# Patient Record
Sex: MALE | Race: ASIAN
[De-identification: names, ages, dates, MRNs, and addresses within clinical notes are randomized per-mention and may not be internally consistent; named-entity substitution may affect disease eponyms.]

---

## 2017-01-16 ENCOUNTER — HOSPITAL ENCOUNTER (OUTPATIENT)
Dept: HOSPITAL 17 - CLAB | Age: 62
End: 2017-01-16
Attending: FAMILY MEDICINE
Payer: COMMERCIAL

## 2017-01-16 DIAGNOSIS — E78.1: ICD-10-CM

## 2017-01-16 DIAGNOSIS — R53.83: ICD-10-CM

## 2017-01-16 DIAGNOSIS — R94.5: ICD-10-CM

## 2017-01-16 DIAGNOSIS — M25.50: ICD-10-CM

## 2017-01-16 DIAGNOSIS — E78.00: ICD-10-CM

## 2017-01-16 DIAGNOSIS — E11.9: ICD-10-CM

## 2017-01-16 DIAGNOSIS — Z00.00: Primary | ICD-10-CM

## 2017-01-16 DIAGNOSIS — Z12.5: ICD-10-CM

## 2017-01-16 DIAGNOSIS — E03.9: ICD-10-CM

## 2017-01-16 DIAGNOSIS — D64.9: ICD-10-CM

## 2017-01-16 LAB
ALP SERPL-CCNC: 84 U/L (ref 45–117)
ALT SERPL-CCNC: 20 U/L (ref 12–78)
ANION GAP SERPL CALC-SCNC: 11 MEQ/L (ref 5–15)
AST SERPL-CCNC: 23 U/L (ref 15–37)
BASOPHILS # BLD AUTO: 0.1 TH/MM3 (ref 0–0.2)
BASOPHILS NFR BLD: 1.1 % (ref 0–2)
BILIRUB INDIRECT SERPL-MCNC: 0.4 MG/DL (ref 0–0.8)
BILIRUB SERPL-MCNC: 0.5 MG/DL (ref 0.2–1)
BUN SERPL-MCNC: 13 MG/DL (ref 7–18)
CHLORIDE SERPL-SCNC: 103 MEQ/L (ref 98–107)
EOSINOPHIL # BLD: 1.3 TH/MM3 (ref 0–0.4)
EOSINOPHIL NFR BLD: 22 % (ref 0–4)
ERYTHROCYTE [DISTWIDTH] IN BLOOD BY AUTOMATED COUNT: 14 % (ref 11.6–17.2)
GFR SERPLBLD BASED ON 1.73 SQ M-ARVRAT: 100 ML/MIN (ref 89–?)
HCO3 BLD-SCNC: 22.5 MEQ/L (ref 21–32)
HCT VFR BLD CALC: 42.4 % (ref 39–51)
HDLC SERPL-MCNC: 33.8 MG/DL (ref 40–60)
HEMO FLAGS: (no result)
HEMOGLOBIN A1A: 1 %
HEMOGLOBIN A1B: 1.6 %
HEMOGLOBIN AO: 85.2 %
HEMOGLOBIN LA1C: 2.1 %
HEMOGLOBIN P3: 4.1 %
LDLC SERPL-MCNC: 105 MG/DL (ref 0–99)
LYMPHOCYTES # BLD AUTO: 1.6 TH/MM3 (ref 1–4.8)
LYMPHOCYTES NFR BLD AUTO: 26 % (ref 9–44)
MCH RBC QN AUTO: 28.8 PG (ref 27–34)
MCHC RBC AUTO-ENTMCNC: 34 % (ref 32–36)
MCV RBC AUTO: 84.5 FL (ref 80–100)
MONOCYTES NFR BLD: 7.1 % (ref 0–8)
NEUTROPHILS # BLD AUTO: 2.6 TH/MM3 (ref 1.8–7.7)
NEUTROPHILS NFR BLD AUTO: 43.8 % (ref 16–70)
PLATELET # BLD: 180 TH/MM3 (ref 150–450)
POTASSIUM SERPL-SCNC: 3.6 MEQ/L (ref 3.5–5.1)
RBC # BLD AUTO: 5.01 MIL/MM3 (ref 4.5–5.9)
SODIUM SERPL-SCNC: 136 MEQ/L (ref 136–145)
VIT B12 SERPL-MCNC: 529 PG/ML (ref 193–986)
WBC # BLD AUTO: 6 TH/MM3 (ref 4–11)

## 2017-01-16 PROCEDURE — 83036 HEMOGLOBIN GLYCOSYLATED A1C: CPT

## 2017-01-16 PROCEDURE — 85025 COMPLETE CBC W/AUTO DIFF WBC: CPT

## 2017-01-16 PROCEDURE — 36415 COLL VENOUS BLD VENIPUNCTURE: CPT

## 2017-01-16 PROCEDURE — 80061 LIPID PANEL: CPT

## 2017-01-16 PROCEDURE — 80076 HEPATIC FUNCTION PANEL: CPT

## 2017-01-16 PROCEDURE — 80048 BASIC METABOLIC PNL TOTAL CA: CPT

## 2017-01-16 PROCEDURE — 82746 ASSAY OF FOLIC ACID SERUM: CPT

## 2017-01-16 PROCEDURE — 84443 ASSAY THYROID STIM HORMONE: CPT

## 2017-01-16 PROCEDURE — 82607 VITAMIN B-12: CPT

## 2017-01-16 PROCEDURE — 84153 ASSAY OF PSA TOTAL: CPT

## 2017-01-30 ENCOUNTER — HOSPITAL ENCOUNTER (INPATIENT)
Dept: HOSPITAL 17 - NEPE | Age: 62
LOS: 5 days | Discharge: HOME | DRG: 190 | End: 2017-02-04
Attending: SPECIALIST | Admitting: SPECIALIST
Payer: COMMERCIAL

## 2017-01-30 VITALS
HEART RATE: 102 BPM | TEMPERATURE: 98.2 F | RESPIRATION RATE: 20 BRPM | SYSTOLIC BLOOD PRESSURE: 134 MMHG | OXYGEN SATURATION: 87 % | DIASTOLIC BLOOD PRESSURE: 95 MMHG

## 2017-01-30 VITALS — OXYGEN SATURATION: 93 % | HEART RATE: 108 BPM

## 2017-01-30 VITALS
OXYGEN SATURATION: 94 % | HEART RATE: 106 BPM | RESPIRATION RATE: 18 BRPM | TEMPERATURE: 96.5 F | DIASTOLIC BLOOD PRESSURE: 63 MMHG | SYSTOLIC BLOOD PRESSURE: 111 MMHG

## 2017-01-30 VITALS — BODY MASS INDEX: 22.04 KG/M2 | WEIGHT: 132.28 LBS | HEIGHT: 65 IN

## 2017-01-30 VITALS — OXYGEN SATURATION: 93 %

## 2017-01-30 VITALS
SYSTOLIC BLOOD PRESSURE: 173 MMHG | TEMPERATURE: 97.5 F | DIASTOLIC BLOOD PRESSURE: 116 MMHG | HEART RATE: 110 BPM | OXYGEN SATURATION: 85 % | RESPIRATION RATE: 22 BRPM

## 2017-01-30 VITALS — HEART RATE: 118 BPM | OXYGEN SATURATION: 82 %

## 2017-01-30 VITALS
SYSTOLIC BLOOD PRESSURE: 127 MMHG | DIASTOLIC BLOOD PRESSURE: 76 MMHG | OXYGEN SATURATION: 91 % | HEART RATE: 108 BPM | RESPIRATION RATE: 20 BRPM

## 2017-01-30 VITALS — OXYGEN SATURATION: 97 % | RESPIRATION RATE: 20 BRPM

## 2017-01-30 VITALS — TEMPERATURE: 99.9 F

## 2017-01-30 VITALS — TEMPERATURE: 102.8 F

## 2017-01-30 DIAGNOSIS — N40.0: ICD-10-CM

## 2017-01-30 DIAGNOSIS — K51.90: ICD-10-CM

## 2017-01-30 DIAGNOSIS — B37.0: ICD-10-CM

## 2017-01-30 DIAGNOSIS — J18.9: ICD-10-CM

## 2017-01-30 DIAGNOSIS — R09.02: ICD-10-CM

## 2017-01-30 DIAGNOSIS — N39.0: ICD-10-CM

## 2017-01-30 DIAGNOSIS — F41.9: ICD-10-CM

## 2017-01-30 DIAGNOSIS — I48.91: ICD-10-CM

## 2017-01-30 DIAGNOSIS — J44.1: ICD-10-CM

## 2017-01-30 DIAGNOSIS — E87.1: ICD-10-CM

## 2017-01-30 DIAGNOSIS — Z87.891: ICD-10-CM

## 2017-01-30 DIAGNOSIS — I10: ICD-10-CM

## 2017-01-30 DIAGNOSIS — J44.0: Primary | ICD-10-CM

## 2017-01-30 LAB
ANION GAP SERPL CALC-SCNC: 5 MEQ/L (ref 5–15)
APTT BLD: 30.1 SEC (ref 24.3–30.1)
BASE EXCESS BLD CALC-SCNC: -2.5 MMOL/L (ref -2–2)
BASOPHILS # BLD AUTO: 0.1 TH/MM3 (ref 0–0.2)
BASOPHILS NFR BLD: 0.8 % (ref 0–2)
BENZODIAZEPINES PNL UR: 87 % (ref 90–100)
BLOOD GAS CARBOXYHEMOGLOBIN: 2.6 % (ref 0–4)
BLOOD GAS HCO3: 21 MMOL/L (ref 22–26)
BLOOD GAS OXYGEN CONTENT: 19.8 VOL % (ref 12–20)
BLOOD GAS PCO2: 29 MMHG (ref 38–42)
BUN SERPL-MCNC: 23 MG/DL (ref 7–18)
CHLORIDE SERPL-SCNC: 103 MEQ/L (ref 98–107)
CRITICAL VALUE: YES
DRAW SITE: (no result)
EOSINOPHIL # BLD: 1.2 TH/MM3 (ref 0–0.4)
EOSINOPHIL NFR BLD: 15 % (ref 0–4)
EOSINOPHIL NFR BLD: 15 % (ref 0–4)
ERYTHROCYTE [DISTWIDTH] IN BLOOD BY AUTOMATED COUNT: 14.6 % (ref 11.6–17.2)
GFR SERPLBLD BASED ON 1.73 SQ M-ARVRAT: 75 ML/MIN (ref 89–?)
HCO3 BLD-SCNC: 25.7 MEQ/L (ref 21–32)
HCT VFR BLD CALC: 46.2 % (ref 39–51)
HEMO FLAGS: (no result)
INR PPP: 1 RATIO
LITER FLOW: 3 L/M
LYMPHOCYTES # BLD AUTO: 1.6 TH/MM3 (ref 1–4.8)
LYMPHOCYTES NFR BLD AUTO: 19.7 % (ref 9–44)
MCH RBC QN AUTO: 29.5 PG (ref 27–34)
MCHC RBC AUTO-ENTMCNC: 34.3 % (ref 32–36)
MCV RBC AUTO: 86 FL (ref 80–100)
METHGB MFR BLDA: 2 % (ref 0–2)
MONOCYTES NFR BLD: 6.4 % (ref 0–8)
NEUTROPHILS # BLD AUTO: 4.8 TH/MM3 (ref 1.8–7.7)
NEUTROPHILS NFR BLD AUTO: 58.1 % (ref 16–70)
NEUTS BAND # BLD MANUAL: 5.8 TH/MM3 (ref 1.8–7.7)
NEUTS SEG NFR BLD MANUAL: 71 % (ref 16–70)
NUMBER OF ARTERIAL PUNCTURES: 1
OXYGEN DEVICE: (no result)
PLAT MORPH BLD: NORMAL
PLATELET # BLD: 182 TH/MM3 (ref 150–450)
PLATELET BLD QL SMEAR: NORMAL
PO2 BLD: 60 MMHG (ref 61–120)
POTASSIUM SERPL-SCNC: 3.8 MEQ/L (ref 3.5–5.1)
PROTHROMBIN TIME: 10.5 SEC (ref 9.8–11.6)
RBC # BLD AUTO: 5.38 MIL/MM3 (ref 4.5–5.9)
RBC MORPH BLD: NORMAL
SALICYLATES SERPL-MCNC: 16.3 G/DL (ref 12–16)
SCAN/DIFF: (no result)
SODIUM SERPL-SCNC: 134 MEQ/L (ref 136–145)
STAT: YES
TEMP CORR TO: 98.6
ULNAR PULSE: PRESENT
WBC # BLD AUTO: 8.2 TH/MM3 (ref 4–11)
WBC DIFF SAMPLE: 100

## 2017-01-30 PROCEDURE — 94060 EVALUATION OF WHEEZING: CPT

## 2017-01-30 PROCEDURE — 85730 THROMBOPLASTIN TIME PARTIAL: CPT

## 2017-01-30 PROCEDURE — 84484 ASSAY OF TROPONIN QUANT: CPT

## 2017-01-30 PROCEDURE — 85007 BL SMEAR W/DIFF WBC COUNT: CPT

## 2017-01-30 PROCEDURE — 84480 ASSAY TRIIODOTHYRONINE (T3): CPT

## 2017-01-30 PROCEDURE — 93005 ELECTROCARDIOGRAM TRACING: CPT

## 2017-01-30 PROCEDURE — 84443 ASSAY THYROID STIM HORMONE: CPT

## 2017-01-30 PROCEDURE — 87040 BLOOD CULTURE FOR BACTERIA: CPT

## 2017-01-30 PROCEDURE — 85610 PROTHROMBIN TIME: CPT

## 2017-01-30 PROCEDURE — 71275 CT ANGIOGRAPHY CHEST: CPT

## 2017-01-30 PROCEDURE — 94640 AIRWAY INHALATION TREATMENT: CPT

## 2017-01-30 PROCEDURE — 84439 ASSAY OF FREE THYROXINE: CPT

## 2017-01-30 PROCEDURE — 83735 ASSAY OF MAGNESIUM: CPT

## 2017-01-30 PROCEDURE — 93306 TTE W/DOPPLER COMPLETE: CPT

## 2017-01-30 PROCEDURE — 87449 NOS EACH ORGANISM AG IA: CPT

## 2017-01-30 PROCEDURE — 36600 WITHDRAWAL OF ARTERIAL BLOOD: CPT

## 2017-01-30 PROCEDURE — 83605 ASSAY OF LACTIC ACID: CPT

## 2017-01-30 PROCEDURE — 83880 ASSAY OF NATRIURETIC PEPTIDE: CPT

## 2017-01-30 PROCEDURE — 85025 COMPLETE CBC W/AUTO DIFF WBC: CPT

## 2017-01-30 PROCEDURE — 81001 URINALYSIS AUTO W/SCOPE: CPT

## 2017-01-30 PROCEDURE — 87633 RESP VIRUS 12-25 TARGETS: CPT

## 2017-01-30 PROCEDURE — 94664 DEMO&/EVAL PT USE INHALER: CPT

## 2017-01-30 PROCEDURE — 94620: CPT

## 2017-01-30 PROCEDURE — 71010: CPT

## 2017-01-30 PROCEDURE — 82805 BLOOD GASES W/O2 SATURATION: CPT

## 2017-01-30 PROCEDURE — 85652 RBC SED RATE AUTOMATED: CPT

## 2017-01-30 PROCEDURE — 85027 COMPLETE CBC AUTOMATED: CPT

## 2017-01-30 PROCEDURE — 80048 BASIC METABOLIC PNL TOTAL CA: CPT

## 2017-01-30 RX ADMIN — NYSTATIN SCH ML: 100000 SUSPENSION ORAL at 18:10

## 2017-01-30 RX ADMIN — METHYLPREDNISOLONE SODIUM SUCCINATE SCH MG: 40 INJECTION, POWDER, FOR SOLUTION INTRAMUSCULAR; INTRAVENOUS at 16:29

## 2017-01-30 RX ADMIN — GABAPENTIN SCH MG: 100 CAPSULE ORAL at 18:10

## 2017-01-30 RX ADMIN — IPRATROPIUM BROMIDE AND ALBUTEROL SULFATE SCH AMPULE: .5; 3 SOLUTION RESPIRATORY (INHALATION) at 20:34

## 2017-01-30 RX ADMIN — BUDESONIDE AND FORMOTEROL FUMARATE DIHYDRATE SCH PUFF: 160; 4.5 AEROSOL RESPIRATORY (INHALATION) at 21:58

## 2017-01-30 RX ADMIN — HEPARIN SODIUM SCH UNITS: 10000 INJECTION, SOLUTION INTRAVENOUS; SUBCUTANEOUS at 14:01

## 2017-01-30 RX ADMIN — ACETAMINOPHEN PRN MG: 500 TABLET ORAL at 18:10

## 2017-01-30 RX ADMIN — MAGNESIUM SULFATE HEPTAHYDRATE SCH MLS/HR: 500 INJECTION, SOLUTION INTRAMUSCULAR; INTRAVENOUS at 22:03

## 2017-01-30 RX ADMIN — MESALAMINE SCH MG: 800 TABLET, DELAYED RELEASE ORAL at 21:59

## 2017-01-30 RX ADMIN — NYSTATIN SCH ML: 100000 SUSPENSION ORAL at 21:58

## 2017-01-30 RX ADMIN — SODIUM CHLORIDE, PRESERVATIVE FREE SCH ML: 5 INJECTION INTRAVENOUS at 21:59

## 2017-01-30 RX ADMIN — MESALAMINE SCH MG: 800 TABLET, DELAYED RELEASE ORAL at 15:22

## 2017-01-30 RX ADMIN — GABAPENTIN SCH MG: 100 CAPSULE ORAL at 13:59

## 2017-01-30 RX ADMIN — CEFEPIME SCH MLS/HR: 2 INJECTION, POWDER, FOR SOLUTION INTRAVENOUS at 21:58

## 2017-01-30 NOTE — HHI.HP
HPI


Service


[facility]


Primary Care Physician


ASIF Farmer


Admission Diagnosis


Hypoxemia


Diagnoses:  


Chief Complaint:  


Shortness of breath (Wilma Harrison)





Travel History


International Travel<30 Days:  No


Contact w/Intl Traveler <30 Da:  No


Traveled to Known Affected Are:  No (Wilma Harrison)


History of Present Illness


This a pleasant 61-year-old male with notable past medical history of tobacco 

abuse with recent diagnosis of COPD, ulcer colitis, hypertension, BPH.  Patient 

presented to the emergency room with complaint of shortness of breath and 

hypoxia.  According to the patient approximately 2 weeks ago he had shortness 

of breath with wheezing and cough.  Went to see his primary care physician who 

gave him Rocephin IM as well as Solu-Medrol and prescribed Augmentin.  He 

completed 10 days of Augmentin and his symptoms did improve.  He also had PFTs 

and was told he had COPD and was referred to see Dr. Bazan.  Patient recently 

quit December 2016.  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 

he was at work he noted increased shortness of breath and checked his sats in 

the were 81%.  He gave himself oxygen at 2 L and finished his shift.  This 

morning, patient came to the emergency room for further evaluation.  States he 

has a dry cough, very little sputum, clear in color.  No fever, no chills.  

Denies any abdominal pain, no diarrhea, no nausea no vomiting.  He does use 

Symbicort as well as Combivent at home.  He's not on any oxygen.  Patient was 

evaluated in the emergency room, laboratory workup was completed, WBC was 8.2.  

Hemoglobin 15.9, hematocrit 46.2, platelets 182.  BMP was essentially 

unremarkable except for mild hyponatremia, sodium 134.  Troponin was negative.  

B natruretic peptide was negative.  Chest x-ray completed showed questionable 

minimal early interstitial infiltrate to the right lung base.  CTA was negative 

for PE.  ABGs were completed, showed PO2 of 60 on 3 L nasal cannula, sats 87% 

on 3 L.  Patient was started on empiric antibiotics.  Patient is evaluated in 

the emergency room, he is complaining of persistent cough.  Robitussin-AC has 

been ordered.  He is noted with thrush.  Patient is admitted for further 

evaluation and treatment (Wilma Harrison)





Review of Systems


Constitutional:  DENIES: Diaphoretic episodes, Fatigue, Fever, Weight gain, 

Weight loss, Chills, Dizziness, Change in appetite, Night Sweats


Endocrine:  DENIES: Heat/cold intolerance, Polydipsia, Polyuria, Polyphagia


Eyes:  DENIES: Blurred vision, Diplopia, Eye inflammation, Eye pain, Vision loss

, Photosensitivity, Double Vision


Ears, nose, mouth, throat:  DENIES: Tinnitus, Hearing loss, Vertigo, Nasal 

discharge, Oral lesions, Throat pain, Hoarseness, Ear Pain, Running Nose, 

Epistaxis, Sinus Pain, Toothache, Odynophagia


Respiratory:  COMPLAINS OF: Cough, Wheezing, Sputum production, Shortness of 

breath,  DENIES: Apneas, Snoring, Hemoptysis


Cardiovascular:  DENIES: Chest pain, Palpitations, Syncope, Dyspnea on Exertion

, PND, Lower Extremity Edema, Orthopnea, Claudication


Gastrointestinal:  DENIES: Abdominal pain, Black stools, Bloody stools, 

Constipation, Diarrhea, Nausea, Vomiting, Difficulty Swallowing, Anorexia


Genitourinary:  DENIES: Sexual dysfunction, Urinary frequency, Urinary 

incontinence, Urgency, Hematuria, Dysuria, Nocturia, Penile Discharge, 

Testicular Pain, Testicular Swelling


Musculoskeletal:  DENIES: Joint pain, Muscle aches, Stiffness, Joint Swelling, 

Back pain, Neck pain


Integumentary:  DENIES: Abnormal pigmentation, Nail changes, Pruritus, Rash


Hematologic/lymphatic:  DENIES: Bruising, Lymphadenopathy


Immunologic/allergic:  DENIES: Eczema, Urticaria


Neurologic:  DENIES: Abnormal gait, Headache, Localized weakness, Paresthesias, 

Seizures, Speech Problems, Tremor, Poor Balance


Psychiatric:  DENIES: Anxiety, Confusion, Mood changes, Depression, 

Hallucinations, Agitation, Suicidal Ideation, Homicidal Ideation, Delusions (

Wilma Harrison)





Past Family Social History


Past Medical History


Hypertension, diverticulitis, gastritis and ulcerative colitis


Previous admission for sepsis and UTI


Bladder polyp


Recently diagnosed with COPD


Past Surgical History


Benign cyst removal from the buttock 2000 and, keloid removal from left upper 

arm, cystoscopy 2 weeks ago


Colonoscopy


Reported Medications





Reported Meds & Active Scripts


Active


Reported


Symbicort Inh (Budesonide/Formoterol Fumarate) 160-4.5 Mcg/Act Aero 1 Puff INH 

Q12HR


Vitamin C (Ascorbic Acid) 500 Mg Tab 500 Mg PO DAILY


Vitamin B Complex (B-Complex Vitamins) 1 Tab 1 Tab PO DAILY


Duoneb (Ipratropium-Albuterol Neb) 0.5-2.5 Mg/3 Ml Neb 1 Vial NEB Q6HR PRN


Metoprolol Tartrate 25 Mg Tab 12.5 Mg PO DAILY PRN


Clonidine (Clonidine HCl) 0.1 Mg Tab 0.1 Mg PO BID PRN


Tylenol Extra Strength (Acetaminophen) 500 Mg Tab 500 Mg PO Q4-6H PRN


Neurontin (Gabapentin) 100 Mg Cap 100 Mg PO TID


Protonix (Pantoprazole Sodium) 40 Mg Tab 40 Mg PO DAILY PRN


Centrum Silver Adult 50+ (Multiple Vitamins W/ Minerals) 1 Tab Tab 1 Tab PO 

DAILY


Asacol HD (Mesalamine) 800 Mg Tab 1,600 Mg PO Q8HR


     Swallow whole.  Take on an empty stomach.


Bentyl (Dicyclomine HCl) 20 Mg Tab 20 Mg PO TID PRN (Wilma Harrison)


Allergies:  


Coded Allergies:  


     No Known Allergies (Verified , 1/30/17)


Active Ordered Medications





 Inpatient Medications


Acetaminophen (Tylenol) 500 mg 5 TIMES A DAY  PRN PO PAIN 1-7;  Start 1/30/17 

at 14:00


Albuterol/ Ipratropium (Duoneb Neb) 1 ampule Q6HR NEB  PRN NEB SHORTNESS OF 

BREATH;  Start 1/30/17 at 16:00


Ascorbic Acid (Vitamin C) 500 mg DAILY PO ;  Start 1/31/17 at 09:00


Azithromycin (Zithromax) 500 mg Q24H PO ;  Start 1/31/17 at 14:00


Budesonide/ Formoterol Fumarate (Symbicort 160-4.5 Inh) 1 puff Q12HR INH ;  

Start 1/30/17 at 21:00


Ceftriaxone Sodium/Sodium Chloride (Rocephin Inj/NS Inj) 100 ml @  200 mls/hr 

Q24H IV ;  Start 1/31/17 at 14:00


Clonidine (Catapres) 0.1 mg BID  PRN PO SYSTOLIC B/P > 170;  Start 1/30/17 at 13

:00


Dicyclomine HCl (Bentyl) 20 mg TID  PRN PO Bowel Management;  Start 1/30/17 at 

13:00


Gabapentin (Neurontin) 100 mg TID PO  Last administered on 1/30/17at 13:59;  

Start 1/30/17 at 13:00


Guaifenesin/ Codeine Phosphate (Robitussin Ac 200-20 Mg/10 ml Liq) 10 ml Q6H  

PRN PO cough;  Start 1/30/17 at 15:30


Heparin Sodium (Porcine) (Heparin Inj) 5,000 units Q12H SQ  Last administered 

on 1/30/17at 14:01;  Start 1/30/17 at 13:00


IV Flush (NS Flush) 2 ml BID FLUSH ;  Start 1/30/17 at 21:00


Mesalamine (Asacol Hd Dr) 1,600 mg Q8HR PO  Last administered on 1/30/17at 15:22

;  Start 1/30/17 at 14:00


Methylprednisolone Sodium Succinate 40 mg 40 mg Q8H IV PUSH ;  Start 1/30/17 at 

16:00


Metoprolol Tartrate (Lopressor) 12.5 mg DAILY  PRN PO INCREASE IN BLOOD PRESSURE

;  Start 1/30/17 at 14:00


Multivitamin Hematinic Therapeutic (Theragran Hematinic) 1 tab DAILY PO ;  

Start 1/31/17 at 09:00


Naloxone HCl (Narcan Inj) 0.4 mg UNSCH  PRN IV SEE LABEL COMMENTS;  Start 1/30/ 17 at 13:00


Nystatin (Mycostatin  Liq) 5 ml QID SWISH-SWAL ;  Start 1/30/17 at 18:00


Ondansetron HCl (Zofran Inj) 4 mg Q6H  PRN IVP NAUSEA OR VOMITING;  Start 1/30/ 17 at 13:00


Pantoprazole Sodium (Protonix) 40 mg DAILY  PRN PO REFLUX;  Start 1/30/17 at 14:

00


Vitamin B Complex/ Vitamin C (Allbee C) 1 tab DAILY PO ;  Start 1/31/17 at 09:00


Family History


Mother passed away secondary to colon cancer at age 65


Father passed away secondary to stroke at age 61


Social History


Patient currently works as an RN [employer]


Quit drinking December 2016


Quit smoking December 2016, smoked 1 pack of cigarettes every 3 days for 30+ 

years


No illegal drug use (Wilma Harrison)





Physical Exam


Vital Signs





 Vital Signs








  Date Time  Temp Pulse Resp B/P Pulse Ox O2 Delivery O2 Flow Rate FiO2


 


1/30/17 15:13  108 20 127/76 91 Nasal Cannula 2 


 


1/30/17 09:45     97 Nasal Cannula 3 


 


1/30/17 09:45   20  97 Nasal Cannula 3 


 


1/30/17 08:30  98 20  87 Nasal Cannula 2 


 


1/30/17 08:26 98.2 102 20 134/95 87   


 


1/30/17 08:16 97.5 110 22 173/116 85   








Physical Exam


GENERAL: This is a well-nourished, well-developed patient, in no apparent 

distress.


SKIN: No rashes, ecchymoses or lesions. Cool and dry.


HEAD: Atraumatic. Normocephalic. No temporal or scalp tenderness.


EYES: Pupils equal round and reactive. Extraocular motions intact. No scleral 

icterus. No injection or drainage. 


ENT: Nose without bleeding, purulent drainage or septal hematoma. Throat with 

mild erythema, thrush noted to oral pharynx, no tonsillar hypertrophy or 

exudate. Uvula midline. Airway patent.


NECK: Trachea midline. No JVD or lymphadenopathy. Supple, nontender, no 

meningeal signs.


CARDIOVASCULAR: Regular rate and rhythm without murmurs, gallops, or rubs. 


RESPIRATORY: Scattered rhonchi left lower base, faint expiratory wheezing, has 

a cough, nonproductive.


GASTROINTESTINAL: Abdomen soft, non-tender, nondistended. No hepato-splenomegaly

, or palpable masses. No guarding.


MUSCULOSKELETAL: Extremities without clubbing, cyanosis, or edema. No joint 

tenderness, effusion, or edema noted. No calf tenderness. Negative Homans sign 

bilaterally.


NEUROLOGICAL: Awake and alert. Cranial nerves II through XII intact.  Motor and 

sensory grossly within normal limits. Five out of 5 muscle strength in all 

muscle groups.  Normal speech.


Laboratory





Laboratory Tests








Test 1/30/17 1/30/17





 08:42 09:35


 


Blood Gas Puncture Site RT RADIAL  


 


Blood Gas Patient Temperature 98.6  


 


Blood Gas HCO3 21  


 


Blood Gas Base Excess -2.5  


 


Blood Gas Oxygen Saturation 87  


 


Arterial Blood pH 7.47  


 


Arterial Blood Partial 29  





Pressure CO2  


 


Arterial Blood Partial 60  





Pressure O2  


 


Arterial Blood Oxygen Content 19.8  


 


Arterial Blood 2.6  





Carboxyhemoglobin  


 


Arterial Blood Methemoglobin 2.0  


 


Blood Gas Hemoglobin 16.3  


 


Oxygen Delivery Device NASAL CANNULA  


 


Blood Gas Liter Flow 3  


 


White Blood Count  8.2 


 


Red Blood Count  5.38 


 


Hemoglobin  15.9 


 


Hematocrit  46.2 


 


Mean Corpuscular Volume  86.0 


 


Mean Corpuscular Hemoglobin  29.5 


 


Mean Corpuscular Hemoglobin  34.3 





Concent  


 


Red Cell Distribution Width  14.6 


 


Platelet Count  182 


 


Mean Platelet Volume  8.3 


 


Neutrophils (%) (Auto)  58.1 


 


Lymphocytes (%) (Auto)  19.7 


 


Monocytes (%) (Auto)  6.4 


 


Eosinophils (%) (Auto)  15.0 


 


Basophils (%) (Auto)  0.8 


 


Neutrophils # (Auto)  4.8 


 


Lymphocytes # (Auto)  1.6 


 


Monocytes # (Auto)  0.5 


 


Eosinophils # (Auto)  1.2 


 


Basophils # (Auto)  0.1 


 


CBC Comment  AUTO DIFF 


 


Differential Total Cells  100 





Counted  


 


Neutrophils % (Manual)  71 


 


Lymphocytes %  12 


 


Monocytes %  2 


 


Eosinophils %  15 


 


Neutrophils # (Manual)  5.8 


 


Differential Comment  FINAL DIFF





  MANUAL


 


Platelet Estimate  NORMAL 


 


Platelet Morphology Comment  NORMAL 


 


Red Cell Morphology Comment  NORMAL 


 


Prothrombin Time  10.5 


 


Prothromb Time International  1.0 





Ratio  


 


Activated Partial  30.1 





Thromboplast Time  


 


Sodium Level  134 


 


Potassium Level  3.8 


 


Chloride Level  103 


 


Carbon Dioxide Level  25.7 


 


Anion Gap  5 


 


Blood Urea Nitrogen  23 


 


Creatinine  1.01 


 


Estimat Glomerular Filtration  75 





Rate  


 


Random Glucose  80 


 


Calcium Level  9.0 


 


Troponin I  LESS THAN 0.02 


 


B-Type Natriuretic Peptide  LESS THAN 2 





 (Wilma Harrison)


Result Diagram:  


1/30/17 0935                                                                   

             1/30/17 0935





Imaging





Last Impressions








Chest X-Ray 1/30/17 0839 Signed





Impressions: 





 Service Date/Time:  Monday, January 30, 2017 08:40 - CONCLUSION:  Questionable 





 minimal early interstitial infiltrate right lung base     Ant Nino MD 


 


CT Angiography 1/30/17 0839 Signed





Impressions: 





 Service Date/Time:  Monday, January 30, 2017 10:42 - CONCLUSION:  Negative 





 examination.     Ant Nino MD 





 (Wilma Harrison)





Assessment and Plan


Problem List:  


(1) Hypoxemia


(2) COPD (chronic obstructive pulmonary disease)


(3) Hypertension


(4) Ulcerative colitis


(5) BPH (benign prostatic hyperplasia)


(6) Pneumonia


Assessment and Plan


Admit to Dr. Sutton





61-year-old male with recent diagnosis of COPD, quit smoking in December, 

presented to emergency room complaining of shortness of breath and cough for 

the last 2 weeks worsening last night while he was at work.   Was treated with 

Augmentin 10 days by PCP for possible URI infection.  He noted saturations on 

room air were 81%.  Patient presented to emergency room for further evaluation, 

chest x-ray with findings of possible early right lower lobe infiltrate, 

possible pneumonia,  COPD exacerbation with hypoxemia, failure of outpatient 

therapy


-Continue with oxygen at 2 L to keep sats greater than 90


Consul Dr. Murry for evaluation


-IV Solu-Medrol 40 mg IV daily


Robitussin AC 10 MLS every 6 when necessary


-DuoNeb's


Resume Symbicort


-Continue with empiric antibiotics, Rocephin 1 g IV daily, we will add 

Zithromax 500 mg by mouth


-Noted with thrush, nystatin has been ordered





History of ulcerative colitis, stable


Continue with Asacol





Hypertension, uncontrolled secondary to respiratory distress


Resume home medications








Heparin 5000 units subcutaneous twice a day for DVT prophylaxis


Protonix for GI prophylaxis


Bun of care has been discussed with the patient, attending and registered 

nurse.  Further management of the patient will be dependent on the hospital 

course





Patient requires inpatient admission for anticipated stay greater than 2 days 

for COPD exacerbation, failure of outpatient therapy, pneumonia, hypoxemia.  

Patient is at risk for complications that can result in respiratory failure, 

possibly death.  Patient requires admission for IV antibiotics, oxygen therapy, 

IV Solu-Medrol, DuoNeb's, evaluation by pulmonology.  Anticipated discharge 

back to home when stable





This patient was seen by myself and Dr. Sutton, this H&P is written his behalf


 (Wilma Harrison)


Assessment and Plan


seen and examined by myself,Dr Sutton , 


Discussed with emergency physician


discussed with nurse


Discussed with patient In room 1734


The patient is febrile, temperature 102.8


Sepsis


Dyspnea with clear chest x-ray


Bilateral wheezing with a cardiac murmur


Echocardiogram ordered


Blood cultures


Infectious disease consultation


Discussed with pulmonologist


Discussed with mid-level practitioner 


The exam, history, and the medical decision-making described in the above note 

were completed with the assistance of the mid-level provider. I reviewed the 

findings presented.  I attest that I had a face-to-face encounter with the 

patient on the same day, and personally performed and documented my assessment 

and findings in the medical record.


 (Mari Sutton MD)





Physician Certification


2 Midnight Certification Type:  Admission for Inpatient Services


Order for Inpatient Services


The services are ordered in accordance with Medicare regulations or non-

Medicare payer requirements, as applicable.  In the case of services not 

specified as inpatient-only, they are appropriately provided as inpatient 

services in accordance with the 2-midnight benchmark.


Estimated LOS (days):  2


2 days is the estimated time the patient will need to remain in the hospital, 

assuming treatment plan goals are met and no additional complications.


Post-Hospital Plan:  Home Health (Wilma Harrison)





Problem Qualifiers





(1) COPD (chronic obstructive pulmonary disease):  


Qualified Code:  J44.1 - Chronic obstructive pulmonary disease with acute 

exacerbation


(2) Hypertension:  


Qualified Code:  I10 - Essential hypertension


(3) Ulcerative colitis:  


Qualified Code:  K51.90 - Ulcerative colitis without complications, unspecified 

location


(4) BPH (benign prostatic hyperplasia):  





(5) Pneumonia:  


Qualified Code:  J18.1 - Pneumonia of right lower lobe due to infectious 

organism





Wilma Harrison Jan 30, 2017 15:16


Mari Sutton MD Jan 30, 2017 22:36

## 2017-01-30 NOTE — RADRPT
EXAM DATE/TIME:  01/30/2017 10:42 

 

[facility] COMPARISON:     

CHEST SINGLE AP, January 30, 2017, 8:40.

 

 

INDICATIONS :     

Shortness of breath and hypoxia for two days.

                      

 

IV CONTRAST:     

75 cc Omnipaque 350 (iohexol) IV 

 

 

RADIATION DOSE:     

23.27 CTDIvol (mGy) 

 

 

MEDICAL HISTORY :     

Cardiovascular disease. Hypertension. Ulcerative colitis.

 

SURGICAL HISTORY :      

None. 

 

ENCOUNTER:      

Initial

 

ACUITY:      

1 day

 

PAIN SCALE:      

0/10

 

LOCATION:       

Bilateral chest 

 

TECHNIQUE:     

Volumetric scanning of the chest was performed using a pulmonary embolism protocol MIP images were re
constructed.  Using automated exposure control and adjustment of the mA and/or kV according to patien
t size, radiation dose was kept as low as reasonably achievable to obtain optimal diagnostic quality 
images. 

 

FINDINGS:     

 

PULMONARY ARTERIES:

No filling defects are seen in the pulmonary arteries through the segmental level.

 

LUNGS:     

There is no consolidation or pneumothorax .  No concerning pulmonary nodule is visualized.

 

PLEURAE:     

There is no pleural thickening or pleural effusion.

 

MEDIASTINUM:     

There is good visualization of the great vessels of the middle mediastinum.  No evidence of mediastin
al or hilar adenopathy/mass.

 

MUSCULOSKELETAL:     

Within normal limits for patient age.

 

MISCELLANEOUS:     

The visualized upper abdominal organs demonstrate no acute abnormality.

 

CONCLUSION:     

Negative examination.

 

 

 

 Ant Nino MD on January 30, 2017 at 10:56           

Board Certified Radiologist.

 This report was verified electronically.

## 2017-01-30 NOTE — PD
HPI


Chief Complaint:  Respiratory Symptoms


Time Seen by Provider:  08:32


Travel History


International Travel<30 days:  No


Contact w/Intl Traveler<30days:  No


Traveled to known affect area:  No





History of Present Illness


HPI


62yo M with PMH of HTN, ulcerative colitis and early emphysema presents to the 

ED with sob and hypoxia.  Pt has been more short of breath for 2 days and was 

saturating at 81% on RA last night. 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 

over night when he check that his O2 sat was low so gave himself oxygen at 

work.  Denies chest pain but there is tightness with cough.  +Dry cough.  

Denies any history of PE, DVT, recent travel, hemoptysis, fever, n/v, abdominal 

pain, focal weakness or numbness.  Pt recently had finished antibiotic given by 

his PMD for cough.





PFSH


Past Medical History


Heart Rhythm Problems:  No


Cancer:  No


Cardiac Catheterization:  No


Cardiovascular Problems:  Yes (HTN)


High Cholesterol:  No


Congestive Heart Failure:  No


Diabetes:  No


Diminished Hearing:  No


Diverticulitis:  Yes


Endocrine:  No


Gastrointestinal Disorders:  Yes (ulcerative colitis)


GERD:  Yes


Genitourinary:  Yes (uti this week)


Hepatitis:  No


Hiatal Hernia:  No


Hypertension:  Yes


Immune Disorder:  No


Kidney Stones:  No


Medical other:  Yes (RECENT HOSPITALIZATION SEPTICEMIA, ANTIBIOTICS)


Musculoskeletal:  No


Neurologic:  No


Psychiatric:  No


Reproductive:  No


Respiratory:  No


Myocardial Infarction:  No


Renal Failure:  No


Thyroid Disease:  No


Ulcer:  No





Past Surgical History


Abdominal Surgery:  No


AICD:  No


Cardiac Surgery:  No


Coronary Artery Bypass Graft:  No


Ear Surgery:  No


Endocrine Surgery:  No


Eye Surgery:  No


Genitourinary Surgery:  No


Gynecologic Surgery:  No


Joint Replacement:  No


Oral Surgery:  No


Pacemaker:  No


Thoracic Surgery:  No


Other Surgery:  Yes (BENIGN CYST REMOVED FROM BUTTOCK 2000, KELOID REMOVED LEFT 

SHOULDER/ARM)





Social History


Alcohol Use:  Yes (SOCIAL)


Tobacco Use:  No


Substance Use:  No





Allergies-Medications


(Allergen,Severity, Reaction):  


Coded Allergies:  


     No Known Allergies (Verified , 1/30/17)


Reported Meds & Prescriptions





Reported Meds & Active Scripts


Active


Reported


Symbicort Inh (Budesonide/Formoterol Fumarate) 160-4.5 Mcg/Act Aero 1 Puff INH 

Q12HR


Vitamin C (Ascorbic Acid) 500 Mg Tab 500 Mg PO DAILY


Vitamin B Complex (B-Complex Vitamins) 1 Tab 1 Tab PO DAILY


Duoneb (Ipratropium-Albuterol Neb) 0.5-2.5 Mg/3 Ml Neb 1 Vial NEB Q6HR PRN


Metoprolol Tartrate 25 Mg Tab 12.5 Mg PO DAILY PRN


Clonidine (Clonidine HCl) 0.1 Mg Tab 0.1 Mg PO BID PRN


Tylenol Extra Strength (Acetaminophen) 500 Mg Tab 500 Mg PO Q4-6H PRN


Neurontin (Gabapentin) 100 Mg Cap 100 Mg PO TID


Protonix (Pantoprazole Sodium) 40 Mg Tab 40 Mg PO DAILY PRN


Centrum Silver Adult 50+ (Multiple Vitamins W/ Minerals) 1 Tab Tab 1 Tab PO 

DAILY


Asacol HD (Mesalamine) 800 Mg Tab 1,600 Mg PO Q8HR


     Swallow whole.  Take on an empty stomach.


Bentyl (Dicyclomine HCl) 20 Mg Tab 20 Mg PO TID PRN








Review of Systems


Except as stated in HPI:  all other systems reviewed are Neg





Physical Exam


Narrative


GENERAL: 62yo M not in distress.


SKIN: Warm and dry.


HEAD: Atraumatic. Normocephalic. 


CARDIOVASCULAR: Regular rate and rhythm.  No murmur appreciated.


RESPIRATORY: No accessory muscle use. Clear to auscultation. Breath sounds 

equal bilaterally. Saturating at 93% on 3L NC.


GASTROINTESTINAL: Abdomen soft, non-tender, nondistended. No rebound tenderness 

or guarding.


MUSCULOSKELETAL: No obvious deformities. No clubbing.  No cyanosis.  No edema. 


NEUROLOGICAL: Awake and alert. No obvious cranial nerve deficits.  Motor 

grossly within normal limits. Normal speech.


PSYCHIATRIC: Appropriate mood and affect; insight and judgment normal.





Data


Data


Last Documented VS





Vital Signs








  Date Time  Temp Pulse Resp B/P Pulse Ox O2 Delivery O2 Flow Rate FiO2


 


1/30/17 09:45     97 Nasal Cannula 3 


 


1/30/17 09:45   20     


 


1/30/17 08:30  98      


 


1/30/17 08:26 98.2   134/95    








Orders





 Complete Blood Count With Diff (1/30/17 08:39)


Basic Metabolic Panel (Bmp) (1/30/17 08:39)


B-Type Natriuretic Peptide (1/30/17 08:39)


Act Partial Throm Time (Ptt) (1/30/17 08:39)


Prothrombin Time / Inr (Pt) (1/30/17 08:39)


Troponin I (1/30/17 08:39)


Arterial Blood Gas (Abg) (1/30/17 08:39)


Iv Access Insert/Monitor (1/30/17 08:39)


Electrocardiogram (1/30/17 08:39)


Ecg Monitoring (1/30/17 08:39)


Oximetry (1/30/17 08:39)


Oxygen Administration (1/30/17 08:39)


Chest, Single Ap (1/30/17 08:39)


Ct Pulmonary Angiogram (1/30/17 08:39)


Sodium Chloride 0.9% Flush (Ns Flush) (1/30/17 08:45)


Iohexol 350 Inj (Omnipaque 350 Inj) (1/30/17 10:43)


Ceftriaxone Inj (Rocephin Inj) (1/30/17 12:45)


Azithromycin (Zithromax) (1/30/17 12:45)


Place In Observation (1/30/17 )


Vital Signs (Adult) Q4H (1/30/17 12:48)


Activity Bed Rest With Brp (1/30/17 12:48)


Cardiac Monitor / Telemetry .CONTINUOUS (1/30/17 12:48)


Diet Regular Basic (1/30/17 Lunch)


Sodium Chloride 0.9% Flush (Ns Flush) (1/30/17 13:00)


Sodium Chloride 0.9% Flush (Ns Flush) (1/30/17 21:00)


Ondansetron Inj (Zofran Inj) (1/30/17 13:00)


Basic Metabolic Panel (Bmp) (1/31/17 06:00)


Complete Blood Count With Diff (1/31/17 06:00)


Heparin Inj (Heparin Inj) (1/30/17 13:00)


Naloxone Inj (Narcan Inj) (1/30/17 13:00)


Westergren Sedimentation Rate (1/30/17 12:51)


Acetaminophen (Tylenol) (1/30/17 14:00)


Budeson-Formot 160-4.5 Mg Inh (Symbicort (1/30/17 21:00)


Clonidine (Catapres) (1/30/17 13:00)


Dicyclomine (Bentyl) (1/30/17 13:00)


Gabapentin (Neurontin) (1/30/17 13:00)


Mesalamine Hd Dr (Asacol Hd Dr) (1/30/17 14:00)


Metoprolol Tartrate (Lopressor) (1/30/17 14:00)


Multivitamin Hematinic Therap (Theragran (1/31/17 09:00)


Pantoprazole (Protonix) (1/30/17 14:00)


Vitamin B Complex-Vit C (Allbee C) (1/31/17 09:00)


Ascorbic Acid (Vitamin C) (1/31/17 09:00)


Albuterol-Ipratropium Neb (Duoneb Neb) (1/30/17 16:00)


Admit Order (Ed Use Only) (1/30/17 13:05)





Labs





 Laboratory Tests








Test 1/30/17 1/30/17





 08:42 09:35


 


Blood Gas Puncture Site RT RADIAL  


 


Blood Gas Patient Temperature 98.6  


 


Blood Gas HCO3 21 mmol/L 


 


Blood Gas Base Excess -2.5 mmol/L 


 


Blood Gas Oxygen Saturation 87 % 


 


Arterial Blood pH 7.47  


 


Arterial Blood Partial 29 mmHg 





Pressure CO2  


 


Arterial Blood Partial 60 mmHG 





Pressure O2  


 


Arterial Blood Oxygen Content 19.8 Vol % 


 


Arterial Blood 2.6 % 





Carboxyhemoglobin  


 


Arterial Blood Methemoglobin 2.0 % 


 


Blood Gas Hemoglobin 16.3 G/DL 


 


Oxygen Delivery Device NASAL CANNULA  


 


Blood Gas Liter Flow 3 L/M 


 


White Blood Count  8.2 TH/MM3


 


Red Blood Count  5.38 MIL/MM3


 


Hemoglobin  15.9 GM/DL


 


Hematocrit  46.2 %


 


Mean Corpuscular Volume  86.0 FL


 


Mean Corpuscular Hemoglobin  29.5 PG


 


Mean Corpuscular Hemoglobin  34.3 %





Concent  


 


Red Cell Distribution Width  14.6 %


 


Platelet Count  182 TH/MM3


 


Mean Platelet Volume  8.3 FL


 


Neutrophils (%) (Auto)  58.1 %


 


Lymphocytes (%) (Auto)  19.7 %


 


Monocytes (%) (Auto)  6.4 %


 


Eosinophils (%) (Auto)  15.0 %


 


Basophils (%) (Auto)  0.8 %


 


Neutrophils # (Auto)  4.8 TH/MM3


 


Lymphocytes # (Auto)  1.6 TH/MM3


 


Monocytes # (Auto)  0.5 TH/MM3


 


Eosinophils # (Auto)  1.2 TH/MM3


 


Basophils # (Auto)  0.1 TH/MM3


 


CBC Comment  AUTO DIFF 


 


Differential Total Cells  100 





Counted  


 


Neutrophils % (Manual)  71 %


 


Lymphocytes %  12 %


 


Monocytes %  2 %


 


Eosinophils %  15 %


 


Neutrophils # (Manual)  5.8 TH/MM3


 


Differential Comment  FINAL DIFF





  MANUAL


 


Platelet Estimate  NORMAL 


 


Platelet Morphology Comment  NORMAL 


 


Red Cell Morphology Comment  NORMAL 


 


Prothrombin Time  10.5 SEC


 


Prothromb Time International  1.0 RATIO





Ratio  


 


Activated Partial  30.1 SEC





Thromboplast Time  


 


Sodium Level  134 MEQ/L


 


Potassium Level  3.8 MEQ/L


 


Chloride Level  103 MEQ/L


 


Carbon Dioxide Level  25.7 MEQ/L


 


Anion Gap  5 MEQ/L


 


Blood Urea Nitrogen  23 MG/DL


 


Creatinine  1.01 MG/DL


 


Estimat Glomerular Filtration  75 ML/MIN





Rate  


 


Random Glucose  80 MG/DL


 


Calcium Level  9.0 MG/DL


 


Troponin I  LESS THAN 0.02





  NG/ML


 


B-Type Natriuretic Peptide  LESS THAN 2





  PG/ML











MDM


Medical Decision Making


Medical Screen Exam Complete:  Yes


Emergency Medical Condition:  Yes


Interpretation(s)


EKG: NSR 90bpm.  Normal axis.  Early repolarization.  No ST segment elevation 

or depression.


Differential Diagnosis


Pulmonary embolism vs. Pneumonia vs. COPD exacerbation


Narrative Course


62yo M with sob and hypoxemia.  Pt with 3L NC and only saturating at 91%.  

Although, pt is speaking in complete sentences and has clear breath sounds on 

auscultation.  ABG showed pO2 of 59.6 on 3L NC.  Saturation was 87% on 3L NC.  

Increased nasal cannula to 5 L.  Pt is not in acute distress.  Labs reviewed, 

no leukocytosis.  BNP negative.  Troponin negative.  BUN mildly elevated.  CXR 

showed questionable minimal early interstitial infiltrate right lung base.  CTA 

negative for PE.  Pt is a former cig smoker and may have emphysema.  Will cover 

with ceftriaxone and azithromycin for possible early pneumonia.  Discussed with 

[facility] hospitalist and admitted to Dr. Sutton.





Diagnosis





 Primary Impression:  


 Hypoxemia





Admitting Information


Admitting Physician Requests:  Admit








Susan Ambrosio DO Jan 30, 2017 08:44

## 2017-01-30 NOTE — RADRPT
EXAM DATE/TIME:  01/30/2017 08:40 

 

[facility] COMPARISON:     

CHEST SINGLE AP, December 04, 2015, 16:47.  CHEST ONE VIEW  **EMPLOYMED ONLY**, April 01, 2016, 7:59.


 

                     

INDICATIONS :     

Short of breath for two days, quit smoking one month ago

                     

 

MEDICAL HISTORY :     

Hypertension.          

 

SURGICAL HISTORY :     

None.   

 

ENCOUNTER:     

Initial                                        

 

ACUITY:     

2 days      

 

PAIN SCORE:     

0/10

 

LOCATION:     

Bilateral chest 

 

FINDINGS:     

There is some mild minimal asymmetric prominence of right basilar interstitium well to the left uncha
nged relative to prior studies reason to question as to a minimal or early interstitial infiltrate in
 the right lung base     

 

 

CONCLUSION:     

Questionable minimal early interstitial infiltrate right lung base

 

 

 

 Ant Nino MD on January 30, 2017 at 9:00           

Board Certified Radiologist.

 This report was verified electronically.

## 2017-01-30 NOTE — EKG
Date Performed: 01/30/2017       Time Performed: 09:33:28

 

PTAGE:      61 years

 

EKG:      Sinus rhythm 

 

 ST ELEVATION, CONSIDER EARLY REPOLARIZATION BORDERLINE ECG

 

PREVIOUS TRACING       : 09/02/2015 11.36 No significant change from previous tracing noted.

 

DOCTOR:   Flo Browne  Interpretating Date/Time  01/30/2017 13:49:02

## 2017-01-31 VITALS
OXYGEN SATURATION: 94 % | RESPIRATION RATE: 19 BRPM | DIASTOLIC BLOOD PRESSURE: 78 MMHG | TEMPERATURE: 96.7 F | HEART RATE: 100 BPM | SYSTOLIC BLOOD PRESSURE: 146 MMHG

## 2017-01-31 VITALS
SYSTOLIC BLOOD PRESSURE: 134 MMHG | OXYGEN SATURATION: 95 % | HEART RATE: 101 BPM | DIASTOLIC BLOOD PRESSURE: 72 MMHG | RESPIRATION RATE: 19 BRPM | TEMPERATURE: 95.3 F

## 2017-01-31 VITALS
DIASTOLIC BLOOD PRESSURE: 69 MMHG | HEART RATE: 84 BPM | TEMPERATURE: 97.3 F | OXYGEN SATURATION: 93 % | SYSTOLIC BLOOD PRESSURE: 131 MMHG | RESPIRATION RATE: 20 BRPM

## 2017-01-31 VITALS
TEMPERATURE: 96.8 F | RESPIRATION RATE: 22 BRPM | OXYGEN SATURATION: 98 % | SYSTOLIC BLOOD PRESSURE: 142 MMHG | HEART RATE: 103 BPM | DIASTOLIC BLOOD PRESSURE: 82 MMHG

## 2017-01-31 VITALS
OXYGEN SATURATION: 94 % | RESPIRATION RATE: 18 BRPM | TEMPERATURE: 96.2 F | DIASTOLIC BLOOD PRESSURE: 74 MMHG | SYSTOLIC BLOOD PRESSURE: 121 MMHG | HEART RATE: 97 BPM

## 2017-01-31 VITALS
DIASTOLIC BLOOD PRESSURE: 66 MMHG | OXYGEN SATURATION: 95 % | HEART RATE: 70 BPM | SYSTOLIC BLOOD PRESSURE: 111 MMHG | RESPIRATION RATE: 18 BRPM | TEMPERATURE: 96.3 F

## 2017-01-31 VITALS — OXYGEN SATURATION: 92 %

## 2017-01-31 VITALS
SYSTOLIC BLOOD PRESSURE: 131 MMHG | RESPIRATION RATE: 18 BRPM | DIASTOLIC BLOOD PRESSURE: 72 MMHG | OXYGEN SATURATION: 93 % | TEMPERATURE: 96 F | HEART RATE: 81 BPM

## 2017-01-31 VITALS — OXYGEN SATURATION: 93 %

## 2017-01-31 VITALS — HEART RATE: 93 BPM

## 2017-01-31 LAB
ANION GAP SERPL CALC-SCNC: 7 MEQ/L (ref 5–15)
BASOPHILS # BLD AUTO: 0 TH/MM3 (ref 0–0.2)
BASOPHILS NFR BLD: 0.5 % (ref 0–2)
BUN SERPL-MCNC: 24 MG/DL (ref 7–18)
CHLORIDE SERPL-SCNC: 108 MEQ/L (ref 98–107)
COLOR UR: YELLOW
COMMENT (UR): (no result)
CULTURE IF INDICATED: (no result)
EOSINOPHIL # BLD: 0 TH/MM3 (ref 0–0.4)
EOSINOPHIL NFR BLD: 0.1 % (ref 0–4)
ERYTHROCYTE [DISTWIDTH] IN BLOOD BY AUTOMATED COUNT: 14.4 % (ref 11.6–17.2)
GFR SERPLBLD BASED ON 1.73 SQ M-ARVRAT: 86 ML/MIN (ref 89–?)
GLUCOSE UR STRIP-MCNC: (no result) MG/DL
HCO3 BLD-SCNC: 23.3 MEQ/L (ref 21–32)
HCT VFR BLD CALC: 42.7 % (ref 39–51)
HEMO FLAGS: (no result)
HGB UR QL STRIP: (no result)
HYALINE CASTS #/AREA URNS LPF: 7 /LPF
KETONES UR STRIP-MCNC: (no result) MG/DL
LYMPHOCYTES # BLD AUTO: 1.2 TH/MM3 (ref 1–4.8)
LYMPHOCYTES NFR BLD AUTO: 28.4 % (ref 9–44)
MCH RBC QN AUTO: 29.8 PG (ref 27–34)
MCHC RBC AUTO-ENTMCNC: 34.6 % (ref 32–36)
MCV RBC AUTO: 86 FL (ref 80–100)
MONOCYTES NFR BLD: 2.8 % (ref 0–8)
MUCOUS THREADS #/AREA URNS LPF: (no result) /LPF
NEUTROPHILS # BLD AUTO: 2.9 TH/MM3 (ref 1.8–7.7)
NEUTROPHILS NFR BLD AUTO: 68.2 % (ref 16–70)
NITRITE UR QL STRIP: (no result)
PLATELET # BLD: 163 TH/MM3 (ref 150–450)
POTASSIUM SERPL-SCNC: 4.3 MEQ/L (ref 3.5–5.1)
RBC # BLD AUTO: 4.96 MIL/MM3 (ref 4.5–5.9)
RENAL EPI CELLS #/AREA URNS HPF: <1 /HPF
SODIUM SERPL-SCNC: 138 MEQ/L (ref 136–145)
SP GR UR STRIP: 1.02 (ref 1–1.03)
TRANS CELLS #/AREA URNS HPF: <1 /HPF
WBC # BLD AUTO: 4.2 TH/MM3 (ref 4–11)

## 2017-01-31 RX ADMIN — MAGNESIUM SULFATE HEPTAHYDRATE SCH MLS/HR: 500 INJECTION, SOLUTION INTRAMUSCULAR; INTRAVENOUS at 09:20

## 2017-01-31 RX ADMIN — GABAPENTIN SCH MG: 100 CAPSULE ORAL at 18:00

## 2017-01-31 RX ADMIN — GABAPENTIN SCH MG: 100 CAPSULE ORAL at 07:34

## 2017-01-31 RX ADMIN — IPRATROPIUM BROMIDE AND ALBUTEROL SULFATE SCH AMPULE: .5; 3 SOLUTION RESPIRATORY (INHALATION) at 15:30

## 2017-01-31 RX ADMIN — NYSTATIN SCH ML: 100000 SUSPENSION ORAL at 20:35

## 2017-01-31 RX ADMIN — METHYLPREDNISOLONE SODIUM SUCCINATE SCH MG: 40 INJECTION, POWDER, FOR SOLUTION INTRAMUSCULAR; INTRAVENOUS at 23:09

## 2017-01-31 RX ADMIN — Medication SCH TAB: at 07:34

## 2017-01-31 RX ADMIN — HEPARIN SODIUM SCH UNITS: 10000 INJECTION, SOLUTION INTRAVENOUS; SUBCUTANEOUS at 00:50

## 2017-01-31 RX ADMIN — GUAIFENESIN AND CODEINE PHOSPHATE PRN ML: 10; 100 LIQUID ORAL at 00:50

## 2017-01-31 RX ADMIN — GUAIFENESIN AND CODEINE PHOSPHATE PRN ML: 10; 100 LIQUID ORAL at 07:33

## 2017-01-31 RX ADMIN — CEFEPIME SCH MLS/HR: 2 INJECTION, POWDER, FOR SOLUTION INTRAVENOUS at 10:33

## 2017-01-31 RX ADMIN — METHYLPREDNISOLONE SODIUM SUCCINATE SCH MG: 40 INJECTION, POWDER, FOR SOLUTION INTRAMUSCULAR; INTRAVENOUS at 00:50

## 2017-01-31 RX ADMIN — METHYLPREDNISOLONE SODIUM SUCCINATE SCH MG: 40 INJECTION, POWDER, FOR SOLUTION INTRAMUSCULAR; INTRAVENOUS at 07:35

## 2017-01-31 RX ADMIN — BUDESONIDE AND FORMOTEROL FUMARATE DIHYDRATE SCH PUFF: 160; 4.5 AEROSOL RESPIRATORY (INHALATION) at 20:35

## 2017-01-31 RX ADMIN — IPRATROPIUM BROMIDE AND ALBUTEROL SULFATE SCH AMPULE: .5; 3 SOLUTION RESPIRATORY (INHALATION) at 11:53

## 2017-01-31 RX ADMIN — SODIUM CHLORIDE, PRESERVATIVE FREE SCH ML: 5 INJECTION INTRAVENOUS at 07:35

## 2017-01-31 RX ADMIN — IPRATROPIUM BROMIDE AND ALBUTEROL SULFATE SCH AMPULE: .5; 3 SOLUTION RESPIRATORY (INHALATION) at 08:28

## 2017-01-31 RX ADMIN — OXYCODONE HYDROCHLORIDE AND ACETAMINOPHEN SCH MG: 500 TABLET ORAL at 07:34

## 2017-01-31 RX ADMIN — CEFEPIME SCH MLS/HR: 2 INJECTION, POWDER, FOR SOLUTION INTRAVENOUS at 23:09

## 2017-01-31 RX ADMIN — NYSTATIN SCH ML: 100000 SUSPENSION ORAL at 07:33

## 2017-01-31 RX ADMIN — IPRATROPIUM BROMIDE AND ALBUTEROL SULFATE SCH AMPULE: .5; 3 SOLUTION RESPIRATORY (INHALATION) at 19:53

## 2017-01-31 RX ADMIN — GABAPENTIN SCH MG: 100 CAPSULE ORAL at 12:05

## 2017-01-31 RX ADMIN — METHYLPREDNISOLONE SODIUM SUCCINATE SCH MG: 40 INJECTION, POWDER, FOR SOLUTION INTRAMUSCULAR; INTRAVENOUS at 16:06

## 2017-01-31 RX ADMIN — HEPARIN SODIUM SCH UNITS: 10000 INJECTION, SOLUTION INTRAVENOUS; SUBCUTANEOUS at 12:05

## 2017-01-31 RX ADMIN — NYSTATIN SCH ML: 100000 SUSPENSION ORAL at 18:00

## 2017-01-31 RX ADMIN — BUDESONIDE AND FORMOTEROL FUMARATE DIHYDRATE SCH PUFF: 160; 4.5 AEROSOL RESPIRATORY (INHALATION) at 07:36

## 2017-01-31 RX ADMIN — MESALAMINE SCH MG: 800 TABLET, DELAYED RELEASE ORAL at 05:34

## 2017-01-31 RX ADMIN — VITAMIN C SCH TAB: TAB at 07:33

## 2017-01-31 RX ADMIN — HEPARIN SODIUM SCH UNITS: 10000 INJECTION, SOLUTION INTRAVENOUS; SUBCUTANEOUS at 23:10

## 2017-01-31 RX ADMIN — NYSTATIN SCH ML: 100000 SUSPENSION ORAL at 12:04

## 2017-01-31 RX ADMIN — MAGNESIUM SULFATE HEPTAHYDRATE SCH MLS/HR: 500 INJECTION, SOLUTION INTRAMUSCULAR; INTRAVENOUS at 22:40

## 2017-01-31 RX ADMIN — GUAIFENESIN AND CODEINE PHOSPHATE PRN ML: 10; 100 LIQUID ORAL at 23:09

## 2017-01-31 RX ADMIN — SODIUM CHLORIDE, PRESERVATIVE FREE SCH ML: 5 INJECTION INTRAVENOUS at 20:35

## 2017-01-31 RX ADMIN — MESALAMINE SCH MG: 800 TABLET, DELAYED RELEASE ORAL at 12:05

## 2017-01-31 RX ADMIN — MESALAMINE SCH MG: 800 TABLET, DELAYED RELEASE ORAL at 23:09

## 2017-01-31 NOTE — HHI.PR
Subjective


Remarks


He is better. Less cough  and wheezing. Sputum is clearing. No fever.





Objective





 Vital Signs








  Date Time  Temp Pulse Resp B/P Pulse Ox O2 Delivery O2 Flow Rate FiO2


 


1/31/17 16:00 96.2 97 18 121/74 94   


 


1/31/17 15:30     93 Nasal Cannula 5.00 


 


1/31/17 12:00 96.7 100 19 146/78 94   


 


1/31/17 08:33     92 Nasal Cannula 5.00 


 


1/31/17 08:00 95.3 101 19 134/72 95   


 


1/31/17 04:00 96.3 70 18 111/66 95   


 


1/31/17 00:00 96.0 81 18 131/72 93   


 


1/30/17 20:00  110      


 


1/30/17 20:00 96.5 106 18 111/63 94   








 I/O








 1/30/17 1/30/17 1/30/17 1/31/17 1/31/17 1/31/17





 07:00 15:00 23:00 07:00 15:00 23:00


 


Intake Total   320 ml 740 ml 2381 ml 


 


Balance   320 ml 740 ml 2381 ml 


 


      


 


Intake Oral   320 ml 240 ml 1400 ml 


 


IV Total   0 ml 500 ml 981 ml 


 


# Voids   1 2 3 


 


# Bowel Movements   0 1 0 








Result Diagram:  


1/31/17 0512                                                                   

             1/31/17 0512





Objective Remarks


This is an averagely built middle-aged man in no distress.  He is slightly


diaphoretic.


HEENT:  Head normocephalic.  Pupils reactive and equal.  Tongue moist.  Throat


is clear.  Nasal mucosae edematous.


NECK:  Supple.  No lymphadenopathy.  No bruits or thyroid enlargement.


CHEST:  Equal movements with percussion note, resonant throughout.  Wheezes are


scattered bilaterally.  There are a few crackles heard at the right base.


HEART:  The heart sounds are irregular, S1 and S2.  No murmur.  No S3.


ABDOMEN:  Soft and benign.  No masses, organomegaly or tenderness.  Bowel


sounds are active.


EXTREMITIES:  No edema.  No clubbing or cyanosis.  No calf tenderness or


swelling.  Reflexes are 1+ with no gross motor deficits.


NEUROLOGIC:  Cranial nerves grossly intact.


SKIN:  No lesions.





Assessment and Plan


Assessment and Plan





 


 


 


 


IMPRESSION


1.   COPD and chronic bronchitis with acute exacerbation.


2.   Probable early pneumonia right lower lobe.


3.   UTI


4.   History of hypertension.








Plan :








1. Continue  antibiotics.





2. Nebs qid , duoneb.





3. Solumedrol  40 mg q6h.





4. Rpt Chest X ray.





5. Will  cont  symbicort inhaler  2 puffs bid.





6. PFT when  stable.





7. O2 at 5 L  and wean to  keep sat >92








ARIADNA Murry MD Jan 31, 2017 19:23

## 2017-01-31 NOTE — MB
cc:

FIFIJESSRINA

****

 

 

DATE OF CONSULTATION

2017

 

REASON FOR CONSULTATION

Pneumonia and COPD.

 

HISTORY OF PRESENT ILLNESS

This is a 61-year-old [place] male with a past history of  chronic bronchitis

and ulcerative colitis, as well as hypertension.  He was seen in the emergency

room for a two-week history of cough, wheezing, shortness of breath.  The

patient initially was treated by his family physician with Rocephin,

Solu-Medrol and Augmentin.  The patient also was on prednisone orally, but he

did improve over a period of one week, but then started to have more shortness

of breath and yesterday at work he quite weak and dyspneic and his O2 sats had

dropped into the 80s.  He then was on oxygen and subsequently came in to the ER

for evaluation.  A chest x-ray and a CT scan were done which showed no active

pulmonary infiltrates.  The patient, however, had to be brought in due to low

sats and a temperature of over 100 degrees.  The patient did have some chills

and he denied any hemoptysis, leg or calf muscle pains.  On oxygen, the pO2 was

60.  Presently, he is on Rocephin one gram and Zithromax 100 mg daily.  He does

have oral thrush and he has been using the Symbicort inhaler regularly.

 

PAST HISTORY

Past history has included a:

1.  Hypertension

2.  History of ulcerative colitis.

3.  Previous history of prostatic hypertrophy and bladder polyps.

4.  Previous sepsis with a UTI.

5.  Removal of benign cyst from his buttock and a keloid from the

    left arm.

6.  He did have a cystoscopy done just two weeks ago.

7.  His most recent chest x-ray apparently showed evidence of

    COPD.

 

HABITS

The patient smoked three to five cigarettes a day and has done so for over 25

years.  No significant alcohol intake.  Works as a nurse.

 

 

MEDICATIONS

1.   Symbicort 160/4.5 two puffs b.i.d.

2.   Nebulized DuoNeb t.i.d.

3.   Clonidine 0.1 mg b.i.d.

4.   Metoprolol 25 mg daily

5.   Neurontin 100 mg t.i.d.

6.   Protonix 40 mg daily

7.   Asacol 1600 mg every 8 hours

 

ALLERGIES

No drug allergies.

 

FAMILY HISTORY

Significant for colon cancer in his mother.  Father  of a stroke.

 

REVIEW OF SYSTEMS

The patient has had some weight loss.  He has sinus drainage, postnasal drip,

cough and wheezing.  He also had some epigastric distress with reflux.  He has

urinary frequency and bladder pain. He has joint pains.  Denies leg swelling.

No calf muscle pains.  He has no depression or anxiety.  Denies skin lesions.

 

PHYSICAL EXAMINATION

This is an averagely built middle-aged man in no distress.  He is slightly

diaphoretic.

VITAL SIGNS:  Blood pressure 125/70, pulse 110, respirations 22, temperature is

98.2.

HEENT:  Head normocephalic.  Pupils reactive and equal.  Tongue moist.  Throat

is injected.  Nasal mucosae edematous.

NECK:  Supple.  No lymphadenopathy.  No bruits or thyroid enlargement.

CHEST:  Equal movements with percussion note, resonant throughout.  Wheezes are

scattered bilaterally.  There are a few crackles heard at the right base.

HEART:  The heart sounds are irregular, S1 and S2.  No murmur.  No S3.

ABDOMEN:  Soft and benign.  No masses, organomegaly or tenderness.  Bowel

sounds are active.

EXTREMITIES:  No edema.  No clubbing or cyanosis.  No calf tenderness or

swelling.  Reflexes are 1+ with no gross motor deficits.

NEUROLOGIC:  Cranial nerves grossly intact.

SKIN:  No lesions.

 

 

 

 

IMPRESSION

1.   COPD and chronic bronchitis with acute exacerbation.

2.   Probable early pneumonia right lower lobe.

3.   UTI

4.   History of hypertension.

 

PLAN

The patient has been placed on cefepime two grams every 12 hours and continue

with Zithromax 500 mg IV daily and discontinue Rocephin.  Sputum will be sent

for Gram stain and culture, urine sent for Legionella and pneumococcal

antigens.  Blood cultures and sputum cultures are pending.  A 2-D

echocardiogram will be done.  Pulmonary function studies to be done at the

bedside and the patient will be continued on Symbicort 160 x 4.5 two puffs

twice a day.  A repeat chest x-ray to be ordered in the a.m.

 

Thank you Dr. Sutton for this consultation.

 

 

 

 

 

                              _________________________________

                              MD CHRIS Ingram/TAMIKA

D:  2017/11:28 PM

T:  2017/9:23 AM

Visit #:  [id number]

Job #:  [id number]

## 2017-01-31 NOTE — HHI.PR
Vitals/Results


Intake & Output











 1/30/17 1/30/17 1/31/17





 14:59 22:59 06:59


 


Intake Total  320 ml 740 ml


 


Balance  320 ml 740 ml


 


   


 


Intake Oral  320 ml 240 ml


 


IV Total  0 ml 500 ml


 


# Voids  1 2


 


# Bowel Movements  0 1








Vital Signs





 Vital Signs








  Date Time  Temp Pulse Resp B/P Pulse Ox O2 Delivery O2 Flow Rate FiO2


 


1/31/17 20:00 96.8 103 22 142/82 98   


 


1/31/17 16:00 96.2 97 18 121/74 94   


 


1/31/17 15:30     93 Nasal Cannula 5.00 


 


1/31/17 12:00 96.7 100 19 146/78 94   


 


1/31/17 08:33     92 Nasal Cannula 5.00 


 


1/31/17 08:00 95.3 101 19 134/72 95   


 


1/31/17 04:00 96.3 70 18 111/66 95   


 


1/31/17 00:00 96.0 81 18 131/72 93   








CBC/BMP:  


1/31/17 0512                                                                   

             1/31/17 0512





Lab Results





Laboratory Tests








Test 1/31/17 1/31/17





 05:12 05:30


 


White Blood Count 4.2 TH/MM3 


 


Red Blood Count 4.96 MIL/MM3 


 


Hemoglobin 14.8 GM/DL 


 


Hematocrit 42.7 % 


 


Mean Corpuscular Volume 86.0 FL 


 


Mean Corpuscular Hemoglobin 29.8 PG 


 


Mean Corpuscular Hemoglobin 34.6 % 





Concent  


 


Red Cell Distribution Width 14.4 % 


 


Platelet Count 163 TH/MM3 


 


Mean Platelet Volume 8.1 FL 


 


Neutrophils (%) (Auto) 68.2 % 


 


Lymphocytes (%) (Auto) 28.4 % 


 


Monocytes (%) (Auto) 2.8 % 


 


Eosinophils (%) (Auto) 0.1 % 


 


Basophils (%) (Auto) 0.5 % 


 


Neutrophils # (Auto) 2.9 TH/MM3 


 


Lymphocytes # (Auto) 1.2 TH/MM3 


 


Monocytes # (Auto) 0.1 TH/MM3 


 


Eosinophils # (Auto) 0.0 TH/MM3 


 


Basophils # (Auto) 0.0 TH/MM3 


 


CBC Comment DIFF FINAL  


 


Differential Comment   


 


Sodium Level 138 MEQ/L 


 


Potassium Level 4.3 MEQ/L 


 


Chloride Level 108 MEQ/L 


 


Carbon Dioxide Level 23.3 MEQ/L 


 


Anion Gap 7 MEQ/L 


 


Blood Urea Nitrogen 24 MG/DL 


 


Creatinine 0.90 MG/DL 


 


Estimat Glomerular Filtration 86 ML/MIN 





Rate  


 


Random Glucose 158 MG/DL 


 


Calcium Level 8.9 MG/DL 


 


Urine Color  YELLOW 


 


Urine Turbidity  CLEAR 


 


Urine pH  5.5 


 


Urine Specific Gravity  1.022 


 


Urine Protein  TRACE mg/dL


 


Urine Glucose (UA)  NEG mg/dL


 


Urine Ketones  NEG mg/dL


 


Urine Occult Blood  NEG 


 


Urine Nitrite  NEG 


 


Urine Bilirubin  NEG 


 


Urine Urobilinogen  LESS THAN 2.0





  MG/DL


 


Urine Leukocyte Esterase  NEG 


 


Urine RBC  1 /hpf


 


Urine WBC  3 /hpf


 


Urine Transitional Epithelial  <1 /hpf





Cells  


 


Urine Renal Epithelial Cells  <1 /hpf


 


Urine Amorphous Sediment  RARE 


 


Urine Hyaline Casts  7 /lpf


 


Urine Mucus  FEW /lpf


 


Microscopic Urinalysis Comment  CULT NOT





  INDICATED








Microbiology





 Microbiology


1/31/17 Legionella Antigen - Final, Complete


          PRESUMPTIVE NEGATIVE FOR LEGIONELLA P...


1/31/17 Streptococcus pneumoniae Antigen (M - Final, Complete


          PRESUMPTIVE NEGATIVE FOR STREPTOCOCCU...





Physical Exam


General


General Appearance:  Well Developed, Comfortable





Eyes


Eye Exam:  Pupils Reactive





Ears & Nose


Ears & Nose Exam:  Nasal Mucosa Pink





Neck


Neck Exam:  Trachea Midline





Pulmonary


Resp Exam:  Breath Sounds Equal, No Distress


Resp Remarks


Occasional bilateral wheeze





Cardiology


CV Exam:  Normal Sinus Rhythm, Good Perfusion





Gastrointestinal/Abdomen


GI Exam:  Non-Tender, Bowel Sounds Present





Musculoskeletal


MS Exam:  Normal Gait, Normal Tone





Integumentary


Skin Exam:  Warm, Dry





Neurologic


Neuro Exam:  Awake, Oriented





Psychiatric


Psych Exam:  Appropriate Responses





VTE Prophylaxis


VTE Prophylaxis Meds:  Heparin, Coumadin





Assessment/Plan


Assessment/Plan








Assessment





Pneumonia


COPD exacerbation


Fever.


Recently quit smoking.





Management.





Intravenous antibiotics.


Assessment


Azithromycin.


DuoNeb.


Supplemental oxygen.


Pulmonary and infectious disease following


Discussed with patient.


Discussed with nurse.








Mari Sutton MD Jan 31, 2017 22:40

## 2017-01-31 NOTE — PD.ID.CON
History of Present Illness


Service


ID


Consult Requested By


Wilma Gomez


Reason for Consult


Evaluation and Mment of Pneumonia and COPD exacerbation.


Primary Care Physician


ASIF Farmer


Diagnoses:  


History of Present Illness


This a pleasant 61-year-old male with notable past medical history of tobacco 

abuse with recent diagnosis of COPD, ulcer colitis, hypertension, BPH.  Patient 

presented to the emergency room with complaint of shortness of breath and 

hypoxia.  According to the patient approximately 2 weeks ago he had shortness 

of breath with wheezing and cough.  Went to see his primary care physician who 

gave him Rocephin IM as well as Solu-Medrol and prescribed Augmentin.  He 

completed 10 days of Augmentin and his symptoms did improve.  He also had PFTs 

and was told he had COPD and was referred to see Dr. Bazan.  Patient recently 

quit December 2016.  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 

he was at work he noted increased shortness of breath and checked his sats in 

the were 81%.  He gave himself oxygen at 2 L and finished his shift.  This 

morning, patient came to the emergency room for further evaluation.  States he 

has a dry cough, very little sputum, clear in color.  No fever, no chills.  

Denies any abdominal pain, no diarrhea, no nausea no vomiting.  He does use 

Symbicort as well as Combivent at home.  He's not on any oxygen.  Patient was 

evaluated in the emergency room, laboratory workup was completed, WBC was 8.2.  

Hemoglobin 15.9, hematocrit 46.2, platelets 182.  BMP was essentially 

unremarkable except for mild hyponatremia, sodium 134.  Troponin was negative.  

B natruretic peptide was negative.  Chest x-ray completed showed questionable 

minimal early interstitial infiltrate to the right lung base.  CTA was negative 

for PE.  ABGs were completed, showed PO2 of 60 on 3 L nasal cannula, sats 87% 

on 3 L.  Patient was started on empiric antibiotics.  Patient is evaluated in 

the emergency room, he is complaining of persistent cough.  Robitussin-AC has 

been ordered.  He is noted with thrush.  Patient is admitted for further 

evaluation and treatment





Past Family Social History


Allergies:  


Coded Allergies:  


     No Known Allergies (Verified , 1/30/17)


Past Medical History


Hypertension, 


diverticulitis, 


gastritis 


ulcerative colitis


Previous admission for sepsis and UTI


Bladder polyp


Recently diagnosed with COPD


Past Surgical History


Benign cyst removal from the buttock 2000 and, 


keloid removal from left upper arm, 


cystoscopy 2 weeks ago


Colonoscopy


Reported Medications


Reported Meds & Active Scripts


Active


Reported


Symbicort Inh (Budesonide/Formoterol Fumarate) 160-4.5 Mcg/Act Aero 1 Puff INH 

Q12HR


Vitamin C (Ascorbic Acid) 500 Mg Tab 500 Mg PO DAILY


Vitamin B Complex (B-Complex Vitamins) 1 Tab 1 Tab PO DAILY


Duoneb (Ipratropium-Albuterol Neb) 0.5-2.5 Mg/3 Ml Neb 1 Vial NEB Q6HR PRN


Metoprolol Tartrate 25 Mg Tab 12.5 Mg PO DAILY PRN


Clonidine (Clonidine HCl) 0.1 Mg Tab 0.1 Mg PO BID PRN


Tylenol Extra Strength (Acetaminophen) 500 Mg Tab 500 Mg PO Q4-6H PRN


Neurontin (Gabapentin) 100 Mg Cap 100 Mg PO TID


Protonix (Pantoprazole Sodium) 40 Mg Tab 40 Mg PO DAILY PRN


Centrum Silver Adult 50+ (Multiple Vitamins W/ Minerals) 1 Tab Tab 1 Tab PO 

DAILY


Asacol HD (Mesalamine) 800 Mg Tab 1,600 Mg PO Q8HR


     Swallow whole.  Take on an empty stomach.


Bentyl (Dicyclomine HCl) 20 Mg Tab 20 Mg PO TID PRN


Active Ordered Medications





 Current Medications








 Medications


  (Trade)  Dose


 Ordered  Sig/Gissel


 Route  Start Time


 Stop Time Status Last Admin


 


  (NS Flush)  2 ml  UNSCH  PRN


 FLUSH  1/30/17 13:00


     


 


 


  (NS Flush)  2 ml  BID


 FLUSH  1/30/17 21:00


    1/31/17 20:35


 


 


  (Zofran Inj)  4 mg  Q6H  PRN


 IVP  1/30/17 13:00


     


 


 


  (Heparin Inj)  5,000 units  Q12H


 SQ  1/30/17 13:00


    1/31/17 12:05


 


 


  (Narcan Inj)  0.4 mg  UNSCH  PRN


 IV  1/30/17 13:00


     


 


 


  (Vitamin C)  500 mg  DAILY


 PO  1/31/17 09:00


    1/31/17 07:34


 


 


  (Symbicort


 160-4.5 Inh)  1 puff  Q12HR


 INH  1/30/17 21:00


    1/31/17 20:35


 


 


  (Catapres)  0.1 mg  BID  PRN


 PO  1/30/17 13:00


     


 


 


  (Bentyl)  20 mg  TID  PRN


 PO  1/30/17 13:00


     


 


 


  (Neurontin)  100 mg  TID


 PO  1/30/17 13:00


    1/31/17 18:00


 


 


  (Asacol Hd Dr)  1,600 mg  Q8HR


 PO  1/30/17 14:00


    1/31/17 12:05


 


 


  (Lopressor)  12.5 mg  DAILY  PRN


 PO  1/30/17 14:00


     


 


 


  (Theragran


 Hematinic)  1 tab  DAILY


 PO  1/31/17 09:00


    1/31/17 07:34


 


 


  (Protonix)  40 mg  DAILY  PRN


 PO  1/30/17 14:00


     


 


 


  (Allbee C)  1 tab  DAILY


 PO  1/31/17 09:00


    1/31/17 07:33


 


 


  (Mycostatin  Liq)  5 ml  QID


 SWISH-SWAL  1/30/17 18:00


    1/31/17 20:35


 


 


  (Robitussin Ac


 200-20 Mg/10 ml


 Liq)  10 ml  Q6H  PRN


 PO  1/30/17 15:30


    1/31/17 07:33


 


 


 Methylprednisolone


 Sodium Succinate


 40 mg  40 mg  Q8H


 IV PUSH  1/30/17 16:00


    1/31/17 16:06


 


 


 Cefepime HCl 2000


 mg/Sodium Chloride  100 ml @ 


 200 mls/hr  Q12H


 IV  1/30/17 22:00


    1/31/17 10:33


 


 


 Azithromycin 500


 mg/Sodium Chloride  250 ml @ 


 250 mls/hr  Q24H


 IV  1/31/17 14:00


    1/31/17 12:58


 


 


  (D5W-1/2 NS 1000


 ml Inj)  1,000 ml @ 


 75 mls/hr  [id number]


 IV  1/30/17 20:00


    1/30/17 22:03


 








Family History


Mother passed away secondary to colon cancer at age 65


Father passed away secondary to stroke at age 61


Social History


Patient currently works as an RN [employer]


Quit drinking December 2016


Quit smoking December 2016, smoked 1 pack of cigarettes every 3 days for 30+ 

years


No illegal drug use





Physical Exam


Vital Signs





 Vital Signs








  Date Time  Temp Pulse Resp B/P Pulse Ox O2 Delivery O2 Flow Rate FiO2


 


1/31/17 16:00 96.2 97 18 121/74 94   


 


1/31/17 15:30     93 Nasal Cannula 5.00 


 


1/31/17 12:00 96.7 100 19 146/78 94   


 


1/31/17 08:33     92 Nasal Cannula 5.00 


 


1/31/17 08:00 95.3 101 19 134/72 95   


 


1/31/17 04:00 96.3 70 18 111/66 95   


 


1/31/17 00:00 96.0 81 18 131/72 93   


 


1/30/17 20:00  110      


 


1/30/17 20:00 96.5 106 18 111/63 94   


 


1/30/17 18:56 99.9       


 


1/30/17 18:06 102.8       








Physical Exam


GENERAL: Thin built, poorly nourished patient, in no apparent distress.


SKIN: No rashes.


HEAD: Atraumatic. Normocephalic. No temporal or scalp tenderness.


EYES: Pupils equal round and reactive. Extraocular motions intact. No scleral 

icterus. No injection or drainage. 


ENT: Nose without bleeding, purulent drainage or septal hematoma. 


Throat without erythema, tonsillar hypertrophy or exudate. Uvula midline. 

Airway patent.


NECK: Trachea midline. Supple, nontender, no meningeal signs.


CARDIOVASCULAR: HS audible.


RESPIRATORY: Clear to auscultation. Breath sounds equal bilaterally. No wheezes

, rales, or rhonchi.  


GASTROINTESTINAL: Abdomen soft, non-tender, nondistended. 


MUSCULOSKELETAL: Extremities without clubbing, cyanosis, or edema. 


No joint tenderness, effusion, or edema noted. No calf tenderness. Negative 

Homans sign bilaterally.


NEUROLOGICAL: Awake and alert. Grossly non focal


Psych: cooperative


IV line sites with no e/o infection.


Laboratory





Laboratory Tests








Test 1/30/17 1/31/17 1/31/17





 19:23 05:12 05:30


 


Erythrocyte Sedimentation Rate 7   


 


Lactic Acid Level 0.9   


 


White Blood Count  4.2  


 


Red Blood Count  4.96  


 


Hemoglobin  14.8  


 


Hematocrit  42.7  


 


Mean Corpuscular Volume  86.0  


 


Mean Corpuscular Hemoglobin  29.8  


 


Mean Corpuscular Hemoglobin  34.6  





Concent   


 


Red Cell Distribution Width  14.4  


 


Platelet Count  163  


 


Mean Platelet Volume  8.1  


 


Neutrophils (%) (Auto)  68.2  


 


Lymphocytes (%) (Auto)  28.4  


 


Monocytes (%) (Auto)  2.8  


 


Eosinophils (%) (Auto)  0.1  


 


Basophils (%) (Auto)  0.5  


 


Neutrophils # (Auto)  2.9  


 


Lymphocytes # (Auto)  1.2  


 


Monocytes # (Auto)  0.1  


 


Eosinophils # (Auto)  0.0  


 


Basophils # (Auto)  0.0  


 


CBC Comment  DIFF FINAL  


 


Differential Comment    


 


Sodium Level  138  


 


Potassium Level  4.3  


 


Chloride Level  108  


 


Carbon Dioxide Level  23.3  


 


Anion Gap  7  


 


Blood Urea Nitrogen  24  


 


Creatinine  0.90  


 


Estimat Glomerular Filtration  86  





Rate   


 


Random Glucose  158  


 


Calcium Level  8.9  


 


Urine Color   YELLOW 


 


Urine Turbidity   CLEAR 


 


Urine pH   5.5 


 


Urine Specific Gravity   1.022 


 


Urine Protein   TRACE 


 


Urine Glucose (UA)   NEG 


 


Urine Ketones   NEG 


 


Urine Occult Blood   NEG 


 


Urine Nitrite   NEG 


 


Urine Bilirubin   NEG 


 


Urine Urobilinogen   LESS THAN 2.0 


 


Urine Leukocyte Esterase   NEG 


 


Urine RBC   1 


 


Urine WBC   3 


 


Urine Transitional Epithelial   <1 





Cells   


 


Urine Renal Epithelial Cells   <1 


 


Urine Amorphous Sediment   RARE 


 


Urine Hyaline Casts   7 


 


Urine Mucus   FEW 


 


Microscopic Urinalysis Comment   CULT NOT





   INDICATED














 Date/Time Procedure Status





Source Growth 


 


 1/31/17 05:30 Legionella Antigen - Final Complete





Urine Clean Catch PRESUMPTIVE NEGATIVE FOR LEGIONELLA P... 





 1/31/17 05:30 Streptococcus pneumoniae Antigen (M - Final Complete





Urine Clean Catch PRESUMPTIVE NEGATIVE FOR STREPTOCOCCU... 


 


 1/30/17 19:25 Aerobic Blood Culture - Preliminary Resulted





Blood Peripheral NO GROWTH IN 1 DAY 





 1/30/17 19:25 Anaerobic Blood Culture - Preliminary Resulted





Blood Peripheral NO GROWTH IN 1 DAY 








Result Diagram:  


1/31/17 0512                                                                   

             1/31/17 0512





Imaging





Last Impressions








Chest X-Ray 1/31/17 0000 Signed





Impressions: 





 Service Date/Time:  Tuesday, January 31, 2017 19:27 - CONCLUSION:  Hazy 

density 





 seen throughout the lungs which may represent some mild interstitial 

prominence 





 especially on the right side.     Nahun Azul MD 


 


CT Angiography 1/30/17 0839 Signed





Impressions: 





 Service Date/Time:  Monday, January 30, 2017 10:42 - CONCLUSION:  Negative 





 examination.     Ant Nino MD 











Assessment and Plan


Assessment and Plan


Community acquired pneumonia vs atypical pneumonia vs viral pneumonitis.


Patient reports he finished his augmentin and next day had recurrence of 

symptoms.


Acute COPD exacerbation


Ulcerative Colitis on Asacol





Recs


Continue Cefepime IV 


Continue Azithro change to oral to reduce fluid overload.


Will check viral resp panel.


Follow cultures


Follow clinically.


d/w patient and Blank Pelletier MD Jan 31, 2017 17:03

## 2017-01-31 NOTE — RADRPT
EXAM DATE/TIME:  01/31/2017 19:27 

 

[facility] COMPARISON:     CT PULMONARY ANGIOGRAM, January 30, 2017, 10:42.  CHEST SINGLE AP, January 30
, 2017, 8:40.

 

                     

INDICATIONS:     Shortness of breath.

                     

MEDICAL HISTORY:     Hypertension.          

SURGICAL HISTORY:     None.   

ENCOUNTER:     Subsequent                                        

ACUITY:     3 days      

PAIN SCORE:     0/10

LOCATION:     Bilateral chest 

 

FINDINGS:     

 

 

Heart size normal.  There is some minimal indistinctiveness of the pulmonary markings.  This appears 
more prominent on the right.  A focal consolidation is not clearly seen.  The costophrenic angles are
 clear.  CONCLUSION:     

Hazy density seen throughout the lungs which may represent some mild interstitial prominence especial
ly on the right side.  

 

 Nahun Azul MD on January 31, 2017 at 19:58                

Board Certified Radiologist.

 This report was verified electronically.

## 2017-02-01 VITALS
SYSTOLIC BLOOD PRESSURE: 134 MMHG | DIASTOLIC BLOOD PRESSURE: 70 MMHG | TEMPERATURE: 96.3 F | RESPIRATION RATE: 17 BRPM | OXYGEN SATURATION: 94 % | HEART RATE: 103 BPM

## 2017-02-01 VITALS
HEART RATE: 115 BPM | SYSTOLIC BLOOD PRESSURE: 166 MMHG | TEMPERATURE: 98.1 F | RESPIRATION RATE: 22 BRPM | DIASTOLIC BLOOD PRESSURE: 93 MMHG | OXYGEN SATURATION: 95 %

## 2017-02-01 VITALS
OXYGEN SATURATION: 95 % | RESPIRATION RATE: 18 BRPM | SYSTOLIC BLOOD PRESSURE: 106 MMHG | TEMPERATURE: 96.6 F | DIASTOLIC BLOOD PRESSURE: 59 MMHG | HEART RATE: 87 BPM

## 2017-02-01 VITALS
DIASTOLIC BLOOD PRESSURE: 76 MMHG | HEART RATE: 77 BPM | RESPIRATION RATE: 17 BRPM | TEMPERATURE: 96.2 F | SYSTOLIC BLOOD PRESSURE: 123 MMHG | OXYGEN SATURATION: 93 %

## 2017-02-01 VITALS
RESPIRATION RATE: 19 BRPM | OXYGEN SATURATION: 94 % | HEART RATE: 101 BPM | SYSTOLIC BLOOD PRESSURE: 117 MMHG | DIASTOLIC BLOOD PRESSURE: 81 MMHG | TEMPERATURE: 96.3 F

## 2017-02-01 VITALS — OXYGEN SATURATION: 96 %

## 2017-02-01 LAB
BOR. HOLMESII: NOT DETECTED
BOR. PARA/BRONCH: NOT DETECTED
BOR. PERTUSSIS: NOT DETECTED
FLUBV AG SPEC QL IA: NOT DETECTED
RESP SYNCYTIAL VIRUS A: NOT DETECTED
RESP SYNCYTIAL VIRUS B: NOT DETECTED

## 2017-02-01 RX ADMIN — NYSTATIN SCH ML: 100000 SUSPENSION ORAL at 19:42

## 2017-02-01 RX ADMIN — GABAPENTIN SCH MG: 100 CAPSULE ORAL at 07:39

## 2017-02-01 RX ADMIN — IPRATROPIUM BROMIDE AND ALBUTEROL SULFATE SCH AMPULE: .5; 3 SOLUTION RESPIRATORY (INHALATION) at 09:09

## 2017-02-01 RX ADMIN — CEFEPIME SCH MLS/HR: 2 INJECTION, POWDER, FOR SOLUTION INTRAVENOUS at 07:38

## 2017-02-01 RX ADMIN — GUAIFENESIN AND CODEINE PHOSPHATE PRN ML: 10; 100 LIQUID ORAL at 07:38

## 2017-02-01 RX ADMIN — HEPARIN SODIUM SCH UNITS: 10000 INJECTION, SOLUTION INTRAVENOUS; SUBCUTANEOUS at 13:00

## 2017-02-01 RX ADMIN — METHYLPREDNISOLONE SODIUM SUCCINATE SCH MG: 40 INJECTION, POWDER, FOR SOLUTION INTRAMUSCULAR; INTRAVENOUS at 16:43

## 2017-02-01 RX ADMIN — MESALAMINE SCH MG: 800 TABLET, DELAYED RELEASE ORAL at 13:00

## 2017-02-01 RX ADMIN — GABAPENTIN SCH MG: 100 CAPSULE ORAL at 16:42

## 2017-02-01 RX ADMIN — METHYLPREDNISOLONE SODIUM SUCCINATE SCH MG: 40 INJECTION, POWDER, FOR SOLUTION INTRAMUSCULAR; INTRAVENOUS at 07:39

## 2017-02-01 RX ADMIN — NYSTATIN SCH ML: 100000 SUSPENSION ORAL at 16:42

## 2017-02-01 RX ADMIN — NYSTATIN SCH ML: 100000 SUSPENSION ORAL at 13:00

## 2017-02-01 RX ADMIN — IPRATROPIUM BROMIDE AND ALBUTEROL SULFATE SCH AMPULE: .5; 3 SOLUTION RESPIRATORY (INHALATION) at 11:26

## 2017-02-01 RX ADMIN — SODIUM CHLORIDE, PRESERVATIVE FREE SCH ML: 5 INJECTION INTRAVENOUS at 19:42

## 2017-02-01 RX ADMIN — SODIUM CHLORIDE, PRESERVATIVE FREE SCH ML: 5 INJECTION INTRAVENOUS at 07:39

## 2017-02-01 RX ADMIN — MESALAMINE SCH MG: 800 TABLET, DELAYED RELEASE ORAL at 22:48

## 2017-02-01 RX ADMIN — IPRATROPIUM BROMIDE AND ALBUTEROL SULFATE SCH AMPULE: .5; 3 SOLUTION RESPIRATORY (INHALATION) at 19:38

## 2017-02-01 RX ADMIN — HEPARIN SODIUM SCH UNITS: 10000 INJECTION, SOLUTION INTRAVENOUS; SUBCUTANEOUS at 22:48

## 2017-02-01 RX ADMIN — GABAPENTIN SCH MG: 100 CAPSULE ORAL at 13:00

## 2017-02-01 RX ADMIN — OXYCODONE HYDROCHLORIDE AND ACETAMINOPHEN SCH MG: 500 TABLET ORAL at 07:38

## 2017-02-01 RX ADMIN — AZITHROMYCIN SCH MG: 250 TABLET, FILM COATED ORAL at 07:39

## 2017-02-01 RX ADMIN — MAGNESIUM SULFATE HEPTAHYDRATE SCH MLS/HR: 500 INJECTION, SOLUTION INTRAMUSCULAR; INTRAVENOUS at 11:14

## 2017-02-01 RX ADMIN — CEFEPIME SCH MLS/HR: 2 INJECTION, POWDER, FOR SOLUTION INTRAVENOUS at 22:47

## 2017-02-01 RX ADMIN — MESALAMINE SCH MG: 800 TABLET, DELAYED RELEASE ORAL at 06:14

## 2017-02-01 RX ADMIN — GUAIFENESIN AND CODEINE PHOSPHATE PRN ML: 10; 100 LIQUID ORAL at 22:48

## 2017-02-01 RX ADMIN — IPRATROPIUM BROMIDE AND ALBUTEROL SULFATE SCH AMPULE: .5; 3 SOLUTION RESPIRATORY (INHALATION) at 15:26

## 2017-02-01 RX ADMIN — Medication SCH TAB: at 07:39

## 2017-02-01 RX ADMIN — NYSTATIN SCH ML: 100000 SUSPENSION ORAL at 07:38

## 2017-02-01 RX ADMIN — VITAMIN C SCH TAB: TAB at 07:38

## 2017-02-01 RX ADMIN — BUDESONIDE AND FORMOTEROL FUMARATE DIHYDRATE SCH PUFF: 160; 4.5 AEROSOL RESPIRATORY (INHALATION) at 07:47

## 2017-02-01 RX ADMIN — METHYLPREDNISOLONE SODIUM SUCCINATE SCH MG: 40 INJECTION, POWDER, FOR SOLUTION INTRAMUSCULAR; INTRAVENOUS at 22:48

## 2017-02-01 RX ADMIN — BUDESONIDE AND FORMOTEROL FUMARATE DIHYDRATE SCH PUFF: 160; 4.5 AEROSOL RESPIRATORY (INHALATION) at 19:43

## 2017-02-01 NOTE — HHI.PR
Subjective


Interval History


Alert, oriented, complaining of recurrent cough, wheezing, anxiety, no pain, 

some palpitation





Review of Systems


Constitutional


Constitutional Remarks


10 systems reviewed and otherwise negative





Vitals/Results


Intake & Output











 1/31/17 1/31/17 2/1/17





 15:00 23:00 07:00


 


Intake Total 2381 ml 960 ml 100 ml


 


Balance 2381 ml 960 ml 100 ml


 


   


 


Intake Oral 1400 ml 960 ml 


 


IV Total 981 ml  100 ml


 


# Voids 3 2 


 


# Bowel Movements 0 0 








Vital Signs





 Vital Signs








  Date Time  Temp Pulse Resp B/P Pulse Ox O2 Delivery O2 Flow Rate FiO2


 


2/1/17 12:00 96.3 101 19 117/81 94   


 


2/1/17 09:12     96 Nasal Cannula 3.00 


 


2/1/17 08:00 96.2 77 17 123/76 93   


 


2/1/17 04:00 96.6 87 18 106/59 95   


 


1/31/17 23:49 97.3 84 20 131/69 93   


 


1/31/17 20:08  93      


 


1/31/17 20:00 96.8 103 22 142/82 98   








CBC/BMP:  


1/31/17 0512                                                                   

             1/31/17 0512





Lab Results





Laboratory Tests








Test 2/1/17





 06:15


 


Adenovirus (PCR) NOT DETECTED 


 


Bordetella holmesii (PCR) NOT DETECTED 


 


Bordetella pertussis DNA (PCR) NOT DETECTED 


 


Bordetella parapertussis DNA NOT DETECTED 





(PCR) 


 


Human Metapneumovirus (PCR) NOT DETECTED 


 


Influenza Type A (RT-PCR) NOT DETECTED 


 


Influenza Type A (H1) (PCR) NOT DETECTED 


 


Influenza Type A (H3) (PCR) NOT DETECTED 


 


Parainfluenza Type 1 (PCR) NOT DETECTED 


 


Parainfluenza Type 2 (PCR) NOT DETECTED 


 


Parainfluenza Type 3 (PCR) NOT DETECTED 


 


Parainfluenza Type 4 (PCR) NOT DETECTED 


 


Resp Syncytial Virus Type A NOT DETECTED 





(PCR) 


 


Resp Syncytial Virus Type B NOT DETECTED 





(PCR) 


 


Rhinovirus (PCR) NOT DETECTED 











Physical Exam


General


General Appearance:  Well Developed, Comfortable





Eyes


Eye Exam:  Pupils Reactive





Ears & Nose


Ears & Nose Exam:  Nasal Mucosa Pink





Neck


Neck Exam:  Trachea Midline





Pulmonary


Resp Exam:  Breath Sounds Equal, No Distress


Resp Remarks


Occasional bilateral wheeze





Cardiology


CV Exam:  Normal Sinus Rhythm, Good Perfusion, Tachycardia





Gastrointestinal/Abdomen


GI Exam:  Non-Tender, Bowel Sounds Present





Musculoskeletal


MS Exam:  Normal Gait, Normal Tone





Integumentary


Skin Exam:  Warm, Dry





Neurologic


Neuro Exam:  Awake, Oriented





Psychiatric


Psych Exam:  Appropriate Responses





VTE Prophylaxis


VTE Prophylaxis Meds:  Heparin, Coumadin





Assessment/Plan


Assessment/Plan








Assessment





Pneumonia


COPD exacerbation


Fever.


Recently quit smoking.





Management.





Telemetry


Intravenous antibiotics.


Follow cultures


Antibiotics per infectious disease


DuoNeb.


Supplemental oxygen.


Pulmonary and infectious disease following


Discussed with patient.


Discussed with nurse.








Mari Sutton MD Feb 1, 2017 17:07

## 2017-02-01 NOTE — HHI.PR
Subjective


Remarks


Improved. Less cough  and wheezing. Sputum is clear. No fever.





Objective





 Vital Signs








  Date Time  Temp Pulse Resp B/P Pulse Ox O2 Delivery O2 Flow Rate FiO2


 


2/1/17 16:00 96.3 103 17 134/70 94   


 


2/1/17 12:00 96.3 101 19 117/81 94   


 


2/1/17 09:12     96 Nasal Cannula 3.00 


 


2/1/17 08:00 96.2 77 17 123/76 93   


 


2/1/17 04:00 96.6 87 18 106/59 95   


 


1/31/17 23:49 97.3 84 20 131/69 93   


 


1/31/17 20:08  93      


 


1/31/17 20:00 96.8 103 22 142/82 98   








 I/O








 1/31/17 1/31/17 1/31/17 2/1/17 2/1/17 2/1/17





 07:00 15:00 23:00 07:00 15:00 23:00


 


Intake Total 740 ml 2381 ml 960 ml 100 ml 340 ml 


 


Balance 740 ml 2381 ml 960 ml 100 ml 340 ml 


 


      


 


Intake Oral 240 ml 1400 ml 960 ml  240 ml 


 


IV Total 500 ml 981 ml  100 ml 100 ml 


 


# Voids 2 3 2  2 


 


# Bowel Movements 1 0 0  1 








Result Diagram:  


1/31/17 0512 1/31/17 0512





Objective Remarks


This is an averagely built middle-aged man in no distress. 


HEENT:  Head normocephalic.  Pupils reactive and equal.  Tongue moist.  Throat


is clear. 


NECK:  Supple.  No lymphadenopathy.  No bruits or thyroid enlargement.


CHEST:  Equal movements with percussion note, resonant throughout.  Wheezes are


scattered bilaterally.  


HEART:  The heart sounds are irregular, S1 and S2.  No murmur.  No S3.


ABDOMEN:  Soft and benign.  No masses, organomegaly or tenderness.  Bowel


sounds are active.


EXTREMITIES:  No edema.  No clubbing or cyanosis.  No calf tenderness or


swelling.  Reflexes are 1+ with no gross motor deficits.


NEUROLOGIC:  Cranial nerves grossly intact.


SKIN:  No lesions.





Assessment and Plan


Assessment and Plan





 


 


 


 


IMPRESSION


1.   COPD and chronic bronchitis with acute exacerbation.


2.   Probable early pneumonia right lower lobe.


3.   UTI


4.   History of hypertension.








Plan :








1. Continue  antibiotic.





2. Nebs qid , duoneb.





3. Solumedrol  40 mg q8h.





4. Labs in am





5. Will  cont  symbicort inhaler  2 puffs bid.





6. PFT when  stable.





7. O2 at 5 L  and wean to  keep sat >92. 





8. Arrange Home O2 at  3 L.








ARIADNA Murry MD Feb 1, 2017 18:47

## 2017-02-02 VITALS
TEMPERATURE: 97.2 F | HEART RATE: 113 BPM | OXYGEN SATURATION: 94 % | DIASTOLIC BLOOD PRESSURE: 63 MMHG | SYSTOLIC BLOOD PRESSURE: 111 MMHG | RESPIRATION RATE: 17 BRPM

## 2017-02-02 VITALS
SYSTOLIC BLOOD PRESSURE: 114 MMHG | HEART RATE: 89 BPM | RESPIRATION RATE: 18 BRPM | TEMPERATURE: 96 F | OXYGEN SATURATION: 95 %

## 2017-02-02 VITALS
TEMPERATURE: 96.5 F | DIASTOLIC BLOOD PRESSURE: 83 MMHG | RESPIRATION RATE: 20 BRPM | HEART RATE: 118 BPM | OXYGEN SATURATION: 96 % | SYSTOLIC BLOOD PRESSURE: 144 MMHG

## 2017-02-02 VITALS
HEART RATE: 82 BPM | TEMPERATURE: 97 F | OXYGEN SATURATION: 93 % | DIASTOLIC BLOOD PRESSURE: 61 MMHG | SYSTOLIC BLOOD PRESSURE: 102 MMHG | RESPIRATION RATE: 17 BRPM

## 2017-02-02 VITALS
RESPIRATION RATE: 20 BRPM | HEART RATE: 109 BPM | DIASTOLIC BLOOD PRESSURE: 85 MMHG | SYSTOLIC BLOOD PRESSURE: 129 MMHG | OXYGEN SATURATION: 91 % | TEMPERATURE: 96 F

## 2017-02-02 VITALS
OXYGEN SATURATION: 92 % | TEMPERATURE: 96.8 F | HEART RATE: 106 BPM | DIASTOLIC BLOOD PRESSURE: 81 MMHG | SYSTOLIC BLOOD PRESSURE: 128 MMHG | RESPIRATION RATE: 19 BRPM

## 2017-02-02 VITALS — OXYGEN SATURATION: 95 %

## 2017-02-02 VITALS — HEART RATE: 109 BPM

## 2017-02-02 VITALS — OXYGEN SATURATION: 96 %

## 2017-02-02 LAB
ANION GAP SERPL CALC-SCNC: 11 MEQ/L (ref 5–15)
BASOPHILS # BLD AUTO: 0 TH/MM3 (ref 0–0.2)
BASOPHILS NFR BLD: 0.1 % (ref 0–2)
BUN SERPL-MCNC: 23 MG/DL (ref 7–18)
CHLORIDE SERPL-SCNC: 107 MEQ/L (ref 98–107)
EOSINOPHIL # BLD: 0 TH/MM3 (ref 0–0.4)
EOSINOPHIL NFR BLD: 0 % (ref 0–4)
ERYTHROCYTE [DISTWIDTH] IN BLOOD BY AUTOMATED COUNT: 14.2 % (ref 11.6–17.2)
GFR SERPLBLD BASED ON 1.73 SQ M-ARVRAT: 91 ML/MIN (ref 89–?)
HCO3 BLD-SCNC: 21.5 MEQ/L (ref 21–32)
HCT VFR BLD CALC: 38.9 % (ref 39–51)
HEMO FLAGS: (no result)
LYMPHOCYTES # BLD AUTO: 0.5 TH/MM3 (ref 1–4.8)
LYMPHOCYTES NFR BLD AUTO: 5.3 % (ref 9–44)
MAGNESIUM SERPL-MCNC: 2.2 MG/DL (ref 1.5–2.5)
MCH RBC QN AUTO: 30.5 PG (ref 27–34)
MCHC RBC AUTO-ENTMCNC: 35.4 % (ref 32–36)
MCV RBC AUTO: 86 FL (ref 80–100)
MONOCYTES NFR BLD: 3.1 % (ref 0–8)
NEUTROPHILS # BLD AUTO: 8.9 TH/MM3 (ref 1.8–7.7)
NEUTROPHILS NFR BLD AUTO: 91.5 % (ref 16–70)
PLATELET # BLD: 179 TH/MM3 (ref 150–450)
POTASSIUM SERPL-SCNC: 4.4 MEQ/L (ref 3.5–5.1)
RBC # BLD AUTO: 4.52 MIL/MM3 (ref 4.5–5.9)
SODIUM SERPL-SCNC: 139 MEQ/L (ref 136–145)
WBC # BLD AUTO: 9.7 TH/MM3 (ref 4–11)

## 2017-02-02 RX ADMIN — NYSTATIN SCH ML: 100000 SUSPENSION ORAL at 21:55

## 2017-02-02 RX ADMIN — VITAMIN C SCH TAB: TAB at 08:11

## 2017-02-02 RX ADMIN — MAGNESIUM SULFATE HEPTAHYDRATE SCH MLS/HR: 500 INJECTION, SOLUTION INTRAMUSCULAR; INTRAVENOUS at 01:20

## 2017-02-02 RX ADMIN — HEPARIN SODIUM SCH UNITS: 10000 INJECTION, SOLUTION INTRAVENOUS; SUBCUTANEOUS at 12:41

## 2017-02-02 RX ADMIN — NYSTATIN SCH ML: 100000 SUSPENSION ORAL at 16:24

## 2017-02-02 RX ADMIN — SODIUM CHLORIDE, PRESERVATIVE FREE SCH ML: 5 INJECTION INTRAVENOUS at 21:00

## 2017-02-02 RX ADMIN — IPRATROPIUM BROMIDE AND ALBUTEROL SULFATE SCH AMPULE: .5; 3 SOLUTION RESPIRATORY (INHALATION) at 07:16

## 2017-02-02 RX ADMIN — AZITHROMYCIN SCH MG: 250 TABLET, FILM COATED ORAL at 08:11

## 2017-02-02 RX ADMIN — GABAPENTIN SCH MG: 100 CAPSULE ORAL at 12:41

## 2017-02-02 RX ADMIN — MESALAMINE SCH MG: 800 TABLET, DELAYED RELEASE ORAL at 12:41

## 2017-02-02 RX ADMIN — SODIUM CHLORIDE, PRESERVATIVE FREE SCH ML: 5 INJECTION INTRAVENOUS at 08:12

## 2017-02-02 RX ADMIN — BUDESONIDE AND FORMOTEROL FUMARATE DIHYDRATE SCH PUFF: 160; 4.5 AEROSOL RESPIRATORY (INHALATION) at 21:55

## 2017-02-02 RX ADMIN — GABAPENTIN SCH MG: 100 CAPSULE ORAL at 08:11

## 2017-02-02 RX ADMIN — DILTIAZEM HYDROCHLORIDE SCH MG: 30 TABLET, FILM COATED ORAL at 16:24

## 2017-02-02 RX ADMIN — IPRATROPIUM BROMIDE AND ALBUTEROL SULFATE SCH AMPULE: .5; 3 SOLUTION RESPIRATORY (INHALATION) at 11:06

## 2017-02-02 RX ADMIN — CEFEPIME SCH MLS/HR: 2 INJECTION, POWDER, FOR SOLUTION INTRAVENOUS at 21:55

## 2017-02-02 RX ADMIN — GUAIFENESIN AND CODEINE PHOSPHATE PRN ML: 10; 100 LIQUID ORAL at 08:15

## 2017-02-02 RX ADMIN — CEFEPIME SCH MLS/HR: 2 INJECTION, POWDER, FOR SOLUTION INTRAVENOUS at 08:12

## 2017-02-02 RX ADMIN — METHYLPREDNISOLONE SODIUM SUCCINATE SCH MG: 40 INJECTION, POWDER, FOR SOLUTION INTRAMUSCULAR; INTRAVENOUS at 08:11

## 2017-02-02 RX ADMIN — BUDESONIDE AND FORMOTEROL FUMARATE DIHYDRATE SCH PUFF: 160; 4.5 AEROSOL RESPIRATORY (INHALATION) at 08:13

## 2017-02-02 RX ADMIN — Medication SCH TAB: at 08:10

## 2017-02-02 RX ADMIN — DILTIAZEM HYDROCHLORIDE SCH MG: 30 TABLET, FILM COATED ORAL at 21:55

## 2017-02-02 RX ADMIN — OXYCODONE HYDROCHLORIDE AND ACETAMINOPHEN SCH MG: 500 TABLET ORAL at 08:11

## 2017-02-02 RX ADMIN — NYSTATIN SCH ML: 100000 SUSPENSION ORAL at 12:41

## 2017-02-02 RX ADMIN — MAGNESIUM SULFATE HEPTAHYDRATE SCH MLS/HR: 500 INJECTION, SOLUTION INTRAMUSCULAR; INTRAVENOUS at 12:43

## 2017-02-02 RX ADMIN — GABAPENTIN SCH MG: 100 CAPSULE ORAL at 16:24

## 2017-02-02 RX ADMIN — MESALAMINE SCH MG: 800 TABLET, DELAYED RELEASE ORAL at 21:55

## 2017-02-02 RX ADMIN — MESALAMINE SCH MG: 800 TABLET, DELAYED RELEASE ORAL at 06:23

## 2017-02-02 RX ADMIN — NYSTATIN SCH ML: 100000 SUSPENSION ORAL at 08:11

## 2017-02-02 NOTE — MB
cc:

TRINY HOLLAND

****

 

 

DATE OF CONSULTATION:  02/02/2017.

 

HISTORY OF PRESENT ILLNESS:

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

of COPD/chronic bronchitis. He was admitted with shortness of breath and

diagnosed with acute exacerbation and COPD and pneumonia. He was started on

antibiotics. He still complains of shortness of breath. He has not had any

chest pain or peripheral edema. He developed atrial fibrillation with rapid

ventricular response this morning. He stays in atrial fibrillation. He has a

previous history of hypertension, chronic bronchitis, ulcerative colitis,

benign prostate hypertrophy, bladder polyps, urinary tract infection and

sepsis, skin cyst and keloid surgeries, cystoscopies.

 

MEDICATIONS:

1. Asacol.

2. Protonix.

3. Neurontin.

4. Metoprolol 25 milligrams a day.

5. Clonidine.

6. DuoNeb.

7. Symbicort.

 

ALLERGIES:

NONE.

 

SOCIAL HISTORY:

The patient quit smoking in December.  He was also drinking in December. He is

a nurse at [employer].

 

ALLERGIES:

NONE.

 

FAMILY HISTORY:

Family history is positive for stroke in his father and coronary artery disease

in his sister.

 

REVIEW OF SYSTEMS:

His review of systems is otherwise negative.

 

PHYSICAL EXAMINATION:

VITAL SIGNS: Blood pressure is 102/61, pulse 82 and irregular.

HEAD, EYES, EARS, NOSE, THROAT:  Negative.

NECK: 2+ carotid upstrokes, no bruits.

LUNGS: With bilateral rhonchi and wheezes.

HEART: Irregularly irregular with no murmurs, rubs or gallops.

ABDOMEN: Abdomen soft. No bruits.

EXTREMITIES: Without edema. 2+ distal pulses.

NEUROLOGIC: Grossly nonfocal.

 

EKG was reviewed and showed normal sinus rhythm with normal axis and intervals.

 

 

Follow up EKG today shows atrial fibrillation with increased ventricular

response, no acute changes.

 

LABS:

Hemoglobin is 1.38, potassium 4.4, creatinine 0.9.

 

Troponin negative x2.

 

BNP less than 2.

 

DIAGNOSIS:

1. New onset of atrial fibrillation with rapid ventricular response.

2. COPD/chronic bronchitis exacerbation.

3. Pneumonia.

4. Urinary tract infection.

 

DISPOSITION:

1. Mr. Pickard will be monitored on telemetry.

2. We will initiate diltiazem p.o. for rate control.

3. We will start therapy with aspirin.

4. If he stays in atrial fibrillation, we will proceed with cardioversion

tomorrow.

5. We will continue antibiotics, steroids, oxygen as per Dr. Murry.

6. I will follow him for cardiology during his hospitalization.

 

 

 

                              _________________________________

                              MD ARAMIS Moctezuma/DAPHNIE

D:  2/2/2017/1:46 PM

T:  2/2/2017/4:00 PM

Visit #:  [id number]

Job #:  [id number]

BENJA

## 2017-02-02 NOTE — HHI.FF
Face to Face Verification


Diagnosis:  


(1) Tobacco abuse


(2) COPD (chronic obstructive pulmonary disease)


(3) Hypoxemia


Home Health Nursing


Order:     Medical education


      Oxygen administration education


      Nursing assessment with vital signs








I have seen patient Zo Pickard on 2/2/17. My clinical findings 

support the need for the requested home health care services because:


Patient has SOB








I certify that my clinical findings support that this patient is homebound 

because:


Hx COPD- exertion dyspnea/weakness








Wilma Harrison Feb 2, 2017 14:58

## 2017-02-02 NOTE — HHI.PR
Subjective


Subjective Remarks


less sob, and wheezing


minimal cough


feels improved


overnight, has palpitations, tele showed afib RVR


seen by Dr. Davalos at this time, poss. due to meds, copd


afebrile 


tele reviewed, afib RVR 





Review of Systems


Constitutional


Constitutional Remarks


12 point ROS completed, negative except as noted above





Vitals/Results


Intake & Output











 2/1/17 2/1/17 2/2/17





 15:00 23:00 07:00


 


Intake Total 340 ml 960 ml 340 ml


 


Balance 340 ml 960 ml 340 ml


 


   


 


Intake Oral 240 ml 960 ml 240 ml


 


IV Total 100 ml  100 ml


 


# Voids 2  2


 


# Bowel Movements 1  1








Vital Signs





 Vital Signs








  Date Time  Temp Pulse Resp B/P Pulse Ox O2 Delivery O2 Flow Rate FiO2


 


2/2/17 12:00 97.0 82 17 102/61 93   


 


2/2/17 08:00 96.8 106 19 128/81 92   


 


2/2/17 07:16     95 Nasal Cannula 2.00 


 


2/2/17 04:00 96.5 118 20 144/83 96   


 


2/2/17 00:00 96.0 89 18 114/ 95   


 


2/1/17 20:00  115      


 


2/1/17 20:00 98.1 101 22 166/93 95   


 


2/1/17 19:38     96 Nasal Cannula 2.00 


 


2/1/17 16:00 96.3 103 17 134/70 94   








CBC/BMP:  


2/2/17 0431                                                                    

            2/2/17 0431





Lab Results





Laboratory Tests








Test 2/2/17





 04:31


 


White Blood Count 9.7 TH/MM3


 


Red Blood Count 4.52 MIL/MM3


 


Hemoglobin 13.8 GM/DL


 


Hematocrit 38.9 %


 


Mean Corpuscular Volume 86.0 FL


 


Mean Corpuscular Hemoglobin 30.5 PG


 


Mean Corpuscular Hemoglobin 35.4 %





Concent 


 


Red Cell Distribution Width 14.2 %


 


Platelet Count 179 TH/MM3


 


Mean Platelet Volume 8.3 FL


 


Neutrophils (%) (Auto) 91.5 %


 


Lymphocytes (%) (Auto) 5.3 %


 


Monocytes (%) (Auto) 3.1 %


 


Eosinophils (%) (Auto) 0.0 %


 


Basophils (%) (Auto) 0.1 %


 


Neutrophils # (Auto) 8.9 TH/MM3


 


Lymphocytes # (Auto) 0.5 TH/MM3


 


Monocytes # (Auto) 0.3 TH/MM3


 


Eosinophils # (Auto) 0.0 TH/MM3


 


Basophils # (Auto) 0.0 TH/MM3


 


CBC Comment DIFF FINAL 


 


Differential Comment  


 


Sodium Level 139 MEQ/L


 


Potassium Level 4.4 MEQ/L


 


Chloride Level 107 MEQ/L


 


Carbon Dioxide Level 21.5 MEQ/L


 


Anion Gap 11 MEQ/L


 


Blood Urea Nitrogen 23 MG/DL


 


Creatinine 0.86 MG/DL


 


Estimat Glomerular Filtration 91 ML/MIN





Rate 


 


Random Glucose 178 MG/DL


 


Calcium Level 9.1 MG/DL


 


Magnesium Level 2.2 MG/DL


 


Troponin I LESS THAN 0.02





 NG/ML











Physical Exam


General


General Appearance:  Well Developed, Comfortable, Anxious





Eyes


Eye Exam:  Pupils Equal, Pupils Reactive





Ears & Nose


Ears & Nose Exam:  Nasal Mucosa Pink





Neck


Neck Exam:  Trachea Midline





Pulmonary


Resp Exam:  No Distress


Resp Remarks


mild exp. wheeze, improved air movement





Cardiology


CV Exam:  Good Perfusion, Arrhythmia, Tachycardia





Gastrointestinal/Abdomen


GI Exam:  Non-Tender, Bowel Sounds Present





Musculoskeletal


MS Exam:  Normal Gait, Normal Tone





Integumentary


Skin Exam:  Warm, Dry





Neurologic


Neuro Exam:  Alert, Awake, Oriented, Speech Clear, Moving All Extremities, No 

Focal Deficits





Psychiatric


Psych Exam:  Appropriate Responses





VTE Prophylaxis


VTE Prophylaxis Meds:  Heparin





PUD Prophylasis


PUD Prophylaxis:  Protonix





Assessment/Plan


Problem List:  


(1) COPD (chronic obstructive pulmonary disease)


(2) Ulcerative colitis


(3) Hypoxemia


(4) Hypertension


(5) Pneumonia


(6) BPH (benign prostatic hyperplasia)


(7) Atrial fibrillation


Assessment/Plan


61-year-old male with recent diagnosis of COPD, quit smoking in December, 

presented to emergency room complaining of shortness of breath and cough for 

the last 2 weeks worsening last night while he was at work.   Was treated with 

Augmentin 10 days by PCP for possible URI infection.  He noted saturations on 

room air were 81%.  Patient presented to emergency room for further evaluation, 

chest x-ray with findings of possible early right lower lobe infiltrate, 

possible pneumonia,  COPD exacerbation with hypoxemia, failure of outpatient 

therapy


-supplemental oxygen as needed to keep sats > 92


appreciate pulmonary input, has adjusted steroids and duonebs due to afib with 

RVR


Robitussin AC 10 MLS every 6 when necessary


continue Symbicort


-Continue with empiric antibiotics, Rocephin 1 g IV daily, Zithromax 500 mg by 

mouth


-Nystatin


-ID input appreciated, viral panel has been ordered, negative so far





History of ulcerative colitis, stable


Continue with Asacol





Hypertension, uncontrolled secondary to respiratory distress


-continue home medications





Afib with RVR overnight


-continue cardizem


-appreciate cardiology input, d/w Dr. Davalos-if he remains in afib, poss. 

cardioversion tomorrow. Will keep NPO after MN


-echo done, ef okay per Dr. Davalos





Heparin 5000 units subcutaneous twice a day for DVT prophylaxis


Protonix for GI prophylaxis


continue to monitor


walk test ordered, may need oxygen for home


CM for dc planning











D/W RN


D/W Dr. Sutton


D/W pt


D/W Dr. Davalos


This patient was seen by myself and Dr. Sutton, this note is written his behalf





Problem Qualifiers





(1) COPD (chronic obstructive pulmonary disease):  


Qualified Code:  J44.1 - Chronic obstructive pulmonary disease with acute 

exacerbation


(2) Ulcerative colitis:  


Qualified Code:  K51.90 - Ulcerative colitis without complications, unspecified 

location


(3) Hypertension:  


Qualified Code:  I10 - Essential hypertension


(4) Pneumonia:  


Qualified Code:  J18.1 - Pneumonia of right lower lobe due to infectious 

organism


(5) BPH (benign prostatic hyperplasia):  





(6) Atrial fibrillation:  


Qualified Code:  I48.91 - Atrial fibrillation, unspecified type





Wilma Harrison Feb 2, 2017 14:58

## 2017-02-02 NOTE — HHI.PR
Subjective


Remarks


Was in A Fib RVR last nite . Now HR 94. Less cough  and wheezing. Sputum is 

clear. No fever.or  chest pain.





Objective





 Vital Signs








  Date Time  Temp Pulse Resp B/P Pulse Ox O2 Delivery O2 Flow Rate FiO2


 


2/2/17 12:00 97.0 82 17 102/61 93   


 


2/2/17 08:00 96.8 106 19 128/81 92   


 


2/2/17 07:16     95 Nasal Cannula 2.00 


 


2/2/17 04:00 96.5 118 20 144/83 96   


 


2/2/17 00:00 96.0 89 18 114/ 95   


 


2/1/17 20:00  115      


 


2/1/17 20:00 98.1 101 22 166/93 95   


 


2/1/17 19:38     96 Nasal Cannula 2.00 


 


2/1/17 16:00 96.3 103 17 134/70 94   








 I/O








 2/1/17 2/1/17 2/1/17 2/2/17 2/2/17 2/2/17





 07:00 15:00 23:00 07:00 15:00 23:00


 


Intake Total 100 ml 340 ml 960 ml 340 ml  


 


Balance 100 ml 340 ml 960 ml 340 ml  


 


      


 


Intake Oral  240 ml 960 ml 240 ml  


 


IV Total 100 ml 100 ml  100 ml  


 


# Voids  2  2  


 


# Bowel Movements  1  1  








Result Diagram:  


2/2/17 0431 2/2/17 0431





Objective Remarks


This is an averagely built middle-aged man in no distress. 


HEENT:  Head normocephalic.  Pupils reactive and equal.  Tongue moist.  Throat


is clear. 


NECK:  Supple.  No lymphadenopathy.  No bruits or thyroid enlargement.


CHEST:  Equal movements with percussion note, resonant throughout. Occ Wheezes 

are


scattered bilaterally.  


HEART:  The heart sounds are irregular, S1 and S2.  No murmur.  No S3.


ABDOMEN:  Soft and benign.  No masses, organomegaly or tenderness.  Bowel


sounds are active.


EXTREMITIES:  No edema.  No clubbing or cyanosis.  No calf tenderness or


swelling.  Reflexes are 1+ with no gross motor deficits.


NEUROLOGIC:  Cranial nerves grossly intact.


SKIN:  No lesions.





Assessment and Plan


Assessment and Plan





 


 


 


 


IMPRESSION


1.   COPD and chronic bronchitis with acute exacerbation.


2.   Probable early pneumonia right lower lobe.


3.   UTI


4.   History of hypertension.


5.   Atrial Fibrillation








Plan :








1. Continue  antibiotic.





2. Nebs qid , duoneb.





3. D/C Solumedrol  





4. Add Low dose prednisone





5. Will  cont  symbicort inhaler  1 puff bid.





6. PFT when  stable.





7. O2 at 3 L  and wean to  keep sat >92. 





8. Arrange Home O2 at  2 L.





9. Cardiology  to see.








ARIADNA Murry MD Feb 2, 2017 12:47

## 2017-02-03 VITALS
OXYGEN SATURATION: 92 % | TEMPERATURE: 96.2 F | SYSTOLIC BLOOD PRESSURE: 124 MMHG | RESPIRATION RATE: 20 BRPM | HEART RATE: 98 BPM | DIASTOLIC BLOOD PRESSURE: 84 MMHG

## 2017-02-03 VITALS
HEART RATE: 107 BPM | SYSTOLIC BLOOD PRESSURE: 128 MMHG | DIASTOLIC BLOOD PRESSURE: 89 MMHG | OXYGEN SATURATION: 95 % | RESPIRATION RATE: 18 BRPM | TEMPERATURE: 98.1 F

## 2017-02-03 VITALS
SYSTOLIC BLOOD PRESSURE: 98 MMHG | OXYGEN SATURATION: 93 % | HEART RATE: 109 BPM | DIASTOLIC BLOOD PRESSURE: 63 MMHG | RESPIRATION RATE: 21 BRPM | TEMPERATURE: 96.4 F

## 2017-02-03 VITALS
DIASTOLIC BLOOD PRESSURE: 86 MMHG | SYSTOLIC BLOOD PRESSURE: 128 MMHG | RESPIRATION RATE: 18 BRPM | HEART RATE: 101 BPM | TEMPERATURE: 98.6 F | OXYGEN SATURATION: 95 %

## 2017-02-03 VITALS
TEMPERATURE: 101.1 F | SYSTOLIC BLOOD PRESSURE: 123 MMHG | DIASTOLIC BLOOD PRESSURE: 72 MMHG | RESPIRATION RATE: 18 BRPM | OXYGEN SATURATION: 94 % | HEART RATE: 117 BPM

## 2017-02-03 VITALS
OXYGEN SATURATION: 98 % | DIASTOLIC BLOOD PRESSURE: 61 MMHG | TEMPERATURE: 97.5 F | SYSTOLIC BLOOD PRESSURE: 109 MMHG | RESPIRATION RATE: 21 BRPM | HEART RATE: 101 BPM

## 2017-02-03 VITALS — OXYGEN SATURATION: 92 %

## 2017-02-03 VITALS — TEMPERATURE: 100.9 F

## 2017-02-03 RX ADMIN — AZITHROMYCIN SCH MG: 250 TABLET, FILM COATED ORAL at 13:41

## 2017-02-03 RX ADMIN — IPRATROPIUM BROMIDE AND ALBUTEROL SULFATE SCH AMPULE: .5; 3 SOLUTION RESPIRATORY (INHALATION) at 21:11

## 2017-02-03 RX ADMIN — GABAPENTIN SCH MG: 100 CAPSULE ORAL at 11:10

## 2017-02-03 RX ADMIN — ACETAMINOPHEN PRN MG: 500 TABLET ORAL at 15:35

## 2017-02-03 RX ADMIN — BUDESONIDE AND FORMOTEROL FUMARATE DIHYDRATE SCH PUFF: 160; 4.5 AEROSOL RESPIRATORY (INHALATION) at 21:00

## 2017-02-03 RX ADMIN — NYSTATIN SCH ML: 100000 SUSPENSION ORAL at 16:27

## 2017-02-03 RX ADMIN — CEFEPIME SCH MLS/HR: 2 INJECTION, POWDER, FOR SOLUTION INTRAVENOUS at 08:07

## 2017-02-03 RX ADMIN — GUAIFENESIN AND CODEINE PHOSPHATE PRN ML: 10; 100 LIQUID ORAL at 21:28

## 2017-02-03 RX ADMIN — SODIUM CHLORIDE, PRESERVATIVE FREE SCH ML: 5 INJECTION INTRAVENOUS at 21:31

## 2017-02-03 RX ADMIN — HEPARIN SODIUM SCH UNITS: 10000 INJECTION, SOLUTION INTRAVENOUS; SUBCUTANEOUS at 01:19

## 2017-02-03 RX ADMIN — NYSTATIN SCH ML: 100000 SUSPENSION ORAL at 21:28

## 2017-02-03 RX ADMIN — NYSTATIN SCH ML: 100000 SUSPENSION ORAL at 09:00

## 2017-02-03 RX ADMIN — DILTIAZEM HYDROCHLORIDE SCH MG: 60 TABLET, FILM COATED ORAL at 21:28

## 2017-02-03 RX ADMIN — OXYCODONE HYDROCHLORIDE AND ACETAMINOPHEN SCH MG: 500 TABLET ORAL at 13:41

## 2017-02-03 RX ADMIN — GABAPENTIN SCH MG: 100 CAPSULE ORAL at 13:00

## 2017-02-03 RX ADMIN — DILTIAZEM HYDROCHLORIDE SCH MG: 60 TABLET, FILM COATED ORAL at 16:28

## 2017-02-03 RX ADMIN — GABAPENTIN SCH MG: 100 CAPSULE ORAL at 16:27

## 2017-02-03 RX ADMIN — DILTIAZEM HYDROCHLORIDE SCH MG: 30 TABLET, FILM COATED ORAL at 08:07

## 2017-02-03 RX ADMIN — DILTIAZEM HYDROCHLORIDE SCH MG: 30 TABLET, FILM COATED ORAL at 13:47

## 2017-02-03 RX ADMIN — MESALAMINE SCH MG: 800 TABLET, DELAYED RELEASE ORAL at 13:46

## 2017-02-03 RX ADMIN — NYSTATIN SCH ML: 100000 SUSPENSION ORAL at 13:41

## 2017-02-03 RX ADMIN — MESALAMINE SCH MG: 800 TABLET, DELAYED RELEASE ORAL at 21:28

## 2017-02-03 RX ADMIN — DILTIAZEM HYDROCHLORIDE SCH MG: 30 TABLET, FILM COATED ORAL at 11:10

## 2017-02-03 RX ADMIN — CEFEPIME SCH MLS/HR: 2 INJECTION, POWDER, FOR SOLUTION INTRAVENOUS at 21:29

## 2017-02-03 RX ADMIN — GUAIFENESIN AND CODEINE PHOSPHATE PRN ML: 10; 100 LIQUID ORAL at 09:07

## 2017-02-03 RX ADMIN — NYSTATIN SCH ML: 100000 SUSPENSION ORAL at 11:10

## 2017-02-03 RX ADMIN — SODIUM CHLORIDE, PRESERVATIVE FREE SCH ML: 5 INJECTION INTRAVENOUS at 08:07

## 2017-02-03 RX ADMIN — MESALAMINE SCH MG: 800 TABLET, DELAYED RELEASE ORAL at 06:00

## 2017-02-03 RX ADMIN — Medication SCH TAB: at 13:40

## 2017-02-03 RX ADMIN — HEPARIN SODIUM SCH UNITS: 10000 INJECTION, SOLUTION INTRAVENOUS; SUBCUTANEOUS at 11:10

## 2017-02-03 RX ADMIN — MESALAMINE SCH MG: 800 TABLET, DELAYED RELEASE ORAL at 06:27

## 2017-02-03 RX ADMIN — GABAPENTIN SCH MG: 100 CAPSULE ORAL at 09:00

## 2017-02-03 RX ADMIN — MAGNESIUM SULFATE HEPTAHYDRATE SCH MLS/HR: 500 INJECTION, SOLUTION INTRAMUSCULAR; INTRAVENOUS at 04:00

## 2017-02-03 RX ADMIN — BUDESONIDE AND FORMOTEROL FUMARATE DIHYDRATE SCH PUFF: 160; 4.5 AEROSOL RESPIRATORY (INHALATION) at 08:08

## 2017-02-03 RX ADMIN — GUAIFENESIN AND CODEINE PHOSPHATE PRN ML: 10; 100 LIQUID ORAL at 15:37

## 2017-02-03 RX ADMIN — VITAMIN C SCH TAB: TAB at 13:40

## 2017-02-03 NOTE — HHI.IDPN
Subjective


Subjective


Remarks


Delayed entry for patient seen on 2/2/2017





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


He has h/o COPD, ulcerative colitis





Overnight events reviewed.


reports he was in Afib with RVR new onset.


No rash


No diarrhea


Sitting in chair working on computer.


Antibiotics


Cefepime IV


Azithro IV


Lines


Line sites with no e/o infection.


Past Medical History


reviewed


Allergies:  


Coded Allergies:  


     No Known Allergies (Verified , 1/30/17)





Objective


.





 Vital Signs








  Date Time  Temp Pulse Resp B/P Pulse Ox O2 Delivery O2 Flow Rate FiO2


 


2/3/17 12:00 98.6 101 18 128/86 95   


 


2/3/17 08:00 98.1 107 18 128/89 95   


 


2/3/17 04:00 97.5 101 21 109/61 98   


 


2/3/17 00:00 96.4 109 21 98/63 93   


 


2/2/17 21:51  109      


 


2/2/17 20:00 96.0 109 20 129/85 91   


 


2/2/17 19:59     96 Nasal Cannula 3.00 


 


2/2/17 18:43       3.00 


 


2/2/17 16:00 97.2 113 17 111/63 94   














 2/2/17 2/2/17 2/3/17





 15:00 23:00 07:00


 


Intake Total 480 ml 360 ml 225 ml


 


Balance 480 ml 360 ml 225 ml


 


   


 


Intake Oral 380 ml 360 ml 120 ml


 


IV Total 100 ml 0 ml 105 ml


 


# Voids 4 2 2


 


# Bowel Movements 1 1 0








.





Laboratory Tests








Test 2/2/17





 04:31


 


White Blood Count 9.7 TH/MM3


 


Red Blood Count 4.52 MIL/MM3


 


Hemoglobin 13.8 GM/DL


 


Hematocrit 38.9 %


 


Mean Corpuscular Volume 86.0 FL


 


Mean Corpuscular Hemoglobin 30.5 PG


 


Mean Corpuscular Hemoglobin 35.4 %





Concent 


 


Red Cell Distribution Width 14.2 %


 


Platelet Count 179 TH/MM3


 


Mean Platelet Volume 8.3 FL


 


Neutrophils (%) (Auto) 91.5 %


 


Lymphocytes (%) (Auto) 5.3 %


 


Monocytes (%) (Auto) 3.1 %


 


Eosinophils (%) (Auto) 0.0 %


 


Basophils (%) (Auto) 0.1 %


 


Neutrophils # (Auto) 8.9 TH/MM3


 


Lymphocytes # (Auto) 0.5 TH/MM3


 


Monocytes # (Auto) 0.3 TH/MM3


 


Eosinophils # (Auto) 0.0 TH/MM3


 


Basophils # (Auto) 0.0 TH/MM3


 


CBC Comment DIFF FINAL 


 


Differential Comment  








Laboratory Tests








Test 2/2/17





 04:31


 


Sodium Level 139 MEQ/L


 


Potassium Level 4.4 MEQ/L


 


Chloride Level 107 MEQ/L


 


Carbon Dioxide Level 21.5 MEQ/L


 


Anion Gap 11 MEQ/L


 


Blood Urea Nitrogen 23 MG/DL


 


Creatinine 0.86 MG/DL


 


Estimat Glomerular Filtration 91 ML/MIN





Rate 


 


Random Glucose 178 MG/DL


 


Calcium Level 9.1 MG/DL


 


Magnesium Level 2.2 MG/DL


 


Troponin I LESS THAN 0.02





 NG/ML


 


Thyroid Stimulating Hormone 0.352 uIU/ML





3rd Gen 








Imaging


Last Impressions








Chest X-Ray 1/31/17 0000 Signed





Impressions: 





 Service Date/Time:  Tuesday, January 31, 2017 19:27 - CONCLUSION:  Hazy 

density 





 seen throughout the lungs which may represent some mild interstitial 

prominence 





 especially on the right side.     Nahun Azul MD 


 


CT Angiography 1/30/17 0839 Signed





Impressions: 





 Service Date/Time:  Monday, January 30, 2017 10:42 - CONCLUSION:  Negative 





 examination.     Ant Nino MD 








Physical Exam


GENERAL: Thin built, poorly nourished patient, in no apparent distress.


SKIN: No rashes.


HEAD: Atraumatic. Normocephalic. No temporal or scalp tenderness.


EYES: Pupils equal round and reactive. Extraocular motions intact. No scleral 

icterus. No injection or drainage. 


ENT: Nose without bleeding, purulent drainage or septal hematoma. 


Throat without erythema, tonsillar hypertrophy or exudate. Uvula midline. 

Airway patent.


NECK: Trachea midline. Supple, nontender, no meningeal signs.


CARDIOVASCULAR: HS audible.


RESPIRATORY: Clear to auscultation. Breath sounds equal bilaterally. No wheezes

, rales, or rhonchi.  


GASTROINTESTINAL: Abdomen soft, non-tender, nondistended. 


MUSCULOSKELETAL: Extremities without clubbing, cyanosis, or edema. 


No joint tenderness, effusion, or edema noted. No calf tenderness. Negative 

Homans sign bilaterally.


NEUROLOGICAL: Awake and alert. Grossly non focal


Psych: cooperative


IV line sites with no e/o infection.





Assessment & Plan


Remarks


Community acquired pneumonia vs atypical pneumonia vs viral pneumonitis.


Patient reports he finished his augmentin and next day had recurrence of 

symptoms.


Acute COPD exacerbation


Ulcerative Colitis on Asacol





Recs


Continue Cefepime IV 


Continue Azithro oral


d/w primary team: continue above while in hospital/


Upon discharge Azithro for 3 more days.


Will sign off please call back if any change in clinical condition or questions.








Blank Whiting MD Feb 3, 2017 12:55

## 2017-02-03 NOTE — EKG
Date Performed: 02/02/2017       Time Performed: 04:19:18

 

PTAGE:      61 years

 

EKG:      Atrial fibrillation with rapid ventricular response with PVC(s) Inferior ST-T changes are n
onspecific Abnormal ECG

 

PREVIOUS TRACING       : 01/30/2017 09.33 Compared to the previous tracing,  Afib is new

 

DOCTOR:   Bowen Irby  Interpretating Date/Time  02/03/2017 06:50:55

## 2017-02-03 NOTE — HHI.PR
Subjective


Subjective Remarks


went for cardioversion


cancelled, converted to SR before procedure


pt. now back to floor


SOB with activity, somewhat anxious, wants to go home


will need oxygen


no fever


HR up 120s, ST, no afib


Dr. Lucero to evaluate as well


will need to stay overnight to monitor





Review of Systems


Constitutional


Constitutional Remarks


12 point ROS completed, negative except as noted above





Vitals/Results


Intake & Output











 2/2/17 2/2/17 2/3/17





 15:00 23:00 07:00


 


Intake Total 480 ml 360 ml 225 ml


 


Balance 480 ml 360 ml 225 ml


 


   


 


Intake Oral 380 ml 360 ml 120 ml


 


IV Total 100 ml 0 ml 105 ml


 


# Voids 4 2 2


 


# Bowel Movements 1 1 0








Vital Signs





 Vital Signs








  Date Time  Temp Pulse Resp B/P Pulse Ox O2 Delivery O2 Flow Rate FiO2


 


2/3/17 12:00 98.6 101 18 128/86 95   


 


2/3/17 08:00 98.1 107 18 128/89 95   


 


2/3/17 04:00 97.5 101 21 109/61 98   


 


2/3/17 00:00 96.4 109 21 98/63 93   


 


2/2/17 21:51  109      


 


2/2/17 20:00 96.0 109 20 129/85 91   


 


2/2/17 19:59     96 Nasal Cannula 3.00 


 


2/2/17 18:43       3.00 


 


2/2/17 16:00 97.2 113 17 111/63 94   








CBC/BMP:  


2/2/17 0431                                                                    

            2/2/17 0431








Physical Exam


General


General Appearance:  Well Developed, Comfortable, Anxious





Eyes


Eye Exam:  Pupils Equal, Pupils Reactive





Ears & Nose


Ears & Nose Exam:  Nasal Mucosa Pink





Neck


Neck Exam:  Trachea Midline





Pulmonary


Resp Exam:  No Distress


Resp Remarks


mild exp. wheeze, improved air movement





Cardiology


CV Exam:  Good Perfusion, Arrhythmia, Tachycardia





Gastrointestinal/Abdomen


GI Exam:  Non-Tender, Bowel Sounds Present





Musculoskeletal


MS Exam:  Normal Gait, Normal Tone





Integumentary


Skin Exam:  Warm, Dry





Neurologic


Neuro Exam:  Alert, Awake, Oriented, Speech Clear, Moving All Extremities, No 

Focal Deficits





Psychiatric


Psych Exam:  Appropriate Responses





VTE Prophylaxis


VTE Prophylaxis Meds:  Heparin





PUD Prophylasis


PUD Prophylaxis:  Protonix





Assessment/Plan


Problem List:  


(1) COPD (chronic obstructive pulmonary disease)


(2) Ulcerative colitis


(3) Hypoxemia


(4) Hypertension


(5) Pneumonia


(6) BPH (benign prostatic hyperplasia)


(7) Atrial fibrillation


Assessment/Plan


61-year-old male with recent diagnosis of COPD, quit smoking in December, 

presented to emergency room complaining of shortness of breath and cough for 

the last 2 weeks worsening last night while he was at work.   Was treated with 

Augmentin 10 days by PCP for possible URI infection.  He noted saturations on 

room air were 81%.  Patient presented to emergency room for further evaluation, 

chest x-ray with findings of possible early right lower lobe infiltrate, 

possible pneumonia,  COPD exacerbation with hypoxemia, failure of outpatient 

therapy


-supplemental oxygen as needed to keep sats > 92


appreciate pulmonary input, has adjusted steroids and duonebs due to afib with 

RVR


Robitussin AC 10 MLS every 6 when necessary


continue Symbicort


-Continue with empiric antibiotics, Rocephin 1 g IV daily, Zithromax 500 mg by 

mouth


-Nystatin


-ID input appreciated, viral panel has been ordered, negative so far





History of ulcerative colitis, stable


Continue with Asacol





Hypertension, uncontrolled secondary to respiratory distress


-continue home medications





Afib with RVR overnight


-Cardioversion planned today but cancelled, pt. converted to SR. 


-continue cardizem, inc to 60 mg PO QID, HR 120s 


-appreciate cardiology 


-echo done, ef okay per Dr. Davalos





Low TSH


-will check T3, free T4





Heparin 5000 units subcutaneous twice a day for DVT prophylaxis


Protonix for GI prophylaxis


needs oxygen for home, ordered


CM for dc planning


will need to be off work at least 2 weeks, until SOB improved and no hypoxia, d/

w at length


Hopefully dc tomorrow 








D/W RN


D/W Dr. Sutton


D/W pt


This patient was seen by myself and Dr. Sutton, this note is written his behalf





Problem Qualifiers





(1) COPD (chronic obstructive pulmonary disease):  


Qualified Code:  J44.1 - Chronic obstructive pulmonary disease with acute 

exacerbation


(2) Ulcerative colitis:  


Qualified Code:  K51.90 - Ulcerative colitis without complications, unspecified 

location


(3) Hypertension:  


Qualified Code:  I10 - Essential hypertension


(4) Pneumonia:  


Qualified Code:  J18.1 - Pneumonia of right lower lobe due to infectious 

organism


(5) BPH (benign prostatic hyperplasia):  





(6) Atrial fibrillation:  


Qualified Code:  I48.91 - Atrial fibrillation, unspecified type





Wilma Harrison Feb 3, 2017 13:39

## 2017-02-03 NOTE — HHI.PR
Subjective


Subjective Remarks


less sob, and wheezing


minimal cough


feels improved


overnight, has palpitations, tele showed afib RVR


seen by Dr. Davalos at this time, poss. due to meds, copd


afebrile 


tele reviewed, afib RVR 





Review of Systems


Constitutional


Constitutional Remarks


12 point ROS completed, negative except as noted above





Vitals/Results


Intake & Output











 2/2/17 2/2/17 2/3/17





 15:00 23:00 07:00


 


Intake Total 480 ml 360 ml 225 ml


 


Balance 480 ml 360 ml 225 ml


 


   


 


Intake Oral 380 ml 360 ml 120 ml


 


IV Total 100 ml 0 ml 105 ml


 


# Voids 4 2 2


 


# Bowel Movements 1 1 0








Vital Signs





 Vital Signs








  Date Time  Temp Pulse Resp B/P Pulse Ox O2 Delivery O2 Flow Rate FiO2


 


2/3/17 12:00 98.6 101 18 128/86 95   


 


2/3/17 08:00 98.1 107 18 128/89 95   


 


2/3/17 04:00 97.5 101 21 109/61 98   


 


2/3/17 00:00 96.4 109 21 98/63 93   


 


2/2/17 21:51  109      


 


2/2/17 20:00 96.0 109 20 129/85 91   


 


2/2/17 19:59     96 Nasal Cannula 3.00 


 


2/2/17 18:43       3.00 


 


2/2/17 16:00 97.2 113 17 111/63 94   








CBC/BMP:  


2/2/17 0431                                                                    

            2/2/17 0431








Physical Exam


General


General Appearance:  Well Developed, Comfortable, Anxious





Eyes


Eye Exam:  Pupils Equal, Pupils Reactive





Ears & Nose


Ears & Nose Exam:  Nasal Mucosa Pink





Neck


Neck Exam:  Trachea Midline





Pulmonary


Resp Exam:  No Distress


Resp Remarks


mild exp. wheeze, improved air movement





Cardiology


CV Exam:  Good Perfusion, Arrhythmia, Tachycardia





Gastrointestinal/Abdomen


GI Exam:  Non-Tender, Bowel Sounds Present





Musculoskeletal


MS Exam:  Normal Gait, Normal Tone





Integumentary


Skin Exam:  Warm, Dry





Neurologic


Neuro Exam:  Alert, Awake, Oriented, Speech Clear, Moving All Extremities, No 

Focal Deficits





Psychiatric


Psych Exam:  Appropriate Responses





VTE Prophylaxis


VTE Prophylaxis Meds:  Heparin





PUD Prophylasis


PUD Prophylaxis:  Protonix





Assessment/Plan


Problem List:  


(1) COPD (chronic obstructive pulmonary disease)


(2) Ulcerative colitis


(3) Hypoxemia


(4) Hypertension


(5) Pneumonia


(6) BPH (benign prostatic hyperplasia)


(7) Atrial fibrillation


Assessment/Plan


61-year-old male with recent diagnosis of COPD, quit smoking in December, 

presented to emergency room complaining of shortness of breath and cough for 

the last 2 weeks worsening last night while he was at work.   Was treated with 

Augmentin 10 days by PCP for possible URI infection.  He noted saturations on 

room air were 81%.  Patient presented to emergency room for further evaluation, 

chest x-ray with findings of possible early right lower lobe infiltrate, 

possible pneumonia,  COPD exacerbation with hypoxemia, failure of outpatient 

therapy


-supplemental oxygen as needed to keep sats > 92


appreciate pulmonary input, has adjusted steroids and duonebs due to afib with 

RVR


Robitussin AC 10 MLS every 6 when necessary


continue Symbicort


-Continue with empiric antibiotics, Rocephin 1 g IV daily, Zithromax 500 mg by 

mouth


-Nystatin


-ID input appreciated, viral panel has been ordered, negative so far





History of ulcerative colitis, stable


Continue with Asacol





Hypertension, uncontrolled secondary to respiratory distress


-continue home medications





Afib with RVR overnight


-continue cardizem


-appreciate cardiology input, d/w Dr. Davalos-if he remains in afib, poss. 

cardioversion tomorrow. Will keep NPO after MN


-echo done, ef okay per Dr. Davalos





Heparin 5000 units subcutaneous twice a day for DVT prophylaxis


Protonix for GI prophylaxis


continue to monitor


walk test ordered, may need oxygen for home


CM for dc planning











D/W RN


D/W Dr. Sutton


D/W pt


D/W Dr. Davalos


This patient was seen by myself and Dr. Sutton, this note is written his behalf





Problem Qualifiers





(1) COPD (chronic obstructive pulmonary disease):  


Qualified Code:  J44.1 - Chronic obstructive pulmonary disease with acute 

exacerbation


(2) Ulcerative colitis:  


Qualified Code:  K51.90 - Ulcerative colitis without complications, unspecified 

location


(3) Hypertension:  


Qualified Code:  I10 - Essential hypertension


(4) Pneumonia:  


Qualified Code:  J18.1 - Pneumonia of right lower lobe due to infectious 

organism


(5) BPH (benign prostatic hyperplasia):  





(6) Atrial fibrillation:  


Qualified Code:  I48.91 - Atrial fibrillation, unspecified type





Wilma Harrison Feb 3, 2017 13:36

## 2017-02-04 VITALS
RESPIRATION RATE: 18 BRPM | OXYGEN SATURATION: 95 % | DIASTOLIC BLOOD PRESSURE: 78 MMHG | HEART RATE: 102 BPM | SYSTOLIC BLOOD PRESSURE: 127 MMHG | TEMPERATURE: 98.4 F

## 2017-02-04 VITALS
OXYGEN SATURATION: 92 % | RESPIRATION RATE: 21 BRPM | TEMPERATURE: 98.1 F | SYSTOLIC BLOOD PRESSURE: 108 MMHG | HEART RATE: 106 BPM | DIASTOLIC BLOOD PRESSURE: 78 MMHG

## 2017-02-04 VITALS
DIASTOLIC BLOOD PRESSURE: 66 MMHG | TEMPERATURE: 98.9 F | RESPIRATION RATE: 21 BRPM | OXYGEN SATURATION: 92 % | HEART RATE: 85 BPM | SYSTOLIC BLOOD PRESSURE: 116 MMHG

## 2017-02-04 VITALS
DIASTOLIC BLOOD PRESSURE: 76 MMHG | TEMPERATURE: 98.6 F | OXYGEN SATURATION: 96 % | HEART RATE: 98 BPM | RESPIRATION RATE: 18 BRPM | SYSTOLIC BLOOD PRESSURE: 125 MMHG

## 2017-02-04 RX ADMIN — NYSTATIN SCH ML: 100000 SUSPENSION ORAL at 08:57

## 2017-02-04 RX ADMIN — NYSTATIN SCH ML: 100000 SUSPENSION ORAL at 12:49

## 2017-02-04 RX ADMIN — HEPARIN SODIUM SCH UNITS: 10000 INJECTION, SOLUTION INTRAVENOUS; SUBCUTANEOUS at 12:51

## 2017-02-04 RX ADMIN — CEFEPIME SCH MLS/HR: 2 INJECTION, POWDER, FOR SOLUTION INTRAVENOUS at 08:59

## 2017-02-04 RX ADMIN — GABAPENTIN SCH MG: 100 CAPSULE ORAL at 12:49

## 2017-02-04 RX ADMIN — GUAIFENESIN AND CODEINE PHOSPHATE PRN ML: 10; 100 LIQUID ORAL at 10:20

## 2017-02-04 RX ADMIN — HEPARIN SODIUM SCH UNITS: 10000 INJECTION, SOLUTION INTRAVENOUS; SUBCUTANEOUS at 01:13

## 2017-02-04 RX ADMIN — DILTIAZEM HYDROCHLORIDE SCH MG: 60 TABLET, FILM COATED ORAL at 08:57

## 2017-02-04 RX ADMIN — SODIUM CHLORIDE, PRESERVATIVE FREE SCH ML: 5 INJECTION INTRAVENOUS at 09:06

## 2017-02-04 RX ADMIN — MESALAMINE SCH MG: 800 TABLET, DELAYED RELEASE ORAL at 08:57

## 2017-02-04 RX ADMIN — BUDESONIDE AND FORMOTEROL FUMARATE DIHYDRATE SCH PUFF: 160; 4.5 AEROSOL RESPIRATORY (INHALATION) at 09:00

## 2017-02-04 RX ADMIN — IPRATROPIUM BROMIDE AND ALBUTEROL SULFATE SCH AMPULE: .5; 3 SOLUTION RESPIRATORY (INHALATION) at 10:06

## 2017-02-04 RX ADMIN — MESALAMINE SCH MG: 800 TABLET, DELAYED RELEASE ORAL at 12:49

## 2017-02-04 RX ADMIN — DILTIAZEM HYDROCHLORIDE SCH MG: 60 TABLET, FILM COATED ORAL at 12:49

## 2017-02-04 RX ADMIN — GABAPENTIN SCH MG: 100 CAPSULE ORAL at 08:57

## 2017-02-04 RX ADMIN — DILTIAZEM HYDROCHLORIDE SCH MG: 60 TABLET, FILM COATED ORAL at 16:21

## 2017-02-04 RX ADMIN — AZITHROMYCIN SCH MG: 250 TABLET, FILM COATED ORAL at 08:57

## 2017-02-04 RX ADMIN — VITAMIN C SCH TAB: TAB at 08:57

## 2017-02-04 RX ADMIN — Medication SCH TAB: at 08:57

## 2017-02-04 RX ADMIN — OXYCODONE HYDROCHLORIDE AND ACETAMINOPHEN SCH MG: 500 TABLET ORAL at 08:57

## 2017-02-04 NOTE — HHI.DS
Discharge Summary


Admission Date


Jan 30, 2017 at 13:07


Discharge Date:  Feb 4, 2017


Admitting Diagnosis


Hypoxemia





(1) Hypoxemia


Diagnosis:  Principal





(2) COPD (chronic obstructive pulmonary disease)


Diagnosis:  Secondary





(3) Hypertension


Diagnosis:  Principal





(4) Ulcerative colitis


Diagnosis:  Secondary





(5) BPH (benign prostatic hyperplasia)


Diagnosis:  Secondary





(6) Pneumonia


Diagnosis:  Principal





(7) Atrial fibrillation


Diagnosis:  Principal





(8) Tobacco abuse


Diagnosis:  Secondary





Procedures


Set up for cardioversion, pt. converted on his own before completion


Brief History


This was a pleasant 61-year-old male with notable past medical history of 

tobacco abuse with recent diagnosis of COPD, ulcer colitis, hypertension, BPH.  

Patient presented to the emergency room with complaint of shortness of breath 

and hypoxia.  According to the patient approximately 2 weeks ago before 

admission,  he had shortness of breath with wheezing and cough.  He went to see 

his primary care physician who gave him Rocephin IM as well as Solu-Medrol and 

prescribed Augmentin.  He completed 10 days of Augmentin and his symptoms did 

improve.  He also had PFTs and was told he had COPD and was referred to see Dr. Bazan.  Patient recently quit December 2016.  Patient is a staff nurse at 

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 

shortness of breath and checked his sats in the were 81%.  He gave himself 

oxygen at 2 L and finished his shift.  Day of admission, patient came to the 

emergency room for further evaluation.  Stated he has a dry cough, very little 

sputum, clear in color.  No fever, no chills.  Denied any abdominal pain, no 

diarrhea, no nausea no vomiting.  He did use Symbicort as well as Combivent at 

home.  He's not on any oxygen.


CBC/BMP:  


2/2/17 0431                                                                    

            2/2/17 0431





Significant Findings





Laboratory Tests








Test 2/2/17





 04:31


 


Hematocrit 38.9 %





 (39.0-51.0)


 


Neutrophils (%) (Auto) 91.5 %





 (16.0-70.0)


 


Lymphocytes (%) (Auto) 5.3 %





 (9.0-44.0)


 


Neutrophils # (Auto) 8.9 TH/MM3





 (1.8-7.7)


 


Lymphocytes # (Auto) 0.5 TH/MM3





 (1.0-4.8)


 


Blood Urea Nitrogen 23 MG/DL (7-18)


 


Random Glucose 178 MG/DL





 ()


 


Troponin I LESS THAN 0.02





 NG/ML





 (0.02-0.05)


 


Thyroid Stimulating Hormone 0.352 uIU/ML





3rd Gen (0.358-3.740)








Imaging





Last Impressions








Chest X-Ray 1/31/17 0000 Signed





Impressions: 





 Service Date/Time:  Tuesday, January 31, 2017 19:27 - CONCLUSION:  Hazy 

density 





 seen throughout the lungs which may represent some mild interstitial 

prominence 





 especially on the right side.     Nahun Azul MD 


 


CT Angiography 1/30/17 0839 Signed





Impressions: 





 Service Date/Time:  Monday, January 30, 2017 10:42 - CONCLUSION:  Negative 





 examination.     Ant Nino MD 








PE at Discharge


Physical Exam


General


General Appearance:  Well Developed, Comfortable, Anxious


Appearance Remarks


slim





Eyes


Eye Exam:  Pupils Equal, Pupils Reactive





Ears & Nose


Ears & Nose Exam:  Nasal Mucosa Pink





Neck


Neck Exam:  Trachea Midline





Pulmonary


Resp Exam:  Clear Bilaterally, No Distress


Resp Remarks


cough





Cardiology


CV Exam:  Normal Sinus Rhythm, Good Perfusion, Tachycardia





Gastrointestinal/Abdomen


GI Exam:  Non-Tender, Bowel Sounds Present





Musculoskeletal


MS Exam:  Normal Gait, Normal Tone, Good Strength





Integumentary


Skin Exam:  Warm, Dry





Extremeties


Extremities Exam:  No Edema





Neurologic


Neuro Exam:  Alert, Awake, Oriented, Speech Clear, Moving All Extremities, No 

Focal Deficits





Psychiatric


Psych Exam:  Appropriate Responses





VTE Prophylaxis


VTE Prophylaxis Meds:  Heparin





PUD Prophylasis


PUD Prophylaxis:  Protonix


Hospital Course


  Patient was evaluated in the emergency room, laboratory workup was completed, 

WBC was 8.2.  Hemoglobin 15.9, hematocrit 46.2, platelets 182.  BMP was 

essentially unremarkable except for mild hyponatremia, sodium 134.  Troponin 

was negative.  B natruretic peptide was negative.  Chest x-ray completed showed 

questionable minimal early interstitial infiltrate to the right lung base.  CTA 

was negative for PE.  ABGs were completed, showed PO2 of 60 on 3 L nasal cannula

, sats 87% on 3 L.  Patient was started on empiric antibiotics.  Patient is 

evaluated in the emergency room, he is complaining of persistent cough.  

Robitussin-AC has been ordered.  He is noted with thrush.  Patient is admitted 

for further evaluation and treatment.  Patient failed outpatient treatment plan.





This is the Problem List used on his treatment plan.


(1) COPD (chronic obstructive pulmonary disease)


(2) Ulcerative colitis


(3) Hypoxemia


(4) Hypertension


(5) Pneumonia


(6) BPH (benign prostatic hyperplasia)


(7) Atrial fibrillation





-supplemental oxygen as needed to keep sats > 92, was used throughout his 

hospital stay.  Patient was evaluated for home O2.


appreciate pulmonary input, has adjusted steroids and duonebs due to afib with 

RVR, dysrhythmia,  which the patient had initially.


Robitussin AC 10 MLS every 6 when necessary


 Symbicort, home med was continued


-Continue with empiric antibiotics, Rocephin 1 g IV daily, Zithromax 500 mg by 

mouth throughout hospital stay


-Nystatin was ordered swish and swallow and use throughout hospital stay


-ID consult ,input appreciated for treatment plan, viral panel has been ordered

, negative so far.  Monitored throughout hospital stay





History of ulcerative colitis, stable


Continue with Asacol, home med.





Hypertension, uncontrolled secondary to respiratory distress


-continue home medications, monitored throughout hospital stay





Afib with RVR overnight


-Cardioversion planned today but cancelled, pt. converted to SR on his own with 

coughing, 2/3, 


 cardizem, continued for home  increased 60 mg PO QID  due to tachycardia. HR 

110 on  Telemetry today with ambulation, but patient tolerated without 

shortness of breath or chest pain


-appreciate cardiology input, during his hospital stay.  Will's follow-up as an 

outpatient appointment.


-echo done, ef okay per Dr. Davalos





Low TSH


-will check T3, free T4, will need monitoring as outpatient when patient 

discharges with his PCP





Heparin 5000 units subcutaneous twice a day for DVT prophylaxis during hospital 

stay


Protonix for GI prophylaxis during hospital stay


needs oxygen for home, ordered for home use


CM assisted with dc planning and O2 sat up





Evaluated per Dr. Sutton, and NANO Vivas .  Stable for discharge.  

Discharge education given which includes work release.


will need to be off work at least 2 weeks, until SOB improved and no hypoxia, d/

w at length


Stable on discharge.


Pt Condition on Discharge:  Stable


Discharge Disposition:  Discharge Home


Discharge Instructions


DIET: Follow Instructions for:  As Tolerated, No Restrictions


Activities you can perform:  Weight Bearing as Leoncio


Follow up Referrals:  


Cardiology


PCP Follow-up





New Medications:  


Diltiazem CD 24 HR (Cardizem CD 24 HR) 240 Mg Caper


240 MG PO DAILY tachycardia #30 Ref 3 CAP


Oxygen tank (Oxygen tank) 1 Ea Tank


2 LITER VERO.CANULA CONTINUOUS Oxygen Concentrator Portable Gaseous 2 L/min via 

Nasal Cannula Continuous For 99 months HYPOXEMIA PREVENTION #1 CYLINDER


Azithromycin (Zithromax) 250 Mg Tab


500 MG PO DAILY Infection #3 Ref 0 TAB


 


Continued Medications:  


Acetaminophen (Tylenol Extra Strength) 500 Mg Tab


500 MG PO Q4-6H PRN PAIN Ref 0 TAB


Ascorbic Acid (Vitamin C) 500 Mg Tab


500 MG PO DAILY Nutritional Supplement Ref 0 TAB


B-Complex Vitamins (Vitamin B Complex) 1 Tab


1 TAB PO DAILY


Budesonide-Formoterol Inh (Symbicort Inh) 160-4.5 Mcg/Act Aero


1 PUFF INH Q12HR #1 Ref 0 INHALER


Clonidine (Clonidine) 0.1 Mg Tab


0.1 MG PO BID PRN SYSTOLIC B/P > 170 #60 Ref 0 TAB


Dicyclomine (Bentyl) 20 Mg Tab


20 MG PO TID PRN Bowel Management Ref 0 TAB


Gabapentin (Neurontin) 100 Mg Cap


100 MG PO TID #90 Ref 0 CAP


Ipratropium-Albuterol Neb (Duoneb) 0.5-2.5 Mg/3 Ml Neb


1 VIAL NEB Q6HR PRN SHORTNESS OF BREATH #120 Ref 0 NEBULE


Mesalamine DR (Asacol HD) 800 Mg Tab


1600 MG PO Q8HR Swallow whole.  Take on an empty stomach. Ulcerative colitis 

Ref 0 TAB


Metoprolol Tartrate (Metoprolol Tartrate) 25 Mg Tab


12.5 MG PO DAILY PRN INCREASE IN BLOOD PRESSURE #60 Ref 0 TAB


Multiple Vitamins W/ Minerals (Centrum Silver Adult 50+) 1 Tab Tab


1 TAB PO DAILY


Pantoprazole (Protonix) 40 Mg Tab


40 MG PO DAILY PRN REFLUX #30 Ref 0 TAB





Additional Information





 Active Medications


Diltiazem HCl (Cardizem) 60 mg QID PO Last administered on 2/4/17at 16:21; 

Admin Dose 60 MG;  Start 2/3/17 at 18:00;  Stop 2/4/17 at 16:41;  Status Coral Zamora Feb 4, 2017 17:30

## 2017-02-04 NOTE — HHI.PR
Subjective


Hospital Day:  5


Subjective Remarks


afib rhythm converted with cough, SR now


SOB exertional, mild, cough


no fever


some tachycardia with ambulation,. 110


No chest pain


appetite good.





Review of Systems


Constitutional


Constitutional Remarks


10 point ROS done.  Tachycardia 110, with activity, cough, O2.   Other systems 

negative





Pulmonary


Respiratory:  Coughing


Pulmonary Remarks


wearing O2





GI/Abdomen


GI/Abdominal Exam:  Vomiting





Vitals/Results


Intake & Output











 2/3/17 2/3/17 2/4/17





 15:00 23:00 07:00


 


Intake Total 100 ml 120 ml 240 ml


 


Balance 100 ml 120 ml 240 ml


 


   


 


Intake Oral 0 ml 120 ml 240 ml


 


IV Total 100 ml 0 ml 


 


# Voids 2 2 3


 


# Bowel Movements 0 0 1








Vital Signs





 Vital Signs








  Date Time  Temp Pulse Resp B/P Pulse Ox O2 Delivery O2 Flow Rate FiO2


 


2/4/17 12:00 98.6 98 18 125/76 96   


 


2/4/17 10:11      Nasal Cannula 3.00 


 


2/4/17 08:21      Nasal Cannula 3.00 


 


2/4/17 08:00 98.4 102 18 127/78 95   


 


2/4/17 04:00 98.1 106 21 108/78 92   


 


2/4/17 01:00 98.9 85 21 116/66 92   


 


2/3/17 21:16     92 Nasal Cannula 2.00 


 


2/3/17 20:00 96.2 98 20 124/84 92   


 


2/3/17 16:26 100.9       


 


2/3/17 16:00 101.1 117 18 123/72 94   








CBC/BMP:  


2/2/17 0431                                                                    

            2/2/17 0431





Lab Results





Laboratory Tests








Test 2/4/17





 03:28


 


Free Thyroxine 1.43 NG/DL


 


Total Triiodothyronine 121 NG/DL








Imaging Remarks





Last Impressions








Chest X-Ray 1/31/17 0000 Signed





Impressions: 





 Service Date/Time:  Tuesday, January 31, 2017 19:27 - CONCLUSION:  Hazy 

density 





 seen throughout the lungs which may represent some mild interstitial 

prominence 





 especially on the right side.     Nahun Azul MD 


 


CT Angiography 1/30/17 0839 Signed





Impressions: 





 Service Date/Time:  Monday, January 30, 2017 10:42 - CONCLUSION:  Negative 





 examination.     Ant Nino MD 








Current Medications





 Active Medications


Diltiazem HCl (Cardizem) 60 mg QID PO Last administered on 2/4/17at 08:57; 

Admin Dose 60 MG;  Start 2/3/17 at 18:00





Physical Exam


General


General Appearance:  Well Developed, Comfortable, Anxious


Appearance Remarks


slim





Eyes


Eye Exam:  Pupils Equal, Pupils Reactive





Ears & Nose


Ears & Nose Exam:  Nasal Mucosa Pink





Neck


Neck Exam:  Trachea Midline





Pulmonary


Resp Exam:  Clear Bilaterally, No Distress


Resp Remarks


cough





Cardiology


CV Exam:  Normal Sinus Rhythm, Good Perfusion, Tachycardia





Gastrointestinal/Abdomen


GI Exam:  Non-Tender, Bowel Sounds Present





Musculoskeletal


MS Exam:  Normal Gait, Normal Tone, Good Strength





Integumentary


Skin Exam:  Warm, Dry





Extremeties


Extremities Exam:  No Edema





Neurologic


Neuro Exam:  Alert, Awake, Oriented, Speech Clear, Moving All Extremities, No 

Focal Deficits





Psychiatric


Psych Exam:  Appropriate Responses





VTE Prophylaxis


VTE Prophylaxis Meds:  Heparin





PUD Prophylasis


PUD Prophylaxis:  Protonix





Assessment/Plan


Problem List:  


(1) COPD (chronic obstructive pulmonary disease)


(2) Ulcerative colitis


(3) Hypoxemia


(4) Hypertension


(5) Pneumonia


(6) BPH (benign prostatic hyperplasia)


(7) Atrial fibrillation


Assessment/Plan


61-year-old male with recent diagnosis of COPD, quit smoking in December, 

presented to emergency room complaining of shortness of breath and cough for 

the last 2 weeks worsening last night while he was at work.   Was treated with 

Augmentin 10 days by PCP for possible URI infection.  He noted saturations on 

room air were 81%.  Patient presented to emergency room for further evaluation, 

chest x-ray with findings of possible early right lower lobe infiltrate, 

possible pneumonia,  COPD exacerbation with hypoxemia, failure of outpatient 

therapy


-supplemental oxygen as needed to keep sats > 92


appreciate pulmonary input, has adjusted steroids and duonebs due to afib with 

RVR


Robitussin AC 10 MLS every 6 when necessary


continue Symbicort


-Continue with empiric antibiotics, Rocephin 1 g IV daily, Zithromax 500 mg by 

mouth


-Nystatin


-ID input appreciated, viral panel has been ordered, negative so far





History of ulcerative colitis, stable


Continue with Asacol





Hypertension, uncontrolled secondary to respiratory distress


-continue home medications





Afib with RVR overnight


-Cardioversion planned today but cancelled, pt. converted to SR on his own, 2/3


-continue cardizem, inc to 60 mg PO QID, HR 120s Telemetry


-appreciate cardiology 


-echo done, ef okay per Dr. Davalos





Low TSH


-will check T3, free T4, will need monitoring as outpatient if patient 

discharges





Heparin 5000 units subcutaneous twice a day for DVT prophylaxis


Protonix for GI prophylaxis


needs oxygen for home, ordered


CM for dc planning


will need to be off work at least 2 weeks, until SOB improved and no hypoxia, d/

w at length


Hopefully dc today , Dr. Sutton will see.








D/W RN


D/W Dr. Sutton


D/W pt


This patient was seen by myself and Dr. Sutton, this note is written his behalf





Problem Qualifiers





(1) COPD (chronic obstructive pulmonary disease):  


Qualified Code:  J44.1 - Chronic obstructive pulmonary disease with acute 

exacerbation


(2) Ulcerative colitis:  


Qualified Code:  K51.90 - Ulcerative colitis without complications, unspecified 

location


(3) Hypertension:  


Qualified Code:  I10 - Essential hypertension


(4) Pneumonia:  


Qualified Code:  J18.1 - Pneumonia of right lower lobe due to infectious 

organism


(5) BPH (benign prostatic hyperplasia):  





(6) Atrial fibrillation:  


Qualified Code:  I48.91 - Atrial fibrillation, unspecified type





Coral Li Feb 4, 2017 12:18

## 2017-02-05 NOTE — EKG
Date Performed: 02/03/2017       Time Performed: 12:32:26

 

PTAGE:      61 years

 

EKG:      Sinus rhythm 

 

. When compared to previous tracing, previously noted atrial fibrillation is no longer present, and p
reviously noted ST elevation Is some what reduced. Normal ECG

 

PREVIOUS TRACING       :    02/02/2017    04.19.18

 

DOCTOR:   Noemi Ballard  Interpretating Date/Time  02/05/2017 14:18:41

## 2017-02-11 ENCOUNTER — HOSPITAL ENCOUNTER (INPATIENT)
Dept: HOSPITAL 17 - NEPE | Age: 62
LOS: 30 days | Discharge: HOME HEALTH | DRG: 975 | End: 2017-03-13
Attending: SPECIALIST | Admitting: SPECIALIST
Payer: COMMERCIAL

## 2017-02-11 VITALS
SYSTOLIC BLOOD PRESSURE: 89 MMHG | RESPIRATION RATE: 22 BRPM | TEMPERATURE: 99 F | HEART RATE: 90 BPM | OXYGEN SATURATION: 94 % | DIASTOLIC BLOOD PRESSURE: 53 MMHG

## 2017-02-11 VITALS
SYSTOLIC BLOOD PRESSURE: 101 MMHG | HEART RATE: 112 BPM | DIASTOLIC BLOOD PRESSURE: 50 MMHG | RESPIRATION RATE: 26 BRPM | TEMPERATURE: 98.8 F | OXYGEN SATURATION: 96 %

## 2017-02-11 VITALS
SYSTOLIC BLOOD PRESSURE: 156 MMHG | DIASTOLIC BLOOD PRESSURE: 74 MMHG | RESPIRATION RATE: 26 BRPM | OXYGEN SATURATION: 87 % | HEART RATE: 128 BPM | TEMPERATURE: 102.9 F

## 2017-02-11 VITALS
OXYGEN SATURATION: 96 % | DIASTOLIC BLOOD PRESSURE: 50 MMHG | RESPIRATION RATE: 24 BRPM | SYSTOLIC BLOOD PRESSURE: 90 MMHG | HEART RATE: 87 BPM

## 2017-02-11 VITALS — DIASTOLIC BLOOD PRESSURE: 53 MMHG | HEART RATE: 99 BPM | SYSTOLIC BLOOD PRESSURE: 92 MMHG | RESPIRATION RATE: 24 BRPM

## 2017-02-11 VITALS — BODY MASS INDEX: 20.47 KG/M2 | HEIGHT: 64 IN | WEIGHT: 119.93 LBS

## 2017-02-11 VITALS — DIASTOLIC BLOOD PRESSURE: 52 MMHG | SYSTOLIC BLOOD PRESSURE: 94 MMHG

## 2017-02-11 DIAGNOSIS — I10: ICD-10-CM

## 2017-02-11 DIAGNOSIS — E87.6: ICD-10-CM

## 2017-02-11 DIAGNOSIS — K51.90: ICD-10-CM

## 2017-02-11 DIAGNOSIS — B37.0: ICD-10-CM

## 2017-02-11 DIAGNOSIS — J44.1: ICD-10-CM

## 2017-02-11 DIAGNOSIS — Z99.81: ICD-10-CM

## 2017-02-11 DIAGNOSIS — B59: ICD-10-CM

## 2017-02-11 DIAGNOSIS — R00.0: ICD-10-CM

## 2017-02-11 DIAGNOSIS — A41.9: ICD-10-CM

## 2017-02-11 DIAGNOSIS — B20: Primary | ICD-10-CM

## 2017-02-11 DIAGNOSIS — I48.0: ICD-10-CM

## 2017-02-11 DIAGNOSIS — R09.02: ICD-10-CM

## 2017-02-11 DIAGNOSIS — B00.2: ICD-10-CM

## 2017-02-11 DIAGNOSIS — Z87.01: ICD-10-CM

## 2017-02-11 DIAGNOSIS — Z87.891: ICD-10-CM

## 2017-02-11 DIAGNOSIS — N40.0: ICD-10-CM

## 2017-02-11 DIAGNOSIS — K21.9: ICD-10-CM

## 2017-02-11 DIAGNOSIS — E86.0: ICD-10-CM

## 2017-02-11 DIAGNOSIS — F41.9: ICD-10-CM

## 2017-02-11 DIAGNOSIS — Z87.440: ICD-10-CM

## 2017-02-11 DIAGNOSIS — E87.1: ICD-10-CM

## 2017-02-11 DIAGNOSIS — R60.0: ICD-10-CM

## 2017-02-11 LAB
ALP SERPL-CCNC: 71 U/L (ref 45–117)
ALT SERPL-CCNC: 28 U/L (ref 12–78)
ANION GAP SERPL CALC-SCNC: 10 MEQ/L (ref 5–15)
APTT BLD: 31 SEC (ref 24.3–30.1)
AST SERPL-CCNC: 35 U/L (ref 15–37)
BASE EXCESS BLD CALC-SCNC: -2.9 MMOL/L (ref -2–2)
BASOPHILS # BLD AUTO: 0.1 TH/MM3 (ref 0–0.2)
BASOPHILS NFR BLD: 1 % (ref 0–2)
BENZODIAZEPINES PNL UR: 91 % (ref 90–100)
BILIRUB SERPL-MCNC: 0.5 MG/DL (ref 0.2–1)
BLOOD GAS CARBOXYHEMOGLOBIN: 2.6 % (ref 0–4)
BLOOD GAS HCO3: 20 MMOL/L (ref 22–26)
BLOOD GAS OXYGEN CONTENT: 21.6 VOL % (ref 12–20)
BLOOD GAS PCO2: 26 MMHG (ref 38–42)
BUN SERPL-MCNC: 22 MG/DL (ref 7–18)
CHLORIDE SERPL-SCNC: 98 MEQ/L (ref 98–107)
COLOR UR: YELLOW
COMMENT (UR): (no result)
CRITICAL VALUE: NO
CULTURE IF INDICATED: (no result)
DRAW SITE: (no result)
EOSINOPHIL # BLD: 0.4 TH/MM3 (ref 0–0.4)
EOSINOPHIL NFR BLD: 3.9 % (ref 0–4)
ERYTHROCYTE [DISTWIDTH] IN BLOOD BY AUTOMATED COUNT: 14.1 % (ref 11.6–17.2)
GFR SERPLBLD BASED ON 1.73 SQ M-ARVRAT: 70 ML/MIN (ref 89–?)
GLUCOSE UR STRIP-MCNC: (no result) MG/DL
HCO3 BLD-SCNC: 23 MEQ/L (ref 21–32)
HCT VFR BLD CALC: 45.1 % (ref 39–51)
HEMO FLAGS: (no result)
HGB UR QL STRIP: (no result)
KETONES UR STRIP-MCNC: (no result) MG/DL
LITER FLOW: 6 L/M
LYMPHOCYTES # BLD AUTO: 1 TH/MM3 (ref 1–4.8)
LYMPHOCYTES NFR BLD AUTO: 10.4 % (ref 9–44)
MCH RBC QN AUTO: 29.5 PG (ref 27–34)
MCHC RBC AUTO-ENTMCNC: 34.6 % (ref 32–36)
MCV RBC AUTO: 85.2 FL (ref 80–100)
METHGB MFR BLDA: 1.9 % (ref 0–2)
MONOCYTES NFR BLD: 5.1 % (ref 0–8)
MUCOUS THREADS #/AREA URNS LPF: (no result) /LPF
NEUTROPHILS # BLD AUTO: 7.5 TH/MM3 (ref 1.8–7.7)
NEUTROPHILS NFR BLD AUTO: 79.6 % (ref 16–70)
NITRITE UR QL STRIP: (no result)
NUMBER OF ARTERIAL PUNCTURES: 1
OXYGEN DEVICE: (no result)
PLATELET # BLD: 265 TH/MM3 (ref 150–450)
PO2 BLD: 79 MMHG (ref 61–120)
POTASSIUM SERPL-SCNC: 4.1 MEQ/L (ref 3.5–5.1)
RBC # BLD AUTO: 5.29 MIL/MM3 (ref 4.5–5.9)
SALICYLATES SERPL-MCNC: 16.8 G/DL (ref 12–16)
SODIUM SERPL-SCNC: 131 MEQ/L (ref 136–145)
SP GR UR STRIP: 1.01 (ref 1–1.03)
SQUAMOUS #/AREA URNS HPF: <1 /HPF (ref 0–5)
STAT: NO
TEMP CORR TO: 98.6
ULNAR PULSE: PRESENT
WBC # BLD AUTO: 9.4 TH/MM3 (ref 4–11)

## 2017-02-11 PROCEDURE — 87804 INFLUENZA ASSAY W/OPTIC: CPT

## 2017-02-11 PROCEDURE — 36600 WITHDRAWAL OF ARTERIAL BLOOD: CPT

## 2017-02-11 PROCEDURE — 96367 TX/PROPH/DG ADDL SEQ IV INF: CPT

## 2017-02-11 PROCEDURE — 85027 COMPLETE CBC AUTOMATED: CPT

## 2017-02-11 PROCEDURE — 87536 HIV-1 QUANT&REVRSE TRNSCRPJ: CPT

## 2017-02-11 PROCEDURE — 87102 FUNGUS ISOLATION CULTURE: CPT

## 2017-02-11 PROCEDURE — 87081 CULTURE SCREEN ONLY: CPT

## 2017-02-11 PROCEDURE — 87206 SMEAR FLUORESCENT/ACID STAI: CPT

## 2017-02-11 PROCEDURE — 80053 COMPREHEN METABOLIC PANEL: CPT

## 2017-02-11 PROCEDURE — 86703 HIV-1/HIV-2 1 RESULT ANTBDY: CPT

## 2017-02-11 PROCEDURE — 81001 URINALYSIS AUTO W/SCOPE: CPT

## 2017-02-11 PROCEDURE — 83605 ASSAY OF LACTIC ACID: CPT

## 2017-02-11 PROCEDURE — 82565 ASSAY OF CREATININE: CPT

## 2017-02-11 PROCEDURE — 83935 ASSAY OF URINE OSMOLALITY: CPT

## 2017-02-11 PROCEDURE — 85652 RBC SED RATE AUTOMATED: CPT

## 2017-02-11 PROCEDURE — 87255 GENET VIRUS ISOLATE HSV: CPT

## 2017-02-11 PROCEDURE — 71020: CPT

## 2017-02-11 PROCEDURE — 87493 C DIFF AMPLIFIED PROBE: CPT

## 2017-02-11 PROCEDURE — 86360 T CELL ABSOLUTE COUNT/RATIO: CPT

## 2017-02-11 PROCEDURE — 80202 ASSAY OF VANCOMYCIN: CPT

## 2017-02-11 PROCEDURE — 94640 AIRWAY INHALATION TREATMENT: CPT

## 2017-02-11 PROCEDURE — 94664 DEMO&/EVAL PT USE INHALER: CPT

## 2017-02-11 PROCEDURE — 85730 THROMBOPLASTIN TIME PARTIAL: CPT

## 2017-02-11 PROCEDURE — 86702 HIV-2 ANTIBODY: CPT

## 2017-02-11 PROCEDURE — 87116 MYCOBACTERIA CULTURE: CPT

## 2017-02-11 PROCEDURE — 71010: CPT

## 2017-02-11 PROCEDURE — 86355 B CELLS TOTAL COUNT: CPT

## 2017-02-11 PROCEDURE — 86701 HIV-1ANTIBODY: CPT

## 2017-02-11 PROCEDURE — 87040 BLOOD CULTURE FOR BACTERIA: CPT

## 2017-02-11 PROCEDURE — 82805 BLOOD GASES W/O2 SATURATION: CPT

## 2017-02-11 PROCEDURE — 87205 SMEAR GRAM STAIN: CPT

## 2017-02-11 PROCEDURE — 87071 CULTURE AEROBIC QUANT OTHER: CPT

## 2017-02-11 PROCEDURE — 84132 ASSAY OF SERUM POTASSIUM: CPT

## 2017-02-11 PROCEDURE — 86403 PARTICLE AGGLUT ANTBDY SCRN: CPT

## 2017-02-11 PROCEDURE — 87449 NOS EACH ORGANISM AG IA: CPT

## 2017-02-11 PROCEDURE — 80048 BASIC METABOLIC PNL TOTAL CA: CPT

## 2017-02-11 PROCEDURE — 93005 ELECTROCARDIOGRAM TRACING: CPT

## 2017-02-11 PROCEDURE — 94150 VITAL CAPACITY TEST: CPT

## 2017-02-11 PROCEDURE — 84484 ASSAY OF TROPONIN QUANT: CPT

## 2017-02-11 PROCEDURE — 86140 C-REACTIVE PROTEIN: CPT

## 2017-02-11 PROCEDURE — 87015 SPECIMEN INFECT AGNT CONCNTJ: CPT

## 2017-02-11 PROCEDURE — 87070 CULTURE OTHR SPECIMN AEROBIC: CPT

## 2017-02-11 PROCEDURE — 85610 PROTHROMBIN TIME: CPT

## 2017-02-11 PROCEDURE — 86357 NK CELLS TOTAL COUNT: CPT

## 2017-02-11 PROCEDURE — 85007 BL SMEAR W/DIFF WBC COUNT: CPT

## 2017-02-11 PROCEDURE — 85025 COMPLETE CBC W/AUTO DIFF WBC: CPT

## 2017-02-11 PROCEDURE — 87901 NFCT AGT GNTYP ALYS HIV1 REV: CPT

## 2017-02-11 PROCEDURE — 76937 US GUIDE VASCULAR ACCESS: CPT

## 2017-02-11 PROCEDURE — 96365 THER/PROPH/DIAG IV INF INIT: CPT

## 2017-02-11 PROCEDURE — 80074 ACUTE HEPATITIS PANEL: CPT

## 2017-02-11 PROCEDURE — 86359 T CELLS TOTAL COUNT: CPT

## 2017-02-11 PROCEDURE — 71275 CT ANGIOGRAPHY CHEST: CPT

## 2017-02-11 PROCEDURE — 83930 ASSAY OF BLOOD OSMOLALITY: CPT

## 2017-02-11 PROCEDURE — 83735 ASSAY OF MAGNESIUM: CPT

## 2017-02-11 PROCEDURE — 96375 TX/PRO/DX INJ NEW DRUG ADDON: CPT

## 2017-02-11 RX ADMIN — FAMOTIDINE SCH MG: 20 TABLET, FILM COATED ORAL at 21:04

## 2017-02-11 RX ADMIN — HEPARIN SODIUM SCH UNITS: 10000 INJECTION, SOLUTION INTRAVENOUS; SUBCUTANEOUS at 21:05

## 2017-02-11 RX ADMIN — MESALAMINE SCH MG: 800 TABLET, DELAYED RELEASE ORAL at 21:46

## 2017-02-11 RX ADMIN — IPRATROPIUM BROMIDE AND ALBUTEROL SULFATE SCH AMPULE: .5; 3 SOLUTION RESPIRATORY (INHALATION) at 21:28

## 2017-02-11 RX ADMIN — IPRATROPIUM BROMIDE AND ALBUTEROL SULFATE SCH AMPULE: .5; 3 SOLUTION RESPIRATORY (INHALATION) at 17:01

## 2017-02-11 RX ADMIN — PHENYTOIN SODIUM SCH MLS/HR: 50 INJECTION INTRAMUSCULAR; INTRAVENOUS at 19:53

## 2017-02-11 RX ADMIN — SODIUM CHLORIDE, PRESERVATIVE FREE SCH ML: 5 INJECTION INTRAVENOUS at 20:56

## 2017-02-11 RX ADMIN — IPRATROPIUM BROMIDE AND ALBUTEROL SULFATE SCH AMPULE: .5; 3 SOLUTION RESPIRATORY (INHALATION) at 16:56

## 2017-02-11 NOTE — PD
HPI


Chief Complaint:  Respiratory Symptoms


Time Seen by Provider:  16:40


Travel History


International Travel<30 days:  No


Contact w/Intl Traveler<30days:  No


Traveled to known affect area:  No





History of Present Illness


HPI


61-year-old male came to the emergency room brought by his friend with history 

of respiratory distress and hypoxia.  His friend is giving most of the history 

who is also a nurse in the emergency room.  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  Patient was admitted for shortness of 

breath and was diagnosed with pneumonia not too long ago.  He was discharged 

about a week ago.  Discharge home on oxygen and he has been requiring 4 L of 

oxygen and currently all of the day.  Yesterday he was seen by a pulmonologist 

Dr. Dallas in his office.  Today he had gone to see his primary care since he 

was short of breath.  He was on his oxygen and his oxygen saturation was in the 

80s.  Primary care wanted him to come emergently by EMS but patient insisted on 

coming by car.  No history of chest pain.  Upon arrival his temperature was 

102.5.  Patient did have tachycardia as well.  He was extremely short of breath 

and could barely speak one word per breath.  He appeared cyanotic  with oxygen 

saturation of 85% on his oxygen.  However he was awake and alert.  As per his 

friend patient was diagnosed with new onset atrial fibrillation during his last 

admission.  However today he did not seem to be in A. fib on the monitor.





[facility]


Past Medical History


*** Narrative Medical


List of his past medical history as reviewed from the nursing note.


Atrial Fibrillation:  Yes


Heart Rhythm Problems:  No


Cancer:  No


Cardiac Catheterization:  No


Cardiovascular Problems:  Yes (HTN)


High Cholesterol:  No


Congestive Heart Failure:  No


COPD:  Yes


Diabetes:  No


Diminished Hearing:  No


Diverticulitis:  Yes


Endocrine:  No


Gastrointestinal Disorders:  Yes (ulcerative colitis)


GERD:  Yes


Genitourinary:  Yes (uti this week)


Hepatitis:  No


Hiatal Hernia:  No


Hypertension:  Yes


Immune Disorder:  No


Kidney Stones:  No


Medical other:  Yes (RECENT HOSPITALIZATION SEPTICEMIA, ANTIBIOTICS)


Musculoskeletal:  No


Neurologic:  No


Psychiatric:  No


Reproductive:  No


Respiratory:  No


Myocardial Infarction:  No


Pneumonia:  Yes


Renal Failure:  No


Thyroid Disease:  No


Ulcer:  No





Past Surgical History


Abdominal Surgery:  Yes (COLONOSCOPY)


AICD:  No


Cardiac Surgery:  No


Coronary Artery Bypass Graft:  No


Ear Surgery:  No


Endocrine Surgery:  No


Eye Surgery:  No


Genitourinary Surgery:  No


Gynecologic Surgery:  No


Joint Replacement:  No


Oral Surgery:  No


Pacemaker:  No


Thoracic Surgery:  No


Other Surgery:  Yes (BENIGN CYST REMOVED FROM BUTTOCK 2000, KELOID REMOVED LEFT 

SHOULDER/ARM)





Social History


Alcohol Use:  Yes (SOCIAL)


Tobacco Use:  No (5 CIGARETTES DAILY)


Substance Use:  No





Allergies-Medications


(Allergen,Severity, Reaction):  


Coded Allergies:  


     No Known Allergies (Verified , 1/30/17)


Comments


No known drug allergies.


Reported Meds & Prescriptions





Reported Meds & Active Scripts


Active


Cardizem CD 24 HR (Diltiazem CD 24 HR) 240 Mg Caper 240 Mg PO DAILY


Oxygen tank (Oxygen) 1 Ea Tank 2 Liter VERO.CANULA CONTINUOUS


     Oxygen Concentrator Portable Gaseous


     2 L/min via Nasal Cannula Continuous


     For 99 months


Reported


Augmentin (Amoxicillin-Clavulanate) 500-125 mg Tab Unknown Dose PO BID


Symbicort Inh (Budesonide/Formoterol Fumarate) 160-4.5 Mcg/Act Aero 1 Puff INH 

Q12HR


Vitamin C (Ascorbic Acid) 500 Mg Tab 500 Mg PO DAILY


Vitamin B Complex (B-Complex Vitamins) 1 Tab 1 Tab PO DAILY


Duoneb (Ipratropium-Albuterol Neb) 0.5-2.5 Mg/3 Ml Neb 1 Vial NEB Q6HR PRN


Metoprolol Tartrate 25 Mg Tab 12.5 Mg PO DAILY PRN


Clonidine (Clonidine HCl) 0.1 Mg Tab 0.1 Mg PO BID PRN


Neurontin (Gabapentin) 100 Mg Cap 100 Mg PO TID


Protonix (Pantoprazole Sodium) 40 Mg Tab 40 Mg PO DAILY PRN


Centrum Silver Adult 50+ (Multiple Vitamins W/ Minerals) 1 Tab Tab 1 Tab PO 

DAILY


Asacol HD (Mesalamine) 800 Mg Tab 1,600 Mg PO Q8HR


     Swallow whole.  Take on an empty stomach.


Bentyl (Dicyclomine HCl) 20 Mg Tab 20 Mg PO TID PRN





Narrative Medication


List of his home medications reviewed from the nursing note.





Review of Systems


Except as stated in HPI:  all other systems reviewed are Neg





Physical Exam


Narrative


GENERAL: Awake, alert, moderate to severe respiratory distress


SKIN: Warm and dry.  Central cyanosis


HEAD: Atraumatic. Normocephalic. 


EYES: Pupils equal and round. No scleral icterus. No injection or drainage. 


ENT: No nasal bleeding or discharge.  Mucous membranes pink and moist.


NECK: Trachea midline. No JVD. 


CARDIOVASCULAR: Regular rate and rhythm.  Tachycardia.  No murmur appreciated.


RESPIRATORY: Use of accessory muscle.  Fine crackles bilaterally, tachypnea


GASTROINTESTINAL: Abdomen soft, non-tender, nondistended. Hepatic and splenic 

margins not palpable. 


MUSCULOSKELETAL: No obvious deformities. No clubbing.  No cyanosis.  No edema. 


NEUROLOGICAL: Awake and alert. No obvious cranial nerve deficits.  Motor 

grossly within normal limits. Normal speech.


PSYCHIATRIC: Appropriate mood and affect; insight and judgment normal.





Data


Data


Last Documented VS





Vital Signs








  Date Time  Temp Pulse Resp B/P Pulse Ox O2 Delivery O2 Flow Rate FiO2


 


2/11/17 18:00 98.8 112 26 101/50 96 Nasal Cannula 4 








Orders





 Electrocardiogram (2/11/17 16:40)


Complete Blood Count With Diff (2/11/17 16:40)


Comprehensive Metabolic Panel (2/11/17 16:40)


Act Partial Throm Time (Ptt) (2/11/17 16:40)


Lactic Acid Sepsis Protocol (2/11/17 16:40)


Troponin I (2/11/17 16:40)


Urinalysis - C+S If Indicated (2/11/17 16:40)


Influenzae A/B Antigen (2/11/17 16:40)


Blood Culture (2/11/17 16:40)


Chest, Single Ap (2/11/17 16:40)


Blood Glucose (2/11/17 16:40)


Ecg Monitoring (2/11/17 16:40)


Iv Access Insert/Monitor (2/11/17 16:40)


Oximetry (2/11/17 16:40)


Oxygen Administration (2/11/17 16:40)


Acetaminophen (Tylenol) (2/11/17 16:45)


Piperacil-Tazo 4.5 Gm Premix (Zosyn 4.5 (2/11/17 16:40)


Azithromycin Inj (Zithromax Inj) (2/11/17 16:40)


Arterial Blood Gas (Abg) (2/11/17 16:40)


Methylprednisolone So Succ Inj (Solumedr (2/11/17 16:45)


Albuterol-Ipratropium Neb (Duoneb Neb) (2/11/17 16:45)


Sodium Chlor 0.9% 1000 Ml Inj (Ns 1000 M (2/11/17 16:45)


Vancomycin Inj (Vancomycin Inj) (2/11/17 17:15)


Ct Pulmonary Angiogram (2/11/17 )


Iohexol 350 Inj (Omnipaque 350 Inj) (2/11/17 18:44)


Admit Order (Ed Use Only) (2/11/17 19:00)





Labs





 Laboratory Tests








Test 2/11/17 2/11/17 2/11/17





 15:08 17:07 18:31


 


Blood Gas Puncture Site RT RADIAL   


 


Blood Gas Patient Temperature 98.6   


 


Blood Gas HCO3 20 mmol/L  


 


Blood Gas Base Excess -2.9 mmol/L  


 


Blood Gas Oxygen Saturation 91 %  


 


Arterial Blood pH 7.50   


 


Arterial Blood Partial 26 mmHg  





Pressure CO2   


 


Arterial Blood Partial 79 mmHG  





Pressure O2   


 


Arterial Blood Oxygen Content 21.6 Vol %  


 


Arterial Blood 2.6 %  





Carboxyhemoglobin   


 


Arterial Blood Methemoglobin 1.9 %  


 


Blood Gas Hemoglobin 16.8 G/DL  


 


Oxygen Delivery Device NASAL CANNULA   


 


Blood Gas Liter Flow 6 L/M  


 


White Blood Count  9.4 TH/MM3 


 


Red Blood Count  5.29 MIL/MM3 


 


Hemoglobin  15.6 GM/DL 


 


Hematocrit  45.1 % 


 


Mean Corpuscular Volume  85.2 FL 


 


Mean Corpuscular Hemoglobin  29.5 PG 


 


Mean Corpuscular Hemoglobin  34.6 % 





Concent   


 


Red Cell Distribution Width  14.1 % 


 


Platelet Count  265 TH/MM3 


 


Mean Platelet Volume  8.2 FL 


 


Neutrophils (%) (Auto)  79.6 % 


 


Lymphocytes (%) (Auto)  10.4 % 


 


Monocytes (%) (Auto)  5.1 % 


 


Eosinophils (%) (Auto)  3.9 % 


 


Basophils (%) (Auto)  1.0 % 


 


Neutrophils # (Auto)  7.5 TH/MM3 


 


Lymphocytes # (Auto)  1.0 TH/MM3 


 


Monocytes # (Auto)  0.5 TH/MM3 


 


Eosinophils # (Auto)  0.4 TH/MM3 


 


Basophils # (Auto)  0.1 TH/MM3 


 


CBC Comment  DIFF FINAL  


 


Differential Comment    


 


Activated Partial  31.0 SEC 





Thromboplast Time   


 


Sodium Level  131 MEQ/L 


 


Potassium Level  4.1 MEQ/L 


 


Chloride Level  98 MEQ/L 


 


Carbon Dioxide Level  23.0 MEQ/L 


 


Anion Gap  10 MEQ/L 


 


Blood Urea Nitrogen  22 MG/DL 


 


Creatinine  1.08 MG/DL 


 


Estimat Glomerular Filtration  70 ML/MIN 





Rate   


 


Random Glucose  93 MG/DL 


 


Lactic Acid Level  0.9 mmol/L 


 


Calcium Level  8.8 MG/DL 


 


Total Bilirubin  0.5 MG/DL 


 


Aspartate Amino Transf  35 U/L 





(AST/SGOT)   


 


Alanine Aminotransferase  28 U/L 





(ALT/SGPT)   


 


Alkaline Phosphatase  71 U/L 


 


Troponin I  LESS THAN 0.02 





  NG/ML 


 


Total Protein  7.8 GM/DL 


 


Albumin  2.5 GM/DL 


 


Urine Color   YELLOW 


 


Urine Turbidity   CLEAR 


 


Urine pH   5.5 


 


Urine Specific Gravity   1.015 


 


Urine Protein   TRACE mg/dL


 


Urine Glucose (UA)   NEG mg/dL


 


Urine Ketones   TRACE mg/dL


 


Urine Occult Blood   NEG 


 


Urine Nitrite   NEG 


 


Urine Bilirubin   NEG 


 


Urine Urobilinogen   LESS THAN 2.0





   MG/DL


 


Urine Leukocyte Esterase   NEG 


 


Urine RBC   2 /hpf


 


Urine WBC   2 /hpf


 


Urine Squamous Epithelial   <1 /hpf





Cells   


 


Urine Mucus   FEW /lpf


 


Microscopic Urinalysis Comment   CATH-CULT NOT





   IND











MDM


Medical Decision Making


Medical Screen Exam Complete:  Yes


Emergency Medical Condition:  Yes


Medical Record Reviewed:  Yes


Interpretation(s)


Twelve-lead EKG was reviewed by me.  Normal sinus rhythm, normal axis, poor R-

wave progression, tachycardia, nonspecific ST-T wave changes.  Heart rate of 

119 bpm.


Differential Diagnosis


Pneumonia, PE, atypical pneumonitis, sepsis, autoimmune pneumonitis


Narrative Course


6:36 PM blood test results are back and they appear to be within normal limit.  

His sodium is low.  Chest x-ray shows bilateral groundglass opacity.  Blood gas 

showed hypoxia in spite of 4 L of oxygen via nasal cannula.  Patient was given 

Tylenol for his fever.  I have also given him IV Zosyn, vancomycin and 

Zithromax for hospital-acquired pneumonia.  Had ordered a CT pulmonary 

angiogram and patient is currently over at the CT.  Awaiting for the test to be 

done and resulted.  Patient will require admission.  He was given a liter of IV 

fluid bolus as well.  I reassessed him and he said he felt a little better now.

  He is saturating 98% on 6 L of oxygen.  Patient is homosexual which increases 

his risk for HIV.  I have ordered an HIV screen test for him.


Critical Care Narrative


Aggregate critical care time was 45 minutes. Time to perform other separately 

billable procedures was not  


included in the critical care time. My time did not include minutes spent 

treating any other patients simultaneously or on  


activities that did not directly contribute to the patient's treatment.  





The services I provided to this patient were to treat and/or prevent clinically 

significant deterioration that could result  


in: Sepsis, hypoxia, pneumonia, respiratory distress





I provided critical care services requiring my management, as noted below:


Chart data review, documentation time, medication orders and management, vital 

sign assessments/reviewing monitor data,  


ordering and reviewing lab tests, ordering and interpreting/reviewing x-rays 

and diagnostic studies, care of the patient  


and discussion of the patient with the admitting physicians.





Procedures


EKG Prior to Arrival:  No





Sepsis Criteria


SIRS Criteria (2 or more):  Temp > 100.9 or < 96.8, Heart rate over 90, RR  > 

20 or PaCO2 < 32


Sepsis Criteria (SIRS+source):  Infect source susp/known





Diagnosis





 Primary Impression:  


 Hospital-acquired pneumonia


 Additional Impressions:  


 Respiratory distress


 Hypoxia


 Hyponatremia


 SIRS (systemic inflammatory response syndrome)





Admitting Information


Admitting Physician Requests:  Janina Mrai MD Feb 11, 2017 16:53

## 2017-02-11 NOTE — RADRPT
EXAM DATE/TIME:  02/11/2017 16:55 

 

[facility] COMPARISON:     

CHEST SINGLE AP, January 31, 2017, 19:27.

 

                     

INDICATIONS :     

Shortness of breath for the past three weeks. Got worst today.

                     

 

MEDICAL HISTORY :     

Hypertension.          

 

SURGICAL HISTORY :     

None.   

 

ENCOUNTER:     

Initial                                        

 

ACUITY:     

3 weeks      

 

PAIN SCORE:     

0/10

 

LOCATION:     

Bilateral chest 

 

FINDINGS:     

There is bilateral perihilar airspace disease. Findings are most characteristic of a bronchopneumonia
. Heart size is within normal limits. Mildly tortuous aorta thickness and confusion.

 

CONCLUSION:     

Bilateral airspace disease which has developed since January 31. Differential diagnosis includes bron
chopneumonia and possibly hypersensitivity type reaction. 

 

 

 Bravo Robison MD on February 11, 2017 at 17:21           

Board Certified Radiologist.

 This report was verified electronically.

## 2017-02-11 NOTE — RADRPT
EXAM DATE/TIME:  02/11/2017 18:29 

 

[facility] COMPARISON:     

CT PULMONARY ANGIOGRAM, January 30, 2017, 10:42.

 

 

INDICATIONS :     

Shortness of breath.

                     

 

IV CONTRAST:     

75 cc Omnipaque 350 (iohexol) IV 

 

 

RADIATION DOSE:     

12.46 CTDIvol (mGy) 

 

 

MEDICAL HISTORY :     

Hypertension. Chronic obstructive pulmonary disease. Gastroesophageal reflux disease.

 

SURGICAL HISTORY :       

Prostate resection.

 

ENCOUNTER:      

Initial

 

ACUITY:      

1 day

 

PAIN SCALE:      

4/10

 

LOCATION:       

Bilateral chest 

 

TECHNIQUE:     

Volumetric scanning of the chest was performed using a pulmonary embolism protocol MIP images were re
constructed.  Using automated exposure control and adjustment of the mA and/or kV according to patien
t size, radiation dose was kept as low as reasonably achievable to obtain optimal diagnostic quality 
images. 

 

FINDINGS:     

No filling defects identified within the pulmonary arteries to suggest pulmonary embolic disease. Ove
r the last 12 days there is development of bilateral groundglass opacity and interstitial lung diseas
e on a background of mild emphysema. Some lung opacities demonstrate small cystic changes or cavities
.

 

There is no pleural or pericardial effusion. Coronary calcifications noted.

 

CONCLUSION:     

1. Negative for pulmonary embolus.

2. Interval development of bilateral lung disease on a background of emphysema. Findings are not enti
rely typical of bronchopneumonia. Differential diagnosis includes some form of smoking related diseas
e with respiratory bronchiolitis or possibly Langerhans' cell histiocytosis.

 

 

 

 Bravo Robison MD on February 11, 2017 at 18:46           

Board Certified Radiologist.

 This report was verified electronically.

## 2017-02-12 VITALS
TEMPERATURE: 97.5 F | DIASTOLIC BLOOD PRESSURE: 65 MMHG | HEART RATE: 74 BPM | RESPIRATION RATE: 18 BRPM | SYSTOLIC BLOOD PRESSURE: 114 MMHG | OXYGEN SATURATION: 95 %

## 2017-02-12 VITALS
SYSTOLIC BLOOD PRESSURE: 115 MMHG | OXYGEN SATURATION: 95 % | DIASTOLIC BLOOD PRESSURE: 58 MMHG | RESPIRATION RATE: 20 BRPM | TEMPERATURE: 97.4 F | HEART RATE: 82 BPM

## 2017-02-12 VITALS
RESPIRATION RATE: 22 BRPM | TEMPERATURE: 99.3 F | HEART RATE: 76 BPM | OXYGEN SATURATION: 95 % | DIASTOLIC BLOOD PRESSURE: 54 MMHG | SYSTOLIC BLOOD PRESSURE: 91 MMHG

## 2017-02-12 VITALS — HEART RATE: 81 BPM

## 2017-02-12 VITALS
TEMPERATURE: 97.6 F | DIASTOLIC BLOOD PRESSURE: 70 MMHG | OXYGEN SATURATION: 94 % | HEART RATE: 85 BPM | RESPIRATION RATE: 20 BRPM | SYSTOLIC BLOOD PRESSURE: 119 MMHG

## 2017-02-12 VITALS — HEART RATE: 78 BPM

## 2017-02-12 VITALS
OXYGEN SATURATION: 95 % | DIASTOLIC BLOOD PRESSURE: 72 MMHG | RESPIRATION RATE: 22 BRPM | TEMPERATURE: 98.6 F | HEART RATE: 95 BPM | SYSTOLIC BLOOD PRESSURE: 130 MMHG

## 2017-02-12 VITALS — HEART RATE: 90 BPM

## 2017-02-12 VITALS
DIASTOLIC BLOOD PRESSURE: 67 MMHG | OXYGEN SATURATION: 94 % | HEART RATE: 87 BPM | TEMPERATURE: 98.8 F | RESPIRATION RATE: 22 BRPM | SYSTOLIC BLOOD PRESSURE: 126 MMHG

## 2017-02-12 VITALS — OXYGEN SATURATION: 93 %

## 2017-02-12 VITALS — HEART RATE: 83 BPM

## 2017-02-12 VITALS — HEART RATE: 86 BPM

## 2017-02-12 VITALS — OXYGEN SATURATION: 97 %

## 2017-02-12 VITALS — HEART RATE: 89 BPM

## 2017-02-12 VITALS — HEART RATE: 73 BPM

## 2017-02-12 VITALS — HEART RATE: 76 BPM

## 2017-02-12 VITALS — HEART RATE: 82 BPM

## 2017-02-12 VITALS — HEART RATE: 80 BPM

## 2017-02-12 VITALS — HEART RATE: 84 BPM

## 2017-02-12 VITALS — HEART RATE: 85 BPM

## 2017-02-12 LAB
ANION GAP SERPL CALC-SCNC: 12 MEQ/L (ref 5–15)
BASOPHILS # BLD AUTO: 0 TH/MM3 (ref 0–0.2)
BASOPHILS NFR BLD: 0.3 % (ref 0–2)
BUN SERPL-MCNC: 17 MG/DL (ref 7–18)
CHLORIDE SERPL-SCNC: 113 MEQ/L (ref 98–107)
EOSINOPHIL # BLD: 0 TH/MM3 (ref 0–0.4)
EOSINOPHIL NFR BLD: 0.2 % (ref 0–4)
ERYTHROCYTE [DISTWIDTH] IN BLOOD BY AUTOMATED COUNT: 14 % (ref 11.6–17.2)
GFR SERPLBLD BASED ON 1.73 SQ M-ARVRAT: 111 ML/MIN (ref 89–?)
HCO3 BLD-SCNC: 18.5 MEQ/L (ref 21–32)
HCT VFR BLD CALC: 39.4 % (ref 39–51)
HEMO FLAGS: (no result)
LYMPHOCYTES # BLD AUTO: 0.6 TH/MM3 (ref 1–4.8)
LYMPHOCYTES NFR BLD AUTO: 12.7 % (ref 9–44)
MCH RBC QN AUTO: 29.1 PG (ref 27–34)
MCHC RBC AUTO-ENTMCNC: 34 % (ref 32–36)
MCV RBC AUTO: 85.7 FL (ref 80–100)
MONOCYTES NFR BLD: 2.6 % (ref 0–8)
NEUTROPHILS # BLD AUTO: 4.1 TH/MM3 (ref 1.8–7.7)
NEUTROPHILS NFR BLD AUTO: 84.2 % (ref 16–70)
PLATELET # BLD: 226 TH/MM3 (ref 150–450)
POTASSIUM SERPL-SCNC: 3.4 MEQ/L (ref 3.5–5.1)
RBC # BLD AUTO: 4.6 MIL/MM3 (ref 4.5–5.9)
SODIUM SERPL-SCNC: 143 MEQ/L (ref 136–145)
WBC # BLD AUTO: 4.9 TH/MM3 (ref 4–11)

## 2017-02-12 RX ADMIN — GABAPENTIN SCH MG: 100 CAPSULE ORAL at 17:17

## 2017-02-12 RX ADMIN — GABAPENTIN SCH MG: 100 CAPSULE ORAL at 09:57

## 2017-02-12 RX ADMIN — HEPARIN SODIUM SCH UNITS: 10000 INJECTION, SOLUTION INTRAVENOUS; SUBCUTANEOUS at 09:45

## 2017-02-12 RX ADMIN — PHENYTOIN SODIUM SCH MLS/HR: 50 INJECTION INTRAMUSCULAR; INTRAVENOUS at 15:01

## 2017-02-12 RX ADMIN — MESALAMINE SCH MG: 800 TABLET, DELAYED RELEASE ORAL at 05:09

## 2017-02-12 RX ADMIN — TAZOBACTAM SODIUM AND PIPERACILLIN SODIUM SCH MLS/HR: 500; 4 INJECTION, SOLUTION INTRAVENOUS at 01:02

## 2017-02-12 RX ADMIN — MESALAMINE SCH MG: 800 TABLET, DELAYED RELEASE ORAL at 19:41

## 2017-02-12 RX ADMIN — PHENYTOIN SODIUM SCH MLS/HR: 50 INJECTION INTRAMUSCULAR; INTRAVENOUS at 05:01

## 2017-02-12 RX ADMIN — GUAIFENESIN AND CODEINE PHOSPHATE PRN ML: 10; 100 LIQUID ORAL at 19:49

## 2017-02-12 RX ADMIN — TAZOBACTAM SODIUM AND PIPERACILLIN SODIUM SCH MLS/HR: 500; 4 INJECTION, SOLUTION INTRAVENOUS at 13:14

## 2017-02-12 RX ADMIN — TAZOBACTAM SODIUM AND PIPERACILLIN SODIUM SCH MLS/HR: 500; 4 INJECTION, SOLUTION INTRAVENOUS at 21:45

## 2017-02-12 RX ADMIN — TAZOBACTAM SODIUM AND PIPERACILLIN SODIUM SCH MLS/HR: 500; 4 INJECTION, SOLUTION INTRAVENOUS at 17:18

## 2017-02-12 RX ADMIN — TAZOBACTAM SODIUM AND PIPERACILLIN SODIUM SCH MLS/HR: 500; 4 INJECTION, SOLUTION INTRAVENOUS at 05:09

## 2017-02-12 RX ADMIN — SODIUM CHLORIDE, PRESERVATIVE FREE SCH ML: 5 INJECTION INTRAVENOUS at 19:37

## 2017-02-12 RX ADMIN — Medication SCH TAB: at 11:00

## 2017-02-12 RX ADMIN — FAMOTIDINE SCH MG: 20 TABLET, FILM COATED ORAL at 09:56

## 2017-02-12 RX ADMIN — GUAIFENESIN AND CODEINE PHOSPHATE PRN ML: 10; 100 LIQUID ORAL at 12:40

## 2017-02-12 RX ADMIN — Medication SCH CAP: at 09:56

## 2017-02-12 RX ADMIN — NYSTATIN SCH ML: 100000 SUSPENSION ORAL at 01:02

## 2017-02-12 RX ADMIN — FAMOTIDINE SCH MG: 20 TABLET, FILM COATED ORAL at 19:38

## 2017-02-12 RX ADMIN — FLUCONAZOLE SCH MG: 100 TABLET ORAL at 11:00

## 2017-02-12 RX ADMIN — SODIUM CHLORIDE SCH MLS/HR: 900 INJECTION INTRAVENOUS at 11:58

## 2017-02-12 RX ADMIN — NYSTATIN SCH ML: 100000 SUSPENSION ORAL at 19:38

## 2017-02-12 RX ADMIN — IPRATROPIUM BROMIDE AND ALBUTEROL SULFATE SCH AMPULE: .5; 3 SOLUTION RESPIRATORY (INHALATION) at 21:28

## 2017-02-12 RX ADMIN — HEPARIN SODIUM SCH UNITS: 10000 INJECTION, SOLUTION INTRAVENOUS; SUBCUTANEOUS at 19:38

## 2017-02-12 RX ADMIN — DILTIAZEM HYDROCHLORIDE SCH MG: 240 CAPSULE, EXTENDED RELEASE ORAL at 09:56

## 2017-02-12 RX ADMIN — NYSTATIN SCH ML: 100000 SUSPENSION ORAL at 09:55

## 2017-02-12 RX ADMIN — NYSTATIN SCH ML: 100000 SUSPENSION ORAL at 17:17

## 2017-02-12 RX ADMIN — IPRATROPIUM BROMIDE AND ALBUTEROL SULFATE SCH AMPULE: .5; 3 SOLUTION RESPIRATORY (INHALATION) at 16:01

## 2017-02-12 RX ADMIN — MESALAMINE SCH MG: 800 TABLET, DELAYED RELEASE ORAL at 13:16

## 2017-02-12 RX ADMIN — SODIUM CHLORIDE SCH MLS/HR: 900 INJECTION INTRAVENOUS at 21:46

## 2017-02-12 RX ADMIN — METHYLPREDNISOLONE SODIUM SUCCINATE SCH MG: 40 INJECTION, POWDER, FOR SOLUTION INTRAMUSCULAR; INTRAVENOUS at 23:03

## 2017-02-12 RX ADMIN — BUDESONIDE AND FORMOTEROL FUMARATE DIHYDRATE SCH PUFF: 160; 4.5 AEROSOL RESPIRATORY (INHALATION) at 09:00

## 2017-02-12 RX ADMIN — GABAPENTIN SCH MG: 100 CAPSULE ORAL at 13:14

## 2017-02-12 RX ADMIN — METHYLPREDNISOLONE SODIUM SUCCINATE SCH MG: 40 INJECTION, POWDER, FOR SOLUTION INTRAMUSCULAR; INTRAVENOUS at 17:17

## 2017-02-12 RX ADMIN — SODIUM CHLORIDE, PRESERVATIVE FREE SCH ML: 5 INJECTION INTRAVENOUS at 09:56

## 2017-02-12 RX ADMIN — OXYCODONE HYDROCHLORIDE AND ACETAMINOPHEN SCH MG: 500 TABLET ORAL at 09:56

## 2017-02-12 RX ADMIN — NYSTATIN SCH ML: 100000 SUSPENSION ORAL at 13:14

## 2017-02-12 RX ADMIN — IPRATROPIUM BROMIDE AND ALBUTEROL SULFATE SCH AMPULE: .5; 3 SOLUTION RESPIRATORY (INHALATION) at 09:51

## 2017-02-12 NOTE — MB
cc:

RINA MURRY R. STEVEN

****

 

 

DATE OF CONSULTATION:  2017.

 

HISTORY OF PRESENT ILLNESS:

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

been followed by Dr. Murry for COPD and chronic bronchitis.  He smoked until

December.  He was admitted here in January for about a week and treated for an

acute exacerbation and during that time developed atrial fibrillation which

apparently converted spontaneously back to regular rhythm.

 

He was discharged home but called his primary physician yesterday because he

was having increased shortness of breath.  He was referred directly to the

emergency room because his sats were in the low 80s. It all came on rather

suddenly. He had cough and purulent sputum.  No blood.  His saturations were

low.  He had a temperature of 102 at home.

 

PAST MEDICAL HISTORY:

He has a prior history of:

1. Hypertension.

2. Ulcerative colitis.

3. Benign prostate hypertrophy.

4. Previous urinary tract infections.

 

ALLERGIES:

None.

 

FAMILY HISTORY:

Positive for colon cancer.  Father  of a stroke.

 

MEDICATIONS AT HOME:

Reviewed and recorded in the electronic medical record.

 

CURRENT MEDICATIONS:

Also reviewed in the electronic medical record.

 

ALLERGIES:

None.

 

 

 

 

 

REVIEW OF SYSTEMS:

He denies any smoking or alcohol use. He has had no chest pain.  He was

lightheaded when he had the fever.  No hemoptysis.  No diarrhea.  No nausea or

vomiting.  No edema in his legs.

 

PHYSICAL EXAMINATION:

VITAL SIGNS: Temperature 97 degrees, pulse 80, respirations 18 nonlabored,

blood pressure is 115/58 and sat is 95% currently on 5 liters.

HEAD, EYES, EARS, NOSE, THROAT: Sclerae anicteric.  Mucous membranes are moist.

Thrush on his tongue

NECK: His neck veins are flat.

CHEST: No wheezing. Some very fine rales.  No congestion.

HEART: Regular rhythm.  No harsh murmur.

ABDOMEN: Abdomen is soft.

EXTREMITIES: No peripheral edema, calf tenderness.  No cyanosis or clubbing.

 

LABORATORY DATA:

White cell count is 5900, hemoglobin is 13.

 

ABG on 6 liters: pO2 was 79, pH 7.5, pCO2 26.

 

BUN and creatinine are normal.

 

C-reactive protein and sedimentation rate are both quite high.

 

Troponin is less than 0.02.  BNP is also low.

 

ASSESSMENT:

Mr. Busch has known COPD; however, his CT scan on this presentation now

reveals rather diffuse infiltrates, probably infectious viral versus

hospital-acquired since he was just in the hospital.

 

He has been started on broad-spectrum antibiotics and corticosteroids.

 

I will increase the frequency of the corticosteroids and also continue him on

oxygen.

 

We will try to collect a sputum.

 

Also check for influenza, Legionella and pneumococcus and do a nasal wash for

MRSA as well.

 

He seems to be responding to initial therapy as his breathing has improved.

 

Further diagnostic and/or therapeutic range will depend on his ongoing clinical

course and response to therapy.

 

 

 

                              _________________________________

                              R. MD MARIYA Ramírez/DAPHNIE

D:  2017/3:33 PM

T:  2017/4:42 PM

Visit #:  [id number]

Job #:  [id number]

## 2017-02-12 NOTE — MH
cc:

FANG KWONG D.O.,JUAN

****

 

DATE OF ADMISSION:  2/11/2017

 

Primary care physician:

Dr. Kwong

 

CHIEF COMPLAINT

Fever. Shortness of breath.

 

HISTORY OF PRESENT ILLNESS

This is a pleasant 61-year-old [place] male who has a history of COPD, and

recent diagnosis of COPD exacerbation with what appeared to be early pneumonia

for which he was hospitalized from January 30, until February 4, 2017.  He was

released from the hospital.  Over the past 24 hours he has developed a fever.

He became more short of breath today.  He went to see his primary care

physician, Dr. Kwong who recommended the patient come to the hospital.  The

patient states at home he has a nebulizer and he has been using it several

times a day.  He also has oxygen.  He has been using between 4 to 6 liters a

day.  He is taking all his medications as prescribed.

 

He could feel that he had a fever.  He had minimal voice this morning.  His

voice is improving a little bit.  The patient was tachycardiac.  He was not

having chest pain or pressure.  He was seen in the emergency room and he was

started on antibiotics, nebulizer, steroids, Tylenol was given for his fever,

and he was admitted for further care.  The patient feels much better than when

he was first admitted.

 

His appetite is not very good because he has thrush and it hurts when he eats.

He has been using Nystatin and the thrush is improving.

 

MEDICATIONS ON ADMISSION:

Please see chart.

 

ALLERGIES:

NONE.

 

PAST MEDICAL HISTORY:

1. A-fib.

2. Hypertension.

3. COPD.

4. Ulcerative colitis.

5. GERD.

6. BPH.

7. Recent pneumonia.

 

PAST SURGICAL HISTORY:

Cyst and keloid removed.

 

SOCIAL HISTORY:

He is not .  Tobacco, quit in December of 2016.  Alcohol: Quite December 2016.  The patient works as a nurse at [employer].

 

FAMILY HISTORY:

Noncontributory.

 

REVIEW OF SYSTEMS:

The patient states that his thrush is getting better but it still hurts when he

tries to swallow.  He has been using Klonopin at night to help him sleep.  When

he is not sick he usually uses it about once a month.  With being sick, he is

using it once every night now.

 

Patient had no side effects from the medications.  He has not had diarrhea.  He

had echocardiogram during his last hospitalization which was essentially

normal.  He was seen by the Infectious Disease, Dr. Whiting.  The workup during

his last hospital stay was negative including multiple serology panel, as well

as urine, Legionella, strep pneumoniae, blood cultures all were negative as

well.  A 12 point review of systems was negative.

 

PHYSICAL EXAMINATION

VITAL SIGNS: T-max 102.9, maximum heart rate has been 128, last temperature was

98.8, heart rate 83, respiratory rate 24, blood pressure 94/52.

GENERAL: This is a 61 year-old [place] male, resting comfortable in bed.  He

is in no distress at this time.

HEENT:  Mucous membranes shows he has oral thrush.  He is hydrated.  There is

no jaundice.

NECK: Supple.

CARDIOVASCULAR: Regular rate and rhythm.

RESPIRATORY: Few crackles as well as a few scattered rhonchi.

GASTROINTESTINAL: Bowel sounds are present.

ABDOMEN: Not distended.

:  No CVA tenderness.

MUSCULOSKELETAL: No edema. Homans' sign negative.

NEUROLOGIC: Alert, oriented.  Speech is clear and fluent.  Moving all

extremities freely.

 

INVESTIGATIONS:

Sodium is 131, potassium 4.1, BUN 22, creatinine 1.08, Troponin-I is negative.

Albumin 2.5.  Blood gas was 0.9.

 

Urinalysis showed trace ketones.  Mucous few.  Cultures not indicated.

 

ABG showed a bicarb of 20, O2 sat 91, pH is 7.5, PCO2 26, PO2 of 79.  CBC was

normal.

 

EKG on admission showed sinus tachycardia.

 

CT angiography showed no PE, interval development of bilateral lung disease on

a background of emphysema, not entirely typical for bronchopneumonia.

Differential diagnosis includes some form of smoking related disease of

respiratory bronchiolitis or possible Langerhans cells, histiocytosis.

 

IMPRESSION:

1. Sepsis on admission.

2. COPD exacerbation.

3. Hyponatremia.

4. Hospital acquired pneumonia.

5. History of A-fib, currently in sinus rhythm.

6. History of ulcerative colitis.

 

DISCUSSION:

The patient is admitted to Dr. Obrien's service.  The patient meets inpatient

criteria due to the severity of his respiratory distress as well as the

diagnosis of sepsis on admission.  He is at high-risk of having a poor outcome

related to multisystem organ failure, respiratory failure, etc.

 

During his hospital stay he will be monitored on telemetry.  He will be placed

on IV fluids.  He is receiving triple antibiotic coverage for what appears to

be hospital acquired pneumonia, including vanco, Zosyn and Zithromax.  IV

consult will be obtained.  He was been started on nebulizer, oxygen and

steroids. Pulmonary consultation will be obtained.  Will follow up labs in the

morning.  Will monitor cultures. DVT prophylaxis will be provided as well as GI

prophylaxis.  His Klonopin will be continued to help him rest.

 

Further recommendations will be made as his case progressed.

 

Anticipated length of stay is 3 to 4 days.  Anticipated discharge is to home.

 

 

Dictated by:  Jose Flores PA-C

 

 

 

                               __________________________________

                           Juan Obrien MD JP/VERO

D:  2/11/2017/11:49 PM

T:  2/12/2017/5:10 AM

Visit #:  [id number]

Job #:  [id number]



Pt seen and examined as above on day of admission ( face to face time spent 
with pt )

chart was reviewd 

labs and rad data reviewed

meds reviewed

notes reviewed

dw ER physician

alexy pt 

alexy rn
EBNJA

## 2017-02-12 NOTE — HHI.PR
Subjective


Remarks


Patient is to have some shortness of breath less than yesterday


Still coughing dry type less than yesterday


Being weak and tired


Has some thrush in his mouth


No other complaint


Review of systems a 12 point system otherwise unremarkable





Objective


Objective Results


-





 Vital Signs








  Date Time  Temp Pulse Resp B/P Pulse Ox O2 Delivery O2 Flow Rate FiO2


 


2/12/17 09:52     97 Nasal Cannula 5.00 


 


2/12/17 08:00  73      


 


2/12/17 07:15 97.5 74 18 114/65 95   


 


2/12/17 07:15     96 Nasal Cannula 5.00 


 


2/12/17 07:15  67      


 


2/12/17 06:00  84      


 


2/12/17 05:00  82      


 


2/12/17 04:00  76      


 


2/12/17 03:56      Nasal Cannula 4.00 


 


2/12/17 03:56 99.3 76 22 91/54 95   


 


2/12/17 03:00  81      


 


2/12/17 02:00  82      


 


2/12/17 01:00  90      


 


2/12/17 00:00  90      


 


2/11/17 23:30  90      


 


2/11/17 23:30 99.0 90 22 89/53 94   


 


2/11/17 23:30      Nasal Cannula 4.00 


 


2/11/17 22:32  83 24 94/52 92  4 


 


2/11/17 21:49  87 24 90/50 96 Nasal Cannula 4 


 


2/11/17 19:36     77 Room Air  


 


2/11/17 19:12  99 24 92/53  Nasal Cannula 4 


 


2/11/17 18:00 98.8 112 26 101/50 96 Nasal Cannula 4 


 


2/11/17 16:44     91 Nasal Cannula 4 


 


2/11/17 16:37 102.9 128 26 156/74 87   








 I/O








 2/11/17 2/11/17 2/11/17 2/12/17 2/12/17 2/12/17





 07:00 15:00 23:00 07:00 15:00 23:00


 


Intake Total    1240 ml  


 


Output Total   300 ml 800 ml  


 


Balance   -300 ml 440 ml  


 


      


 


Intake Oral    240 ml  


 


IV Total    1000 ml  


 


Output Urine Total   300 ml 800 ml  


 


# Voids   1   


 


# Bowel Movements    0  








Result Diagram:  


2/12/17 0548                                                                   

             2/12/17 0548





Other Results





Laboratory Tests








Test 2/11/17 2/11/17 2/11/17 2/12/17





 15:08 17:07 18:31 05:48


 


Blood Gas Puncture Site RT RADIAL    


 


Blood Gas Patient Temperature 98.6    


 


Blood Gas HCO3 20    


 


Blood Gas Base Excess -2.9    


 


Blood Gas Oxygen Saturation 91    


 


Arterial Blood pH 7.50    


 


Arterial Blood Partial 26    





Pressure CO2    


 


Arterial Blood Partial 79    





Pressure O2    


 


Arterial Blood Oxygen Content 21.6    


 


Arterial Blood 2.6    





Carboxyhemoglobin    


 


Arterial Blood Methemoglobin 1.9    


 


Blood Gas Hemoglobin 16.8    


 


Oxygen Delivery Device NASAL CANNULA    


 


Blood Gas Liter Flow 6    


 


White Blood Count  9.4   4.9 


 


Red Blood Count  5.29   4.60 


 


Hemoglobin  15.6   13.4 


 


Hematocrit  45.1   39.4 


 


Mean Corpuscular Volume  85.2   85.7 


 


Mean Corpuscular Hemoglobin  29.5   29.1 


 


Mean Corpuscular Hemoglobin  34.6   34.0 





Concent    


 


Red Cell Distribution Width  14.1   14.0 


 


Platelet Count  265   226 


 


Mean Platelet Volume  8.2   8.0 


 


Neutrophils (%) (Auto)  79.6   84.2 


 


Lymphocytes (%) (Auto)  10.4   12.7 


 


Monocytes (%) (Auto)  5.1   2.6 


 


Eosinophils (%) (Auto)  3.9   0.2 


 


Basophils (%) (Auto)  1.0   0.3 


 


Neutrophils # (Auto)  7.5   4.1 


 


Lymphocytes # (Auto)  1.0   0.6 


 


Monocytes # (Auto)  0.5   0.1 


 


Eosinophils # (Auto)  0.4   0.0 


 


Basophils # (Auto)  0.1   0.0 


 


CBC Comment  DIFF FINAL   DIFF FINAL 


 


Differential Comment      


 


Activated Partial  31.0   





Thromboplast Time    


 


Sodium Level  131   143 


 


Potassium Level  4.1   3.4 


 


Chloride Level  98   113 


 


Carbon Dioxide Level  23.0   18.5 


 


Anion Gap  10   12 


 


Blood Urea Nitrogen  22   17 


 


Creatinine  1.08   0.72 


 


Estimat Glomerular Filtration  70   111 





Rate    


 


Random Glucose  93   148 


 


Lactic Acid Level  0.9   


 


Calcium Level  8.8   8.0 


 


Total Bilirubin  0.5   


 


Aspartate Amino Transf  35   





(AST/SGOT)    


 


Alanine Aminotransferase  28   





(ALT/SGPT)    


 


Alkaline Phosphatase  71   


 


Troponin I  LESS THAN 0.02   


 


Total Protein  7.8   


 


Albumin  2.5   


 


Urine Color   YELLOW  


 


Urine Turbidity   CLEAR  


 


Urine pH   5.5  


 


Urine Specific Gravity   1.015  


 


Urine Protein   TRACE  


 


Urine Glucose (UA)   NEG  


 


Urine Ketones   TRACE  


 


Urine Occult Blood   NEG  


 


Urine Nitrite   NEG  


 


Urine Bilirubin   NEG  


 


Urine Urobilinogen   LESS THAN 2.0  


 


Urine Leukocyte Esterase   NEG  


 


Urine RBC   2  


 


Urine WBC   2  


 


Urine Squamous Epithelial   <1  





Cells    


 


Urine Mucus   FEW  


 


Microscopic Urinalysis Comment   CATH-CULT NOT 





   IND 














 Date/Time Procedure Status





Source Growth 


 


 2/12/17 05:48 Aerobic Blood Culture Received





Blood Peripheral Pending 





 2/12/17 05:48 Anaerobic Blood Culture Received





Blood Peripheral Pending 


 


 2/11/17 17:07 Influenza Types A,B Antigen (VIVIANA) - Final Complete





Nasal Aspirate NEGATIVE FOR FLU A AND B ANTIGEN.... 











Physical Exam


Physical Exam


VITAL SIGNS: Reviewed


GENERAL: This is a 61 year-old [place]ian male, resting comfortable in bed.  He


is in no distress at this time.


HEENT:  Mucous membranes shows he has oral thrush.  He is hydrated.  There is


no jaundice.


NECK: Supple.  No increased JVD negative thyromegaly


CARDIOVASCULAR: Regular rate and rhythm.


RESPIRATORY: Few crackles as well as a occasional basilar rhonchi.


GASTROINTESTINAL: Bowel sounds are present.  Soft nontender nondistended 

positive bowel sounds


:  No CVA tenderness.


MUSCULOSKELETAL: No edema. Homans' sign negative.


NEUROLOGIC: Alert, oriented.  Speech is clear and fluent.  Moving all


extremities freely.





A/P


Assessment and Plan


1. Sepsis on admission.


2. COPD exacerbation.


3. Hyponatremia.


4. Hospital acquired pneumonia.


5. History of A-fib, currently in sinus rhythm.


6. History of ulcerative colitis.


 


DISCUSSION:


Labs reviewed


Low potassium replace


Better sodium


monitored on telemetry.


Continue IV fluids.


Continue antibiotic


ID consult


Pulmonary consult


Continue nebulizer treatments


Continue oxygen to keep saturation above 90%


steroids


DVT prophylaxis will be provided as well as GI prophylaxis.


Continue home medication as indicated


Diflucan for thrush


Cough medication with codeine


Discussed with RN


Discussed with patient


Condition Tiffany Thomas MD Feb 12, 2017 10:24

## 2017-02-13 VITALS
OXYGEN SATURATION: 95 % | DIASTOLIC BLOOD PRESSURE: 75 MMHG | SYSTOLIC BLOOD PRESSURE: 136 MMHG | TEMPERATURE: 98.4 F | RESPIRATION RATE: 20 BRPM | HEART RATE: 77 BPM

## 2017-02-13 VITALS — HEART RATE: 81 BPM

## 2017-02-13 VITALS
HEART RATE: 6 BPM | DIASTOLIC BLOOD PRESSURE: 71 MMHG | RESPIRATION RATE: 18 BRPM | SYSTOLIC BLOOD PRESSURE: 121 MMHG | OXYGEN SATURATION: 95 % | TEMPERATURE: 98 F

## 2017-02-13 VITALS
TEMPERATURE: 97.6 F | RESPIRATION RATE: 20 BRPM | SYSTOLIC BLOOD PRESSURE: 142 MMHG | DIASTOLIC BLOOD PRESSURE: 81 MMHG | HEART RATE: 86 BPM | OXYGEN SATURATION: 80 %

## 2017-02-13 VITALS — HEART RATE: 75 BPM

## 2017-02-13 VITALS
RESPIRATION RATE: 22 BRPM | TEMPERATURE: 98.4 F | HEART RATE: 80 BPM | OXYGEN SATURATION: 94 % | DIASTOLIC BLOOD PRESSURE: 83 MMHG | SYSTOLIC BLOOD PRESSURE: 130 MMHG

## 2017-02-13 VITALS — HEART RATE: 90 BPM

## 2017-02-13 VITALS — HEART RATE: 86 BPM

## 2017-02-13 VITALS
HEART RATE: 82 BPM | OXYGEN SATURATION: 93 % | DIASTOLIC BLOOD PRESSURE: 75 MMHG | SYSTOLIC BLOOD PRESSURE: 138 MMHG | RESPIRATION RATE: 20 BRPM

## 2017-02-13 VITALS — HEART RATE: 80 BPM

## 2017-02-13 VITALS — HEART RATE: 85 BPM

## 2017-02-13 VITALS — OXYGEN SATURATION: 95 %

## 2017-02-13 VITALS — HEART RATE: 87 BPM

## 2017-02-13 VITALS — HEART RATE: 79 BPM

## 2017-02-13 VITALS — HEART RATE: 76 BPM

## 2017-02-13 VITALS — OXYGEN SATURATION: 98 %

## 2017-02-13 VITALS — HEART RATE: 69 BPM

## 2017-02-13 VITALS — HEART RATE: 74 BPM

## 2017-02-13 VITALS — HEART RATE: 83 BPM

## 2017-02-13 LAB
ANION GAP SERPL CALC-SCNC: 10 MEQ/L (ref 5–15)
BUN SERPL-MCNC: 11 MG/DL (ref 7–18)
CHLORIDE SERPL-SCNC: 116 MEQ/L (ref 98–107)
GFR SERPLBLD BASED ON 1.73 SQ M-ARVRAT: 132 ML/MIN (ref 89–?)
HCO3 BLD-SCNC: 20.1 MEQ/L (ref 21–32)
POTASSIUM SERPL-SCNC: 2.9 MEQ/L (ref 3.5–5.1)
SODIUM SERPL-SCNC: 146 MEQ/L (ref 136–145)

## 2017-02-13 RX ADMIN — TAZOBACTAM SODIUM AND PIPERACILLIN SODIUM SCH MLS/HR: 500; 4 INJECTION, SOLUTION INTRAVENOUS at 05:06

## 2017-02-13 RX ADMIN — DILTIAZEM HYDROCHLORIDE SCH MG: 240 CAPSULE, EXTENDED RELEASE ORAL at 09:11

## 2017-02-13 RX ADMIN — IPRATROPIUM BROMIDE AND ALBUTEROL SULFATE SCH AMPULE: .5; 3 SOLUTION RESPIRATORY (INHALATION) at 15:53

## 2017-02-13 RX ADMIN — TAZOBACTAM SODIUM AND PIPERACILLIN SODIUM SCH MLS/HR: 500; 4 INJECTION, SOLUTION INTRAVENOUS at 12:58

## 2017-02-13 RX ADMIN — FAMOTIDINE SCH MG: 20 TABLET, FILM COATED ORAL at 20:19

## 2017-02-13 RX ADMIN — GABAPENTIN SCH MG: 100 CAPSULE ORAL at 09:11

## 2017-02-13 RX ADMIN — METHYLPREDNISOLONE SODIUM SUCCINATE SCH MG: 40 INJECTION, POWDER, FOR SOLUTION INTRAMUSCULAR; INTRAVENOUS at 12:20

## 2017-02-13 RX ADMIN — IPRATROPIUM BROMIDE AND ALBUTEROL SULFATE SCH AMPULE: .5; 3 SOLUTION RESPIRATORY (INHALATION) at 04:23

## 2017-02-13 RX ADMIN — HEPARIN SODIUM SCH UNITS: 10000 INJECTION, SOLUTION INTRAVENOUS; SUBCUTANEOUS at 20:21

## 2017-02-13 RX ADMIN — MESALAMINE SCH MG: 800 TABLET, DELAYED RELEASE ORAL at 15:17

## 2017-02-13 RX ADMIN — NYSTATIN SCH ML: 100000 SUSPENSION ORAL at 17:10

## 2017-02-13 RX ADMIN — SODIUM CHLORIDE SCH MLS/HR: 234 INJECTION INTRAMUSCULAR; INTRAVENOUS; SUBCUTANEOUS at 10:31

## 2017-02-13 RX ADMIN — AZITHROMYCIN SCH MG: 250 TABLET, FILM COATED ORAL at 17:10

## 2017-02-13 RX ADMIN — SODIUM CHLORIDE SCH MLS/HR: 900 INJECTION INTRAVENOUS at 11:53

## 2017-02-13 RX ADMIN — METHYLPREDNISOLONE SODIUM SUCCINATE SCH MG: 40 INJECTION, POWDER, FOR SOLUTION INTRAMUSCULAR; INTRAVENOUS at 17:10

## 2017-02-13 RX ADMIN — PHENYTOIN SODIUM SCH MLS/HR: 50 INJECTION INTRAMUSCULAR; INTRAVENOUS at 00:20

## 2017-02-13 RX ADMIN — NYSTATIN SCH ML: 100000 SUSPENSION ORAL at 09:11

## 2017-02-13 RX ADMIN — SODIUM CHLORIDE, PRESERVATIVE FREE SCH ML: 5 INJECTION INTRAVENOUS at 17:10

## 2017-02-13 RX ADMIN — OXYCODONE HYDROCHLORIDE AND ACETAMINOPHEN SCH MG: 500 TABLET ORAL at 09:11

## 2017-02-13 RX ADMIN — Medication SCH CAP: at 09:11

## 2017-02-13 RX ADMIN — GABAPENTIN SCH MG: 100 CAPSULE ORAL at 17:10

## 2017-02-13 RX ADMIN — NYSTATIN SCH ML: 100000 SUSPENSION ORAL at 12:20

## 2017-02-13 RX ADMIN — TAZOBACTAM SODIUM AND PIPERACILLIN SODIUM SCH MLS/HR: 500; 4 INJECTION, SOLUTION INTRAVENOUS at 17:10

## 2017-02-13 RX ADMIN — GUAIFENESIN AND CODEINE PHOSPHATE PRN ML: 10; 100 LIQUID ORAL at 09:17

## 2017-02-13 RX ADMIN — Medication SCH TAB: at 20:20

## 2017-02-13 RX ADMIN — SODIUM CHLORIDE, PRESERVATIVE FREE SCH ML: 5 INJECTION INTRAVENOUS at 20:19

## 2017-02-13 RX ADMIN — GUAIFENESIN AND CODEINE PHOSPHATE PRN ML: 10; 100 LIQUID ORAL at 20:18

## 2017-02-13 RX ADMIN — NYSTATIN SCH ML: 100000 SUSPENSION ORAL at 20:20

## 2017-02-13 RX ADMIN — Medication SCH TAB: at 09:11

## 2017-02-13 RX ADMIN — TAZOBACTAM SODIUM AND PIPERACILLIN SODIUM SCH MLS/HR: 500; 4 INJECTION, SOLUTION INTRAVENOUS at 23:26

## 2017-02-13 RX ADMIN — FAMOTIDINE SCH MG: 20 TABLET, FILM COATED ORAL at 09:11

## 2017-02-13 RX ADMIN — METHYLPREDNISOLONE SODIUM SUCCINATE SCH MG: 40 INJECTION, POWDER, FOR SOLUTION INTRAMUSCULAR; INTRAVENOUS at 05:06

## 2017-02-13 RX ADMIN — VANCOMYCIN HYDROCHLORIDE SCH MLS/HR: 1 INJECTION, SOLUTION INTRAVENOUS at 21:29

## 2017-02-13 RX ADMIN — MESALAMINE SCH MG: 800 TABLET, DELAYED RELEASE ORAL at 05:07

## 2017-02-13 RX ADMIN — IPRATROPIUM BROMIDE AND ALBUTEROL SULFATE SCH AMPULE: .5; 3 SOLUTION RESPIRATORY (INHALATION) at 21:17

## 2017-02-13 RX ADMIN — METHYLPREDNISOLONE SODIUM SUCCINATE SCH MG: 40 INJECTION, POWDER, FOR SOLUTION INTRAMUSCULAR; INTRAVENOUS at 23:26

## 2017-02-13 RX ADMIN — MESALAMINE SCH MG: 800 TABLET, DELAYED RELEASE ORAL at 20:24

## 2017-02-13 RX ADMIN — FLUCONAZOLE SCH MG: 100 TABLET ORAL at 09:11

## 2017-02-13 RX ADMIN — IPRATROPIUM BROMIDE AND ALBUTEROL SULFATE SCH AMPULE: .5; 3 SOLUTION RESPIRATORY (INHALATION) at 10:04

## 2017-02-13 RX ADMIN — GABAPENTIN SCH MG: 100 CAPSULE ORAL at 12:21

## 2017-02-13 RX ADMIN — HEPARIN SODIUM SCH UNITS: 10000 INJECTION, SOLUTION INTRAVENOUS; SUBCUTANEOUS at 09:12

## 2017-02-13 NOTE — HHI.PR
Subjective


Remarks


Patient still  have some shortness of breath less than yesterday


Still coughing dry type less than yesterday


improving weakness


Has some thrush in his mouth can swallow with ease now


No other complaint


Wants to transfer to the seventh floor in new to our


Review of systems a 10 point system otherwise unremarkable





Objective


Objective Results


-





 Vital Signs








  Date Time  Temp Pulse Resp B/P Pulse Ox O2 Delivery O2 Flow Rate FiO2


 


2/13/17 09:38     98 Nasal Cannula 3.00 


 


2/13/17 08:00 98.0 80 22 130/83 94   


 


2/13/17 06:00  79      


 


2/13/17 05:00  75      


 


2/13/17 04:00  77      


 


2/13/17 04:00      Nasal Cannula 4.00 


 


2/13/17 04:00 98.4 77 20 136/75 95   


 


2/13/17 03:00  81      


 


2/13/17 02:00  85      


 


2/13/17 01:00  83      


 


2/13/17 00:00  87      


 


2/12/17 23:54 98.8 87 22 126/67 94   


 


2/12/17 23:54      Nasal Cannula 5.00 


 


2/12/17 23:00  89      


 


2/12/17 22:00  85      


 


2/12/17 21:29     93   


 


2/12/17 21:00  82      


 


2/12/17 20:00 98.6 95 22 130/72 95   


 


2/12/17 20:00  95      


 


2/12/17 20:00      Nasal Cannula 5.00 


 


2/12/17 18:00  81      


 


2/12/17 17:00  83      


 


2/12/17 16:00  89      


 


2/12/17 15:00 97.6 82 20 119/70 94   


 


2/12/17 15:00  85      


 


2/12/17 14:00  78      


 


2/12/17 13:00  80      


 


2/12/17 12:00  86      








 I/O








 2/12/17 2/12/17 2/12/17 2/13/17 2/13/17 2/13/17





 07:00 15:00 23:00 07:00 15:00 23:00


 


Intake Total 1240 ml  1602 ml 1340 ml  


 


Output Total 800 ml  1500 ml 800 ml  


 


Balance 440 ml  102 ml 540 ml  


 


      


 


Intake Oral 240 ml  1240 ml 240 ml  


 


IV Total 1000 ml  362 ml 1100 ml  


 


Output Urine Total 800 ml  1500 ml 800 ml  


 


# Bowel Movements 0  1 0  








Result Diagram:  


2/12/17 0548                                                                   

             2/13/17 0610





Other Results





Laboratory Tests








Test 2/13/17





 06:10


 


Sodium Level 146 


 


Potassium Level 2.9 


 


Chloride Level 116 


 


Carbon Dioxide Level 20.1 


 


Anion Gap 10 


 


Blood Urea Nitrogen 11 


 


Creatinine 0.62 


 


Estimat Glomerular Filtration 132 





Rate 


 


Random Glucose 173 


 


Calcium Level 8.0 














 Date/Time Procedure Status





Source Growth 


 


 2/13/17 06:00 Gram Stain - Final Resulted





Sputum Expectorated Sputum  





 2/13/17 06:00 Sputum Culture Resulted





Sputum Expectorated Sputum Pending 


 


 2/12/17 17:10 MRSA Surveillance Culture Received





Nasopharyngeal Pending 


 


 2/12/17 17:10 Legionella Antigen - Final Complete





Urine Random Urine PRESUMPTIVE NEGATIVE FOR LEGIONELLA P... 





 2/12/17 17:10 Streptococcus pneumoniae Antigen (M - Final Complete





Urine Random Urine PRESUMPTIVE NEGATIVE FOR STREPTOCOCCU... 


 


 2/12/17 05:48 Aerobic Blood Culture - Preliminary Resulted





Blood Peripheral NO GROWTH IN 1 DAY 





 2/12/17 05:48 Anaerobic Blood Culture - Preliminary Resulted





Blood Peripheral NO GROWTH IN 1 DAY 


 


 2/11/17 17:07 Influenza Types A,B Antigen (VIVIANA) - Final Complete





Nasal Aspirate NEGATIVE FOR FLU A AND B ANTIGEN.... 











Physical Exam


Physical Exam


VITAL SIGNS: Reviewed


GENERAL: This is a 61 year-old Palestinian male, resting comfortable in bed.  He


is in no distress at this time.


HEENT:  Mucous membranes shows he has less oral thrush.  He is hydrated.  There 

is


no jaundice.


NECK: Supple.  No increased JVD negative thyromegaly


CARDIOVASCULAR: Regular rate and rhythm.


RESPIRATORY: Few crackles as well as a occasional basilar rhonchi.


GASTROINTESTINAL: Bowel sounds are present.  Soft nontender nondistended 

positive bowel sounds


:  No CVA tenderness.


MUSCULOSKELETAL: No edema. Homans' sign negative.


NEUROLOGIC: Alert, oriented.  Speech is clear and fluent.  Moving all


extremities freely.


Psych: appropriate mood and affect





A/P


Assessment and Plan


1. Sepsis on admission.


2. COPD exacerbation.


3. Hyponatremia.


4. Hospital acquired pneumonia.


5. History of A-fib, currently in sinus rhythm.


6. History of ulcerative colitis.


 


DISCUSSION:


Labs reviewed


Low potassium replace


High sodium will change IV fluid to half normal saline


High CRP and ESR


monitored on telemetry.


Continue IV fluids.


Continue antibiotic


ID consult


Pulmonary consult appreciated


Continue nebulizer treatments


Continue oxygen to keep saturation above 90%


steroids


DVT prophylaxis will be provided as well as GI prophylaxis.


Continue home medication as indicated


Diflucan for thrush


Cough medication with codeine


Discussed with RN


Discussed with patient


Transferred to seventh floor as per patient request








Tiffany Obrien MD Feb 13, 2017 11:26

## 2017-02-13 NOTE — PD.[email or web address].CON
History of Present Illness


Service


ID


Consult Requested By


Mayra


Reason for Consult


pneumonia


Primary Care Physician


ASIF Farmer


Diagnoses:  


History of Present Illness


60 yo [place] male with h/o smoking, emphysema developped SOB, fever up to 

102.9


found to have apypical imphiltrates


Mainly non productive cough


fever resolved on sterroids, hypoxia improved but not resolved


W/u negative with multiple neg blood and sputum clx, neg flu AG/leg and 

pneumococcus





Review of Systems


Constitutional:  COMPLAINS OF: Fatigue, Fever, Weight loss, Chills


Ears, nose, mouth, throat:  COMPLAINS OF: Oral lesions, Throat pain


Respiratory:  COMPLAINS OF: Cough, Shortness of breath


Cardiovascular:  COMPLAINS OF: Dyspnea on Exertion


Except as stated in HPI:  all other systems reviewed are Neg





Past Family Social History


Allergies:  


Coded Allergies:  


     No Known Allergies (Verified , 1/30/17)


Past Medical History


recent PNA


Afib


Ulcerative colitis


COPD HTN


GERD


BPH


Past Surgical History


colonoscopies


Active Ordered Medications


Medications where reviewed in EMR


Antibiotics Include:  vanco


azithro


fluconazole


pip tazo


Family History


Non-Contributory.


Social History


No Tobacco x 2 mos


No ETOH x 2 mos


No Illicit Drugs.


works as RN charge, night shifts





Physical Exam


Vital Signs





 Vital Signs








  Date Time  Temp Pulse Resp B/P Pulse Ox O2 Delivery O2 Flow Rate FiO2


 


2/13/17 12:00  82 20 138/75 93   


 


2/13/17 09:38     98 Nasal Cannula 3.00 


 


2/13/17 08:00 98.0 80 22 130/83 94   


 


2/13/17 06:00  79      


 


2/13/17 05:00  75      


 


2/13/17 04:00  77      


 


2/13/17 04:00      Nasal Cannula 4.00 


 


2/13/17 04:00 98.4 77 20 136/75 95   


 


2/13/17 03:00  81      


 


2/13/17 02:00  85      


 


2/13/17 01:00  83      


 


2/13/17 00:00  87      


 


2/12/17 23:54 98.8 87 22 126/67 94   


 


2/12/17 23:54      Nasal Cannula 5.00 


 


2/12/17 23:00  89      


 


2/12/17 22:00  85      


 


2/12/17 21:29     93   


 


2/12/17 21:00  82      


 


2/12/17 20:00 98.6 95 22 130/72 95   


 


2/12/17 20:00  95      


 


2/12/17 20:00      Nasal Cannula 5.00 


 


2/12/17 18:00  81      


 


2/12/17 17:00  83      


 


2/12/17 16:00  89      


 


2/12/17 15:00 97.6 82 20 119/70 94   


 


2/12/17 15:00  85      








Physical Exam


CONSTITUTIONAL/GENERAL: This is thin patient, in no apparent distress.


TUBES/LINES/DRAINS:


SKIN: No jaundice, rashes, or lesions. Ecchymoses on upper extremities. No 

wounds seen anteriorly. Skin temperature appropriate. Not diaphoretic. 


HEAD: Atraumatic. Normocephalic.


EYES: Pupils equal and round and reactive. Extraocular motions intact. No 

scleral icterus. No injection or drainage. Fundi not examined.


ENT: Hearing grossly normal. Nose without bleeding or purulent drainage. 

Extensive oral thrush


NECK: Trachea midline. Supple, nontender. No palpable thyroid enlargement or 

nodularity. 


CARDIOVASCULAR: Regular rate and rhythm without murmurs, gallops, or rubs. No 

JVD. Peripheral pulses symmetric.


RESPIRATORY/CHEST: Symmetric, unlabored respirations. Clear to auscultation. 

Breath sounds equal bilaterally. No wheezes, rales, or rhonchi.  


GASTROINTESTINAL: Abdomen soft, non-tender, nondistended. No hepato-splenomegaly

, or palpable masses. No guarding. Bowel sounds present.


GENITOURINARY: Without palpable bladder distension.


MUSCULOSKELETAL: Extremities without clubbing, cyanosis, or edema. No joint 

tenderness or effusion noted. No calf tenderness. No mottling or clubbing.


LYMPHATICS: No palpable cervical or supraclavicular adenopathy.


NEUROLOGICAL: Awake and alert. Motor and sensory grossly within normal limits. 

Follows commands. Cognitively sharp. Moves all extremities.


PSYCHIATRIC: No obvious anxiety/depression. no apparent hallucinations or other 

psychotic thought process.


Laboratory





Laboratory Tests








Test 2/13/17 2/13/17





 06:10 11:28


 


Sodium Level 146  


 


Potassium Level 2.9  


 


Chloride Level 116  


 


Carbon Dioxide Level 20.1  


 


Anion Gap 10  


 


Blood Urea Nitrogen 11  


 


Creatinine 0.62  


 


Estimat Glomerular Filtration 132  





Rate  


 


Random Glucose 173  


 


Calcium Level 8.0  


 


Vancomycin Level Trough  8.7 














 Date/Time Procedure Status





Source Growth 


 


 2/13/17 06:00 Gram Stain - Final Resulted





Sputum Expectorated Sputum  





 2/13/17 06:00 Sputum Culture Resulted





Sputum Expectorated Sputum Pending 


 


 2/12/17 17:10 MRSA Surveillance Culture Received





Nasopharyngeal Pending 


 


 2/12/17 17:10 Legionella Antigen - Final Complete





Urine Random Urine PRESUMPTIVE NEGATIVE FOR LEGIONELLA P... 





 2/12/17 17:10 Streptococcus pneumoniae Antigen (M - Final Complete





Urine Random Urine PRESUMPTIVE NEGATIVE FOR STREPTOCOCCU... 


 


 2/12/17 05:48 Aerobic Blood Culture - Preliminary Resulted





Blood Peripheral NO GROWTH IN 1 DAY 





 2/12/17 05:48 Anaerobic Blood Culture - Preliminary Resulted





Blood Peripheral NO GROWTH IN 1 DAY 


 


 2/11/17 17:07 Influenza Types A,B Antigen (VIVIANA) - Final Complete





Nasal Aspirate NEGATIVE FOR FLU A AND B ANTIGEN.... 








Result Diagram:  


2/12/17 0548                                                                   

             2/13/17 0610





Imaging





Last Impressions








Chest X-Ray 2/11/17 1640 Signed





Impressions: 





 Service Date/Time:  Saturday, February 11, 2017 16:55 - CONCLUSION:  Bilateral 





 airspace disease which has developed since January 31. Differential diagnosis 





 includes bronchopneumonia and possibly hypersensitivity type reaction.     





 Bravo Robison MD 


 


CT Angiography 2/11/17 0000 Signed





Impressions: 





 Service Date/Time:  Saturday, February 11, 2017 18:29 - CONCLUSION:  1. 

Negative 





 for pulmonary embolus. 2. Interval development of bilateral lung disease on a 





 background of emphysema. Findings are not entirely typical of 

bronchopneumonia. 





 Differential diagnosis includes some form of smoking related disease with 





 respiratory bronchiolitis or possibly Langerhans' cell histiocytosis.     

Bravo Robison MD 











Assessment and Plan


Assessment and Plan


COPD


PNA vs non infectious pneumonitis with hypoxia


Oral candidiasis in the settings of sterriods use





   - cont broad spectrum abx


   - fluconazole


   -  chk PCP


Discussed Condition With


Deborah Luis MD Feb 13, 2017 14:55

## 2017-02-13 NOTE — MB
cc:

TRINY HOLLAND

****

 

 

DATE OF CONSULTATION

2/13/2017

 

REASON FOR CONSULTATION

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

COPD and chronic bronchitis. He was seen two weeks ago here for COPD

exacerbation and developed atrial fibrillation which converted to sinus rhythm

after approximately 24 hours spontaneously. He has not had any new episode of

palpitations. He developed increased shortness of breath two days ago and he

was sent to the emergency room where he was found to be hypoxemic. He had cough

productive of purulent sputum. He has not had any chest pain. He has not had

any palpitations suggestive of recurrent atrial fibrillation.

 

PAST MEDICAL HISTORY

Positive for:

1. Hypertension.

2. Ulcerative colitis.

3. Benign prostatic hypertrophy.

4. Frequent urinary tract infections.

 

MEDICATIONS

Include:

1. Vancomycin.

2. Azithromycin.

3. Solu-Medrol.

4. Vitamin C.

5. Diltiazem 240 milligrams daily.

6. Gabapentin.

7. Multivitamin.

8. Vitamin B.

9. Vitamin C.

10. Folic acid.

11. Nystatin swish and swallow.

13. Heparin.

 

ALLERGIES

NONE.

 

SOCIAL HISTORY

The patient has history of smoking, he used to smoke until December of last 
year. He

also quit drinking last December. He works as a nurse at [employer].

 

FAMILY HISTORY

Positive for a stroke in his father and coronary artery disease in his sister.

 

 

REVIEW OF SYSTEMS

Is otherwise negative.

 

PHYSICAL EXAMINATION

VITAL SIGNS: Blood pressure 121/71, pulse 69 and regular.

HEENT: Negative. 2+ carotid upstroke. No bruits.

LUNGS: With a few crackles.

HEART: Regular with no murmur, gallop or rub.

ABDOMEN: Soft. No bruits.

EXTREMITIES: Without edema. 2+ distal pulses.

NEUROLOGICAL: Grossly nonfocal.

 

EKG was reviewed and showed sinus tachycardia. Normal axis and otherwise normal

intervals. No acute changes.

 

LABORATORY DATA

Hemoglobin 13.4.

 

Potassium 3.1. Creatinine 0.6.

 

CRP 10.

 

AST normal. ALT normal.

 

Telemetry shows sinus rhythm and with a pause around 3 seconds. Was

asymptomatic.

 

DIAGNOSES

1. Pneumonia.

2. Chronic obstructive pulmonary disease exacerbation.

3. Paroxysmal atrial fibrillation.

4. Sinus pauses.

5. Ulcerative colitis.

 

DISPOSITION

Mr. Pickard will continue his current medical program including PO

Diltiazem. His telemetry showed a sinus pause which was asymptomatic. If he has

recurrent pauses while awake with symptoms, we will decrease his dose of

Diltiazem. He will be monitored on telemetry. I will follow him for cardiology

during his hospitalization. I recommend to continue the evaluation and

therapy with antibiotic as outlined by infectious disease and pulmonary

consultants.

 

 

 

 

                              _________________________________

                              MD ARAMIS Moctezuma/MERRILL

D:  2/13/2017/6:54 PM

T:  2/13/2017/9:16 PM

Visit #:  [id number]

Job #:  [id number]

[email or web address]

## 2017-02-14 VITALS
RESPIRATION RATE: 20 BRPM | OXYGEN SATURATION: 96 % | SYSTOLIC BLOOD PRESSURE: 146 MMHG | DIASTOLIC BLOOD PRESSURE: 80 MMHG | HEART RATE: 73 BPM | TEMPERATURE: 97.5 F

## 2017-02-14 VITALS
DIASTOLIC BLOOD PRESSURE: 83 MMHG | HEART RATE: 83 BPM | SYSTOLIC BLOOD PRESSURE: 137 MMHG | RESPIRATION RATE: 20 BRPM | OXYGEN SATURATION: 92 % | TEMPERATURE: 97.6 F

## 2017-02-14 VITALS
DIASTOLIC BLOOD PRESSURE: 92 MMHG | TEMPERATURE: 97.4 F | SYSTOLIC BLOOD PRESSURE: 151 MMHG | HEART RATE: 96 BPM | OXYGEN SATURATION: 94 % | RESPIRATION RATE: 18 BRPM

## 2017-02-14 VITALS — HEART RATE: 98 BPM

## 2017-02-14 VITALS — HEART RATE: 93 BPM

## 2017-02-14 VITALS
OXYGEN SATURATION: 92 % | HEART RATE: 96 BPM | TEMPERATURE: 97.4 F | SYSTOLIC BLOOD PRESSURE: 138 MMHG | DIASTOLIC BLOOD PRESSURE: 69 MMHG | RESPIRATION RATE: 20 BRPM

## 2017-02-14 VITALS — HEART RATE: 102 BPM

## 2017-02-14 VITALS — OXYGEN SATURATION: 98 %

## 2017-02-14 VITALS
SYSTOLIC BLOOD PRESSURE: 158 MMHG | HEART RATE: 96 BPM | DIASTOLIC BLOOD PRESSURE: 99 MMHG | OXYGEN SATURATION: 93 % | TEMPERATURE: 97.8 F | RESPIRATION RATE: 18 BRPM

## 2017-02-14 VITALS — HEART RATE: 96 BPM

## 2017-02-14 VITALS — HEART RATE: 74 BPM

## 2017-02-14 VITALS — HEART RATE: 76 BPM

## 2017-02-14 VITALS — HEART RATE: 84 BPM

## 2017-02-14 VITALS — HEART RATE: 100 BPM

## 2017-02-14 LAB
ANION GAP SERPL CALC-SCNC: 13 MEQ/L (ref 5–15)
BUN SERPL-MCNC: 15 MG/DL (ref 7–18)
CHLORIDE SERPL-SCNC: 109 MEQ/L (ref 98–107)
ERYTHROCYTE [DISTWIDTH] IN BLOOD BY AUTOMATED COUNT: 14.2 % (ref 11.6–17.2)
GFR SERPLBLD BASED ON 1.73 SQ M-ARVRAT: 68 ML/MIN (ref 89–?)
HCO3 BLD-SCNC: 21 MEQ/L (ref 21–32)
HCT VFR BLD CALC: 40.6 % (ref 39–51)
INR PPP: 0.9 RATIO
MAGNESIUM SERPL-MCNC: 2.3 MG/DL (ref 1.5–2.5)
MCH RBC QN AUTO: 28.8 PG (ref 27–34)
MCHC RBC AUTO-ENTMCNC: 33.2 % (ref 32–36)
MCV RBC AUTO: 86.8 FL (ref 80–100)
PLATELET # BLD: 275 TH/MM3 (ref 150–450)
POTASSIUM SERPL-SCNC: 3.1 MEQ/L (ref 3.5–5.1)
PROTHROMBIN TIME: 10 SEC (ref 9.8–11.6)
RBC # BLD AUTO: 4.68 MIL/MM3 (ref 4.5–5.9)
REVIEW FLAG: (no result)
SODIUM SERPL-SCNC: 143 MEQ/L (ref 136–145)
WBC # BLD AUTO: 11.2 TH/MM3 (ref 4–11)

## 2017-02-14 RX ADMIN — DILTIAZEM HYDROCHLORIDE SCH MG: 240 CAPSULE, EXTENDED RELEASE ORAL at 09:22

## 2017-02-14 RX ADMIN — TAZOBACTAM SODIUM AND PIPERACILLIN SODIUM SCH MLS/HR: 500; 4 INJECTION, SOLUTION INTRAVENOUS at 23:00

## 2017-02-14 RX ADMIN — NYSTATIN SCH ML: 100000 SUSPENSION ORAL at 09:22

## 2017-02-14 RX ADMIN — SODIUM CHLORIDE, PRESERVATIVE FREE SCH ML: 5 INJECTION INTRAVENOUS at 21:00

## 2017-02-14 RX ADMIN — HEPARIN SODIUM SCH UNITS: 10000 INJECTION, SOLUTION INTRAVENOUS; SUBCUTANEOUS at 21:44

## 2017-02-14 RX ADMIN — METHYLPREDNISOLONE SODIUM SUCCINATE SCH MG: 40 INJECTION, POWDER, FOR SOLUTION INTRAMUSCULAR; INTRAVENOUS at 17:36

## 2017-02-14 RX ADMIN — MESALAMINE SCH MG: 800 TABLET, DELAYED RELEASE ORAL at 13:27

## 2017-02-14 RX ADMIN — MESALAMINE SCH MG: 800 TABLET, DELAYED RELEASE ORAL at 21:44

## 2017-02-14 RX ADMIN — GUAIFENESIN AND CODEINE PHOSPHATE PRN ML: 10; 100 LIQUID ORAL at 17:40

## 2017-02-14 RX ADMIN — IPRATROPIUM BROMIDE AND ALBUTEROL SULFATE SCH AMPULE: .5; 3 SOLUTION RESPIRATORY (INHALATION) at 04:26

## 2017-02-14 RX ADMIN — AZITHROMYCIN SCH MG: 250 TABLET, FILM COATED ORAL at 17:36

## 2017-02-14 RX ADMIN — Medication SCH CAP: at 09:22

## 2017-02-14 RX ADMIN — NYSTATIN SCH ML: 100000 SUSPENSION ORAL at 17:36

## 2017-02-14 RX ADMIN — TAZOBACTAM SODIUM AND PIPERACILLIN SODIUM SCH MLS/HR: 500; 4 INJECTION, SOLUTION INTRAVENOUS at 17:35

## 2017-02-14 RX ADMIN — TAZOBACTAM SODIUM AND PIPERACILLIN SODIUM SCH MLS/HR: 500; 4 INJECTION, SOLUTION INTRAVENOUS at 04:05

## 2017-02-14 RX ADMIN — GABAPENTIN SCH MG: 100 CAPSULE ORAL at 13:27

## 2017-02-14 RX ADMIN — GABAPENTIN SCH MG: 100 CAPSULE ORAL at 09:22

## 2017-02-14 RX ADMIN — GABAPENTIN SCH MG: 100 CAPSULE ORAL at 17:36

## 2017-02-14 RX ADMIN — GUAIFENESIN AND CODEINE PHOSPHATE PRN ML: 10; 100 LIQUID ORAL at 21:44

## 2017-02-14 RX ADMIN — FAMOTIDINE SCH MG: 20 TABLET, FILM COATED ORAL at 21:44

## 2017-02-14 RX ADMIN — VANCOMYCIN HYDROCHLORIDE SCH MLS/HR: 1 INJECTION, SOLUTION INTRAVENOUS at 09:23

## 2017-02-14 RX ADMIN — IPRATROPIUM BROMIDE AND ALBUTEROL SULFATE SCH AMPULE: .5; 3 SOLUTION RESPIRATORY (INHALATION) at 15:14

## 2017-02-14 RX ADMIN — SODIUM CHLORIDE SCH MLS/HR: 234 INJECTION INTRAMUSCULAR; INTRAVENOUS; SUBCUTANEOUS at 04:05

## 2017-02-14 RX ADMIN — FAMOTIDINE SCH MG: 20 TABLET, FILM COATED ORAL at 09:22

## 2017-02-14 RX ADMIN — HEPARIN SODIUM SCH UNITS: 10000 INJECTION, SOLUTION INTRAVENOUS; SUBCUTANEOUS at 09:22

## 2017-02-14 RX ADMIN — IPRATROPIUM BROMIDE AND ALBUTEROL SULFATE SCH AMPULE: .5; 3 SOLUTION RESPIRATORY (INHALATION) at 08:31

## 2017-02-14 RX ADMIN — GUAIFENESIN AND CODEINE PHOSPHATE PRN ML: 10; 100 LIQUID ORAL at 09:33

## 2017-02-14 RX ADMIN — TAZOBACTAM SODIUM AND PIPERACILLIN SODIUM SCH MLS/HR: 500; 4 INJECTION, SOLUTION INTRAVENOUS at 11:56

## 2017-02-14 RX ADMIN — METHYLPREDNISOLONE SODIUM SUCCINATE SCH MG: 40 INJECTION, POWDER, FOR SOLUTION INTRAMUSCULAR; INTRAVENOUS at 11:56

## 2017-02-14 RX ADMIN — SODIUM CHLORIDE, PRESERVATIVE FREE SCH ML: 5 INJECTION INTRAVENOUS at 09:00

## 2017-02-14 RX ADMIN — MESALAMINE SCH MG: 800 TABLET, DELAYED RELEASE ORAL at 04:06

## 2017-02-14 RX ADMIN — NYSTATIN SCH ML: 100000 SUSPENSION ORAL at 21:44

## 2017-02-14 RX ADMIN — NYSTATIN SCH ML: 100000 SUSPENSION ORAL at 13:27

## 2017-02-14 RX ADMIN — OXYCODONE HYDROCHLORIDE AND ACETAMINOPHEN SCH MG: 500 TABLET ORAL at 09:22

## 2017-02-14 RX ADMIN — VANCOMYCIN HYDROCHLORIDE SCH MLS/HR: 1 INJECTION, SOLUTION INTRAVENOUS at 21:43

## 2017-02-14 RX ADMIN — METHYLPREDNISOLONE SODIUM SUCCINATE SCH MG: 40 INJECTION, POWDER, FOR SOLUTION INTRAMUSCULAR; INTRAVENOUS at 04:06

## 2017-02-14 RX ADMIN — IPRATROPIUM BROMIDE AND ALBUTEROL SULFATE SCH AMPULE: .5; 3 SOLUTION RESPIRATORY (INHALATION) at 20:03

## 2017-02-14 NOTE — HHI.PR
Subjective


Remarks


No chest pain 


shortness of breath exertional and at rest


Voice hoarse, O2 on nasal cannula


Anxiety mild


No headache





Objective


Objective Results


-





 Vital Signs








  Date Time  Temp Pulse Resp B/P Pulse Ox O2 Delivery O2 Flow Rate FiO2


 


2/14/17 10:46 97.4       


 


2/14/17 08:35     98 Nasal Cannula 2.00 


 


2/14/17 06:00  93      


 


2/14/17 05:00  98      


 


2/14/17 04:00  78      


 


2/14/17 04:00 97.6 83 20 137/83 92   


 


2/14/17 03:00  76      


 


2/14/17 02:00  76      


 


2/14/17 01:00  74      


 


2/14/17 00:00  73      


 


2/14/17 00:00 97.5 70 20 146/80 96   


 


2/13/17 23:00  80      


 


2/13/17 22:00  90      


 


2/13/17 21:19     95 Nasal Cannula 3.00 


 


2/13/17 21:00  76      


 


2/13/17 20:00      Nasal Cannula 4.00 





      Humidified  


 


2/13/17 20:00 97.6 80 20 142/81 95   


 


2/13/17 20:00  86      


 


2/13/17 17:44  69      


 


2/13/17 17:24 98.0 6 18 121/71 95   


 


2/13/17 17:00  90      


 


2/13/17 15:26      Nasal Cannula 4.00 








 I/O








 2/13/17 2/13/17 2/13/17 2/14/17 2/14/17 2/14/17





 07:00 15:00 23:00 07:00 15:00 23:00


 


Intake Total 1340 ml  650 ml 240 ml  


 


Output Total 800 ml  600 ml 1150 ml  


 


Balance 540 ml  50 ml -910 ml  


 


      


 


Intake Oral 240 ml   240 ml  


 


IV Total 1100 ml  650 ml   


 


Output Urine Total 800 ml  600 ml 1150 ml  


 


# Bowel Movements 0     








Result Diagram:  


2/14/17 0540                                                                   

             2/14/17 0540





Other Results





Last Impressions








Chest X-Ray 2/11/17 1640 Signed





Impressions: 





 Service Date/Time:  Saturday, February 11, 2017 16:55 - CONCLUSION:  Bilateral 





 airspace disease which has developed since January 31. Differential diagnosis 





 includes bronchopneumonia and possibly hypersensitivity type reaction.     





 Bravo Robison MD 


 


CT Angiography 2/11/17 0000 Signed





Impressions: 





 Service Date/Time:  Saturday, February 11, 2017 18:29 - CONCLUSION:  1. 

Negative 





 for pulmonary embolus. 2. Interval development of bilateral lung disease on a 





 background of emphysema. Findings are not entirely typical of 

bronchopneumonia. 





 Differential diagnosis includes some form of smoking related disease with 





 respiratory bronchiolitis or possibly Langerhans' cell histiocytosis.     

Bravo Robison MD 








Medications and IVs





 Active Medications


Azithromycin 500 mg 500 mg Q24H PO Last administered on 2/13/17at 17:10; Admin 

Dose 500 MG;  Start 2/13/17 at 18:00


Miscellaneous Information SPECIFIC LAB TO BE SHREYA... ONCE  ONCE XX;  Start 2/15/

17 at 09:45;  Stop 2/15/17 at 09:46


Potassium Chloride (KCl 40 Meq/30 ml Liq) 40 meq ONCE  ONCE PO;  Start 2/14/17 

at 14:15;  Stop 2/14/17 at 14:16


Potassium Chloride (KCl) 20 meq ONCE  ONCE PO;  Start 2/14/17 at 17:00;  Stop 2/ 14/17 at 17:01


Vancomycin HCl/ Sodium Chloride (Vancomycin Inj/  ml Inj) 250 ml @  250 

mls/hr Q12H IV Last administered on 2/14/17at 09:23; Admin Dose 250 MLS/HR;  

Start 2/13/17 at 22:00





ROS


General:  Fatigue, Weakness, Other (10 point ROS done.  Positives include 

generalized weakness and fatigue, shortness of breath at rest and with exertion

, sore throat, hoarse speech)


HEENT:  Sore Throat, Other (thick white yellow coating remains on tongue)


Pulmonary:  Cough, SOB, Other (hoarse)


Neuro/MS:  Other (anxiety mild over unknown condition/medical)





Physical Exam


Physical Exam


PHYSICAL EXAMINATION


GENERAL:  This is a well-developed, well-nourished   male 


who appears to be in no acute distress.  He  is alert and awake, sitting on 

side of bed and ambulating in room.  


HEAD:  Normocephalic without any lesion or mass noted.  Facial features


appear symmetric.


OROPHARYNGEAL:  Oropharynx with thick coating, white yellow, thrush.  Throat 

sore to swallow


NECK:  Supple.  No nuchal rigidity or lymphadenopathy.


Trachea midline without deviation.  


CARDIAC:  Regular rhythm, regular rate, S1 and S2 are heard.  Murmur none; no 

gallops or rubs.


LUNGS:  Clear to auscultation bilaterally.  No wheeze, rhonchi or rale.  No


use of accessory muscles on inspiration or expiration.


ABDOMEN:  Soft, nontender, no organomegaly or masses.  Bowel sounds are


heard in all four quadrants.  No rebound.  No guarding.


EXTREMITIES:  No edema.  Pulses equal bilateral.   cyanosis.


NEUROLOGICAL:  Patient mood and affect appropriate. No focal deficit, mild 

anxiety over current condition.


SKIN:Warm and moist, dry


Objective Remarks


My Throat is so sore.





A/P


Assessment and Plan


Assessment and Plan


1. Sepsis on admission.


2. COPD exacerbation.


3. Hyponatremia.


4. Hospital acquired pneumonia.


5. History of A-fib, currently in sinus rhythm.


6. History of ulcerative colitis.


7. Hypokalemia


 


DISCUSSION:


Labs reviewed, continues with leukocytosis white count 11.2 probable secondary 

to steroids, hypokalemia 3.1, recheck BMP in the morning


Low potassium replace with by mouth


High sodium will change IV fluid to half normal saline, with K+


High CRP and ESR


monitored on telemetry.


Continue IV fluids.


Continue antibiotic


ID consult, , states probable PNA vs non infectious pneumonitis with hypoxia, 

thrush


Pulmonary consult appreciated, possible bronch tomorrow stated per patient.,


Continue nebulizer treatments


Continue oxygen to keep saturation above 90%Also check for influenza, 

Legionella and pneumococcus and do a nasal wash for


MRSA as well.


 


steroids


DVT prophylaxis will be provided as well as GI prophylaxis.


Continue home medication as indicated


Diflucan for thrush


Cough medication with codeine


Supplemental potassium by mouth today, total 30 mEq.  We'll recheck level in 

a.m.


Discussed with RN


Discussed with patient


Discussed With:  Nurse, Family (patient), Other (Dr. Obrien, patient seen  on 

his behalf)








Coral Li Feb 14, 2017 14:21

## 2017-02-14 NOTE — PD.CARD.PN
Subjective


Subjective Remarks


Less SOB, no CP





Objective


Medications





 Current Medications








 Medications


  (Trade)  Dose


 Ordered  Sig/Gissel


 Route  Start Time


 Stop Time Status Last Admin


 


  (NS Flush)  2 ml  UNSCH  PRN


 IV FLUSH  2/11/17 19:15


     


 


 


 IV Flush 2 ml  2 ml  BID


 IV FLUSH  2/11/17 21:00


    2/13/17 17:10


 


 


 Piperacillin Sod/


 Tazobactam Sod  100 ml @ 


 200 mls/hr  Q6H


 IV  2/11/17 23:00


    2/14/17 11:56


 


 


  (Vancomycin


 Consult Pharmacy)  0 ml @ 0


 mls/hr  UNSCH


 OTHER  2/11/17 19:15


     


 


 


  (Zofran Inj)  4 mg  Q6H  PRN


 IV  2/11/17 19:15


     


 


 


  (Heparin Inj)  5,000 units  Q12H


 SQ  2/11/17 21:00


    2/14/17 09:22


 


 


  (Pepcid)  20 mg  BID


 PO  2/11/17 21:00


    2/14/17 09:22


 


 


  (Vitamin C)  500 mg  DAILY


 PO  2/12/17 09:00


    2/14/17 09:22


 


 


  (Symbicort


 160-4.5 Inh)  1 puff  Q12HR


 INH  2/11/17 21:00


   Hold 2/12/17 09:00


 


 


  (Bentyl)  20 mg  TID  PRN


 PO  2/11/17 19:15


     


 


 


  (Cardizem Cd)  240 mg  DAILY


 PO  2/12/17 09:00


    2/14/17 09:22


 


 


  (Neurontin)  100 mg  TID


 PO  2/12/17 09:00


    2/14/17 13:27


 


 


  (Asacol Hd Dr)  1,600 mg  Q8HR


 PO  2/11/17 22:00


    2/14/17 13:27


 


 


  (Theragran


 Hematinic)  1 tab  DAILY


 PO  2/12/17 09:00


    2/13/17 20:20


 


 


  (Nephrocaps)  1 cap  DAILY


 PO  2/12/17 09:00


    2/14/17 09:22


 


 


  (Tylenol)  650 mg  Q4H  PRN


 PO  2/11/17 23:30


     


 


 


  (Mycostatin  Liq)  5 ml  QID


 SWISH-SWAL  2/11/17 23:30


    2/14/17 13:27


 


 


  (Robitussin Ac


 200-20 Mg/10 ml


 Liq)  10 ml  Q4H  PRN


 PO  2/12/17 10:30


    2/14/17 09:33


 


 


  (SoluMEDROL INJ)  40 mg  Q6HR


 IV  2/12/17 18:00


    2/14/17 11:56


 


 


 Clonazepam 1 mg  1 mg  HS  PRN


 PO  2/12/17 21:30


    2/13/17 20:18


 


 


  (KCl Inj/1/2 NS


 1000 ml Inj)  1,005 ml @ 


 42 mls/hr  [id number]


 IV  2/13/17 10:00


    2/14/17 04:05


 


 


 Azithromycin 500


 mg  500 mg  Q24H


 PO  2/13/17 18:00


    2/13/17 17:10


 


 


  (Vancomycin Inj/


  ml Inj)  250 ml @ 


 250 mls/hr  Q12H


 IV  2/13/17 22:00


    2/14/17 09:23


 


 


 Miscellaneous


 Information  SPECIFIC


 LAB TO BE


 SHREYA...  ONCE  ONCE


 XX  2/15/17 09:45


 2/15/17 09:46   


 


 


  (KCl)  20 meq  ONCE  ONCE


 PO  2/14/17 17:00


 2/14/17 17:01   


 


 


  (Chloraseptic


 Spray)  2 spray  Q2H  PRN


 OROPHARYNG  2/14/17 14:15


     


 








Vital Signs / I&O





 Vital Signs








  Date Time  Temp Pulse Resp B/P Pulse Ox O2 Delivery O2 Flow Rate FiO2


 


2/14/17 10:46 97.4       


 


2/14/17 08:35     98 Nasal Cannula 2.00 


 


2/14/17 06:00  93      


 


2/14/17 05:00  98      


 


2/14/17 04:00  78      


 


2/14/17 04:00 97.6 83 20 137/83 92   


 


2/14/17 03:00  76      


 


2/14/17 02:00  76      


 


2/14/17 01:00  74      


 


2/14/17 00:00  73      


 


2/14/17 00:00 97.5 70 20 146/80 96   


 


2/13/17 23:00  80      


 


2/13/17 22:00  90      


 


2/13/17 21:19     95 Nasal Cannula 3.00 


 


2/13/17 21:00  76      


 


2/13/17 20:00      Nasal Cannula 4.00 





      Humidified  


 


2/13/17 20:00 97.6 80 20 142/81 95   


 


2/13/17 20:00  86      


 


2/13/17 17:44  69      


 


2/13/17 17:24 98.0 6 18 121/71 95   


 


2/13/17 17:00  90      


 


2/13/17 15:26      Nasal Cannula 4.00 








 I/O








 2/13/17 2/13/17 2/13/17 2/14/17 2/14/17 2/14/17





 07:00 15:00 23:00 07:00 15:00 23:00


 


Intake Total 1340 ml  650 ml 240 ml  


 


Output Total 800 ml  600 ml 1150 ml  


 


Balance 540 ml  50 ml -910 ml  


 


      


 


Intake Oral 240 ml   240 ml  


 


IV Total 1100 ml  650 ml   


 


Output Urine Total 800 ml  600 ml 1150 ml  


 


# Bowel Movements 0     








Physical Exam


GENERAL: 


SKIN: Warm and dry.


HEAD: Normocephalic.


EYES: No scleral icterus. No injection or drainage. 


NECK: Supple, trachea midline. No JVD or lymphadenopathy.


CARDIOVASCULAR: Regular rate and rhythm without murmurs, gallops, or rubs. 


RESPIRATORY: Breath sounds equal bilaterally. No accessory muscle use. Few 

rhonchi.


GASTROINTESTINAL: Abdomen soft, non-tender, nondistended. 


MUSCULOSKELETAL: No cyanosis, or edema. 


BACK: Nontender without obvious deformity. No CVA tenderness.





Laboratory





Laboratory Tests








Test 2/13/17 2/14/17





 15:40 05:40


 


Potassium Level 3.1 MEQ/L 3.1 MEQ/L


 


Magnesium Level 2.3 MG/DL 2.3 MG/DL


 


White Blood Count  11.2 TH/MM3


 


Red Blood Count  4.68 MIL/MM3


 


Hemoglobin  13.5 GM/DL


 


Hematocrit  40.6 %


 


Mean Corpuscular Volume  86.8 FL


 


Mean Corpuscular Hemoglobin  28.8 PG


 


Mean Corpuscular Hemoglobin  33.2 %





Concent  


 


Red Cell Distribution Width  14.2 %


 


Platelet Count  275 TH/MM3


 


Mean Platelet Volume  7.8 FL


 


Prothrombin Time  10.0 SEC


 


Prothromb Time International  0.9 RATIO





Ratio  


 


Sodium Level  143 MEQ/L


 


Chloride Level  109 MEQ/L


 


Carbon Dioxide Level  21.0 MEQ/L


 


Anion Gap  13 MEQ/L


 


Blood Urea Nitrogen  15 MG/DL


 


Creatinine  1.10 MG/DL


 


Estimat Glomerular Filtration  68 ML/MIN





Rate  


 


Random Glucose  172 MG/DL


 


Calcium Level  8.5 MG/DL








Imaging





Last Impressions








Chest X-Ray 2/11/17 1640 Signed





Impressions: 





 Service Date/Time:  Saturday, February 11, 2017 16:55 - CONCLUSION:  Bilateral 





 airspace disease which has developed since January 31. Differential diagnosis 





 includes bronchopneumonia and possibly hypersensitivity type reaction.     





 Bravo Robison MD 


 


CT Angiography 2/11/17 0000 Signed





Impressions: 





 Service Date/Time:  Saturday, February 11, 2017 18:29 - CONCLUSION:  1. 

Negative 





 for pulmonary embolus. 2. Interval development of bilateral lung disease on a 





 background of emphysema. Findings are not entirely typical of 

bronchopneumonia. 





 Differential diagnosis includes some form of smoking related disease with 





 respiratory bronchiolitis or possibly Langerhans' cell histiocytosis.     

Bravo Robison MD 











Assessment and Plan


Problem List:  


(1) Pneumonia


(2) COPD (chronic obstructive pulmonary disease)


(3) Atrial fibrillation


Assessment and Plan


No recurrent AF. No pauses. Continue monitoring on tele. Pulm eval in progress, 

bronchoscopy planned. Continue tx for pneumonia/COPD exac. Increase activity.





Problem Qualifiers





(1) Pneumonia:  





(2) COPD (chronic obstructive pulmonary disease):  


Qualified Code:  J44.1 - Chronic obstructive pulmonary disease with acute 

exacerbation


(3) Atrial fibrillation:  


Qualified Code:  I48.0 - Paroxysmal atrial fibrillation





Romi Davalos MD Feb 14, 2017 15:22

## 2017-02-15 VITALS
DIASTOLIC BLOOD PRESSURE: 97 MMHG | SYSTOLIC BLOOD PRESSURE: 154 MMHG | RESPIRATION RATE: 16 BRPM | OXYGEN SATURATION: 96 % | TEMPERATURE: 98.1 F | HEART RATE: 80 BPM

## 2017-02-15 VITALS — HEART RATE: 88 BPM

## 2017-02-15 VITALS
RESPIRATION RATE: 18 BRPM | HEART RATE: 90 BPM | SYSTOLIC BLOOD PRESSURE: 155 MMHG | OXYGEN SATURATION: 92 % | DIASTOLIC BLOOD PRESSURE: 78 MMHG

## 2017-02-15 VITALS
DIASTOLIC BLOOD PRESSURE: 86 MMHG | SYSTOLIC BLOOD PRESSURE: 140 MMHG | TEMPERATURE: 97.6 F | OXYGEN SATURATION: 93 % | RESPIRATION RATE: 16 BRPM | HEART RATE: 82 BPM

## 2017-02-15 VITALS
DIASTOLIC BLOOD PRESSURE: 95 MMHG | OXYGEN SATURATION: 9 % | SYSTOLIC BLOOD PRESSURE: 154 MMHG | TEMPERATURE: 98.2 F | HEART RATE: 98 BPM | RESPIRATION RATE: 20 BRPM

## 2017-02-15 VITALS — HEART RATE: 92 BPM

## 2017-02-15 VITALS — OXYGEN SATURATION: 94 %

## 2017-02-15 VITALS
SYSTOLIC BLOOD PRESSURE: 145 MMHG | OXYGEN SATURATION: 93 % | DIASTOLIC BLOOD PRESSURE: 87 MMHG | RESPIRATION RATE: 18 BRPM | HEART RATE: 85 BPM

## 2017-02-15 VITALS — HEART RATE: 85 BPM

## 2017-02-15 VITALS — OXYGEN SATURATION: 97 %

## 2017-02-15 VITALS — HEART RATE: 78 BPM

## 2017-02-15 VITALS — HEART RATE: 90 BPM

## 2017-02-15 VITALS — HEART RATE: 94 BPM

## 2017-02-15 VITALS — HEART RATE: 87 BPM

## 2017-02-15 VITALS — HEART RATE: 84 BPM

## 2017-02-15 LAB
ANION GAP SERPL CALC-SCNC: 12 MEQ/L (ref 5–15)
BUN SERPL-MCNC: 15 MG/DL (ref 7–18)
CHLORIDE SERPL-SCNC: 107 MEQ/L (ref 98–107)
ERYTHROCYTE [DISTWIDTH] IN BLOOD BY AUTOMATED COUNT: 14.5 % (ref 11.6–17.2)
GFR SERPLBLD BASED ON 1.73 SQ M-ARVRAT: 82 ML/MIN (ref 89–?)
HCO3 BLD-SCNC: 21.5 MEQ/L (ref 21–32)
HCT VFR BLD CALC: 39.9 % (ref 39–51)
MCH RBC QN AUTO: 29.2 PG (ref 27–34)
MCHC RBC AUTO-ENTMCNC: 33.5 % (ref 32–36)
MCV RBC AUTO: 87.1 FL (ref 80–100)
PLATELET # BLD: 324 TH/MM3 (ref 150–450)
POTASSIUM SERPL-SCNC: 3.7 MEQ/L (ref 3.5–5.1)
RBC # BLD AUTO: 4.58 MIL/MM3 (ref 4.5–5.9)
REVIEW FLAG: (no result)
SODIUM SERPL-SCNC: 140 MEQ/L (ref 136–145)
WBC # BLD AUTO: 7.8 TH/MM3 (ref 4–11)

## 2017-02-15 PROCEDURE — 0B958ZX DRAINAGE OF RIGHT MIDDLE LOBE BRONCHUS, VIA NATURAL OR ARTIFICIAL OPENING ENDOSCOPIC, DIAGNOSTIC: ICD-10-PCS | Performed by: CLINIC/CENTER

## 2017-02-15 PROCEDURE — 0B988ZX DRAINAGE OF LEFT UPPER LOBE BRONCHUS, VIA NATURAL OR ARTIFICIAL OPENING ENDOSCOPIC, DIAGNOSTIC: ICD-10-PCS | Performed by: CLINIC/CENTER

## 2017-02-15 PROCEDURE — 0B948ZX DRAINAGE OF RIGHT UPPER LOBE BRONCHUS, VIA NATURAL OR ARTIFICIAL OPENING ENDOSCOPIC, DIAGNOSTIC: ICD-10-PCS | Performed by: CLINIC/CENTER

## 2017-02-15 PROCEDURE — 0B968ZX DRAINAGE OF RIGHT LOWER LOBE BRONCHUS, VIA NATURAL OR ARTIFICIAL OPENING ENDOSCOPIC, DIAGNOSTIC: ICD-10-PCS | Performed by: CLINIC/CENTER

## 2017-02-15 PROCEDURE — 0B9B8ZX DRAINAGE OF LEFT LOWER LOBE BRONCHUS, VIA NATURAL OR ARTIFICIAL OPENING ENDOSCOPIC, DIAGNOSTIC: ICD-10-PCS | Performed by: CLINIC/CENTER

## 2017-02-15 RX ADMIN — METHYLPREDNISOLONE SODIUM SUCCINATE SCH MG: 40 INJECTION, POWDER, FOR SOLUTION INTRAMUSCULAR; INTRAVENOUS at 05:37

## 2017-02-15 RX ADMIN — SODIUM CHLORIDE SCH MLS/HR: 234 INJECTION INTRAMUSCULAR; INTRAVENOUS; SUBCUTANEOUS at 03:27

## 2017-02-15 RX ADMIN — IPRATROPIUM BROMIDE AND ALBUTEROL SULFATE SCH AMPULE: .5; 3 SOLUTION RESPIRATORY (INHALATION) at 19:52

## 2017-02-15 RX ADMIN — METHYLPREDNISOLONE SODIUM SUCCINATE SCH MG: 40 INJECTION, POWDER, FOR SOLUTION INTRAMUSCULAR; INTRAVENOUS at 17:48

## 2017-02-15 RX ADMIN — OXYCODONE HYDROCHLORIDE AND ACETAMINOPHEN SCH MG: 500 TABLET ORAL at 15:14

## 2017-02-15 RX ADMIN — TAZOBACTAM SODIUM AND PIPERACILLIN SODIUM SCH MLS/HR: 500; 4 INJECTION, SOLUTION INTRAVENOUS at 11:12

## 2017-02-15 RX ADMIN — METHYLPREDNISOLONE SODIUM SUCCINATE SCH MG: 40 INJECTION, POWDER, FOR SOLUTION INTRAMUSCULAR; INTRAVENOUS at 00:04

## 2017-02-15 RX ADMIN — NYSTATIN SCH ML: 100000 SUSPENSION ORAL at 23:29

## 2017-02-15 RX ADMIN — HEPARIN SODIUM SCH UNITS: 10000 INJECTION, SOLUTION INTRAVENOUS; SUBCUTANEOUS at 09:00

## 2017-02-15 RX ADMIN — FLUCONAZOLE SCH MG: 200 TABLET ORAL at 15:15

## 2017-02-15 RX ADMIN — MESALAMINE SCH MG: 800 TABLET, DELAYED RELEASE ORAL at 15:13

## 2017-02-15 RX ADMIN — IPRATROPIUM BROMIDE AND ALBUTEROL SULFATE SCH AMPULE: .5; 3 SOLUTION RESPIRATORY (INHALATION) at 16:08

## 2017-02-15 RX ADMIN — TAZOBACTAM SODIUM AND PIPERACILLIN SODIUM SCH MLS/HR: 500; 4 INJECTION, SOLUTION INTRAVENOUS at 04:39

## 2017-02-15 RX ADMIN — GUAIFENESIN AND CODEINE PHOSPHATE PRN ML: 10; 100 LIQUID ORAL at 03:12

## 2017-02-15 RX ADMIN — FAMOTIDINE SCH MG: 20 TABLET, FILM COATED ORAL at 23:30

## 2017-02-15 RX ADMIN — GUAIFENESIN AND CODEINE PHOSPHATE PRN ML: 10; 100 LIQUID ORAL at 23:29

## 2017-02-15 RX ADMIN — GABAPENTIN SCH MG: 100 CAPSULE ORAL at 09:00

## 2017-02-15 RX ADMIN — GABAPENTIN SCH MG: 100 CAPSULE ORAL at 15:14

## 2017-02-15 RX ADMIN — Medication SCH CAP: at 15:14

## 2017-02-15 RX ADMIN — FAMOTIDINE SCH MG: 20 TABLET, FILM COATED ORAL at 09:00

## 2017-02-15 RX ADMIN — IPRATROPIUM BROMIDE AND ALBUTEROL SULFATE SCH AMPULE: .5; 3 SOLUTION RESPIRATORY (INHALATION) at 03:36

## 2017-02-15 RX ADMIN — MESALAMINE SCH MG: 800 TABLET, DELAYED RELEASE ORAL at 06:00

## 2017-02-15 RX ADMIN — DILTIAZEM HYDROCHLORIDE SCH MG: 240 CAPSULE, EXTENDED RELEASE ORAL at 09:08

## 2017-02-15 RX ADMIN — Medication SCH TAB: at 15:14

## 2017-02-15 RX ADMIN — IPRATROPIUM BROMIDE AND ALBUTEROL SULFATE SCH AMPULE: .5; 3 SOLUTION RESPIRATORY (INHALATION) at 07:53

## 2017-02-15 RX ADMIN — NYSTATIN SCH ML: 100000 SUSPENSION ORAL at 17:48

## 2017-02-15 RX ADMIN — METHYLPREDNISOLONE SODIUM SUCCINATE SCH MG: 40 INJECTION, POWDER, FOR SOLUTION INTRAMUSCULAR; INTRAVENOUS at 14:55

## 2017-02-15 RX ADMIN — NYSTATIN SCH ML: 100000 SUSPENSION ORAL at 15:15

## 2017-02-15 RX ADMIN — TAZOBACTAM SODIUM AND PIPERACILLIN SODIUM SCH MLS/HR: 500; 4 INJECTION, SOLUTION INTRAVENOUS at 17:48

## 2017-02-15 RX ADMIN — NYSTATIN SCH ML: 100000 SUSPENSION ORAL at 14:55

## 2017-02-15 RX ADMIN — SODIUM CHLORIDE, PRESERVATIVE FREE SCH ML: 5 INJECTION INTRAVENOUS at 09:10

## 2017-02-15 RX ADMIN — TAZOBACTAM SODIUM AND PIPERACILLIN SODIUM SCH MLS/HR: 500; 4 INJECTION, SOLUTION INTRAVENOUS at 23:30

## 2017-02-15 RX ADMIN — GABAPENTIN SCH MG: 100 CAPSULE ORAL at 17:48

## 2017-02-15 RX ADMIN — MESALAMINE SCH MG: 800 TABLET, DELAYED RELEASE ORAL at 23:30

## 2017-02-15 RX ADMIN — SODIUM CHLORIDE SCH MLS/HR: 900 INJECTION INTRAVENOUS at 18:29

## 2017-02-15 RX ADMIN — AZITHROMYCIN SCH MG: 250 TABLET, FILM COATED ORAL at 17:48

## 2017-02-15 RX ADMIN — VANCOMYCIN HYDROCHLORIDE SCH MLS/HR: 1 INJECTION, SOLUTION INTRAVENOUS at 09:05

## 2017-02-15 RX ADMIN — HEPARIN SODIUM SCH UNITS: 10000 INJECTION, SOLUTION INTRAVENOUS; SUBCUTANEOUS at 23:30

## 2017-02-15 NOTE — HHI.PR
Subjective


Remarks


No chest pain 


shortness of breath exertional and at rest


Voice hoarse, O2 on nasal cannula


Anxiety mild


No headache (Coral Li)





Objective


Objective Results


-





 Vital Signs








  Date Time  Temp Pulse Resp B/P Pulse Ox O2 Delivery O2 Flow Rate FiO2


 


2/15/17 12:11 98.0 96 17 149/93 93 Nasal Cannula 6 


 


2/15/17 10:06  88      


 


2/15/17 09:30  87      


 


2/15/17 08:30  85      


 


2/15/17 07:54     94   21


 


2/15/17 07:30 97.6 86 16 140/86 93   


 


2/15/17 07:30  82      


 


2/15/17 06:00  92      


 


2/15/17 05:00  90      


 


2/15/17 04:00  90 18 155/78 92   


 


2/15/17 04:00  92      


 


2/15/17 03:00  92      


 


2/15/17 02:00  88      


 


2/15/17 01:00  84      


 


2/15/17 00:00  83 18 145/87 93   


 


2/15/17 00:00  85      


 


2/14/17 23:06      Nasal Cannula 4.00 





      Humidified  


 


2/14/17 23:00  84      


 


2/14/17 22:00  100      


 


2/14/17 21:00  102      


 


2/14/17 20:00 97.8 99 18 158/99 93   


 


2/14/17 20:00  96      


 


2/14/17 19:00  96      


 


2/14/17 16:00 97.4  20 138/69 92   


 


2/14/17 16:00     92 Nasal Cannula 4.00 





      Humidified  


 


2/14/17 16:00  96      








 I/O








 2/14/17 2/14/17 2/14/17 2/15/17 2/15/17 2/15/17





 07:00 15:00 23:00 07:00 15:00 23:00


 


Intake Total 240 ml  1700 ml  900 ml 


 


Output Total 1150 ml   1400 ml  


 


Balance -910 ml  1700 ml -1400 ml 900 ml 


 


      


 


Intake Oral 240 ml     


 


IV Total   1700 ml   


 


Other     900 ml 


 


Output Urine Total 1150 ml   1400 ml  





 (Coral Li)


Result Diagram:  


2/15/17 0745                                                                   

             2/15/17 0745





Medications and IVs





 Active Medications


Albuterol Sulfate (*ALBUTEROL NEB PERIprocedure ONLY) 2.5 mg STK-MED ONCE NEB 

Last administered on 2/15/17at 12:33; Admin Dose 2.5 MG;  Start 2/15/17 at 12:33

;  Stop 2/15/17 at 12:34;  Status DC


Epinephrine HCl (Adrenalin (1:1000) Inj) 4 mg STK-MED ONCE .ROUTE;  Start 2/15/

17 at 11:15;  Stop 2/15/17 at 11:16;  Status DC


Fentanyl Citrate (fentaNYL INJ) 100 mcg STK-MED ONCE .ROUTE;  Start 2/15/17 at 

12:15;  Stop 2/15/17 at 12:16;  Status DC


Fluconazole 200 mg 200 mg DAILY PO;  Start 2/15/17 at 09:00


Lidocaine HCl (Xylocaine 2% Inj) 100 ml STK-MED ONCE .ROUTE;  Start 2/15/17 at 

11:15;  Stop 2/15/17 at 11:16;  Status DC


Lidocaine HCl (Xylocaine 2% Viscous) 30 ml STK-MED ONCE .ROUTE;  Start 2/15/17 

at 11:15;  Stop 2/15/17 at 11:16;  Status DC


Lidocaine HCl (Xylocaine-Mpf 4% Inj) 15 ml STK-MED ONCE .ROUTE;  Start 2/15/17 

at 11:15;  Stop 2/15/17 at 11:16;  Status DC


Midazolam HCl (Versed Inj) 2 mg STK-MED ONCE .ROUTE;  Start 2/15/17 at 11:19;  

Stop 2/15/17 at 11:20;  Status DC


Miscellaneous Information ALL NURSING DEPARTME... UNSCH  PRN XX;  Start 2/15/17 

at 12:14;  Stop 2/16/17 at 12:13


Miscellaneous Information SPECIFIC LAB TO BE SHREYA... ONCE  ONCE XX;  Start 2/15/

17 at 09:45;  Stop 2/15/17 at 09:46;  Status DC


Miscellaneous Information SPECIFIC LAB TO BE DRAWN:VANCOMYCIN TROUGH DATE TO... 

ONCE  ONCE XX;  Start 2/17/17 at 05:45;  Stop 2/17/17 at 05:46


Phenol (Chloraseptic Spray) 2 spray Q2H  PRN OROPHARYNG;  Start 2/14/17 at 14:15


Potassium Chloride (KCl 40 Meq/30 ml Liq) 40 meq ONCE  ONCE PO Last 

administered on 2/14/17at 17:41; Admin Dose 40 MEQ;  Start 2/14/17 at 14:15;  

Stop 2/14/17 at 14:16;  Status DC


Potassium Chloride (KCl) 20 meq ONCE  ONCE PO Last administered on 2/14/17at 17:

41; Admin Dose 20 MEQ;  Start 2/14/17 at 17:00;  Stop 2/14/17 at 17:01;  Status 

DC


Sodium Chloride (Sodium Chloride 0.9% Inj) 60 ml STK-MED ONCE .ROUTE;  Start 2/

15/17 at 11:14;  Stop 2/15/17 at 11:15;  Status DC


Vancomycin HCl/ Sodium Chloride (Vancomycin Inj/  ml Inj) 515 ml @  250 

mls/hr Q12H IV;  Start 2/15/17 at 18:00 (Coral Li)





Physical Exam


Physical Exam


PHYSICAL EXAMINATION


GENERAL:  This is a well-developed, well-nourished   male 


who appears to be in no acute distress.  He  is alert and awake, .  


HEAD:  Normocephalic without any lesion or mass noted.  Facial features


appear symmetric.


OROPHARYNGEAL:  Oropharynx without erythema or edema.


NECK:  Supple.  No nuchal rigidity or lymphadenopathy.


Trachea midline without deviation.  


CARDIAC:  Regular rhythm, regular rate, S1 and S2 are heard.  Murmur none no 

gallops or rubs.


LUNGS:  Clear to auscultation bilaterally.  No wheeze,  rhonchi or  rales.  

Cough, low volumes with exertion


ABDOMEN:  Soft, nontender, no organomegaly or masses.  Bowel sounds are


heard in all four quadrants.  No rebound.  No guarding.


EXTREMITIES:  No edema.  Pulses equal bilateral.  No cyanosis.


NEUROLOGICAL:  Patient mood and affect appropriate. No focal deficit, anxiety 

mild


SKIN:Warm and moist


Objective Remarks


Im doing ok while resting in bed. (Coral Li)





A/P


Assessment and Plan


Assessment and Plan


1. Sepsis on admission.


2. COPD exacerbation.


3. Hyponatremia.


4. Hospital acquired pneumonia.


5. History of A-fib, currently in sinus rhythm.


6. History of ulcerative colitis.


7. Hypokalemia


 


DISCUSSION:


Labs review, WBC count normal, hgb stable, potassium 3.7 after by mouth dose 

yesterday.


gentle hydration. 


High CRP and ESR


monitored on telemetry, regular rhythm


Continue IV fluids.


Continue antibiotic


ID consult, , states probable PNA vs non infectious pneumonitis with hypoxia, 

thrush


Pulmonary consult appreciated, bronchoscopy today. Awaiting tests/biopsy 

results.  


Continue nebulizer treatments


Continue oxygen to keep saturation above 90%  Encouraged pt. to stay on rm air 

unless he is SOB, and or sat <90.  Currently pt. is 96 on rm air.


continues with coarse voice, and cough.  Has meds for prn use.


 


steroids


DVT prophylaxis will be provided as well as GI prophylaxis.


Continue home medication as indicated


Diflucan for thrush continued, improving


Cough medication with codeine


Potassium, 3.7





Discussed with RN


Discussed with patient


Discussed with Dr. Obrien, pt. seen on his behalf


Discussed With:  Nurse, Family (patient), Other (Dr. Obrien, patient seen  on 

his behalf) (Coral Li)


Assessment and Plan


Patient seen and examined visible


Discussed with patient


Labs reviewed


Medications reviewed


Plan of care discussed with ARNP


Appreciate consultants input (Tiffany Obrien MD)








Coral Li Feb 15, 2017 14:07


Tiffany Obrien MD Feb 15, 2017 15:36

## 2017-02-15 NOTE — PD.CARD.PN
Subjective


Subjective Remarks


No CP, SOB improved, had bronchoscopy today





Objective


Medications





 Current Medications








 Medications


  (Trade)  Dose


 Ordered  Sig/Gissel


 Route  Start Time


 Stop Time Status Last Admin


 


  (NS Flush)  2 ml  UNSCH  PRN


 IV FLUSH  2/11/17 19:15


     


 


 


 IV Flush 2 ml  2 ml  BID


 IV FLUSH  2/11/17 21:00


    2/15/17 09:10


 


 


 Piperacillin Sod/


 Tazobactam Sod  100 ml @ 


 200 mls/hr  Q6H


 IV  2/11/17 23:00


    2/15/17 11:12


 


 


  (Vancomycin


 Consult Pharmacy)  0 ml @ 0


 mls/hr  UNSCH


 OTHER  2/11/17 19:15


     


 


 


  (Zofran Inj)  4 mg  Q6H  PRN


 IV  2/11/17 19:15


     


 


 


  (Heparin Inj)  5,000 units  Q12H


 SQ  2/11/17 21:00


    2/14/17 21:44


 


 


  (Pepcid)  20 mg  BID


 PO  2/11/17 21:00


    2/14/17 21:44


 


 


  (Vitamin C)  500 mg  DAILY


 PO  2/12/17 09:00


    2/15/17 15:14


 


 


  (Symbicort


 160-4.5 Inh)  1 puff  Q12HR


 INH  2/11/17 21:00


   Hold 2/12/17 09:00


 


 


  (Bentyl)  20 mg  TID  PRN


 PO  2/11/17 19:15


     


 


 


  (Cardizem Cd)  240 mg  DAILY


 PO  2/12/17 09:00


    2/15/17 09:08


 


 


  (Neurontin)  100 mg  TID


 PO  2/12/17 09:00


    2/15/17 15:14


 


 


  (Asacol Hd Dr)  1,600 mg  Q8HR


 PO  2/11/17 22:00


    2/15/17 15:13


 


 


  (Theragran


 Hematinic)  1 tab  DAILY


 PO  2/12/17 09:00


    2/15/17 15:14


 


 


  (Nephrocaps)  1 cap  DAILY


 PO  2/12/17 09:00


    2/15/17 15:14


 


 


  (Tylenol)  650 mg  Q4H  PRN


 PO  2/11/17 23:30


     


 


 


  (Mycostatin  Liq)  5 ml  QID


 SWISH-SWAL  2/11/17 23:30


    2/15/17 15:15


 


 


  (Robitussin Ac


 200-20 Mg/10 ml


 Liq)  10 ml  Q4H  PRN


 PO  2/12/17 10:30


    2/15/17 03:12


 


 


  (SoluMEDROL INJ)  40 mg  Q6HR


 IV  2/12/17 18:00


    2/15/17 14:55


 


 


 Clonazepam 1 mg  1 mg  HS  PRN


 PO  2/12/17 21:30


    2/14/17 21:44


 


 


  (KCl Inj/1/2 NS


 1000 ml Inj)  1,005 ml @ 


 42 mls/hr  [id number]


 IV  2/13/17 10:00


    2/14/17 04:05


 


 


  (Zithromax)  500 mg  Q24H


 PO  2/13/17 18:00


    2/14/17 17:36


 


 


  (Chloraseptic


 Spray)  2 spray  Q2H  PRN


 OROPHARYNG  2/14/17 14:15


     


 


 


 Fluconazole 200 mg  200 mg  DAILY


 PO  2/15/17 09:00


    2/15/17 15:15


 


 


  (Vancomycin Inj/


  ml Inj)  515 ml @ 


 250 mls/hr  Q12H


 IV  2/15/17 18:00


     


 


 


 Miscellaneous


 Information  SPECIFIC LAB TO BE


 DRAWN:VANCOMYCIN


 TROUGH DATE TO...  ONCE  ONCE


 XX  2/17/17 05:45


 2/17/17 05:46   


 


 


 Miscellaneous


 Information  ALL


 NURSING


 DEPARTME...  UNSCH  PRN


 XX  2/15/17 12:14


 2/16/17 12:13   


 








Vital Signs / I&O





 Vital Signs








  Date Time  Temp Pulse Resp B/P Pulse Ox O2 Delivery O2 Flow Rate FiO2


 


2/15/17 12:11 98.0 96 17 149/93 93 Nasal Cannula 6 


 


2/15/17 10:06  88      


 


2/15/17 09:30  87      


 


2/15/17 08:30  85      


 


2/15/17 07:54     94   21


 


2/15/17 07:30 97.6 86 16 140/86 93   


 


2/15/17 07:30  82      


 


2/15/17 06:00  92      


 


2/15/17 05:00  90      


 


2/15/17 04:00  90 18 155/78 92   


 


2/15/17 04:00  92      


 


2/15/17 03:00  92      


 


2/15/17 02:00  88      


 


2/15/17 01:00  84      


 


2/15/17 00:00  83 18 145/87 93   


 


2/15/17 00:00  85      


 


2/14/17 23:06      Nasal Cannula 4.00 





      Humidified  


 


2/14/17 23:00  84      


 


2/14/17 22:00  100      


 


2/14/17 21:00  102      


 


2/14/17 20:00 97.8 99 18 158/99 93   


 


2/14/17 20:00  96      


 


2/14/17 19:00  96      


 


2/14/17 16:00 97.4  20 138/69 92   


 


2/14/17 16:00     92 Nasal Cannula 4.00 





      Humidified  


 


2/14/17 16:00  96      








 I/O








 2/14/17 2/14/17 2/14/17 2/15/17 2/15/17 2/15/17





 07:00 15:00 23:00 07:00 15:00 23:00


 


Intake Total 240 ml  1700 ml  900 ml 


 


Output Total 1150 ml   1400 ml  


 


Balance -910 ml  1700 ml -1400 ml 900 ml 


 


      


 


Intake Oral 240 ml     


 


IV Total   1700 ml   


 


Other     900 ml 


 


Output Urine Total 1150 ml   1400 ml  








Physical Exam


GENERAL: On O2


SKIN: Warm and dry.


HEAD: Normocephalic.


EYES: No scleral icterus. No injection or drainage. 


NECK: Supple, trachea midline. No JVD or lymphadenopathy.


CARDIOVASCULAR: Regular rate and rhythm without murmurs, gallops, or rubs. 


RESPIRATORY: Breath sounds equal bilaterally. No accessory muscle use. Few 

rhonchi.


GASTROINTESTINAL: Abdomen soft, non-tender, nondistended. 


MUSCULOSKELETAL: No cyanosis, or edema. 


BACK: Nontender without obvious deformity. No CVA tenderness.





Laboratory





Laboratory Tests








Test 2/15/17





 07:45


 


White Blood Count 7.8 TH/MM3


 


Red Blood Count 4.58 MIL/MM3


 


Hemoglobin 13.4 GM/DL


 


Hematocrit 39.9 %


 


Mean Corpuscular Volume 87.1 FL


 


Mean Corpuscular Hemoglobin 29.2 PG


 


Mean Corpuscular Hemoglobin 33.5 %





Concent 


 


Red Cell Distribution Width 14.5 %


 


Platelet Count 324 TH/MM3


 


Mean Platelet Volume 9.0 FL


 


Sodium Level 140 MEQ/L


 


Potassium Level 3.7 MEQ/L


 


Chloride Level 107 MEQ/L


 


Carbon Dioxide Level 21.5 MEQ/L


 


Anion Gap 12 MEQ/L


 


Blood Urea Nitrogen 15 MG/DL


 


Creatinine 0.94 MG/DL


 


Estimat Glomerular Filtration 82 ML/MIN





Rate 


 


Random Glucose 132 MG/DL


 


Calcium Level 8.6 MG/DL


 


Vancomycin Level Trough 8.2 MCG/ML








Imaging





Last Impressions








Chest X-Ray 2/15/17 0000 Signed





Impressions: 





 Service Date/Time:  Wednesday, February 15, 2017 12:30 - CONCLUSION:  Mild 





 interval increase in hazy opacity in both lungs which may represent pulmonary 





 edema.     Bhavik Lockett MD 


 


CT Angiography 2/11/17 0000 Signed





Impressions: 





 Service Date/Time:  Saturday, February 11, 2017 18:29 - CONCLUSION:  1. 

Negative 





 for pulmonary embolus. 2. Interval development of bilateral lung disease on a 





 background of emphysema. Findings are not entirely typical of 

bronchopneumonia. 





 Differential diagnosis includes some form of smoking related disease with 





 respiratory bronchiolitis or possibly Langerhans' cell histiocytosis.     

Bravo Robison MD 











Assessment and Plan


Problem List:  


(1) Pneumonia


(2) COPD (chronic obstructive pulmonary disease)


(3) Atrial fibrillation


Assessment and Plan


Overall improvement. No recurrent AF or pauses. Continue monitoring on tele. 

Pulm eval in progress, bronchoscopy today, await bx results. Continue tx for 

pneumonia/COPD exac. Increase activity.





Problem Qualifiers





(1) Pneumonia:  





(2) COPD (chronic obstructive pulmonary disease):  


Qualified Code:  J44.1 - Chronic obstructive pulmonary disease with acute 

exacerbation


(3) Atrial fibrillation:  


Qualified Code:  I48.0 - Paroxysmal atrial fibrillation





Romi Davalos MD Feb 15, 2017 15:27

## 2017-02-15 NOTE — RADRPT
EXAM DATE/TIME:  02/15/2017 12:30 

 

[facility] COMPARISON:     

CHEST SINGLE AP, February 11, 2017, 16:55.

 

                     

INDICATIONS :     

Post bronchoscopy. Cough.

                     

 

MEDICAL HISTORY :     

Hypertension.  Chronic obstructive pulmonary disease.  Gastroesophageal reflux disease.      

 

SURGICAL HISTORY :        

Prostate resection.

 

ENCOUNTER:     

Initial                                        

 

ACUITY:     

1 day      

 

PAIN SCORE:     

2/10

 

LOCATION:     

Left chest 

 

FINDINGS:     

A single AP portable erect view of the chest was obtained and demonstrates no evidence of pneumothora
x. There is hazy opacity in both lungs which appears mildly increased. This is greatest in the perihi
lar regions. There is no effusion. The heart size is at the upper limits of normal. The bony thorax i
s intact.

 

CONCLUSION:     

Mild interval increase in hazy opacity in both lungs which may represent pulmonary edema.

 

 

 

 Bhavik Lockett MD on February 15, 2017 at 13:08           

Board Certified Radiologist.

 This report was verified electronically.

## 2017-02-16 VITALS — HEART RATE: 82 BPM

## 2017-02-16 VITALS
TEMPERATURE: 97.8 F | RESPIRATION RATE: 20 BRPM | SYSTOLIC BLOOD PRESSURE: 155 MMHG | DIASTOLIC BLOOD PRESSURE: 103 MMHG | HEART RATE: 85 BPM | OXYGEN SATURATION: 94 %

## 2017-02-16 VITALS
OXYGEN SATURATION: 97 % | HEART RATE: 87 BPM | RESPIRATION RATE: 20 BRPM | TEMPERATURE: 98.8 F | SYSTOLIC BLOOD PRESSURE: 134 MMHG | DIASTOLIC BLOOD PRESSURE: 80 MMHG

## 2017-02-16 VITALS
TEMPERATURE: 98.2 F | OXYGEN SATURATION: 96 % | SYSTOLIC BLOOD PRESSURE: 169 MMHG | DIASTOLIC BLOOD PRESSURE: 94 MMHG | RESPIRATION RATE: 18 BRPM | HEART RATE: 88 BPM

## 2017-02-16 VITALS
HEART RATE: 80 BPM | RESPIRATION RATE: 18 BRPM | TEMPERATURE: 97.9 F | SYSTOLIC BLOOD PRESSURE: 144 MMHG | DIASTOLIC BLOOD PRESSURE: 88 MMHG | OXYGEN SATURATION: 92 %

## 2017-02-16 VITALS — HEART RATE: 88 BPM

## 2017-02-16 VITALS — HEART RATE: 76 BPM

## 2017-02-16 VITALS — HEART RATE: 92 BPM

## 2017-02-16 VITALS — HEART RATE: 90 BPM

## 2017-02-16 VITALS — OXYGEN SATURATION: 94 %

## 2017-02-16 VITALS — HEART RATE: 74 BPM

## 2017-02-16 VITALS — OXYGEN SATURATION: 93 %

## 2017-02-16 VITALS — HEART RATE: 80 BPM

## 2017-02-16 VITALS — HEART RATE: 84 BPM

## 2017-02-16 VITALS — HEART RATE: 86 BPM

## 2017-02-16 RX ADMIN — Medication SCH CAP: at 09:06

## 2017-02-16 RX ADMIN — SODIUM CHLORIDE SCH MLS/HR: 900 INJECTION INTRAVENOUS at 04:51

## 2017-02-16 RX ADMIN — IPRATROPIUM BROMIDE AND ALBUTEROL SULFATE PRN AMPULE: .5; 3 SOLUTION RESPIRATORY (INHALATION) at 15:14

## 2017-02-16 RX ADMIN — MESALAMINE SCH MG: 800 TABLET, DELAYED RELEASE ORAL at 09:05

## 2017-02-16 RX ADMIN — IPRATROPIUM BROMIDE AND ALBUTEROL SULFATE PRN AMPULE: .5; 3 SOLUTION RESPIRATORY (INHALATION) at 20:53

## 2017-02-16 RX ADMIN — IPRATROPIUM BROMIDE AND ALBUTEROL SULFATE PRN AMPULE: .5; 3 SOLUTION RESPIRATORY (INHALATION) at 04:55

## 2017-02-16 RX ADMIN — DILTIAZEM HYDROCHLORIDE SCH MG: 120 CAPSULE, EXTENDED RELEASE ORAL at 11:35

## 2017-02-16 RX ADMIN — NYSTATIN SCH ML: 100000 SUSPENSION ORAL at 17:34

## 2017-02-16 RX ADMIN — OXYCODONE HYDROCHLORIDE AND ACETAMINOPHEN SCH MG: 500 TABLET ORAL at 09:05

## 2017-02-16 RX ADMIN — GABAPENTIN SCH MG: 100 CAPSULE ORAL at 09:05

## 2017-02-16 RX ADMIN — MESALAMINE SCH MG: 800 TABLET, DELAYED RELEASE ORAL at 21:58

## 2017-02-16 RX ADMIN — IPRATROPIUM BROMIDE AND ALBUTEROL SULFATE PRN AMPULE: .5; 3 SOLUTION RESPIRATORY (INHALATION) at 11:45

## 2017-02-16 RX ADMIN — METHYLPREDNISOLONE SODIUM SUCCINATE SCH MG: 40 INJECTION, POWDER, FOR SOLUTION INTRAMUSCULAR; INTRAVENOUS at 21:00

## 2017-02-16 RX ADMIN — IPRATROPIUM BROMIDE AND ALBUTEROL SULFATE PRN AMPULE: .5; 3 SOLUTION RESPIRATORY (INHALATION) at 08:19

## 2017-02-16 RX ADMIN — NYSTATIN SCH ML: 100000 SUSPENSION ORAL at 09:06

## 2017-02-16 RX ADMIN — Medication SCH TAB: at 09:05

## 2017-02-16 RX ADMIN — MESALAMINE SCH MG: 800 TABLET, DELAYED RELEASE ORAL at 13:11

## 2017-02-16 RX ADMIN — METHYLPREDNISOLONE SODIUM SUCCINATE SCH MG: 40 INJECTION, POWDER, FOR SOLUTION INTRAMUSCULAR; INTRAVENOUS at 17:34

## 2017-02-16 RX ADMIN — NYSTATIN SCH ML: 100000 SUSPENSION ORAL at 13:11

## 2017-02-16 RX ADMIN — FAMOTIDINE SCH MG: 20 TABLET, FILM COATED ORAL at 09:06

## 2017-02-16 RX ADMIN — HEPARIN SODIUM SCH UNITS: 10000 INJECTION, SOLUTION INTRAVENOUS; SUBCUTANEOUS at 21:59

## 2017-02-16 RX ADMIN — NYSTATIN SCH ML: 100000 SUSPENSION ORAL at 21:58

## 2017-02-16 RX ADMIN — METHYLPREDNISOLONE SODIUM SUCCINATE SCH MG: 40 INJECTION, POWDER, FOR SOLUTION INTRAMUSCULAR; INTRAVENOUS at 04:52

## 2017-02-16 RX ADMIN — TAZOBACTAM SODIUM AND PIPERACILLIN SODIUM SCH MLS/HR: 500; 4 INJECTION, SOLUTION INTRAVENOUS at 21:59

## 2017-02-16 RX ADMIN — GABAPENTIN SCH MG: 100 CAPSULE ORAL at 17:33

## 2017-02-16 RX ADMIN — FAMOTIDINE SCH MG: 20 TABLET, FILM COATED ORAL at 21:58

## 2017-02-16 RX ADMIN — GABAPENTIN SCH MG: 100 CAPSULE ORAL at 13:11

## 2017-02-16 RX ADMIN — GUAIFENESIN AND CODEINE PHOSPHATE PRN ML: 10; 100 LIQUID ORAL at 21:58

## 2017-02-16 RX ADMIN — HEPARIN SODIUM SCH UNITS: 10000 INJECTION, SOLUTION INTRAVENOUS; SUBCUTANEOUS at 09:00

## 2017-02-16 RX ADMIN — METHYLPREDNISOLONE SODIUM SUCCINATE SCH MG: 40 INJECTION, POWDER, FOR SOLUTION INTRAMUSCULAR; INTRAVENOUS at 00:00

## 2017-02-16 RX ADMIN — METOPROLOL TARTRATE SCH MG: 25 TABLET, FILM COATED ORAL at 21:58

## 2017-02-16 RX ADMIN — TAZOBACTAM SODIUM AND PIPERACILLIN SODIUM SCH MLS/HR: 500; 4 INJECTION, SOLUTION INTRAVENOUS at 17:34

## 2017-02-16 RX ADMIN — SODIUM CHLORIDE SCH MLS/HR: 234 INJECTION INTRAMUSCULAR; INTRAVENOUS; SUBCUTANEOUS at 03:23

## 2017-02-16 RX ADMIN — SODIUM CHLORIDE, PRESERVATIVE FREE SCH ML: 5 INJECTION INTRAVENOUS at 17:34

## 2017-02-16 RX ADMIN — AZITHROMYCIN SCH MG: 250 TABLET, FILM COATED ORAL at 17:33

## 2017-02-16 RX ADMIN — SODIUM CHLORIDE SCH MLS/HR: 900 INJECTION INTRAVENOUS at 17:34

## 2017-02-16 RX ADMIN — FLUCONAZOLE SCH MG: 200 TABLET ORAL at 09:06

## 2017-02-16 RX ADMIN — TAZOBACTAM SODIUM AND PIPERACILLIN SODIUM SCH MLS/HR: 500; 4 INJECTION, SOLUTION INTRAVENOUS at 10:53

## 2017-02-16 RX ADMIN — TAZOBACTAM SODIUM AND PIPERACILLIN SODIUM SCH MLS/HR: 500; 4 INJECTION, SOLUTION INTRAVENOUS at 04:52

## 2017-02-16 RX ADMIN — GUAIFENESIN AND CODEINE PHOSPHATE PRN ML: 10; 100 LIQUID ORAL at 09:08

## 2017-02-16 RX ADMIN — METHYLPREDNISOLONE SODIUM SUCCINATE SCH MG: 40 INJECTION, POWDER, FOR SOLUTION INTRAMUSCULAR; INTRAVENOUS at 11:35

## 2017-02-16 NOTE — HHI.PR
Subjective


Remarks


Better today . On o2  2l.


Coughed  up  clear  sputum. No fever.





Objective





 Vital Signs








  Date Time  Temp Pulse Resp B/P Pulse Ox O2 Delivery O2 Flow Rate FiO2


 


2/16/17 17:00  86      


 


2/16/17 15:00  88      


 


2/16/17 13:00  80      


 


2/16/17 12:22 98.8 87 20 134/80 97   


 


2/16/17 11:43      Nasal Cannula 4.00 





      Humidified  


 


2/16/17 11:00  84      


 


2/16/17 10:00  78      


 


2/16/17 10:00  82      


 


2/16/17 09:00  92      


 


2/16/17 08:19     94 Nasal Cannula 2.00 


 


2/16/17 08:00 98.2 88 18 169/94 96   


 


2/16/17 04:00 97.9 80 18 144/88 92   


 


2/15/17 20:00 98.2 98 20 154/95 9   


 


2/15/17 20:00      Nasal Cannula 4.00 





      Humidified  


 


2/15/17 19:52     97   








 I/O








 2/15/17 2/15/17 2/15/17 2/16/17 2/16/17 2/16/17





 07:00 15:00 23:00 07:00 15:00 23:00


 


Intake Total  900 ml  1005 ml  


 


Output Total 1400 ml   650 ml  


 


Balance -1400 ml 900 ml  355 ml  


 


      


 


Intake Oral    480 ml  


 


IV Total    525 ml  


 


Other  900 ml    


 


Output Urine Total 1400 ml   650 ml  


 


# Bowel Movements      4








Result Diagram:  


2/15/17 0745                                                                   

             2/15/17 0745





Objective Remarks


GENERAL: This is a 61 year-old [place] male, resting  in bed.  He


is in no distress .


HEENT:  Mucous membranes moist. There is


no jaundice.Throat Clear.


NECK: Supple.


CARDIOVASCULAR: Regular rate and rhythm.


RESPIRATORY: Few crackles as well as a few scattered rhonchi.


GASTROINTESTINAL: Bowel sounds are present.


ABDOMEN: Not distended.


:  No CVA tenderness.


MUSCULOSKELETAL: No edema. Homans' sign negative.


NEUROLOGIC: Alert, oriented.  Speech is clear and fluent.  Moving all


extremities freely.


 





 


 


CT angiography showed no PE, interval development of bilateral lung disease on


a background of emphysema, not entirely typical for bronchopneumonia.


Differential diagnosis includes some form of smoking related disease of


respiratory bronchiolitis or possible Langerhans cells, histiocytosis.





Assessment and Plan


Assessment and Plan





IMPRESSION:


1. Sepsis on admission.


2. COPD exacerbation.


3. Hyponatremia.


4. Hospital acquired pneumonia.


5. History of A-fib, currently in sinus rhythm.


6. History of ulcerative colitis.











Plan :





1. Cont Antibiotics as ordered.





2. Nebs qid , duoneb.





3. Taper  solumedrol to 40 mg bid.





4. D/C IV fluids.





5. Rpt Chest X ray .





6. Home soon.








ARIADNA Murry MD Feb 16, 2017 19:22

## 2017-02-16 NOTE — HHI.PR
Subjective


Remarks


Patient shortness of breath is much better


Cough is also much better


improving weakness in her energy


Mouth is better can swallow with ease now


No other complaint


Review of systems a 10 point system otherwise unremarkable





Objective


Objective Results


-





 Vital Signs








  Date Time  Temp Pulse Resp B/P Pulse Ox O2 Delivery O2 Flow Rate FiO2


 


2/16/17 12:22 98.8 87 20 134/80 97   


 


2/16/17 11:43      Nasal Cannula 4.00 





      Humidified  


 


2/16/17 10:00  82      


 


2/16/17 08:19     94 Nasal Cannula 2.00 


 


2/16/17 08:00 98.2 88 18 169/94 96   


 


2/16/17 04:00 97.9 80 18 144/88 92   


 


2/15/17 20:00 98.2 98 20 154/95 9   


 


2/15/17 20:00      Nasal Cannula 4.00 





      Humidified  


 


2/15/17 19:52     97   


 


2/15/17 18:00      Nasal Cannula 4.00 





      Humidified  


 


2/15/17 18:00  94      


 


2/15/17 17:00 98.1 95 16 154/97 96   


 


2/15/17 17:00  80      








 I/O








 2/15/17 2/15/17 2/15/17 2/16/17 2/16/17 2/16/17





 07:00 15:00 23:00 07:00 15:00 23:00


 


Intake Total  900 ml  1005 ml  


 


Output Total 1400 ml   650 ml  


 


Balance -1400 ml 900 ml  355 ml  


 


      


 


Intake Oral    480 ml  


 


IV Total    525 ml  


 


Other  900 ml    


 


Output Urine Total 1400 ml   650 ml  








Result Diagram:  


2/15/17 0745                                                                   

             2/15/17 0745





Other Results











 Date/Time Procedure Status





Source Growth 


 


 2/15/17 11:45 Gram Stain - Final Resulted





Bronchial Washings Right Lower Lobe  





 2/15/17 11:45 Bronchial Culture - Preliminary Resulted





Bronchial Washings Right Lower Lobe LIGHT GROWTH NORMAL RESPIRATORY JACKELYN... 


 


 2/15/17 11:45 Fungal Smear - Final Resulted





Bronchial Washings Right Lower Lobe NO FUNGAL ELEMENTS SEEN. 





 2/15/17 11:45 Fungal Culture Resulted





Bronchial Washings Right Lower Lobe Pending 


 


 2/15/17 11:45 Fungal Smear Received





Bronchial Brushings Right Lower Lobe Pending 





 2/15/17 11:45 Fungal Culture Received





Bronchial Brushings Right Lower Lobe Pending 


 


 2/15/17 11:45 Bronchial Aspirate Culture - Preliminary Resulted





Bronchial Brushings Right Lower Lobe NO GROWTH IN 24 HOURS. 


 


 2/15/17 11:45 Acid Fast Stain - Final Resulted





Bronchial Washings Right Lower Lobe NO ACID FAST BACILLI SEEN 





 2/15/17 11:45 Mycobacterial Culture Resulted





Bronchial Washings Right Lower Lobe Pending 


 


 2/15/17 11:45 Acid Fast Stain Received





Bronchial Brushings Right Lower Lobe Pending 





 2/15/17 11:45 Mycobacterial Culture Received





Bronchial Brushings Right Lower Lobe Pending 


 


 2/15/17 11:45  Cancelled





Other  


 


 2/12/17 17:10 MRSA Surveillance Culture - Final Complete





Nasopharyngeal NO MRSA ISOLATED 


 


 2/12/17 17:10 Legionella Antigen - Final Complete





Urine Random Urine PRESUMPTIVE NEGATIVE FOR LEGIONELLA P... 





 2/12/17 17:10 Streptococcus pneumoniae Antigen (M - Final Complete





Urine Random Urine PRESUMPTIVE NEGATIVE FOR STREPTOCOCCU... 


 


 2/12/17 05:48 Aerobic Blood Culture - Preliminary Resulted





Blood Peripheral NO GROWTH IN 4 DAYS 





 2/12/17 05:48 Anaerobic Blood Culture - Preliminary Resulted





Blood Peripheral NO GROWTH IN 4 DAYS 


 


 2/11/17 17:07 Influenza Types A,B Antigen (VIVIANA) - Final Complete





Nasal Aspirate NEGATIVE FOR FLU A AND B ANTIGEN.... 


 


 2/11/17 17:07 Aerobic Blood Culture - Final Complete





Blood Peripheral NO GROWTH IN 5 DAYS 





 2/11/17 17:07 Anaerobic Blood Culture - Final Complete





Blood Peripheral NO GROWTH IN 5 DAYS 











Physical Exam


Physical Exam


VITAL SIGNS: Reviewed


GENERAL: This is a 61 year-old [place]er male, resting comfortable in bed.  He


is in no distress at this time.


HEENT:  Mucous membranes shows he has less oral thrush.  He is hydrated.  There 

is


no jaundice.


NECK: Supple.  No increased JVD negative thyromegaly


CARDIOVASCULAR: Regular rate and rhythm.


RESPIRATORY: Clear to auscultation


GASTROINTESTINAL: Bowel sounds are present.  Soft nontender nondistended 

positive bowel sounds


:  No CVA tenderness.


MUSCULOSKELETAL: No edema. Homans' sign negative.


NEUROLOGIC: Alert, oriented.  Speech is clear and fluent.  Moving all


extremities freely.


Psych: appropriate mood and affect





A/P


Assessment and Plan


1. Sepsis on admission.


2. COPD exacerbation.


3. Hyponatremia.


4. Hospital acquired pneumonia.


5. History of A-fib, currently in sinus rhythm.


6. History of ulcerative colitis.


7. Hypokalemia


 


DISCUSSION:


Labs reviewed


gentle hydration. 


High CRP and ESR


monitored on telemetry, regular rhythm


Continue antibiotic


ID consult, , states probable PNA vs non infectious pneumonitis with hypoxia, 

thrush


Pulmonary consult appreciated, status post bronchoscopy.  


Continue nebulizer treatments


Continue oxygen to keep saturation above 90%  Encouraged pt. to stay on rm air 

unless he is SOB, and or sat <90.  Currently pt. is 96 on rm air.


continues with coarse voice, and cough.  Has meds for prn use.


 


steroids


DVT prophylaxis will be provided as well as GI prophylaxis.


Continue home medication as indicated


Diflucan for thrush continued, improving


Cough medication with codeine





Discussed with RN


Discussed with patient


This remained stable hopefully DC tomorrow


Discussed With:  Nurse, Family (patient), Other (Dr. Obrien, patient seen  on 

his behalf)








Tiffany Obrien MD Feb 16, 2017 16:34

## 2017-02-16 NOTE — MP
cc:

RINA MURRY

****

 

 

DATE OF SURGERY

2/15/2017

 

PROCEDURE

Fiberoptic bronchoscopy with brushings, washings and lavage.

 

PREOPERATIVE DIAGNOSIS

Bilateral pulmonary infiltrates and pneumonia.

 

POSTOPERATIVE DIAGNOSIS

Bilateral pulmonary infiltrates and pneumonia.

 

ANESTHESIA

General

 

SURGEON

Rina Murry MD

 

PROCEDURE AND FINDINGS

The patient was intubated with a number 7.5 endotracheal tube  under general

anesthesia and was sedated with Diprivan.  The  Olympus IT-180 bronchoscope was

used to visualize the bronchi.  The scope was advanced via the endotracheal

tube into the trachea.  The trachea and josué appeared normal.  The scope was

then advanced to the right mainstem and right upper lobe segmental bronchi.

The right upper lobe segmental bronchi demonstrated moderate endobronchitis

with thick mucoid secretions.  These were suctioned out.  Next, the right

middle lobe segmental bronchi were visualized which demonstrated mucoid

secretions and moderate endobronchitis with mucosal ridging.  The secretions

were suctioned out.

 

Next, the right lower lobe segmental bronchi were visualized.  These rhonchi

demonstrated moderate endobronchitis with mucopurulent secretions and these

were suctioned out.  Saline washings and lavage were done.  Brushings were done

for cytology and micro and no significant bleeding was observed.  There were no

endobronchial masses on the right side.

 

Next, the scope was advanced to left mainstem and left upper lobe segmental

bronchi.  These bronchi demonstrated mucoid secretions and endobronchitis and

mucosal edema.  Thick secretions were noted which were suctioned out.

 

Next, the left lower lobe segmental bronchi were visualized.  These bronchi

demonstrated moderate endobronchitis with mucoid secretions and these were

suctioned out.  Saline washings and lavage were done.

 

There were no endobronchial lesions noted in the left lung.  The procedure was

then terminated.

 

The patient tolerated procedure well.

 

 

 

 

 

                              _________________________________

                              Rina Murry MD

 

 

 

JJAILENE/TAMIKA

D:  2/15/2017/12:03 PM

T:  2/16/2017/1:24 PM

Visit #:  [id number]

Job #:  [id number]

## 2017-02-16 NOTE — RADRPT
EXAM DATE/TIME:  02/16/2017 20:01 

 

[facility] COMPARISON:     

No previous studies available for comparison.

 

                     

INDICATIONS :     

Short of breath.

                     

 

MEDICAL HISTORY :     

None.          

 

SURGICAL HISTORY :     

None.   

 

ENCOUNTER:     

Initial                                        

 

ACUITY:     

1 month      

 

PAIN SCORE:     

3/10

 

LOCATION:     

Bilateral chest 

 

FINDINGS:     

The heart size is normal.  There is diffuse consolidation seen bilaterally being most prominent in th
e perihilar regions.  There is relative sparing of the periphery of the lungs.  A significant effusio
n is not seen.  

 

CONCLUSION:     Widespread areas of consolidation likely representing edema or diffuse inflammatory c
roxana.  

 

 

 Nahun Azul MD on February 16, 2017 at 20:14                

Board Certified Radiologist.

 This report was verified electronically.

## 2017-02-16 NOTE — PD.CARD.PN
Subjective


Subjective Remarks


No CP, SOB improved, ambulating in the halls, c/o LE edema





Objective


Medications





 Current Medications








 Medications


  (Trade)  Dose


 Ordered  Sig/Gissel


 Route  Start Time


 Stop Time Status Last Admin


 


  (NS Flush)  2 ml  UNSCH  PRN


 IV FLUSH  2/11/17 19:15


     


 


 


 IV Flush 2 ml  2 ml  BID


 IV FLUSH  2/11/17 21:00


    2/15/17 09:10


 


 


 Piperacillin Sod/


 Tazobactam Sod  100 ml @ 


 200 mls/hr  Q6H


 IV  2/11/17 23:00


    2/16/17 04:52


 


 


  (Vancomycin


 Consult Pharmacy)  0 ml @ 0


 mls/hr  UNSCH


 OTHER  2/11/17 19:15


     


 


 


  (Zofran Inj)  4 mg  Q6H  PRN


 IV  2/11/17 19:15


     


 


 


  (Heparin Inj)  5,000 units  Q12H


 SQ  2/11/17 21:00


    2/15/17 23:30


 


 


  (Pepcid)  20 mg  BID


 PO  2/11/17 21:00


    2/16/17 09:06


 


 


  (Vitamin C)  500 mg  DAILY


 PO  2/12/17 09:00


    2/16/17 09:05


 


 


  (Symbicort


 160-4.5 Inh)  1 puff  Q12HR


 INH  2/11/17 21:00


   Hold 2/12/17 09:00


 


 


  (Bentyl)  20 mg  TID  PRN


 PO  2/11/17 19:15


     


 


 


  (Cardizem Cd)  240 mg  DAILY


 PO  2/12/17 09:00


    2/15/17 09:08


 


 


  (Neurontin)  100 mg  TID


 PO  2/12/17 09:00


    2/16/17 09:05


 


 


  (Asacol Hd )  1,600 mg  Q8HR


 PO  2/11/17 22:00


    2/16/17 09:05


 


 


  (Theragran


 Hematinic)  1 tab  DAILY


 PO  2/12/17 09:00


    2/16/17 09:05


 


 


  (Nephrocaps)  1 cap  DAILY


 PO  2/12/17 09:00


    2/16/17 09:06


 


 


  (Tylenol)  650 mg  Q4H  PRN


 PO  2/11/17 23:30


     


 


 


  (Mycostatin  Liq)  5 ml  QID


 SWISH-SWAL  2/11/17 23:30


    2/16/17 09:06


 


 


  (Robitussin Ac


 200-20 Mg/10 ml


 Liq)  10 ml  Q4H  PRN


 PO  2/12/17 10:30


    2/16/17 09:08


 


 


  (SoluMEDROL INJ)  40 mg  Q6HR


 IV  2/12/17 18:00


    2/16/17 04:52


 


 


 Clonazepam 1 mg  1 mg  HS  PRN


 PO  2/12/17 21:30


    2/15/17 23:30


 


 


  (KCl Inj/1/2 NS


 1000 ml Inj)  1,005 ml @ 


 42 mls/hr  [id number]


 IV  2/13/17 10:00


    2/14/17 04:05


 


 


  (Zithromax)  500 mg  Q24H


 PO  2/13/17 18:00


    2/15/17 17:48


 


 


  (Chloraseptic


 Spray)  2 spray  Q2H  PRN


 OROPHARYNG  2/14/17 14:15


     


 


 


 Fluconazole 200 mg  200 mg  DAILY


 PO  2/15/17 09:00


    2/16/17 09:06


 


 


  (Vancomycin Inj/


  ml Inj)  515 ml @ 


 250 mls/hr  Q12H


 IV  2/15/17 18:00


    2/16/17 04:51


 


 


 Miscellaneous


 Information  SPECIFIC LAB TO BE


 DRAWN:VANCOMYCIN


 TROUGH DATE TO...  ONCE  ONCE


 XX  2/17/17 05:45


 2/17/17 05:46   


 


 


 Miscellaneous


 Information  ALL


 NURSING


 DEPARTME...  UNSCH  PRN


 XX  2/15/17 12:14


 2/16/17 12:13   


 








Vital Signs / I&O





 Vital Signs








  Date Time  Temp Pulse Resp B/P Pulse Ox O2 Delivery O2 Flow Rate FiO2


 


2/16/17 08:19     94 Nasal Cannula 2.00 


 


2/16/17 04:00 97.9 80 18 144/88 92   


 


2/15/17 20:00 98.2 98 20 154/95 9   


 


2/15/17 20:00      Nasal Cannula 4.00 





      Humidified  


 


2/15/17 19:52     97   


 


2/15/17 18:00      Nasal Cannula 4.00 





      Humidified  


 


2/15/17 18:00  94      


 


2/15/17 17:00 98.1 95 16 154/97 96   


 


2/15/17 17:00  80      


 


2/15/17 15:00  94      


 


2/15/17 14:00  78      


 


2/15/17 13:15 98.0 82 17 141/84 92 Nasal Cannula 4 


 


2/15/17 13:00  83 17 144/85 90 Nasal Cannula 4 


 


2/15/17 12:45  84 17 139/88 90 Nasal Cannula 6 


 


2/15/17 12:30  88 17 138/93 93 Nasal Cannula 6 


 


2/15/17 12:11 98.0 96 17 149/93 93 Nasal Cannula 6 


 


2/15/17 10:06  88      


 


2/15/17 09:30  87      








 I/O








 2/15/17 2/15/17 2/15/17 2/16/17 2/16/17 2/16/17





 07:00 15:00 23:00 07:00 15:00 23:00


 


Intake Total  900 ml  1005 ml  


 


Output Total 1400 ml   650 ml  


 


Balance -1400 ml 900 ml  355 ml  


 


      


 


Intake Oral    480 ml  


 


IV Total    525 ml  


 


Other  900 ml    


 


Output Urine Total 1400 ml   650 ml  








Physical Exam


GENERAL: On O2


SKIN: Warm and dry.


HEAD: Normocephalic.


EYES: No scleral icterus. No injection or drainage. 


NECK: Supple, trachea midline. No JVD or lymphadenopathy.


CARDIOVASCULAR: Regular rate and rhythm without murmurs, gallops, or rubs. 


RESPIRATORY: Breath sounds equal bilaterally. No accessory muscle use.


GASTROINTESTINAL: Abdomen soft, non-tender, nondistended. 


MUSCULOSKELETAL: No cyanosis, or edema. 





Laboratory





 Laboratory Tests








Test 2/15/17





 07:45


 


White Blood Count 7.8 TH/MM3


 


Red Blood Count 4.58 MIL/MM3


 


Hemoglobin 13.4 GM/DL


 


Hematocrit 39.9 %


 


Mean Corpuscular Volume 87.1 FL


 


Mean Corpuscular Hemoglobin 29.2 PG


 


Mean Corpuscular Hemoglobin 33.5 %





Concent 


 


Red Cell Distribution Width 14.5 %


 


Platelet Count 324 TH/MM3


 


Mean Platelet Volume 9.0 FL


 


Sodium Level 140 MEQ/L


 


Potassium Level 3.7 MEQ/L


 


Chloride Level 107 MEQ/L


 


Carbon Dioxide Level 21.5 MEQ/L


 


Anion Gap 12 MEQ/L


 


Blood Urea Nitrogen 15 MG/DL


 


Creatinine 0.94 MG/DL


 


Estimat Glomerular Filtration 82 ML/MIN





Rate 


 


Random Glucose 132 MG/DL


 


Calcium Level 8.6 MG/DL


 


Vancomycin Level Trough 8.2 MCG/ML








Imaging





Last Impressions








Chest X-Ray 2/15/17 0000 Signed





Impressions: 





 Service Date/Time:  Wednesday, February 15, 2017 12:30 - CONCLUSION:  Mild 





 interval increase in hazy opacity in both lungs which may represent pulmonary 





 edema.     Bhavik Lockett MD 


 


CT Angiography 2/11/17 0000 Signed





Impressions: 





 Service Date/Time:  Saturday, February 11, 2017 18:29 - CONCLUSION:  1. 

Negative 





 for pulmonary embolus. 2. Interval development of bilateral lung disease on a 





 background of emphysema. Findings are not entirely typical of 

bronchopneumonia. 





 Differential diagnosis includes some form of smoking related disease with 





 respiratory bronchiolitis or possibly Langerhans' cell histiocytosis.     

Bravo Robison MD 











Assessment and Plan


Problem List:  


(1) Pneumonia


(2) COPD (chronic obstructive pulmonary disease)


(3) Atrial fibrillation


Assessment and Plan


Overall improvement. No recurrent AF or pauses. Continue monitoring on tele. 

Pulm eval in progress, bronchoscopy today, await bx results. Continue tx for 

pneumonia/COPD exac. Increase activity. LE edema, will decrease diltiazem and 

add low dose metoprolol. Transfer to 7th floor.





Problem Qualifiers





(1) Pneumonia:  





(2) COPD (chronic obstructive pulmonary disease):  


Qualified Code:  J44.1 - Chronic obstructive pulmonary disease with acute 

exacerbation


(3) Atrial fibrillation:  


Qualified Code:  I48.0 - Paroxysmal atrial fibrillation





Romi Davalos MD Feb 16, 2017 09:18

## 2017-02-17 VITALS
SYSTOLIC BLOOD PRESSURE: 137 MMHG | DIASTOLIC BLOOD PRESSURE: 72 MMHG | OXYGEN SATURATION: 98 % | RESPIRATION RATE: 17 BRPM | TEMPERATURE: 98 F | HEART RATE: 78 BPM

## 2017-02-17 VITALS
OXYGEN SATURATION: 97 % | RESPIRATION RATE: 21 BRPM | DIASTOLIC BLOOD PRESSURE: 79 MMHG | HEART RATE: 75 BPM | SYSTOLIC BLOOD PRESSURE: 153 MMHG | TEMPERATURE: 97.5 F

## 2017-02-17 VITALS — HEART RATE: 86 BPM

## 2017-02-17 VITALS — HEART RATE: 74 BPM

## 2017-02-17 VITALS
HEART RATE: 82 BPM | SYSTOLIC BLOOD PRESSURE: 148 MMHG | RESPIRATION RATE: 20 BRPM | DIASTOLIC BLOOD PRESSURE: 98 MMHG | TEMPERATURE: 98.3 F | OXYGEN SATURATION: 92 %

## 2017-02-17 VITALS
SYSTOLIC BLOOD PRESSURE: 137 MMHG | OXYGEN SATURATION: 98 % | DIASTOLIC BLOOD PRESSURE: 91 MMHG | TEMPERATURE: 98.4 F | HEART RATE: 76 BPM | RESPIRATION RATE: 20 BRPM

## 2017-02-17 VITALS
HEART RATE: 94 BPM | RESPIRATION RATE: 20 BRPM | DIASTOLIC BLOOD PRESSURE: 91 MMHG | OXYGEN SATURATION: 98 % | SYSTOLIC BLOOD PRESSURE: 137 MMHG | TEMPERATURE: 98 F

## 2017-02-17 VITALS — OXYGEN SATURATION: 93 %

## 2017-02-17 VITALS — HEART RATE: 84 BPM

## 2017-02-17 VITALS — HEART RATE: 94 BPM

## 2017-02-17 VITALS — HEART RATE: 82 BPM

## 2017-02-17 VITALS — OXYGEN SATURATION: 97 %

## 2017-02-17 VITALS — HEART RATE: 76 BPM

## 2017-02-17 VITALS — HEART RATE: 90 BPM

## 2017-02-17 VITALS — HEART RATE: 80 BPM

## 2017-02-17 LAB — GFR SERPLBLD BASED ON 1.73 SQ M-ARVRAT: 105 ML/MIN (ref 89–?)

## 2017-02-17 RX ADMIN — FAMOTIDINE SCH MG: 20 TABLET, FILM COATED ORAL at 22:00

## 2017-02-17 RX ADMIN — NYSTATIN SCH ML: 100000 SUSPENSION ORAL at 08:49

## 2017-02-17 RX ADMIN — TAZOBACTAM SODIUM AND PIPERACILLIN SODIUM SCH MLS/HR: 500; 4 INJECTION, SOLUTION INTRAVENOUS at 06:13

## 2017-02-17 RX ADMIN — FLUCONAZOLE SCH MG: 200 TABLET ORAL at 08:48

## 2017-02-17 RX ADMIN — MESALAMINE SCH MG: 800 TABLET, DELAYED RELEASE ORAL at 06:13

## 2017-02-17 RX ADMIN — TAZOBACTAM SODIUM AND PIPERACILLIN SODIUM SCH MLS/HR: 500; 4 INJECTION, SOLUTION INTRAVENOUS at 17:23

## 2017-02-17 RX ADMIN — HEPARIN SODIUM SCH UNITS: 10000 INJECTION, SOLUTION INTRAVENOUS; SUBCUTANEOUS at 08:50

## 2017-02-17 RX ADMIN — METOPROLOL TARTRATE SCH MG: 25 TABLET, FILM COATED ORAL at 08:49

## 2017-02-17 RX ADMIN — IPRATROPIUM BROMIDE AND ALBUTEROL SULFATE PRN AMPULE: .5; 3 SOLUTION RESPIRATORY (INHALATION) at 06:41

## 2017-02-17 RX ADMIN — GUAIFENESIN AND CODEINE PHOSPHATE PRN ML: 10; 100 LIQUID ORAL at 08:58

## 2017-02-17 RX ADMIN — GABAPENTIN SCH MG: 100 CAPSULE ORAL at 13:28

## 2017-02-17 RX ADMIN — IPRATROPIUM BROMIDE AND ALBUTEROL SULFATE PRN AMPULE: .5; 3 SOLUTION RESPIRATORY (INHALATION) at 08:57

## 2017-02-17 RX ADMIN — METOPROLOL TARTRATE SCH MG: 25 TABLET, FILM COATED ORAL at 21:59

## 2017-02-17 RX ADMIN — TAZOBACTAM SODIUM AND PIPERACILLIN SODIUM SCH MLS/HR: 500; 4 INJECTION, SOLUTION INTRAVENOUS at 22:22

## 2017-02-17 RX ADMIN — NYSTATIN SCH ML: 100000 SUSPENSION ORAL at 13:00

## 2017-02-17 RX ADMIN — IPRATROPIUM BROMIDE AND ALBUTEROL SULFATE PRN AMPULE: .5; 3 SOLUTION RESPIRATORY (INHALATION) at 22:29

## 2017-02-17 RX ADMIN — MESALAMINE SCH MG: 800 TABLET, DELAYED RELEASE ORAL at 13:25

## 2017-02-17 RX ADMIN — MESALAMINE SCH MG: 800 TABLET, DELAYED RELEASE ORAL at 22:01

## 2017-02-17 RX ADMIN — TAZOBACTAM SODIUM AND PIPERACILLIN SODIUM SCH MLS/HR: 500; 4 INJECTION, SOLUTION INTRAVENOUS at 11:14

## 2017-02-17 RX ADMIN — IPRATROPIUM BROMIDE AND ALBUTEROL SULFATE PRN AMPULE: .5; 3 SOLUTION RESPIRATORY (INHALATION) at 11:57

## 2017-02-17 RX ADMIN — DILTIAZEM HYDROCHLORIDE SCH MG: 120 CAPSULE, EXTENDED RELEASE ORAL at 08:49

## 2017-02-17 RX ADMIN — SODIUM CHLORIDE, PRESERVATIVE FREE PRN ML: 5 INJECTION INTRAVENOUS at 22:22

## 2017-02-17 RX ADMIN — SODIUM CHLORIDE SCH MLS/HR: 900 INJECTION INTRAVENOUS at 18:03

## 2017-02-17 RX ADMIN — FAMOTIDINE SCH MG: 20 TABLET, FILM COATED ORAL at 08:49

## 2017-02-17 RX ADMIN — GABAPENTIN SCH MG: 100 CAPSULE ORAL at 08:49

## 2017-02-17 RX ADMIN — OXYCODONE HYDROCHLORIDE AND ACETAMINOPHEN SCH MG: 500 TABLET ORAL at 08:49

## 2017-02-17 RX ADMIN — HEPARIN SODIUM SCH UNITS: 10000 INJECTION, SOLUTION INTRAVENOUS; SUBCUTANEOUS at 22:01

## 2017-02-17 RX ADMIN — SODIUM CHLORIDE SCH MLS/HR: 900 INJECTION INTRAVENOUS at 06:13

## 2017-02-17 RX ADMIN — GABAPENTIN SCH MG: 100 CAPSULE ORAL at 18:06

## 2017-02-17 RX ADMIN — NYSTATIN SCH ML: 100000 SUSPENSION ORAL at 22:00

## 2017-02-17 RX ADMIN — AZITHROMYCIN SCH MG: 250 TABLET, FILM COATED ORAL at 18:06

## 2017-02-17 RX ADMIN — NYSTATIN SCH ML: 100000 SUSPENSION ORAL at 13:25

## 2017-02-17 RX ADMIN — SODIUM CHLORIDE, PRESERVATIVE FREE SCH ML: 5 INJECTION INTRAVENOUS at 21:00

## 2017-02-17 RX ADMIN — METHYLPREDNISOLONE SODIUM SUCCINATE SCH MG: 40 INJECTION, POWDER, FOR SOLUTION INTRAMUSCULAR; INTRAVENOUS at 08:50

## 2017-02-17 RX ADMIN — Medication SCH TAB: at 09:00

## 2017-02-17 RX ADMIN — Medication SCH CAP: at 09:00

## 2017-02-17 NOTE — HHI.PR
Subjective


Remarks


Still SOB . On o2  2l.


Coughed  up  clear  sputum. No fever.Bronch  wash  shows  no  malignant cells. 

Fungal organisms  seen





Objective





 Vital Signs








  Date Time  Temp Pulse Resp B/P Pulse Ox O2 Delivery O2 Flow Rate FiO2


 


2/17/17 18:47     93   21


 


2/17/17 16:00 98.0 78 17 137/72 98   


 


2/17/17 15:06      Nasal Cannula 2.50 21


 


2/17/17 13:00  84      


 


2/17/17 12:00  86      


 


2/17/17 11:00 98.0 86 20 137/91 98   


 


2/17/17 11:00  94      


 


2/17/17 10:09  90      


 


2/17/17 09:00  74      


 


2/17/17 08:57     97 Nasal Cannula 2.50 


 


2/17/17 08:00  76      


 


2/17/17 07:00  94      


 


2/17/17 07:00     91 Nasal Cannula 2.00 





      Humidified  


 


2/17/17 07:00 98.4 76 20 137/91 98   


 


2/17/17 07:00  76      


 


2/17/17 06:00  80      


 


2/17/17 05:00  74      


 


2/17/17 04:00 98.3 79 20 148/98 92   


 


2/17/17 04:00  82      


 


2/17/17 03:00  94      


 


2/17/17 02:00  76      


 


2/17/17 01:00  84      


 


2/17/17 00:00  82      


 


2/16/17 23:00  74      


 


2/16/17 22:00  90      


 


2/16/17 21:00  76      


 


2/16/17 20:55     93 Nasal Cannula 3.00 


 


2/16/17 20:00     91 Nasal Cannula 2.00 





      Humidified  


 


2/16/17 20:00  59      


 


2/16/17 20:00 97.8 85 20 155/103 94   


 


2/16/17 20:00  79      








 I/O








 2/16/17 2/16/17 2/16/17 2/17/17 2/17/17 2/17/17





 07:00 15:00 23:00 07:00 15:00 23:00


 


Intake Total 1005 ml   380 ml  


 


Output Total 650 ml     


 


Balance 355 ml   380 ml  


 


      


 


Intake Oral 480 ml   380 ml  


 


IV Total 525 ml     


 


Output Urine Total 650 ml     


 


# Voids    3  


 


# Bowel Movements   4   








Result Diagram:  


2/15/17 0745                                                                   

             2/17/17 1487





Objective Remarks


GENERAL: This is a 61 year-old [place] male, up  in bed.  He


is in no distress .


HEENT:  Mucous membranes moist. There is


no jaundice.Throat Clear.


NECK: Supple.


CARDIOVASCULAR: Regular rate and rhythm.


RESPIRATORY: Few crackles  at bases  as well as  scattered wheeze.


GASTROINTESTINAL: Bowel sounds are present.


ABDOMEN: Not distended.


:  No CVA tenderness.


MUSCULOSKELETAL: No edema. Homans' sign negative.


NEUROLOGIC: Alert, oriented.  Speech is clear and fluent.  Moving all


extremities well.


 





 


 


CT angiography showed no PE, interval development of bilateral lung disease on


a background of emphysema, not entirely typical for bronchopneumonia.


Differential diagnosis includes some form of smoking related disease of


respiratory bronchiolitis or possible Langerhans cells, histiocytosis.





Assessment and Plan


Assessment and Plan





IMPRESSION:


1. Sepsis on admission.


2. COPD exacerbation.


3. Hyponatremia.


4. Hospital acquired pneumonia.


5. History of A-fib, currently in sinus rhythm.


6. History of ulcerative colitis.











Plan :





1. Cont Antibiotics as ordered.





2. Nebs qid , duoneb.





3. Cont  solumedrol to 40 mg bid.





4.  BMP in am





5. Rpt Chest X ray .





6. PT  and OT








ARIADNA Murry MD Feb 17, 2017 19:57

## 2017-02-17 NOTE — HHI.PR
Subjective


Remarks


Patient is short of breath


Feeling tired and weak


Coughing some


Mouth is better can swallow with ease now


No other complaint


Review of systems a 10 point system otherwise unremarkable





Objective


Objective Results


-





 Vital Signs








  Date Time  Temp Pulse Resp B/P Pulse Ox O2 Delivery O2 Flow Rate FiO2


 


2/17/17 10:09  90      


 


2/17/17 09:00  74      


 


2/17/17 08:57     97 Nasal Cannula 2.50 


 


2/17/17 08:00  76      


 


2/17/17 07:00  94      


 


2/17/17 07:00     91 Nasal Cannula 2.00 





      Humidified  


 


2/17/17 07:00 98.4 76 20 137/91 98   


 


2/17/17 07:00  76      


 


2/17/17 06:00  80      


 


2/17/17 05:00  74      


 


2/17/17 04:00 98.3 79 20 148/98 92   


 


2/17/17 04:00  82      


 


2/17/17 03:00  94      


 


2/17/17 02:00  76      


 


2/17/17 01:00  84      


 


2/17/17 00:00  82      


 


2/16/17 23:00  74      


 


2/16/17 22:00  90      


 


2/16/17 21:00  76      


 


2/16/17 20:55     93 Nasal Cannula 3.00 


 


2/16/17 20:00     91 Nasal Cannula 2.00 





      Humidified  


 


2/16/17 20:00  59      


 


2/16/17 20:00 97.8 85 20 155/103 94   


 


2/16/17 20:00  79      


 


2/16/17 17:00  86      


 


2/16/17 15:00  88      


 


2/16/17 13:00  80      








 I/O








 2/16/17 2/16/17 2/16/17 2/17/17 2/17/17 2/17/17





 07:00 15:00 23:00 07:00 15:00 23:00


 


Intake Total 1005 ml   380 ml  


 


Output Total 650 ml     


 


Balance 355 ml   380 ml  


 


      


 


Intake Oral 480 ml   380 ml  


 


IV Total 525 ml     


 


Output Urine Total 650 ml     


 


# Voids    3  


 


# Bowel Movements   4   








Result Diagram:  


2/15/17 0745                                                                   

             2/17/17 0455





Other Results





Laboratory Tests








Test 2/17/17





 04:55


 


Creatinine 0.76 


 


Estimat Glomerular Filtration 105 





Rate 


 


Vancomycin Level Trough 17.8 














 Date/Time Procedure Status





Source Growth 


 


 2/15/17 11:45 Gram Stain - Final Complete





Bronchial Washings Right Lower Lobe  





 2/15/17 11:45 Bronchial Culture - Final Complete





Bronchial Washings Right Lower Lobe LIGHT GROWTH NORMAL RESPIRATORY JACKELYN 


 


 2/15/17 11:45 Fungal Smear - Final Resulted





Bronchial Washings Right Lower Lobe NO FUNGAL ELEMENTS SEEN. 





 2/15/17 11:45 Fungal Culture Resulted





Bronchial Washings Right Lower Lobe Pending 


 


 2/15/17 11:45 Fungal Smear Received





Bronchial Brushings Right Lower Lobe Pending 





 2/15/17 11:45 Fungal Culture Received





Bronchial Brushings Right Lower Lobe Pending 


 


 2/15/17 11:45 Bronchial Aspirate Culture - Final Complete





Bronchial Brushings Right Lower Lobe NO GROWTH IN 48 HOURS. 


 


 2/15/17 11:45 Acid Fast Stain - Final Resulted





Bronchial Washings Right Lower Lobe NO ACID FAST BACILLI SEEN 





 2/15/17 11:45 Mycobacterial Culture Resulted





Bronchial Washings Right Lower Lobe Pending 


 


 2/15/17 11:45 Acid Fast Stain Received





Bronchial Brushings Right Lower Lobe Pending 





 2/15/17 11:45 Mycobacterial Culture Received





Bronchial Brushings Right Lower Lobe Pending 


 


 2/15/17 11:45  Cancelled





Other  


 


 2/12/17 17:10 MRSA Surveillance Culture - Final Complete





Nasopharyngeal NO MRSA ISOLATED 


 


 2/12/17 17:10 Legionella Antigen - Final Complete





Urine Random Urine PRESUMPTIVE NEGATIVE FOR LEGIONELLA P... 





 2/12/17 17:10 Streptococcus pneumoniae Antigen (M - Final Complete





Urine Random Urine PRESUMPTIVE NEGATIVE FOR STREPTOCOCCU... 











Physical Exam


Physical Exam


VITAL SIGNS: Reviewed


GENERAL: This is a 61 year-old Turkmen male, resting comfortable in bed.  He


is in no distress at this time.


HEENT:  Mucous membranes shows he has less oral thrush.  He is hydrated.  There 

is


no jaundice.


NECK: Supple.  No increased JVD negative thyromegaly


CARDIOVASCULAR: Regular rate and rhythm.


RESPIRATORY: Clear to auscultation


GASTROINTESTINAL: Bowel sounds are present.  Soft nontender nondistended 

positive bowel sounds


:  No CVA tenderness.


MUSCULOSKELETAL: No edema. Homans' sign negative.


NEUROLOGIC: Alert, oriented.  Speech is clear and fluent.  Moving all


extremities freely.


Psych: appropriate mood and affect





A/P


Assessment and Plan


1. Sepsis on admission.


2. COPD exacerbation.


3. Hyponatremia.


4. Hospital acquired pneumonia.


5. History of A-fib, currently in sinus rhythm.


6. History of ulcerative colitis.


7. Hypokalemia


 


DISCUSSION:


Tiredness and weakness with low energy.  Will monitor


gentle hydration. 


High CRP and ESR


monitored on telemetry, regular rhythm


Continue antibiotic


ID consult, , states probable PNA vs non infectious pneumonitis with hypoxia, 

thrush


Pulmonary consult appreciated, status post bronchoscopy.  


Continue nebulizer treatments


Continue oxygen to keep saturation above 90%  Encouraged pt. to stay on rm air 

unless he is SOB, and or sat <90.  Currently pt. is 96 on rm air.


continues with hoarse voice, and cough.  Has meds for prn use.


 


steroids


DVT prophylaxis will be provided as well as GI prophylaxis.


Continue home medication as indicated


Diflucan for thrush continued, improving


Cough medication with codeine





Discussed with RN


Discussed with patient


Because of again shortness of breath with weakness tiredness will monitor him 

today, to watch for any exacerbation again.  If is stable hopefully DC tomorrow


Discussed With:  Nurse, Family (patient), Other (Dr. Obrien, patient seen  on 

his behalf)








Tiffany Obrien MD Feb 17, 2017 12:47

## 2017-02-17 NOTE — PD.CARD.PN
Subjective


Subjective Remarks


No CP or excessive SOB, still c/o cough





Objective


Medications





 Current Medications








 Medications


  (Trade)  Dose


 Ordered  Sig/Gissel


 Route  Start Time


 Stop Time Status Last Admin


 


  (NS Flush)  2 ml  UNSCH  PRN


 IV FLUSH  2/11/17 19:15


     


 


 


 IV Flush 2 ml  2 ml  BID


 IV FLUSH  2/11/17 21:00


    2/16/17 17:34


 


 


 Piperacillin Sod/


 Tazobactam Sod  100 ml @ 


 200 mls/hr  Q6H


 IV  2/11/17 23:00


    2/17/17 17:23


 


 


  (Vancomycin


 Consult Pharmacy)  0 ml @ 0


 mls/hr  UNSCH


 OTHER  2/11/17 19:15


     


 


 


  (Zofran Inj)  4 mg  Q6H  PRN


 IV  2/11/17 19:15


     


 


 


  (Heparin Inj)  5,000 units  Q12H


 SQ  2/11/17 21:00


    2/17/17 08:50


 


 


  (Pepcid)  20 mg  BID


 PO  2/11/17 21:00


    2/17/17 08:49


 


 


  (Vitamin C)  500 mg  DAILY


 PO  2/12/17 09:00


    2/17/17 08:49


 


 


  (Symbicort


 160-4.5 Inh)  1 puff  Q12HR


 INH  2/11/17 21:00


   Hold 2/12/17 09:00


 


 


  (Bentyl)  20 mg  TID  PRN


 PO  2/11/17 19:15


     


 


 


  (Neurontin)  100 mg  TID


 PO  2/12/17 09:00


    2/17/17 18:06


 


 


  (Asacol Hd Dr)  1,600 mg  Q8HR


 PO  2/11/17 22:00


    2/17/17 13:25


 


 


  (Theragran


 Hematinic)  1 tab  DAILY


 PO  2/12/17 09:00


    2/17/17 09:00


 


 


  (Nephrocaps)  1 cap  DAILY


 PO  2/12/17 09:00


    2/17/17 09:00


 


 


  (Tylenol)  650 mg  Q4H  PRN


 PO  2/11/17 23:30


     


 


 


  (Mycostatin  Liq)  5 ml  QID


 SWISH-SWAL  2/11/17 23:30


    2/17/17 13:25


 


 


  (Robitussin Ac


 200-20 Mg/10 ml


 Liq)  10 ml  Q4H  PRN


 PO  2/12/17 10:30


    2/17/17 08:58


 


 


  (KlonoPIN)  1 mg  HS  PRN


 PO  2/12/17 21:30


    2/16/17 21:58


 


 


  (Zithromax)  500 mg  Q24H


 PO  2/13/17 18:00


    2/17/17 18:06


 


 


  (Chloraseptic


 Spray)  2 spray  Q2H  PRN


 OROPHARYNG  2/14/17 14:15


     


 


 


 Fluconazole 200 mg  200 mg  DAILY


 PO  2/15/17 09:00


    2/17/17 08:48


 


 


  (Vancomycin Inj/


  ml Inj)  515 ml @ 


 250 mls/hr  Q12H


 IV  2/15/17 18:00


    2/17/17 18:03


 


 


  (Cardizem Cd)  120 mg  DAILY


 PO  2/16/17 11:30


    2/17/17 08:49


 


 


  (Lopressor)  25 mg  BID


 PO  2/16/17 21:00


    2/17/17 08:49


 


 


  (SoluMEDROL INJ)  40 mg  BID


 IV  2/17/17 09:00


    2/17/17 08:50


 








Vital Signs / I&O





 Vital Signs








  Date Time  Temp Pulse Resp B/P Pulse Ox O2 Delivery O2 Flow Rate FiO2


 


2/17/17 18:47     93   21


 


2/17/17 16:00 98.0 78 17 137/72 98   


 


2/17/17 15:06      Nasal Cannula 2.50 21


 


2/17/17 13:00  84      


 


2/17/17 12:00  86      


 


2/17/17 11:00 98.0 86 20 137/91 98   


 


2/17/17 11:00  94      


 


2/17/17 10:09  90      


 


2/17/17 09:00  74      


 


2/17/17 08:57     97 Nasal Cannula 2.50 


 


2/17/17 08:00  76      


 


2/17/17 07:00  94      


 


2/17/17 07:00     91 Nasal Cannula 2.00 





      Humidified  


 


2/17/17 07:00 98.4 76 20 137/91 98   


 


2/17/17 07:00  76      


 


2/17/17 06:00  80      


 


2/17/17 05:00  74      


 


2/17/17 04:00 98.3 79 20 148/98 92   


 


2/17/17 04:00  82      


 


2/17/17 03:00  94      


 


2/17/17 02:00  76      


 


2/17/17 01:00  84      


 


2/17/17 00:00  82      


 


2/16/17 23:00  74      


 


2/16/17 22:00  90      


 


2/16/17 21:00  76      


 


2/16/17 20:55     93 Nasal Cannula 3.00 


 


2/16/17 20:00     91 Nasal Cannula 2.00 





      Humidified  


 


2/16/17 20:00  59      


 


2/16/17 20:00 97.8 85 20 155/103 94   


 


2/16/17 20:00  79      








 I/O








 2/16/17 2/16/17 2/16/17 2/17/17 2/17/17 2/17/17





 07:00 15:00 23:00 07:00 15:00 23:00


 


Intake Total 1005 ml   380 ml  


 


Output Total 650 ml     


 


Balance 355 ml   380 ml  


 


      


 


Intake Oral 480 ml   380 ml  


 


IV Total 525 ml     


 


Output Urine Total 650 ml     


 


# Voids    3  


 


# Bowel Movements   4   








Physical Exam


GENERAL: On O2


SKIN: Warm and dry.


HEAD: Normocephalic.


EYES: No scleral icterus. No injection or drainage. 


NECK: Supple, trachea midline. No JVD or lymphadenopathy.


CARDIOVASCULAR: Regular rate and rhythm without murmurs, gallops, or rubs. Few 

rhonchi.


RESPIRATORY: Breath sounds equal bilaterally. No accessory muscle use.


GASTROINTESTINAL: Abdomen soft, non-tender, nondistended. 


MUSCULOSKELETAL: No cyanosis, or edema. 





Laboratory





Laboratory Tests








Test 2/17/17





 04:55


 


Creatinine 0.76 MG/DL


 


Estimat Glomerular Filtration 105 ML/MIN





Rate 


 


Vancomycin Level Trough 17.8 MCG/ML








Imaging





Last Impressions








Chest X-Ray 2/16/17 0000 Signed





Impressions: 





 Service Date/Time:  Thursday, February 16, 2017 20:01 - CONCLUSION: Widespread 





 areas of consolidation likely representing edema or diffuse inflammatory 

change. 





      Nahun Azul MD 


 


CT Angiography 2/11/17 0000 Signed





Impressions: 





 Service Date/Time:  Saturday, February 11, 2017 18:29 - CONCLUSION:  1. 

Negative 





 for pulmonary embolus. 2. Interval development of bilateral lung disease on a 





 background of emphysema. Findings are not entirely typical of 

bronchopneumonia. 





 Differential diagnosis includes some form of smoking related disease with 





 respiratory bronchiolitis or possibly Langerhans' cell histiocytosis.     

Bravo Robison MD 











Assessment and Plan


Problem List:  


(1) Pneumonia


(2) COPD (chronic obstructive pulmonary disease)


(3) Atrial fibrillation


Assessment and Plan


Overall improvement. No recurrent AF or pauses. Continue monitoring on tele. 

Pulm eval in progress. Continue tx for pneumonia/COPD exac. Increase activity. 

LE edema improving, decreased diltiazem and added low dose metoprolol. 

Anticipate discharge home soon.





Problem Qualifiers





(1) Pneumonia:  





(2) COPD (chronic obstructive pulmonary disease):  


Qualified Code:  J44.1 - Chronic obstructive pulmonary disease with acute 

exacerbation


(3) Atrial fibrillation:  


Qualified Code:  I48.0 - Paroxysmal atrial fibrillation





Romi Davalos MD Feb 17, 2017 19:21

## 2017-02-18 VITALS
SYSTOLIC BLOOD PRESSURE: 172 MMHG | DIASTOLIC BLOOD PRESSURE: 99 MMHG | TEMPERATURE: 100.4 F | RESPIRATION RATE: 20 BRPM | OXYGEN SATURATION: 92 % | HEART RATE: 82 BPM

## 2017-02-18 VITALS
OXYGEN SATURATION: 92 % | DIASTOLIC BLOOD PRESSURE: 89 MMHG | HEART RATE: 92 BPM | RESPIRATION RATE: 19 BRPM | TEMPERATURE: 98.2 F | SYSTOLIC BLOOD PRESSURE: 151 MMHG

## 2017-02-18 VITALS
DIASTOLIC BLOOD PRESSURE: 86 MMHG | RESPIRATION RATE: 20 BRPM | HEART RATE: 76 BPM | OXYGEN SATURATION: 92 % | SYSTOLIC BLOOD PRESSURE: 155 MMHG | TEMPERATURE: 97 F

## 2017-02-18 VITALS
OXYGEN SATURATION: 91 % | RESPIRATION RATE: 20 BRPM | DIASTOLIC BLOOD PRESSURE: 66 MMHG | SYSTOLIC BLOOD PRESSURE: 133 MMHG | HEART RATE: 89 BPM | TEMPERATURE: 99.1 F

## 2017-02-18 VITALS — OXYGEN SATURATION: 94 %

## 2017-02-18 VITALS
DIASTOLIC BLOOD PRESSURE: 77 MMHG | RESPIRATION RATE: 22 BRPM | HEART RATE: 84 BPM | SYSTOLIC BLOOD PRESSURE: 159 MMHG | TEMPERATURE: 96.9 F | OXYGEN SATURATION: 91 %

## 2017-02-18 VITALS — OXYGEN SATURATION: 93 %

## 2017-02-18 VITALS — OXYGEN SATURATION: 92 %

## 2017-02-18 RX ADMIN — NYSTATIN SCH ML: 100000 SUSPENSION ORAL at 17:24

## 2017-02-18 RX ADMIN — FLUCONAZOLE SCH MG: 200 TABLET ORAL at 09:50

## 2017-02-18 RX ADMIN — GUAIFENESIN AND CODEINE PHOSPHATE PRN ML: 10; 100 LIQUID ORAL at 21:11

## 2017-02-18 RX ADMIN — MESALAMINE SCH MG: 800 TABLET, DELAYED RELEASE ORAL at 05:00

## 2017-02-18 RX ADMIN — IPRATROPIUM BROMIDE AND ALBUTEROL SULFATE PRN AMPULE: .5; 3 SOLUTION RESPIRATORY (INHALATION) at 16:51

## 2017-02-18 RX ADMIN — SODIUM CHLORIDE SCH MLS/HR: 900 INJECTION INTRAVENOUS at 05:00

## 2017-02-18 RX ADMIN — GABAPENTIN SCH MG: 100 CAPSULE ORAL at 09:48

## 2017-02-18 RX ADMIN — IPRATROPIUM BROMIDE AND ALBUTEROL SULFATE PRN AMPULE: .5; 3 SOLUTION RESPIRATORY (INHALATION) at 06:34

## 2017-02-18 RX ADMIN — TAZOBACTAM SODIUM AND PIPERACILLIN SODIUM SCH MLS/HR: 500; 4 INJECTION, SOLUTION INTRAVENOUS at 05:00

## 2017-02-18 RX ADMIN — FAMOTIDINE SCH MG: 20 TABLET, FILM COATED ORAL at 09:50

## 2017-02-18 RX ADMIN — GUAIFENESIN AND CODEINE PHOSPHATE PRN ML: 10; 100 LIQUID ORAL at 09:47

## 2017-02-18 RX ADMIN — GABAPENTIN SCH MG: 100 CAPSULE ORAL at 13:05

## 2017-02-18 RX ADMIN — GUAIFENESIN AND CODEINE PHOSPHATE PRN ML: 10; 100 LIQUID ORAL at 17:23

## 2017-02-18 RX ADMIN — IPRATROPIUM BROMIDE AND ALBUTEROL SULFATE PRN AMPULE: .5; 3 SOLUTION RESPIRATORY (INHALATION) at 11:12

## 2017-02-18 RX ADMIN — Medication SCH TAB: at 09:48

## 2017-02-18 RX ADMIN — HEPARIN SODIUM SCH UNITS: 10000 INJECTION, SOLUTION INTRAVENOUS; SUBCUTANEOUS at 21:10

## 2017-02-18 RX ADMIN — METOPROLOL TARTRATE SCH MG: 25 TABLET, FILM COATED ORAL at 09:51

## 2017-02-18 RX ADMIN — GABAPENTIN SCH MG: 100 CAPSULE ORAL at 17:24

## 2017-02-18 RX ADMIN — TAZOBACTAM SODIUM AND PIPERACILLIN SODIUM SCH MLS/HR: 500; 4 INJECTION, SOLUTION INTRAVENOUS at 11:26

## 2017-02-18 RX ADMIN — OXYCODONE HYDROCHLORIDE AND ACETAMINOPHEN SCH MG: 500 TABLET ORAL at 09:50

## 2017-02-18 RX ADMIN — MESALAMINE SCH MG: 800 TABLET, DELAYED RELEASE ORAL at 13:06

## 2017-02-18 RX ADMIN — NYSTATIN SCH ML: 100000 SUSPENSION ORAL at 21:10

## 2017-02-18 RX ADMIN — CEFTRIAXONE SODIUM SCH MLS/HR: 2 INJECTION, POWDER, FOR SOLUTION INTRAMUSCULAR; INTRAVENOUS at 18:12

## 2017-02-18 RX ADMIN — HEPARIN SODIUM SCH UNITS: 10000 INJECTION, SOLUTION INTRAVENOUS; SUBCUTANEOUS at 09:53

## 2017-02-18 RX ADMIN — SODIUM CHLORIDE, PRESERVATIVE FREE SCH ML: 5 INJECTION INTRAVENOUS at 09:52

## 2017-02-18 RX ADMIN — Medication SCH CAP: at 09:50

## 2017-02-18 RX ADMIN — METOPROLOL TARTRATE SCH MG: 25 TABLET, FILM COATED ORAL at 21:10

## 2017-02-18 RX ADMIN — SODIUM CHLORIDE, PRESERVATIVE FREE SCH ML: 5 INJECTION INTRAVENOUS at 21:09

## 2017-02-18 RX ADMIN — NYSTATIN SCH ML: 100000 SUSPENSION ORAL at 09:52

## 2017-02-18 RX ADMIN — AZITHROMYCIN SCH MG: 250 TABLET, FILM COATED ORAL at 17:25

## 2017-02-18 RX ADMIN — GUAIFENESIN AND CODEINE PHOSPHATE PRN ML: 10; 100 LIQUID ORAL at 05:01

## 2017-02-18 RX ADMIN — MESALAMINE SCH MG: 800 TABLET, DELAYED RELEASE ORAL at 21:10

## 2017-02-18 RX ADMIN — NYSTATIN SCH ML: 100000 SUSPENSION ORAL at 13:05

## 2017-02-18 RX ADMIN — METHYLPREDNISOLONE SODIUM SUCCINATE SCH MG: 40 INJECTION, POWDER, FOR SOLUTION INTRAMUSCULAR; INTRAVENOUS at 09:53

## 2017-02-18 RX ADMIN — FAMOTIDINE SCH MG: 20 TABLET, FILM COATED ORAL at 21:10

## 2017-02-18 RX ADMIN — DILTIAZEM HYDROCHLORIDE SCH MG: 120 CAPSULE, EXTENDED RELEASE ORAL at 09:51

## 2017-02-18 NOTE — HHI.PR
Subjective


Remarks


Better today. On o2  2l.


Coughed  up  clear  sputum. Had  some low  grade fever.Bronch  wash  shows  no  

malignant cells. Fungal organisms  seen





Objective





 Vital Signs








  Date Time  Temp Pulse Resp B/P Pulse Ox O2 Delivery O2 Flow Rate FiO2


 


2/18/17 16:51     92 Nasal Cannula 3.00 


 


2/18/17 16:00 98.2 92 19 151/89 92   


 


2/18/17 12:00 100.4 82 20 172/99 92   





    160/90    


 


2/18/17 11:14     93 Nasal Cannula 3.00 


 


2/18/17 09:45     91 Nasal Cannula 2.00 





      Humidified  


 


2/18/17 08:00 99.1 89 20 133/66 91   


 


2/18/17 06:38     94 Nasal Cannula 2.00 


 


2/18/17 00:00 97.0 76 20 155/86 92   


 


2/17/17 22:00      Nasal Cannula 2.50 21


 


2/17/17 20:00 97.5 75 21 153/79 97   








 I/O








 2/17/17 2/17/17 2/17/17 2/18/17 2/18/17 2/18/17





 07:00 15:00 23:00 07:00 15:00 23:00


 


Intake Total 380 ml  240 ml 240 ml 964 ml 


 


Balance 380 ml  240 ml 240 ml 964 ml 


 


      


 


Intake Oral 380 ml  240 ml 240 ml 720 ml 


 


IV Total     244 ml 


 


# Voids 3  2 2 5 


 


# Bowel Movements   2 0 1 








Result Diagram:  


2/15/17 0745                                                                   

             2/17/17 0455





Objective Remarks


GENERAL: This is a 61 year-old [place] male, up  in bed.  He


is in no distress .


HEENT:  Mucous membranes moist. There is


no jaundice.Throat  injected.


NECK: Supple.


CARDIOVASCULAR: Regular rate and rhythm.


RESPIRATORY: Few crackles  at bases  as well as  scattered wheeze.


GASTROINTESTINAL: Bowel sounds are present.


ABDOMEN: Not distended.


:  No CVA tenderness.


MUSCULOSKELETAL: No edema. Homans' sign negative.


NEUROLOGIC: Alert, oriented.  Speech is clear .  Moving all


extremities well.


 





 


 


CT angiography showed no PE, interval development of bilateral lung disease on


a background of emphysema, not entirely typical for bronchopneumonia.


Differential diagnosis includes some form of smoking related disease of


respiratory bronchiolitis or possible Langerhans cells, histiocytosis.





Assessment and Plan


Assessment and Plan





IMPRESSION:


1. Sepsis on admission.


2. COPD exacerbation.


3. Hyponatremia.


4. Hospital acquired pneumonia.


5. History of A-fib, currently in sinus rhythm.


6. History of ulcerative colitis.











Plan :





1. Cont Antibiotics as ordered.





2. Nebs qid , duoneb.





3. D/C  solumedrol 





4. Add Prednisone  20 mg daily.





5. Rpt CBC,BMP





6. Home  when OK  with ID








ARIADNA Murry MD Feb 18, 2017 18:53

## 2017-02-18 NOTE — HHI.PR
Subjective


Remarks


Patient has short of breath


Patient is still Feeling tired and weak


Coughing more than yesterday dry type


Mouth is better can swallow with ease now


No other complaint


Review of systems a 10 point system otherwise unremarkable





Objective


Objective Results


-





 Vital Signs








  Date Time  Temp Pulse Resp B/P Pulse Ox O2 Delivery O2 Flow Rate FiO2


 


2/18/17 11:14     93 Nasal Cannula 3.00 


 


2/18/17 09:45     91 Nasal Cannula 2.00 





      Humidified  


 


2/18/17 08:00 99.1 89 20 133/66 91   


 


2/18/17 06:38     94 Nasal Cannula 2.00 


 


2/18/17 00:00 97.0 76 20 155/86 92   


 


2/17/17 22:00      Nasal Cannula 2.50 21


 


2/17/17 20:00 97.5 75 21 153/79 97   


 


2/17/17 18:47     93 Nasal Cannula 2.00 


 


2/17/17 16:00 98.0 78 17 137/72 98   


 


2/17/17 15:06      Nasal Cannula 2.50 21








 I/O








 2/17/17 2/17/17 2/17/17 2/18/17 2/18/17 2/18/17





 07:00 15:00 23:00 07:00 15:00 23:00


 


Intake Total 380 ml  240 ml 240 ml 244 ml 


 


Balance 380 ml  240 ml 240 ml 244 ml 


 


      


 


Intake Oral 380 ml  240 ml 240 ml  


 


IV Total     244 ml 


 


# Voids 3  2 2  


 


# Bowel Movements   2 0  








Result Diagram:  


2/15/17 0745                                                                   

             2/17/17 0455





Other Results











 Date/Time Procedure Status





Source Growth 


 


 2/15/17 11:45 Gram Stain - Final Complete





Bronchial Washings Right Lower Lobe  





 2/15/17 11:45 Bronchial Culture - Final Complete





Bronchial Washings Right Lower Lobe LIGHT GROWTH NORMAL RESPIRATORY JACKELYN 


 


 2/15/17 11:45 Fungal Smear - Final Resulted





Bronchial Washings Right Lower Lobe NO FUNGAL ELEMENTS SEEN. 





 2/15/17 11:45 Fungal Culture Resulted





Bronchial Washings Right Lower Lobe Pending 


 


 2/15/17 11:45 Fungal Smear Received





Bronchial Brushings Right Lower Lobe Pending 





 2/15/17 11:45 Fungal Culture Received





Bronchial Brushings Right Lower Lobe Pending 


 


 2/15/17 11:45 Bronchial Aspirate Culture - Final Complete





Bronchial Brushings Right Lower Lobe NO GROWTH IN 48 HOURS. 


 


 2/15/17 11:45 Acid Fast Stain - Final Resulted





Bronchial Washings Right Lower Lobe NO ACID FAST BACILLI SEEN 





 2/15/17 11:45 Mycobacterial Culture Resulted





Bronchial Washings Right Lower Lobe Pending 


 


 2/15/17 11:45  Cancelled





Other  











Physical Exam


Physical Exam


VITAL SIGNS: Reviewed


GENERAL: This is a 61 year-old Citizen of [place] male, resting comfortable in bed.  He


is in no distress at this time.


HEENT:  Mucous membranes shows he has less oral thrush.  He is hydrated.  There 

is


no jaundice.


NECK: Supple.  No increased JVD negative thyromegaly


CARDIOVASCULAR: Regular rate and rhythm.


RESPIRATORY: Coarse breathing bibasally


GASTROINTESTINAL: Bowel sounds are present.  Soft nontender nondistended 

positive bowel sounds


:  No CVA tenderness.


MUSCULOSKELETAL: No edema. Homans' sign negative.


NEUROLOGIC: Alert, oriented.  Speech is clear and fluent.  Moving all


extremities freely.


Psych: appropriate mood and affect





A/P


Assessment and Plan


1. Sepsis on admission.


2. COPD exacerbation.


3. Hyponatremia.


4. Hospital acquired pneumonia.


5. History of A-fib, currently in sinus rhythm.


6. History of ulcerative colitis.


7. Hypokalemia


 


DISCUSSION:


Tiredness and weakness with low energy.  Will monitor


X-ray report reviewed


High CRP and ESR


monitored on telemetry, regular rhythm


Continue antibiotic


ID consult, , states probable PNA vs non infectious pneumonitis with hypoxia, 

thrush


Pulmonary input appreciated, status post bronchoscopy.  


Continue nebulizer treatments


Continue oxygen to keep saturation above 90%  Encouraged pt. to stay on rm air 

unless he is SOB, and or sat <90.  Currently pt. is 96 on rm air.


continues with hoarse voice, and cough.  Has meds for prn use.


steroids


DVT prophylaxis will be provided as well as GI prophylaxis.


Continue home medication as indicated


Diflucan for thrush continued, improving


Cough medication with codeine





Discussed with RN


Discussed with patient


Because of again shortness of breath with weakness tiredness and cough, will 

watch for any exacerbation again.  If is stable hopefully DC tomorrow


Discussed With:  Nurse, Family (patient), Other (Dr. Obrien, patient seen  on 

his behalf)








Tiffany Obrien MD Feb 18, 2017 13:09

## 2017-02-18 NOTE — RADRPT
EXAM DATE/TIME:  02/18/2017 05:45 

 

[facility] COMPARISON:     

CHEST SINGLE AP, February 15, 2017, 12:30.

 

                     

INDICATIONS :     

Shortness of breath.

                     

 

MEDICAL HISTORY :     

Hypertension.  Chronic obstructive pulmonary disease.        

 

SURGICAL HISTORY :     

None.   

 

ENCOUNTER:     

Subsequent                                        

 

ACUITY:     

1 week      

 

PAIN SCORE:     

0/10

 

LOCATION:     

Bilateral chest 

 

FINDINGS:     

The cardiac silhouette is enlarged in transverse diameter. The aortic knob is prominent with tortuosi
ty of the descending thoracic aorta. There is patchy alveolar disease bilaterally compatible with anil
ma or pneumonia. There has been no significant change when compared to the prior exam.

 

CONCLUSION:     

1. Patchy alveolar disease characteristic of edema or pneumonia.  There has been no significant wilson
e when compared to the prior exam.

 

 

 

 Alden Jean Baptiste MD on February 18, 2017 at 6:34           

Board Certified Radiologist.

 This report was verified electronically.

## 2017-02-19 VITALS
DIASTOLIC BLOOD PRESSURE: 88 MMHG | SYSTOLIC BLOOD PRESSURE: 167 MMHG | HEART RATE: 84 BPM | RESPIRATION RATE: 22 BRPM | TEMPERATURE: 99.4 F | OXYGEN SATURATION: 90 %

## 2017-02-19 VITALS — OXYGEN SATURATION: 94 %

## 2017-02-19 VITALS
RESPIRATION RATE: 21 BRPM | TEMPERATURE: 98.1 F | DIASTOLIC BLOOD PRESSURE: 87 MMHG | SYSTOLIC BLOOD PRESSURE: 135 MMHG | HEART RATE: 78 BPM | OXYGEN SATURATION: 90 %

## 2017-02-19 VITALS — OXYGEN SATURATION: 92 %

## 2017-02-19 VITALS
DIASTOLIC BLOOD PRESSURE: 96 MMHG | HEART RATE: 75 BPM | TEMPERATURE: 96.8 F | OXYGEN SATURATION: 94 % | SYSTOLIC BLOOD PRESSURE: 162 MMHG | RESPIRATION RATE: 22 BRPM

## 2017-02-19 VITALS
TEMPERATURE: 96.5 F | HEART RATE: 82 BPM | OXYGEN SATURATION: 94 % | SYSTOLIC BLOOD PRESSURE: 123 MMHG | RESPIRATION RATE: 20 BRPM | DIASTOLIC BLOOD PRESSURE: 90 MMHG

## 2017-02-19 VITALS
DIASTOLIC BLOOD PRESSURE: 84 MMHG | OXYGEN SATURATION: 92 % | TEMPERATURE: 99.4 F | HEART RATE: 85 BPM | SYSTOLIC BLOOD PRESSURE: 139 MMHG | RESPIRATION RATE: 20 BRPM

## 2017-02-19 LAB — GFR SERPLBLD BASED ON 1.73 SQ M-ARVRAT: 99 ML/MIN (ref 89–?)

## 2017-02-19 RX ADMIN — GUAIFENESIN AND CODEINE PHOSPHATE PRN ML: 10; 100 LIQUID ORAL at 17:31

## 2017-02-19 RX ADMIN — Medication SCH TAB: at 08:47

## 2017-02-19 RX ADMIN — IPRATROPIUM BROMIDE AND ALBUTEROL SULFATE PRN AMPULE: .5; 3 SOLUTION RESPIRATORY (INHALATION) at 21:15

## 2017-02-19 RX ADMIN — NYSTATIN SCH ML: 100000 SUSPENSION ORAL at 20:52

## 2017-02-19 RX ADMIN — GABAPENTIN SCH MG: 100 CAPSULE ORAL at 08:46

## 2017-02-19 RX ADMIN — MESALAMINE SCH MG: 800 TABLET, DELAYED RELEASE ORAL at 12:46

## 2017-02-19 RX ADMIN — GUAIFENESIN AND CODEINE PHOSPHATE PRN ML: 10; 100 LIQUID ORAL at 12:45

## 2017-02-19 RX ADMIN — GUAIFENESIN AND CODEINE PHOSPHATE PRN ML: 10; 100 LIQUID ORAL at 08:48

## 2017-02-19 RX ADMIN — Medication SCH CAP: at 08:48

## 2017-02-19 RX ADMIN — GUAIFENESIN AND CODEINE PHOSPHATE PRN ML: 10; 100 LIQUID ORAL at 06:14

## 2017-02-19 RX ADMIN — GABAPENTIN SCH MG: 100 CAPSULE ORAL at 12:45

## 2017-02-19 RX ADMIN — OXYCODONE HYDROCHLORIDE AND ACETAMINOPHEN SCH MG: 500 TABLET ORAL at 08:48

## 2017-02-19 RX ADMIN — HEPARIN SODIUM SCH UNITS: 10000 INJECTION, SOLUTION INTRAVENOUS; SUBCUTANEOUS at 08:50

## 2017-02-19 RX ADMIN — GUAIFENESIN AND CODEINE PHOSPHATE PRN ML: 10; 100 LIQUID ORAL at 20:52

## 2017-02-19 RX ADMIN — IPRATROPIUM BROMIDE AND ALBUTEROL SULFATE PRN AMPULE: .5; 3 SOLUTION RESPIRATORY (INHALATION) at 01:26

## 2017-02-19 RX ADMIN — METHYLPREDNISOLONE SODIUM SUCCINATE SCH MG: 40 INJECTION, POWDER, FOR SOLUTION INTRAMUSCULAR; INTRAVENOUS at 20:51

## 2017-02-19 RX ADMIN — HEPARIN SODIUM SCH UNITS: 10000 INJECTION, SOLUTION INTRAVENOUS; SUBCUTANEOUS at 20:52

## 2017-02-19 RX ADMIN — METHYLPREDNISOLONE SODIUM SUCCINATE SCH MG: 40 INJECTION, POWDER, FOR SOLUTION INTRAMUSCULAR; INTRAVENOUS at 12:48

## 2017-02-19 RX ADMIN — FAMOTIDINE SCH MG: 20 TABLET, FILM COATED ORAL at 08:46

## 2017-02-19 RX ADMIN — SODIUM CHLORIDE, PRESERVATIVE FREE SCH ML: 5 INJECTION INTRAVENOUS at 20:51

## 2017-02-19 RX ADMIN — NYSTATIN SCH ML: 100000 SUSPENSION ORAL at 12:45

## 2017-02-19 RX ADMIN — SODIUM CHLORIDE, PRESERVATIVE FREE SCH ML: 5 INJECTION INTRAVENOUS at 08:52

## 2017-02-19 RX ADMIN — MESALAMINE SCH MG: 800 TABLET, DELAYED RELEASE ORAL at 06:14

## 2017-02-19 RX ADMIN — METOPROLOL TARTRATE SCH MG: 25 TABLET, FILM COATED ORAL at 08:47

## 2017-02-19 RX ADMIN — AZITHROMYCIN SCH MG: 250 TABLET, FILM COATED ORAL at 17:30

## 2017-02-19 RX ADMIN — NYSTATIN SCH ML: 100000 SUSPENSION ORAL at 08:48

## 2017-02-19 RX ADMIN — GABAPENTIN SCH MG: 100 CAPSULE ORAL at 17:30

## 2017-02-19 RX ADMIN — GUAIFENESIN AND CODEINE PHOSPHATE PRN ML: 10; 100 LIQUID ORAL at 01:21

## 2017-02-19 RX ADMIN — FLUCONAZOLE SCH MG: 200 TABLET ORAL at 08:48

## 2017-02-19 RX ADMIN — FAMOTIDINE SCH MG: 20 TABLET, FILM COATED ORAL at 20:52

## 2017-02-19 RX ADMIN — CEFTRIAXONE SODIUM SCH MLS/HR: 2 INJECTION, POWDER, FOR SOLUTION INTRAMUSCULAR; INTRAVENOUS at 17:33

## 2017-02-19 RX ADMIN — METOPROLOL TARTRATE SCH MG: 25 TABLET, FILM COATED ORAL at 20:52

## 2017-02-19 RX ADMIN — DILTIAZEM HYDROCHLORIDE SCH MG: 120 CAPSULE, EXTENDED RELEASE ORAL at 08:47

## 2017-02-19 RX ADMIN — MESALAMINE SCH MG: 800 TABLET, DELAYED RELEASE ORAL at 20:51

## 2017-02-19 RX ADMIN — NYSTATIN SCH ML: 100000 SUSPENSION ORAL at 17:30

## 2017-02-19 NOTE — HHI.PR
Subjective


Remarks


Has some  cough  and  wheezing . On o2  2l.


Coughed  up  clear  sputum. Had  some low  grade fever.Bronch  wash  shows  no  

malignant cells.


Final  AFB  and fungal  culture will  be back in  4 weeks





Objective





 Vital Signs








  Date Time  Temp Pulse Resp B/P Pulse Ox O2 Delivery O2 Flow Rate FiO2


 


2/19/17 16:00 98.1 78 21 135/87 90   


 


2/19/17 15:11     92 Nasal Cannula 3.00 


 


2/19/17 12:00 99.4 84 22 167/88 90   


 


2/19/17 09:05     92 Nasal Cannula 3.00 





      Humidified  


 


2/19/17 08:00 99.4 85 20 139/84 92   


 


2/19/17 01:29     92 Nasal Cannula 3.00 


 


2/19/17 00:00 96.8 75 22 162/96 94   


 


2/18/17 21:34      Nasal Cannula 2.00 


 


2/18/17 20:00 96.9 84 22 159/77 91   


 


2/18/17 16:51     92 Nasal Cannula 3.00 








 I/O








 2/18/17 2/18/17 2/18/17 2/19/17 2/19/17 2/19/17





 07:00 15:00 23:00 07:00 15:00 23:00


 


Intake Total 240 ml 964 ml 360 ml 240 ml 1200 ml 


 


Balance 240 ml 964 ml 360 ml 240 ml 1200 ml 


 


      


 


Intake Oral 240 ml 720 ml 360 ml 240 ml 1200 ml 


 


IV Total  244 ml 0 ml   


 


# Voids 2 5 2 2 3 


 


# Bowel Movements 0 1 0 0 2 








Result Diagram:  


2/15/17 0745                                                                   

             2/19/17 0526





Objective Remarks


GENERAL: This is a 61 year-old [place] male, up  in bed.  He


is in no distress .


HEENT:  Mucous membranes moist. There is


no jaundice.Throat clear.


NECK: Supple.


CARDIOVASCULAR: Regular rate and rhythm.


RESPIRATORY: Few crackles  at bases  as well as  scattered wheeze.


GASTROINTESTINAL: Bowel sounds are present.


ABDOMEN: Not distended.


:  No CVA tenderness.


MUSCULOSKELETAL: No edema. Homans' sign negative.


NEUROLOGIC: Alert, oriented.  Speech is clear .  Moving all


extremities well.


 





 


 


CT angiography showed no PE, interval development of bilateral lung disease on


a background of emphysema, not entirely typical for bronchopneumonia.


Differential diagnosis includes some form of smoking related disease of


respiratory bronchiolitis or possible Langerhans cells, histiocytosis.





Assessment and Plan


Assessment and Plan





IMPRESSION:


1. Sepsis on admission.


2. COPD exacerbation.


3. Hyponatremia.


4. Hospital acquired pneumonia.


5. History of A-fib, currently in sinus rhythm.


6. History of ulcerative colitis.











Plan :





1. Cont Antibiotics as ordered.





2. Nebs qid , duoneb.





3. Cont  solumedrol  40 mg bid





4. IS  q3h.





5. CBC,BMP, CXR in am





6. Home  when OK  with ID








ARIADNA Murry MD Feb 19, 2017 16:25

## 2017-02-19 NOTE — HHI.PR
Subjective


Remarks


Patient has short of breath also having cough.  Also has a wheezing.  Feeling 

weak and tired more than yesterday


Mouth is better can swallow with ease now


No other complaint


Review of systems a 10 point system otherwise unremarkable





Objective


Objective Results


-





 Vital Signs








  Date Time  Temp Pulse Resp B/P Pulse Ox O2 Delivery O2 Flow Rate FiO2


 


2/19/17 09:05     92 Nasal Cannula 3.00 





      Humidified  


 


2/19/17 08:00 99.4 85 20 139/84 92   


 


2/19/17 01:29     92 Nasal Cannula 3.00 


 


2/19/17 00:00 96.8 75 22 162/96 94   


 


2/18/17 21:34      Nasal Cannula 2.00 


 


2/18/17 20:00 96.9 84 22 159/77 91   


 


2/18/17 16:51     92 Nasal Cannula 3.00 


 


2/18/17 16:00 98.2 92 19 151/89 92   


 


2/18/17 12:00 100.4 82 20 172/99 92   





    160/90    


 


2/18/17 11:14     93 Nasal Cannula 3.00 








 I/O








 2/18/17 2/18/17 2/18/17 2/19/17 2/19/17 2/19/17





 07:00 15:00 23:00 07:00 15:00 23:00


 


Intake Total 240 ml 964 ml 360 ml 240 ml  


 


Balance 240 ml 964 ml 360 ml 240 ml  


 


      


 


Intake Oral 240 ml 720 ml 360 ml 240 ml  


 


IV Total  244 ml 0 ml   


 


# Voids 2 5 2 2  


 


# Bowel Movements 0 1 0 0  








Result Diagram:  


2/15/17 0745                                                                   

             2/19/17 0526





Other Results





Laboratory Tests








Test 2/19/17





 05:26


 


Creatinine 0.80 


 


Estimat Glomerular Filtration 99 





Rate 














 Date/Time Procedure Status





Source Growth 


 


 2/15/17 11:45 Gram Stain - Final Complete





Bronchial Washings Right Lower Lobe  





 2/15/17 11:45 Bronchial Culture - Final Complete





Bronchial Washings Right Lower Lobe LIGHT GROWTH NORMAL RESPIRATORY JACKELYN 


 


 2/15/17 11:45 Fungal Smear - Final Resulted





Bronchial Washings Right Lower Lobe NO FUNGAL ELEMENTS SEEN. 





 2/15/17 11:45 Fungal Culture Resulted





Bronchial Washings Right Lower Lobe Pending 


 


 2/15/17 11:45 Fungal Smear Received





Bronchial Brushings Right Lower Lobe Pending 





 2/15/17 11:45 Fungal Culture Received





Bronchial Brushings Right Lower Lobe Pending 


 


 2/15/17 11:45 Bronchial Aspirate Culture - Final Complete





Bronchial Brushings Right Lower Lobe NO GROWTH IN 48 HOURS. 


 


 2/15/17 11:45 Acid Fast Stain - Final Resulted





Bronchial Washings Right Lower Lobe NO ACID FAST BACILLI SEEN 





 2/15/17 11:45 Mycobacterial Culture Resulted





Bronchial Washings Right Lower Lobe Pending 


 


 2/15/17 11:45  Cancelled





Other  











Physical Exam


Physical Exam


VITAL SIGNS: Reviewed


GENERAL: This is a 61 year-old [place] male, resting comfortable in bed.  He


is in no distress at this time.


HEENT:  Mucous membranes shows he has less oral thrush.  He is hydrated.  There 

is


no jaundice.


NECK: Supple.  No increased JVD negative thyromegaly


CARDIOVASCULAR: Regular rate and rhythm.


RESPIRATORY: Coarse breathing bibasally with few scattered wheezing expiratory 

type.


GASTROINTESTINAL: Bowel sounds are present.  Soft nontender nondistended 

positive bowel sounds


:  No CVA tenderness.


MUSCULOSKELETAL: No edema. Homans' sign negative.


NEUROLOGIC: Alert, oriented.  Speech is clear and fluent.  Moving all


extremities freely.


Psych: appropriate mood and affect





A/P


Assessment and Plan


1. Sepsis on admission.


2. COPD exacerbation.


3. Hyponatremia.


4. Hospital acquired pneumonia.


5. History of A-fib, currently in sinus rhythm.


6. History of ulcerative colitis.


7. Hypokalemia


 


DISCUSSION:


Will switch to IV steroid


Continue antibiotic


X-ray report reviewed


High CRP and ESR


monitored on telemetry, regular rhythm


Continue antibiotic


ID consult, , states probable PNA vs non infectious pneumonitis with hypoxia, 

thrush


Pulmonary input appreciated, status post bronchoscopy.  Cytology negative for 

malignant cells urine but there is numerous fungal organisms consistent with 

Candida.  Continue Diflucan


Continue nebulizer treatments


Continue oxygen to keep saturation above 90%  Encouraged pt. to stay on rm air 

unless he is SOB, and or sat <90.  Currently pt. is 96 on rm air.


continues with hoarse voice, and cough.  Has meds for prn use.


Oxygen on a when necessary basis


DVT prophylaxis will be provided as well as GI prophylaxis.


Continue home medication as indicated


Cough medication with codeine





Discussed with RN


Discussed with patient


Antibiotics per ID


Discussed With:  Nurse, Family (patient), Other (Dr. Obrien, patient seen  on 

his behalf)








Tiffany Obrien MD Feb 19, 2017 11:07

## 2017-02-20 VITALS
OXYGEN SATURATION: 97 % | HEART RATE: 90 BPM | SYSTOLIC BLOOD PRESSURE: 164 MMHG | RESPIRATION RATE: 20 BRPM | TEMPERATURE: 96.9 F | DIASTOLIC BLOOD PRESSURE: 80 MMHG

## 2017-02-20 VITALS
HEART RATE: 84 BPM | TEMPERATURE: 97.8 F | DIASTOLIC BLOOD PRESSURE: 61 MMHG | OXYGEN SATURATION: 96 % | SYSTOLIC BLOOD PRESSURE: 146 MMHG | RESPIRATION RATE: 19 BRPM

## 2017-02-20 VITALS
RESPIRATION RATE: 18 BRPM | HEART RATE: 79 BPM | DIASTOLIC BLOOD PRESSURE: 80 MMHG | TEMPERATURE: 97.5 F | SYSTOLIC BLOOD PRESSURE: 140 MMHG | OXYGEN SATURATION: 96 %

## 2017-02-20 VITALS
SYSTOLIC BLOOD PRESSURE: 169 MMHG | TEMPERATURE: 98 F | DIASTOLIC BLOOD PRESSURE: 99 MMHG | RESPIRATION RATE: 19 BRPM | HEART RATE: 79 BPM | OXYGEN SATURATION: 95 %

## 2017-02-20 VITALS — OXYGEN SATURATION: 95 %

## 2017-02-20 VITALS
RESPIRATION RATE: 20 BRPM | DIASTOLIC BLOOD PRESSURE: 97 MMHG | TEMPERATURE: 96.9 F | OXYGEN SATURATION: 95 % | HEART RATE: 82 BPM | SYSTOLIC BLOOD PRESSURE: 159 MMHG

## 2017-02-20 VITALS — SYSTOLIC BLOOD PRESSURE: 146 MMHG | DIASTOLIC BLOOD PRESSURE: 93 MMHG

## 2017-02-20 VITALS — OXYGEN SATURATION: 93 %

## 2017-02-20 LAB
ANION GAP SERPL CALC-SCNC: 10 MEQ/L (ref 5–15)
BUN SERPL-MCNC: 21 MG/DL (ref 7–18)
C. DIFF EPI 027: (no result)
C. DIFF TOXIN PCR: NEGATIVE
CHLORIDE SERPL-SCNC: 102 MEQ/L (ref 98–107)
ERYTHROCYTE [DISTWIDTH] IN BLOOD BY AUTOMATED COUNT: 14.5 % (ref 11.6–17.2)
GFR SERPLBLD BASED ON 1.73 SQ M-ARVRAT: 99 ML/MIN (ref 89–?)
HCO3 BLD-SCNC: 25.6 MEQ/L (ref 21–32)
HCT VFR BLD CALC: 43.2 % (ref 39–51)
MCH RBC QN AUTO: 29.6 PG (ref 27–34)
MCHC RBC AUTO-ENTMCNC: 34.2 % (ref 32–36)
MCV RBC AUTO: 86.6 FL (ref 80–100)
PLATELET # BLD: 225 TH/MM3 (ref 150–450)
POTASSIUM SERPL-SCNC: 4.1 MEQ/L (ref 3.5–5.1)
RBC # BLD AUTO: 4.99 MIL/MM3 (ref 4.5–5.9)
REVIEW FLAG: (no result)
SODIUM SERPL-SCNC: 138 MEQ/L (ref 136–145)
WBC # BLD AUTO: 9.1 TH/MM3 (ref 4–11)

## 2017-02-20 RX ADMIN — GUAIFENESIN AND CODEINE PHOSPHATE PRN ML: 10; 100 LIQUID ORAL at 17:39

## 2017-02-20 RX ADMIN — HEPARIN SODIUM SCH UNITS: 10000 INJECTION, SOLUTION INTRAVENOUS; SUBCUTANEOUS at 20:25

## 2017-02-20 RX ADMIN — DILTIAZEM HYDROCHLORIDE SCH MG: 120 CAPSULE, EXTENDED RELEASE ORAL at 09:30

## 2017-02-20 RX ADMIN — OXYCODONE HYDROCHLORIDE AND ACETAMINOPHEN SCH MG: 500 TABLET ORAL at 09:30

## 2017-02-20 RX ADMIN — GABAPENTIN SCH MG: 100 CAPSULE ORAL at 17:33

## 2017-02-20 RX ADMIN — MESALAMINE SCH MG: 800 TABLET, DELAYED RELEASE ORAL at 20:24

## 2017-02-20 RX ADMIN — MESALAMINE SCH MG: 800 TABLET, DELAYED RELEASE ORAL at 05:10

## 2017-02-20 RX ADMIN — SODIUM CHLORIDE, PRESERVATIVE FREE SCH ML: 5 INJECTION INTRAVENOUS at 21:15

## 2017-02-20 RX ADMIN — FLUCONAZOLE SCH MG: 200 TABLET ORAL at 09:31

## 2017-02-20 RX ADMIN — GUAIFENESIN AND CODEINE PHOSPHATE PRN ML: 10; 100 LIQUID ORAL at 05:06

## 2017-02-20 RX ADMIN — FAMOTIDINE SCH MG: 20 TABLET, FILM COATED ORAL at 09:30

## 2017-02-20 RX ADMIN — Medication SCH CAP: at 09:31

## 2017-02-20 RX ADMIN — NYSTATIN SCH ML: 100000 SUSPENSION ORAL at 12:20

## 2017-02-20 RX ADMIN — GABAPENTIN SCH MG: 100 CAPSULE ORAL at 12:17

## 2017-02-20 RX ADMIN — GABAPENTIN SCH MG: 100 CAPSULE ORAL at 11:08

## 2017-02-20 RX ADMIN — HEPARIN SODIUM SCH UNITS: 10000 INJECTION, SOLUTION INTRAVENOUS; SUBCUTANEOUS at 09:31

## 2017-02-20 RX ADMIN — AZITHROMYCIN SCH MG: 250 TABLET, FILM COATED ORAL at 17:34

## 2017-02-20 RX ADMIN — Medication SCH TAB: at 09:30

## 2017-02-20 RX ADMIN — FAMOTIDINE SCH MG: 20 TABLET, FILM COATED ORAL at 20:25

## 2017-02-20 RX ADMIN — METOPROLOL TARTRATE SCH MG: 25 TABLET, FILM COATED ORAL at 20:25

## 2017-02-20 RX ADMIN — SODIUM CHLORIDE, PRESERVATIVE FREE SCH ML: 5 INJECTION INTRAVENOUS at 09:31

## 2017-02-20 RX ADMIN — GUAIFENESIN AND CODEINE PHOSPHATE PRN ML: 10; 100 LIQUID ORAL at 23:22

## 2017-02-20 RX ADMIN — METOPROLOL TARTRATE SCH MG: 25 TABLET, FILM COATED ORAL at 09:31

## 2017-02-20 RX ADMIN — NYSTATIN SCH ML: 100000 SUSPENSION ORAL at 09:30

## 2017-02-20 RX ADMIN — GUAIFENESIN AND CODEINE PHOSPHATE PRN ML: 10; 100 LIQUID ORAL at 01:06

## 2017-02-20 RX ADMIN — NYSTATIN SCH ML: 100000 SUSPENSION ORAL at 20:26

## 2017-02-20 RX ADMIN — METHYLPREDNISOLONE SODIUM SUCCINATE SCH MG: 40 INJECTION, POWDER, FOR SOLUTION INTRAMUSCULAR; INTRAVENOUS at 09:31

## 2017-02-20 RX ADMIN — MESALAMINE SCH MG: 800 TABLET, DELAYED RELEASE ORAL at 14:01

## 2017-02-20 RX ADMIN — IPRATROPIUM BROMIDE AND ALBUTEROL SULFATE PRN AMPULE: .5; 3 SOLUTION RESPIRATORY (INHALATION) at 12:20

## 2017-02-20 RX ADMIN — NYSTATIN SCH ML: 100000 SUSPENSION ORAL at 17:34

## 2017-02-20 RX ADMIN — IPRATROPIUM BROMIDE AND ALBUTEROL SULFATE PRN AMPULE: .5; 3 SOLUTION RESPIRATORY (INHALATION) at 05:52

## 2017-02-20 RX ADMIN — IPRATROPIUM BROMIDE AND ALBUTEROL SULFATE PRN AMPULE: .5; 3 SOLUTION RESPIRATORY (INHALATION) at 19:28

## 2017-02-20 NOTE — HHI.PR
Subjective


Remarks


Better today. On o2  2l.


Coughed  up  clear  sputum. Had  no fever.Bronchoscopy  wash  shows  no  

malignant cells.


Final  AFB  and fungal  culture will  be back in  4 weeks





Objective





 Vital Signs








  Date Time  Temp Pulse Resp B/P Pulse Ox O2 Delivery O2 Flow Rate FiO2


 


2/20/17 19:30     93 Nasal Cannula 2.00 


 


2/20/17 16:00 97.8 84 19 146/61 96   


 


2/20/17 12:21    146/93    


 


2/20/17 12:00 98.0 79 19 169/99 95   


 


2/20/17 08:00 97.5 79 18 140/80 96   


 


2/20/17 07:29     95 Nasal Cannula 3.00 


 


2/20/17 00:00 96.9 82 20 159/97 95   


 


2/19/17 22:28      Nasal Cannula 2.00 


 


2/19/17 21:17     94 Nasal Cannula 3.00 


 


2/19/17 20:00 96.5 82 20 123/90 94   








 I/O








 2/19/17 2/19/17 2/19/17 2/20/17 2/20/17 2/20/17





 07:00 15:00 23:00 07:00 15:00 23:00


 


Intake Total 240 ml 1200 ml 338 ml 320 ml 1080 ml 


 


Output Total     600 ml 


 


Balance 240 ml 1200 ml 338 ml 320 ml 480 ml 


 


      


 


Intake Oral 240 ml 1200 ml 240 ml 320 ml 1080 ml 


 


IV Total   98 ml  0 ml 


 


Output Urine Total     400 ml 


 


Stool Total     200 ml 


 


# Voids 2 3 2 2  


 


# Bowel Movements 0 2 0 0  








Result Diagram:  


2/20/17 0528                                                                   

             2/20/17 0528





Objective Remarks


GENERAL: This is a 61 year-old male, up  in bed.  He


is in no distress .


HEENT:  Mucous membranes moist. There is


no jaundice.Throat clear.


NECK: Supple.


CARDIOVASCULAR: Regular rate and rhythm.


RESPIRATORY: Few crackles  at bases  as well as  scattered wheeze.


GASTROINTESTINAL: Bowel sounds are present.


ABDOMEN: Not distended.


:  No CVA tenderness.


MUSCULOSKELETAL: No edema. moves all  well.


NEUROLOGIC: Alert, oriented.  Speech is clear .Reflexes  1 +





Assessment and Plan


Assessment and Plan





IMPRESSION:


1. Sepsis on admission.


2. COPD exacerbation.


3. Hyponatremia.


4. Hospital acquired pneumonia.


5. History of A-fib, currently in sinus rhythm.


6. History of ulcerative colitis.











Plan :





1. Cont Antibiotics as ordered.





2. Nebs qid , duoneb.





3. D/C  solumedrol  





4. IS  q3h.





5. Prednisone 10 mg bid.





6. Home  when OK  with ID








ARIADNA Murry MD Feb 20, 2017 19:41

## 2017-02-20 NOTE — HHI.PR
Subjective


Remarks


Patient is feeling lot better.  Has occasional cough .  Shortness of breath is 

significantly improved.


Has more energy


Good appetite


Mouth is better can swallow with ease now


No other complaint


Review of systems a 10 point system otherwise unremarkable





Objective


Objective Results


-





 Vital Signs








  Date Time  Temp Pulse Resp B/P Pulse Ox O2 Delivery O2 Flow Rate FiO2


 


2/20/17 08:00 97.5 79 18 140/80 96   


 


2/20/17 00:00 96.9 82 20 159/97 95   


 


2/19/17 22:28      Nasal Cannula 2.00 


 


2/19/17 21:17     94 Nasal Cannula 3.00 


 


2/19/17 20:00 96.5 82 20 123/90 94   


 


2/19/17 16:00 98.1 78 21 135/87 90   


 


2/19/17 15:11     92 Nasal Cannula 3.00 


 


2/19/17 12:00 99.4 84 22 167/88 90   








 I/O








 2/19/17 2/19/17 2/19/17 2/20/17 2/20/17 2/20/17





 07:00 15:00 23:00 07:00 15:00 23:00


 


Intake Total 240 ml 1200 ml 338 ml 320 ml 120 ml 


 


Balance 240 ml 1200 ml 338 ml 320 ml 120 ml 


 


      


 


Intake Oral 240 ml 1200 ml 240 ml 320 ml 120 ml 


 


IV Total   98 ml   


 


# Voids 2 3 2 2  


 


# Bowel Movements 0 2 0 0  








Result Diagram:  


2/20/17 0528                                                                   

             2/20/17 0528





Other Results





Laboratory Tests








Test 2/20/17





 05:28


 


White Blood Count 9.1 


 


Red Blood Count 4.99 


 


Hemoglobin 14.8 


 


Hematocrit 43.2 


 


Mean Corpuscular Volume 86.6 


 


Mean Corpuscular Hemoglobin 29.6 


 


Mean Corpuscular Hemoglobin 34.2 





Concent 


 


Red Cell Distribution Width 14.5 


 


Platelet Count 225 


 


Mean Platelet Volume 7.8 


 


Sodium Level 138 


 


Potassium Level 4.1 


 


Chloride Level 102 


 


Carbon Dioxide Level 25.6 


 


Anion Gap 10 


 


Blood Urea Nitrogen 21 


 


Creatinine 0.80 


 


Estimat Glomerular Filtration 99 





Rate 


 


Random Glucose 145 


 


Calcium Level 8.6 














 Date/Time Procedure Status





Source Growth 


 


 2/15/17 11:45 Gram Stain - Final Complete





Bronchial Washings Right Lower Lobe  





 2/15/17 11:45 Bronchial Culture - Final Complete





Bronchial Washings Right Lower Lobe LIGHT GROWTH NORMAL RESPIRATORY JACKELYN 


 


 2/15/17 11:45 Fungal Smear - Final Resulted





Bronchial Washings Right Lower Lobe NO FUNGAL ELEMENTS SEEN. 





 2/15/17 11:45 Fungal Culture Resulted





Bronchial Washings Right Lower Lobe Pending 


 


 2/15/17 11:45 Fungal Smear Received





Bronchial Brushings Right Lower Lobe Pending 





 2/15/17 11:45 Fungal Culture Received





Bronchial Brushings Right Lower Lobe Pending 


 


 2/15/17 11:45 Bronchial Aspirate Culture - Final Complete





Bronchial Brushings Right Lower Lobe NO GROWTH IN 48 HOURS. 


 


 2/15/17 11:45 Acid Fast Stain - Final Resulted





Bronchial Washings Right Lower Lobe NO ACID FAST BACILLI SEEN 





 2/15/17 11:45 Mycobacterial Culture Resulted





Bronchial Washings Right Lower Lobe Pending 


 


 2/15/17 11:45  Cancelled





Other  











Physical Exam


Physical Exam


VITAL SIGNS: Reviewed


GENERAL: This is a 61 year-old [place] male, resting comfortable in bed.  He


is in no distress at this time.


HEENT:  Mucous membranes shows he has less oral thrush.  He is hydrated.  There 

is


no jaundice.


NECK: Supple.  No increased JVD negative thyromegaly


CARDIOVASCULAR: Regular rate and rhythm.


RESPIRATORY: Good air entry except slight decrease in bibasally region with 

occasional scattered wheezing expiratory type.


GASTROINTESTINAL: Bowel sounds are present.  Soft nontender nondistended 

positive bowel sounds


:  No CVA tenderness.


MUSCULOSKELETAL: No edema. Homans' sign negative.


NEUROLOGIC: Alert, oriented.  Speech is clear and fluent.  Moving all


extremities freely.


Psych: appropriate mood and affect





A/P


Assessment and Plan


1. Sepsis on admission.


2. COPD exacerbation.


3. Hyponatremia.


4. Hospital acquired pneumonia.


5. History of A-fib, currently in sinus rhythm.


6. History of ulcerative colitis.


7. Hypokalemia


 


DISCUSSION:


On IV steroid


Continue antibiotic.  Final recommendation per ID


X-ray report reviewed


High CRP and ESR


monitored on telemetry, regular rhythm


Continue antibiotic


Pulmonary input appreciated, status post bronchoscopy.  Cytology negative for 

malignant cells, but there is numerous fungal organisms consistent with 

Candida.  Continue Diflucan


Continue nebulizer treatments


Continue oxygen to keep saturation above 90%  Encouraged pt. to stay on rm air 

unless he is SOB, and or sat <90.  Currently pt. is 96 on rm air.  Patient has 

oxygen at home and sometimes use 2 L


continues with hoarse voice, improving


Oxygen on a when necessary basis


DVT prophylaxis will be provided as well as GI prophylaxis.


Continue home medication as indicated


Cough medication





Discussed with RN


Discussed with patient.  May DC today or tomorrow depending on ID recommendation


Antibiotics per ID


Discussed With:  Nurse, Family (patient), Other (Dr. Obrien, patient seen  on 

his behalf)








Tiffany Obrien MD Feb 20, 2017 10:33

## 2017-02-20 NOTE — PD.CARD.PN
Subjective


Subjective Remarks


No CP, still  SOB w exertion





Objective


Medications





 Current Medications








 Medications


  (Trade)  Dose


 Ordered  Sig/Gissel


 Route  Start Time


 Stop Time Status Last Admin


 


  (NS Flush)  2 ml  UNSCH  PRN


 IV FLUSH  2/11/17 19:15


    2/17/17 22:22


 


 


  (NS Flush)  2 ml  BID


 IV FLUSH  2/11/17 21:00


    2/20/17 09:31


 


 


  (Zofran Inj)  4 mg  Q6H  PRN


 IV  2/11/17 19:15


     


 


 


  (Heparin Inj)  5,000 units  Q12H


 SQ  2/11/17 21:00


    2/20/17 09:31


 


 


  (Pepcid)  20 mg  BID


 PO  2/11/17 21:00


    2/20/17 09:30


 


 


  (Vitamin C)  500 mg  DAILY


 PO  2/12/17 09:00


    2/20/17 09:30


 


 


  (Symbicort


 160-4.5 Inh)  1 puff  Q12HR


 INH  2/11/17 21:00


   Hold 2/12/17 09:00


 


 


  (Bentyl)  20 mg  TID  PRN


 PO  2/11/17 19:15


     


 


 


  (Neurontin)  100 mg  TID


 PO  2/12/17 09:00


    2/20/17 11:08


 


 


  (Asacol Hd Dr)  1,600 mg  Q8HR


 PO  2/11/17 22:00


    2/20/17 14:01


 


 


  (Theragran


 Hematinic)  1 tab  DAILY


 PO  2/12/17 09:00


    2/20/17 09:30


 


 


  (Nephrocaps)  1 cap  DAILY


 PO  2/12/17 09:00


    2/20/17 09:31


 


 


  (Tylenol)  650 mg  Q4H  PRN


 PO  2/11/17 23:30


     


 


 


  (Mycostatin  Liq)  5 ml  QID


 SWISH-SWAL  2/11/17 23:30


    2/20/17 12:20


 


 


  (Robitussin Ac


 200-20 Mg/10 ml


 Liq)  10 ml  Q4H  PRN


 PO  2/12/17 10:30


    2/20/17 05:06


 


 


  (KlonoPIN)  1 mg  HS  PRN


 PO  2/12/17 21:30


    2/19/17 20:57


 


 


  (Zithromax)  500 mg  Q24H


 PO  2/13/17 18:00


    2/19/17 17:30


 


 


  (Chloraseptic


 Spray)  2 spray  Q2H  PRN


 OROPHARYNG  2/14/17 14:15


    2/18/17 09:58


 


 


  (Diflucan)  200 mg  DAILY


 PO  2/15/17 09:00


    2/20/17 09:31


 


 


  (Cardizem Cd)  120 mg  DAILY


 PO  2/16/17 11:30


    2/20/17 09:30


 


 


  (Lopressor)  25 mg  BID


 PO  2/16/17 21:00


    2/20/17 09:31


 


 


  (SoluMEDROL INJ)  40 mg  Q12HR


 IV PUSH  2/19/17 12:00


    2/20/17 09:31


 








Vital Signs / I&O





 Vital Signs








  Date Time  Temp Pulse Resp B/P Pulse Ox O2 Delivery O2 Flow Rate FiO2


 


2/20/17 12:21    146/93    


 


2/20/17 12:00 98.0 79 19 169/99 95   


 


2/20/17 08:00 97.5 79 18 140/80 96   


 


2/20/17 07:29     95 Nasal Cannula 3.00 


 


2/20/17 00:00 96.9 82 20 159/97 95   


 


2/19/17 22:28      Nasal Cannula 2.00 


 


2/19/17 21:17     94 Nasal Cannula 3.00 


 


2/19/17 20:00 96.5 82 20 123/90 94   


 


2/19/17 16:00 98.1 78 21 135/87 90   


 


2/19/17 15:11     92 Nasal Cannula 3.00 








 I/O








 2/19/17 2/19/17 2/19/17 2/20/17 2/20/17 2/20/17





 07:00 15:00 23:00 07:00 15:00 23:00


 


Intake Total 240 ml 1200 ml 338 ml 320 ml 120 ml 


 


Balance 240 ml 1200 ml 338 ml 320 ml 120 ml 


 


      


 


Intake Oral 240 ml 1200 ml 240 ml 320 ml 120 ml 


 


IV Total   98 ml   


 


# Voids 2 3 2 2  


 


# Bowel Movements 0 2 0 0  








Physical Exam


GENERAL: On O2


SKIN: Warm and dry.


HEAD: Normocephalic.


EYES: No scleral icterus. No injection or drainage. 


NECK: Supple, trachea midline. No JVD or lymphadenopathy.


CARDIOVASCULAR: Regular rate and rhythm without murmurs, gallops, or rubs. Few 

rhonchi.


RESPIRATORY: Breath sounds equal bilaterally. No accessory muscle use.


GASTROINTESTINAL: Abdomen soft, non-tender, nondistended. 


MUSCULOSKELETAL: No cyanosis, or edema. 





Laboratory





Laboratory Tests








Test 2/20/17





 05:28


 


White Blood Count 9.1 TH/MM3


 


Red Blood Count 4.99 MIL/MM3


 


Hemoglobin 14.8 GM/DL


 


Hematocrit 43.2 %


 


Mean Corpuscular Volume 86.6 FL


 


Mean Corpuscular Hemoglobin 29.6 PG


 


Mean Corpuscular Hemoglobin 34.2 %





Concent 


 


Red Cell Distribution Width 14.5 %


 


Platelet Count 225 TH/MM3


 


Mean Platelet Volume 7.8 FL


 


Sodium Level 138 MEQ/L


 


Potassium Level 4.1 MEQ/L


 


Chloride Level 102 MEQ/L


 


Carbon Dioxide Level 25.6 MEQ/L


 


Anion Gap 10 MEQ/L


 


Blood Urea Nitrogen 21 MG/DL


 


Creatinine 0.80 MG/DL


 


Estimat Glomerular Filtration 99 ML/MIN





Rate 


 


Random Glucose 145 MG/DL


 


Calcium Level 8.6 MG/DL








Imaging





Last Impressions








Chest X-Ray 2/20/17 0600 Signed





Impressions: 





 Service Date/Time:  Monday, February 20, 2017 05:30 - CONCLUSION:  Diffuse 





 increase interstitial markings related to diffuse processes such as edema or 





 diffuse infection.     Nahun Azul MD 


 


CT Angiography 2/11/17 0000 Signed





Impressions: 





 Service Date/Time:  Saturday, February 11, 2017 18:29 - CONCLUSION:  1. 

Negative 





 for pulmonary embolus. 2. Interval development of bilateral lung disease on a 





 background of emphysema. Findings are not entirely typical of 

bronchopneumonia. 





 Differential diagnosis includes some form of smoking related disease with 





 respiratory bronchiolitis or possibly Langerhans' cell histiocytosis.     

Bravo Robison MD 











Assessment and Plan


Problem List:  


(1) Pneumonia


(2) COPD (chronic obstructive pulmonary disease)


(3) Atrial fibrillation


Assessment and Plan


Still c/o SOB. Slow improvement. No recurrent AF or pauses. Continue 

monitoring. Pulm eval in progress. Continue tx for pneumonia/COPD exac. 

Increase activity. LE edema improving, decreased diltiazem and added low dose 

metoprolol. Anticipate discharge home soon.





Problem Qualifiers





(1) Pneumonia:  





(2) COPD (chronic obstructive pulmonary disease):  


Qualified Code:  J44.1 - Chronic obstructive pulmonary disease with acute 

exacerbation


(3) Atrial fibrillation:  


Qualified Code:  I48.0 - Paroxysmal atrial fibrillation





Romi Davalos MD Feb 20, 2017 14:24

## 2017-02-20 NOTE — RADRPT
EXAM DATE/TIME:  02/20/2017 05:30 

 

[facility] COMPARISON:     

CHEST SINGLE AP, January 30, 2017, 8:40.  CHEST SINGLE AP, February 18, 2017, 5:45.

 

                     

INDICATIONS :     

Short of breath, nonproductive cough, evaluate infiltrate

                     

 

MEDICAL HISTORY :     

Hypertension.  Chronic obstructive pulmonary disease.        

 

SURGICAL HISTORY :     

None.   

 

ENCOUNTER:     

Subsequent                                        

 

ACUITY:     

1 week      

 

PAIN SCORE:     

0/10

 

LOCATION:     

Bilateral chest 

 

FINDINGS:     

The heart size is normal. The lungs demonstrate diffuse increased interstitial markings. A significan
t effusion is not seen.

 

CONCLUSION:     

Diffuse increase interstitial markings related to diffuse processes such as edema or diffuse infectio
n.

 

 

 

 Nahun Azul MD on February 20, 2017 at 6:33           

Board Certified Radiologist.

 This report was verified electronically.

## 2017-02-21 VITALS
TEMPERATURE: 97.9 F | SYSTOLIC BLOOD PRESSURE: 139 MMHG | DIASTOLIC BLOOD PRESSURE: 85 MMHG | RESPIRATION RATE: 18 BRPM | HEART RATE: 79 BPM | OXYGEN SATURATION: 96 %

## 2017-02-21 VITALS
TEMPERATURE: 96.5 F | RESPIRATION RATE: 20 BRPM | DIASTOLIC BLOOD PRESSURE: 71 MMHG | SYSTOLIC BLOOD PRESSURE: 144 MMHG | HEART RATE: 81 BPM | OXYGEN SATURATION: 96 %

## 2017-02-21 VITALS
OXYGEN SATURATION: 94 % | RESPIRATION RATE: 18 BRPM | HEART RATE: 81 BPM | TEMPERATURE: 97 F | DIASTOLIC BLOOD PRESSURE: 93 MMHG | SYSTOLIC BLOOD PRESSURE: 152 MMHG

## 2017-02-21 VITALS
TEMPERATURE: 97.8 F | SYSTOLIC BLOOD PRESSURE: 157 MMHG | OXYGEN SATURATION: 93 % | HEART RATE: 78 BPM | DIASTOLIC BLOOD PRESSURE: 82 MMHG | RESPIRATION RATE: 18 BRPM

## 2017-02-21 VITALS — OXYGEN SATURATION: 93 %

## 2017-02-21 VITALS
DIASTOLIC BLOOD PRESSURE: 78 MMHG | RESPIRATION RATE: 20 BRPM | TEMPERATURE: 97.2 F | SYSTOLIC BLOOD PRESSURE: 150 MMHG | HEART RATE: 9 BPM | OXYGEN SATURATION: 97 %

## 2017-02-21 RX ADMIN — GUAIFENESIN AND CODEINE PHOSPHATE PRN ML: 10; 100 LIQUID ORAL at 18:28

## 2017-02-21 RX ADMIN — SODIUM CHLORIDE, PRESERVATIVE FREE SCH ML: 5 INJECTION INTRAVENOUS at 19:43

## 2017-02-21 RX ADMIN — MESALAMINE SCH MG: 800 TABLET, DELAYED RELEASE ORAL at 14:10

## 2017-02-21 RX ADMIN — NYSTATIN SCH ML: 100000 SUSPENSION ORAL at 12:31

## 2017-02-21 RX ADMIN — GABAPENTIN SCH MG: 100 CAPSULE ORAL at 18:25

## 2017-02-21 RX ADMIN — NYSTATIN SCH ML: 100000 SUSPENSION ORAL at 18:26

## 2017-02-21 RX ADMIN — GABAPENTIN SCH MG: 100 CAPSULE ORAL at 12:31

## 2017-02-21 RX ADMIN — SULFAMETHOXAZOLE AND TRIMETHOPRIM SCH MLS/HR: 80; 16 INJECTION, SOLUTION, CONCENTRATE INTRAVENOUS at 18:25

## 2017-02-21 RX ADMIN — MESALAMINE SCH MG: 800 TABLET, DELAYED RELEASE ORAL at 19:39

## 2017-02-21 RX ADMIN — IPRATROPIUM BROMIDE AND ALBUTEROL SULFATE PRN AMPULE: .5; 3 SOLUTION RESPIRATORY (INHALATION) at 03:23

## 2017-02-21 RX ADMIN — METOPROLOL TARTRATE SCH MG: 25 TABLET, FILM COATED ORAL at 08:41

## 2017-02-21 RX ADMIN — SULFAMETHOXAZOLE AND TRIMETHOPRIM SCH MLS/HR: 80; 16 INJECTION, SOLUTION, CONCENTRATE INTRAVENOUS at 23:58

## 2017-02-21 RX ADMIN — IPRATROPIUM BROMIDE AND ALBUTEROL SULFATE PRN AMPULE: .5; 3 SOLUTION RESPIRATORY (INHALATION) at 20:20

## 2017-02-21 RX ADMIN — HEPARIN SODIUM SCH UNITS: 10000 INJECTION, SOLUTION INTRAVENOUS; SUBCUTANEOUS at 08:41

## 2017-02-21 RX ADMIN — METOPROLOL TARTRATE SCH MG: 25 TABLET, FILM COATED ORAL at 19:39

## 2017-02-21 RX ADMIN — FLUCONAZOLE SCH MG: 200 TABLET ORAL at 08:41

## 2017-02-21 RX ADMIN — HEPARIN SODIUM SCH UNITS: 10000 INJECTION, SOLUTION INTRAVENOUS; SUBCUTANEOUS at 19:39

## 2017-02-21 RX ADMIN — NYSTATIN SCH ML: 100000 SUSPENSION ORAL at 19:39

## 2017-02-21 RX ADMIN — SODIUM CHLORIDE, PRESERVATIVE FREE PRN ML: 5 INJECTION INTRAVENOUS at 23:58

## 2017-02-21 RX ADMIN — IPRATROPIUM BROMIDE AND ALBUTEROL SULFATE PRN AMPULE: .5; 3 SOLUTION RESPIRATORY (INHALATION) at 07:43

## 2017-02-21 RX ADMIN — FAMOTIDINE SCH MG: 20 TABLET, FILM COATED ORAL at 08:40

## 2017-02-21 RX ADMIN — Medication SCH TAB: at 08:41

## 2017-02-21 RX ADMIN — DILTIAZEM HYDROCHLORIDE SCH MG: 120 CAPSULE, EXTENDED RELEASE ORAL at 08:40

## 2017-02-21 RX ADMIN — GUAIFENESIN AND CODEINE PHOSPHATE PRN ML: 10; 100 LIQUID ORAL at 06:02

## 2017-02-21 RX ADMIN — SODIUM CHLORIDE, PRESERVATIVE FREE SCH ML: 5 INJECTION INTRAVENOUS at 08:47

## 2017-02-21 RX ADMIN — OXYCODONE HYDROCHLORIDE AND ACETAMINOPHEN SCH MG: 500 TABLET ORAL at 08:41

## 2017-02-21 RX ADMIN — GABAPENTIN SCH MG: 100 CAPSULE ORAL at 08:40

## 2017-02-21 RX ADMIN — NYSTATIN SCH ML: 100000 SUSPENSION ORAL at 08:40

## 2017-02-21 RX ADMIN — Medication SCH CAP: at 08:41

## 2017-02-21 RX ADMIN — FAMOTIDINE SCH MG: 20 TABLET, FILM COATED ORAL at 19:39

## 2017-02-21 RX ADMIN — MESALAMINE SCH MG: 800 TABLET, DELAYED RELEASE ORAL at 06:00

## 2017-02-21 NOTE — PD.CARD.PN
Subjective


Subjective Remarks


No CP, SOB slowly improving





Objective


Medications





 Current Medications








 Medications


  (Trade)  Dose


 Ordered  Sig/Gissel


 Route  Start Time


 Stop Time Status Last Admin


 


  (NS Flush)  2 ml  UNSCH  PRN


 IV FLUSH  2/11/17 19:15


    2/17/17 22:22


 


 


  (NS Flush)  2 ml  BID


 IV FLUSH  2/11/17 21:00


    2/21/17 19:43


 


 


  (Zofran Inj)  4 mg  Q6H  PRN


 IV  2/11/17 19:15


     


 


 


  (Heparin Inj)  5,000 units  Q12H


 SQ  2/11/17 21:00


    2/21/17 19:39


 


 


  (Pepcid)  20 mg  BID


 PO  2/11/17 21:00


    2/21/17 19:39


 


 


  (Vitamin C)  500 mg  DAILY


 PO  2/12/17 09:00


    2/21/17 08:41


 


 


  (Symbicort


 160-4.5 Inh)  1 puff  Q12HR


 INH  2/11/17 21:00


   Hold 2/12/17 09:00


 


 


  (Bentyl)  20 mg  TID  PRN


 PO  2/11/17 19:15


     


 


 


  (Neurontin)  100 mg  TID


 PO  2/12/17 09:00


    2/21/17 18:25


 


 


  (Asacol Hd Dr)  1,600 mg  Q8HR


 PO  2/11/17 22:00


    2/21/17 19:39


 


 


  (Theragran


 Hematinic)  1 tab  DAILY


 PO  2/12/17 09:00


    2/21/17 08:41


 


 


  (Nephrocaps)  1 cap  DAILY


 PO  2/12/17 09:00


    2/21/17 08:41


 


 


  (Tylenol)  650 mg  Q4H  PRN


 PO  2/11/17 23:30


     


 


 


  (Mycostatin  Liq)  5 ml  QID


 SWISH-SWAL  2/11/17 23:30


    2/21/17 19:39


 


 


  (Robitussin Ac


 200-20 Mg/10 ml


 Liq)  10 ml  Q4H  PRN


 PO  2/12/17 10:30


    2/21/17 18:28


 


 


  (KlonoPIN)  1 mg  HS  PRN


 PO  2/12/17 21:30


    2/21/17 21:13


 


 


  (Chloraseptic


 Spray)  2 spray  Q2H  PRN


 OROPHARYNG  2/14/17 14:15


    2/18/17 09:58


 


 


  (Diflucan)  200 mg  DAILY


 PO  2/15/17 09:00


    2/21/17 08:41


 


 


  (Cardizem Cd)  120 mg  DAILY


 PO  2/16/17 11:30


    2/21/17 08:40


 


 


  (Lopressor)  25 mg  BID


 PO  2/16/17 21:00


    2/21/17 19:39


 


 


  (Deltasone)  40 mg  DAILY


 PO  2/27/17 09:00


 3/4/17 08:59   


 


 


  (Deltasone)  20 mg  DAILY


 PO  3/4/17 09:00


 3/15/17 08:59   


 


 


 Prednisone 40 mg  40 mg  BID


 PO  2/21/17 16:00


 2/27/17 08:59  2/21/17 19:39


 


 


  (Bactrim Inj/D5W


 500 ml Inj)  514.0625


 ml @ 


 514....  Q6HR


 IV  2/21/17 18:00


    2/21/17 18:25


 








Vital Signs / I&O





 Vital Signs








  Date Time  Temp Pulse Resp B/P Pulse Ox O2 Delivery O2 Flow Rate FiO2


 


2/21/17 20:00 96.5 81 20 144/71 96   


 


2/21/17 18:34     93 Nasal Cannula 3.00 


 


2/21/17 12:00 97.8 78 18 157/82 93   


 


2/21/17 08:38      Nasal Cannula 4.00 


 


2/21/17 08:00 97.0 81 18 152/93 94   


 


2/21/17 00:00 97.2 9 20 150/78 97   








 I/O








 2/20/17 2/20/17 2/20/17 2/21/17 2/21/17 2/21/17





 07:00 15:00 23:00 07:00 15:00 23:00


 


Intake Total 320 ml 1080 ml 480 ml 240 ml 240 ml 


 


Output Total  600 ml   100 ml 


 


Balance 320 ml 480 ml 480 ml 240 ml 140 ml 


 


      


 


Intake Oral 320 ml 1080 ml 480 ml 240 ml 240 ml 


 


IV Total  0 ml    


 


Output Urine Total  400 ml   100 ml 


 


Stool Total  200 ml    


 


# Voids 2  2 2  


 


# Bowel Movements 0  1 0 0 








Physical Exam


GENERAL: On O2


SKIN: Warm and dry.


HEAD: Normocephalic.


EYES: No scleral icterus. No injection or drainage. 


NECK: Supple, trachea midline. No JVD or lymphadenopathy.


CARDIOVASCULAR: Regular rate and rhythm without murmurs, gallops, or rubs. Few 

rhonchi.


RESPIRATORY: Breath sounds equal bilaterally. No accessory muscle use.


GASTROINTESTINAL: Abdomen soft, non-tender, nondistended. 


MUSCULOSKELETAL: No cyanosis, or edema. 





Laboratory





Laboratory Tests








Test 2/21/17





 14:31


 


HIV (1&2) Antibody REFLEX 








Imaging





Last Impressions








Chest X-Ray 2/20/17 0600 Signed





Impressions: 





 Service Date/Time:  Monday, February 20, 2017 05:30 - CONCLUSION:  Diffuse 





 increase interstitial markings related to diffuse processes such as edema or 





 diffuse infection.     Nahun Azul MD 


 


CT Angiography 2/11/17 0000 Signed





Impressions: 





 Service Date/Time:  Saturday, February 11, 2017 18:29 - CONCLUSION:  1. 

Negative 





 for pulmonary embolus. 2. Interval development of bilateral lung disease on a 





 background of emphysema. Findings are not entirely typical of 

bronchopneumonia. 





 Differential diagnosis includes some form of smoking related disease with 





 respiratory bronchiolitis or possibly Langerhans' cell histiocytosis.     

Bravo Robison MD 











Assessment and Plan


Problem List:  


(1) Pneumonia


(2) COPD (chronic obstructive pulmonary disease)


(3) Atrial fibrillation


Assessment and Plan


Slow progress. No recurrent AF or pauses. Continue monitoring. Pulm and ID eval 

in progress. Continue tx for pneumonia/COPD exac. LE edema improved, after we 

decreased diltiazem and added low dose metoprolol. Increase activity.





Problem Qualifiers





(1) Pneumonia:  





(2) COPD (chronic obstructive pulmonary disease):  


Qualified Code:  J44.1 - Chronic obstructive pulmonary disease with acute 

exacerbation


(3) Atrial fibrillation:  


Qualified Code:  I48.0 - Paroxysmal atrial fibrillation





Romi Davalos MD Feb 21, 2017 21:40

## 2017-02-21 NOTE — HHI.PR
Subjective


Remarks


No chest pain 


shortness of breath exertional and at rest


Voice hoarse, O2 on nasal cannula


Anxiety mild


No headache (Coral Li)





Objective


Objective Results


-





 Vital Signs








  Date Time  Temp Pulse Resp B/P Pulse Ox O2 Delivery O2 Flow Rate FiO2


 


2/21/17 12:00 97.8 78 18 157/82 93   


 


2/21/17 08:38      Nasal Cannula 4.00 


 


2/21/17 08:00 97.0 81 18 152/93 94   


 


2/21/17 00:00 97.2 9 20 150/78 97   


 


2/20/17 20:19     93 Nasal Cannula 2.00 


 


2/20/17 20:00 96.9 90 20 164/80 97   


 


2/20/17 19:30     93 Nasal Cannula 2.00 


 


2/20/17 16:00 97.8 84 19 146/61 96   








 I/O








 2/20/17 2/20/17 2/20/17 2/21/17 2/21/17 2/21/17





 07:00 15:00 23:00 07:00 15:00 23:00


 


Intake Total 320 ml 1080 ml 480 ml 240 ml 240 ml 


 


Output Total  600 ml   100 ml 


 


Balance 320 ml 480 ml 480 ml 240 ml 140 ml 


 


      


 


Intake Oral 320 ml 1080 ml 480 ml 240 ml 240 ml 


 


IV Total  0 ml    


 


Output Urine Total  400 ml   100 ml 


 


Stool Total  200 ml    


 


# Voids 2  2 2  


 


# Bowel Movements 0  1 0 0 





 (Coral Li)


Result Diagram:  


2/20/17 0528 2/20/17 0528








ROS


General:  Fatigue, Weakness, Other (10 point ROS done continues with weakness 

fatigue, hoarse voice, shortness of breath, cough.  Other systems otherwise 

negative are unremarkable)


Pulmonary:  SOB (Coral Li)





Physical Exam


Physical Exam


PHYSICAL EXAMINATION


GENERAL:  This is a slim, well-nourished   male 


who appears to be in no acute distress.  He  is alert and awake, answers 

questions appropriately.  


HEAD:  Normocephalic without any lesion or mass noted.  Facial features


appear symmetric.


OROPHARYNGEAL:  Oropharynx without erythema or edema.


NECK:  Supple.  No nuchal rigidity or lymphadenopathy.


Trachea midline without deviation.  


CARDIAC:  Regular rhythm, regular rate, S1 and S2 are heard.  Murmur none; no 

gallops or rubs.


LUNGS:  Diminished breath sounds anteriorly and posteriorly more decreased on 

the right than the left.  Mild wheeze, mild rhonchi no  rale.  No


use of accessory muscles on inspiration or expiration At rest


ABDOMEN:  Soft, nontender, no organomegaly or masses.  Bowel sounds are


heard in all four quadrants.  No rebound.  No guarding.  Appetite fair


EXTREMITIES:  No edema.  Pulses equal bilateral.  No cyanosis.


NEUROLOGICAL:  Patient mood and affect appropriate. No focal deficit


SKIN:Warm and moist


Objective Remarks


I'm getting up walking so I can keep my strength up (Coral Li)





A/P


Assessment and Plan


Assessment and Plan


1. Sepsis on admission.


2. COPD exacerbation.


3. Hyponatremia.


4. Hospital acquired pneumonia.


5. History of A-fib, currently in sinus rhythm.


6. History of ulcerative colitis.


7. Hypokalemia


8. R/O AIDS


9. Dehydration


 


DISCUSSION:


Labs review, vital signs reviewed.


gentle hydration. 





monitored on telemetry, regular rhythm


Continue IV fluids.


Continue antibiotic


ID consult, , states probable PNA vs non infectious pneumonitis with hypoxia, 

thrush,  More testing to R/O AIDS


Pulmonary consult appreciated, bronchoscopy today. Awaiting tests/biopsy 

results which included cytology results  


Continue nebulizer treatments


Continue oxygen to keep saturation above 90%  Encouraged pt. to stay on rm air 

unless he is SOB, and or sat <90.  Currently pt. is 96 on rm air.


continues with coarse voice, and cough.  Has meds for prn use.


Current by mouth fluids, labs reviewed in normal except B UN 21. 


 


steroids


DVT prophylaxis will be provided as well as GI prophylaxis.


Continue home medication as indicated


Diflucan for thrush continued, improving


Cough medication with codeine


Still has hoarse voice, with low air volumes, respirations 24-28 at rest.  

Patient states hypoxia with ambulation and activity.  Keep O2 on at 2 L for now


Lab in for more lab draws per ID.  See note





Discussed with RN


Discussed with patient


Discussed with Dr. Marie, pt. seen on her behalf


Discussed With:  Nurse, Family (patient), Other (Dr. Obrien, patient seen  on 

his behalf) (Coral Li)


Assessment and Plan


Patient seen and examined


cytology 1of 3 + for PCP


d/w Dr Ray


start i/v bactrim / steroids


check HIV status


continue current care


discussed with patient


discussed with Coral MCGILL (Vianey Marie MD)








Coral Li Feb 21, 2017 15:39


Vianey Marie MD Feb 21, 2017 16:34

## 2017-02-21 NOTE — HHI.PR
Subjective


Remarks


No fever today.. On o2  2l.


Coughed  up  clear  sputum. Had  no fever.Bronchoscopy  wash  shows  no  

malignant cells.


Pneumocystis  was present on BAL now on bactrim IV.and on diflucan





Objective





 Vital Signs








  Date Time  Temp Pulse Resp B/P Pulse Ox O2 Delivery O2 Flow Rate FiO2


 


2/21/17 18:34     93 Nasal Cannula 3.00 


 


2/21/17 12:00 97.8 78 18 157/82 93   


 


2/21/17 08:38      Nasal Cannula 4.00 


 


2/21/17 08:00 97.0 81 18 152/93 94   


 


2/21/17 00:00 97.2 9 20 150/78 97   


 


2/20/17 20:19     93 Nasal Cannula 2.00 


 


2/20/17 20:00 96.9 90 20 164/80 97   


 


2/20/17 19:30     93 Nasal Cannula 2.00 








 I/O








 2/20/17 2/20/17 2/20/17 2/21/17 2/21/17 2/21/17





 07:00 15:00 23:00 07:00 15:00 23:00


 


Intake Total 320 ml 1080 ml 480 ml 240 ml 240 ml 


 


Output Total  600 ml   100 ml 


 


Balance 320 ml 480 ml 480 ml 240 ml 140 ml 


 


      


 


Intake Oral 320 ml 1080 ml 480 ml 240 ml 240 ml 


 


IV Total  0 ml    


 


Output Urine Total  400 ml   100 ml 


 


Stool Total  200 ml    


 


# Voids 2  2 2  


 


# Bowel Movements 0  1 0 0 








Result Diagram:  


2/20/17 0528 2/20/17 0528





Objective Remarks


GENERAL: This is a 61 year-old male, up  in bed.  He


is in no distress .


HEENT:  Mucous membranes moist. There is


no jaundice.Throat clear.


NECK: Supple.


CARDIOVASCULAR: Regular rate and rhythm.


RESPIRATORY: Few crackles  at bases  as well as  scattered wheeze.


GASTROINTESTINAL: Bowel sounds are present.


ABDOMEN: Not distended.No mass.


:  No CVA tenderness.


MUSCULOSKELETAL: No edema. moves all  well.


NEUROLOGIC: Alert, oriented.  Speech is clear .Reflexes  1 +





Assessment and Plan


Assessment and Plan





IMPRESSION:


1. Sepsis on admission.


2. COPD exacerbation.


3. Hyponatremia.


4. Hospital acquired pneumonia.


5. History of A-fib, currently in sinus rhythm.


6. History of ulcerative colitis.











Plan :





1. Cont Antibiotics as ordered.





2. Nebs qid , duoneb.





3. Bactrim  mg





4. IS  q3h.





5. Prednisone 40 mg bid.





6. CD count  and HIV testing.








ARIADNA Murry MD Feb 21, 2017 19:26

## 2017-02-22 VITALS
SYSTOLIC BLOOD PRESSURE: 110 MMHG | DIASTOLIC BLOOD PRESSURE: 62 MMHG | RESPIRATION RATE: 19 BRPM | OXYGEN SATURATION: 95 % | HEART RATE: 82 BPM | TEMPERATURE: 97.3 F

## 2017-02-22 VITALS
RESPIRATION RATE: 17 BRPM | OXYGEN SATURATION: 94 % | DIASTOLIC BLOOD PRESSURE: 63 MMHG | SYSTOLIC BLOOD PRESSURE: 117 MMHG | TEMPERATURE: 97.8 F | HEART RATE: 80 BPM

## 2017-02-22 VITALS
DIASTOLIC BLOOD PRESSURE: 72 MMHG | HEART RATE: 80 BPM | OXYGEN SATURATION: 96 % | TEMPERATURE: 97.3 F | SYSTOLIC BLOOD PRESSURE: 133 MMHG | RESPIRATION RATE: 17 BRPM

## 2017-02-22 VITALS — OXYGEN SATURATION: 92 %

## 2017-02-22 VITALS
RESPIRATION RATE: 18 BRPM | TEMPERATURE: 98.9 F | HEART RATE: 84 BPM | OXYGEN SATURATION: 93 % | DIASTOLIC BLOOD PRESSURE: 80 MMHG | SYSTOLIC BLOOD PRESSURE: 148 MMHG

## 2017-02-22 RX ADMIN — GABAPENTIN SCH MG: 100 CAPSULE ORAL at 08:56

## 2017-02-22 RX ADMIN — MESALAMINE SCH MG: 800 TABLET, DELAYED RELEASE ORAL at 05:57

## 2017-02-22 RX ADMIN — FLUCONAZOLE SCH MG: 200 TABLET ORAL at 08:57

## 2017-02-22 RX ADMIN — GUAIFENESIN AND CODEINE PHOSPHATE PRN ML: 10; 100 LIQUID ORAL at 21:34

## 2017-02-22 RX ADMIN — NYSTATIN SCH ML: 100000 SUSPENSION ORAL at 13:45

## 2017-02-22 RX ADMIN — HEPARIN SODIUM SCH UNITS: 10000 INJECTION, SOLUTION INTRAVENOUS; SUBCUTANEOUS at 21:35

## 2017-02-22 RX ADMIN — IPRATROPIUM BROMIDE AND ALBUTEROL SULFATE PRN AMPULE: .5; 3 SOLUTION RESPIRATORY (INHALATION) at 05:23

## 2017-02-22 RX ADMIN — MESALAMINE SCH MG: 800 TABLET, DELAYED RELEASE ORAL at 15:29

## 2017-02-22 RX ADMIN — GABAPENTIN SCH MG: 100 CAPSULE ORAL at 13:45

## 2017-02-22 RX ADMIN — GUAIFENESIN AND CODEINE PHOSPHATE PRN ML: 10; 100 LIQUID ORAL at 09:02

## 2017-02-22 RX ADMIN — SULFAMETHOXAZOLE AND TRIMETHOPRIM SCH MLS/HR: 80; 16 INJECTION, SOLUTION, CONCENTRATE INTRAVENOUS at 05:58

## 2017-02-22 RX ADMIN — DILTIAZEM HYDROCHLORIDE SCH MG: 120 CAPSULE, EXTENDED RELEASE ORAL at 08:56

## 2017-02-22 RX ADMIN — SULFAMETHOXAZOLE AND TRIMETHOPRIM SCH MLS/HR: 80; 16 INJECTION, SOLUTION, CONCENTRATE INTRAVENOUS at 18:14

## 2017-02-22 RX ADMIN — FAMOTIDINE SCH MG: 20 TABLET, FILM COATED ORAL at 21:34

## 2017-02-22 RX ADMIN — SODIUM CHLORIDE, PRESERVATIVE FREE SCH ML: 5 INJECTION INTRAVENOUS at 08:57

## 2017-02-22 RX ADMIN — METOPROLOL TARTRATE SCH MG: 25 TABLET, FILM COATED ORAL at 21:41

## 2017-02-22 RX ADMIN — OXYCODONE HYDROCHLORIDE AND ACETAMINOPHEN SCH MG: 500 TABLET ORAL at 08:57

## 2017-02-22 RX ADMIN — SULFAMETHOXAZOLE AND TRIMETHOPRIM SCH MLS/HR: 80; 16 INJECTION, SOLUTION, CONCENTRATE INTRAVENOUS at 12:21

## 2017-02-22 RX ADMIN — IPRATROPIUM BROMIDE AND ALBUTEROL SULFATE PRN AMPULE: .5; 3 SOLUTION RESPIRATORY (INHALATION) at 21:53

## 2017-02-22 RX ADMIN — HEPARIN SODIUM SCH UNITS: 10000 INJECTION, SOLUTION INTRAVENOUS; SUBCUTANEOUS at 08:57

## 2017-02-22 RX ADMIN — Medication SCH TAB: at 08:56

## 2017-02-22 RX ADMIN — FAMOTIDINE SCH MG: 20 TABLET, FILM COATED ORAL at 08:56

## 2017-02-22 RX ADMIN — MESALAMINE SCH MG: 800 TABLET, DELAYED RELEASE ORAL at 21:35

## 2017-02-22 RX ADMIN — SODIUM CHLORIDE, PRESERVATIVE FREE SCH ML: 5 INJECTION INTRAVENOUS at 21:35

## 2017-02-22 RX ADMIN — SODIUM CHLORIDE, PRESERVATIVE FREE PRN ML: 5 INJECTION INTRAVENOUS at 05:58

## 2017-02-22 RX ADMIN — NYSTATIN SCH ML: 100000 SUSPENSION ORAL at 21:34

## 2017-02-22 RX ADMIN — METOPROLOL TARTRATE SCH MG: 25 TABLET, FILM COATED ORAL at 08:56

## 2017-02-22 RX ADMIN — GABAPENTIN SCH MG: 100 CAPSULE ORAL at 18:14

## 2017-02-22 RX ADMIN — GUAIFENESIN AND CODEINE PHOSPHATE PRN ML: 10; 100 LIQUID ORAL at 14:59

## 2017-02-22 RX ADMIN — IPRATROPIUM BROMIDE AND ALBUTEROL SULFATE PRN AMPULE: .5; 3 SOLUTION RESPIRATORY (INHALATION) at 15:31

## 2017-02-22 RX ADMIN — Medication SCH CAP: at 08:56

## 2017-02-22 RX ADMIN — NYSTATIN SCH ML: 100000 SUSPENSION ORAL at 08:57

## 2017-02-22 RX ADMIN — NYSTATIN SCH ML: 100000 SUSPENSION ORAL at 18:14

## 2017-02-22 NOTE — HHI.FF
Face to Face Verification


Diagnosis:  


(1) COPD (chronic obstructive pulmonary disease)


(2) Atrial fibrillation


(3) Pneumonia


(4) Ulcerative colitis


(5) Dehydration


(6) Thrush


(7) Pneumocystis carinii pneumonia


(8) Hypoxia


(9) Hypertension


(10) SIRS (systemic inflammatory response syndrome)


(11) BPH (benign prostatic hyperplasia)


Home Health Nursing


Order:     Medical education


      Signs/symptoms of disease process


      Oxygen administration education


      Nursing assessment with vital signs


      IV medication administration


Instructions:


NEEDS ANTIBIOTICS, DR. MEJIA TO WRITE ORDER








Order: To Evaluate:  Support services


Order: To Provide:  Community services








I have seen patient Zo Pickard on 2/22/17. My clinical findings 

support the need for the requested home health care services because:


Patient has SOB


Deconditioned w/ increased weakness


Need for psychosocial assistance








I certify that my clinical findings support that this patient is homebound 

because:


Hx COPD- exertion dyspnea/weakness


Need for psychosocial assistance








Wilma Harrison Feb 22, 2017 15:16

## 2017-02-22 NOTE — PD.CARD.PN
Subjective


Subjective Remarks


No CP, mild SOB, still on O2





Objective


Medications





 Current Medications








 Medications


  (Trade)  Dose


 Ordered  Sig/Gissel


 Route  Start Time


 Stop Time Status Last Admin


 


  (NS Flush)  2 ml  UNSCH  PRN


 IV FLUSH  2/11/17 19:15


    2/22/17 05:58


 


 


  (NS Flush)  2 ml  BID


 IV FLUSH  2/11/17 21:00


    2/22/17 08:57


 


 


  (Zofran Inj)  4 mg  Q6H  PRN


 IV  2/11/17 19:15


     


 


 


  (Heparin Inj)  5,000 units  Q12H


 SQ  2/11/17 21:00


    2/22/17 08:57


 


 


  (Pepcid)  20 mg  BID


 PO  2/11/17 21:00


    2/22/17 08:56


 


 


  (Vitamin C)  500 mg  DAILY


 PO  2/12/17 09:00


    2/22/17 08:57


 


 


  (Symbicort


 160-4.5 Inh)  1 puff  Q12HR


 INH  2/11/17 21:00


   Hold 2/12/17 09:00


 


 


  (Bentyl)  20 mg  TID  PRN


 PO  2/11/17 19:15


     


 


 


  (Neurontin)  100 mg  TID


 PO  2/12/17 09:00


    2/22/17 08:56


 


 


  (Asacol Hd Dr)  1,600 mg  Q8HR


 PO  2/11/17 22:00


    2/22/17 05:57


 


 


  (Theragran


 Hematinic)  1 tab  DAILY


 PO  2/12/17 09:00


    2/22/17 08:56


 


 


  (Nephrocaps)  1 cap  DAILY


 PO  2/12/17 09:00


    2/22/17 08:56


 


 


  (Tylenol)  650 mg  Q4H  PRN


 PO  2/11/17 23:30


     


 


 


  (Mycostatin  Liq)  5 ml  QID


 SWISH-SWAL  2/11/17 23:30


    2/22/17 08:57


 


 


  (Robitussin Ac


 200-20 Mg/10 ml


 Liq)  10 ml  Q4H  PRN


 PO  2/12/17 10:30


    2/22/17 09:02


 


 


  (KlonoPIN)  1 mg  HS  PRN


 PO  2/12/17 21:30


    2/21/17 21:13


 


 


  (Chloraseptic


 Spray)  2 spray  Q2H  PRN


 OROPHARYNG  2/14/17 14:15


    2/18/17 09:58


 


 


  (Diflucan)  200 mg  DAILY


 PO  2/15/17 09:00


    2/22/17 08:57


 


 


  (Cardizem Cd)  120 mg  DAILY


 PO  2/16/17 11:30


    2/22/17 08:56


 


 


  (Lopressor)  25 mg  BID


 PO  2/16/17 21:00


    2/22/17 08:56


 


 


  (Deltasone)  40 mg  DAILY


 PO  2/27/17 09:00


 3/4/17 08:59   


 


 


  (Deltasone)  20 mg  DAILY


 PO  3/4/17 09:00


 3/15/17 08:59   


 


 


 Prednisone 40 mg  40 mg  BID


 PO  2/21/17 16:00


 2/27/17 08:59  2/22/17 08:56


 


 


  (Bactrim Inj/D5W


 500 ml Inj)  514.0625


 ml @ 


 514....  Q6HR


 IV  2/21/17 18:00


    2/22/17 05:58


 








Vital Signs / I&O





 Vital Signs








  Date Time  Temp Pulse Resp B/P Pulse Ox O2 Delivery O2 Flow Rate FiO2


 


2/22/17 08:00 97.3 82 19 110/62 95   


 


2/21/17 23:54 97.9 79 18 139/85 96   


 


2/21/17 20:00 96.5 81 20 144/71 96   


 


2/21/17 19:34     96 Nasal Cannula 2.00 


 


2/21/17 18:34     93 Nasal Cannula 3.00 


 


2/21/17 12:00 97.8 78 18 157/82 93   








 I/O








 2/21/17 2/21/17 2/21/17 2/22/17 2/22/17 2/22/17





 07:00 15:00 23:00 07:00 15:00 23:00


 


Intake Total 240 ml 240 ml 500 ml 120 ml  


 


Output Total  100 ml    


 


Balance 240 ml 140 ml 500 ml 120 ml  


 


      


 


Intake Oral 240 ml 240 ml  120 ml  


 


IV Total   500 ml   


 


Output Urine Total  100 ml    


 


# Voids 2   2  


 


# Bowel Movements 0 0  0  








Physical Exam


GENERAL: On O2


SKIN: Warm and dry.


HEAD: Normocephalic.


EYES: No scleral icterus. No injection or drainage. 


NECK: Supple, trachea midline. No JVD or lymphadenopathy.


CARDIOVASCULAR: Regular rate and rhythm without murmurs, gallops, or rubs. Few 

rhonchi.


RESPIRATORY: Breath sounds equal bilaterally. No accessory muscle use.


GASTROINTESTINAL: Abdomen soft, non-tender, nondistended. 


MUSCULOSKELETAL: No cyanosis, or edema. 





Laboratory





Laboratory Tests








Test 2/21/17





 14:31


 


HIV (1&2) Antibody REFLEX 








Imaging





Last Impressions








Chest X-Ray 2/20/17 0600 Signed





Impressions: 





 Service Date/Time:  Monday, February 20, 2017 05:30 - CONCLUSION:  Diffuse 





 increase interstitial markings related to diffuse processes such as edema or 





 diffuse infection.     Nahun Azul MD 


 


CT Angiography 2/11/17 0000 Signed





Impressions: 





 Service Date/Time:  Saturday, February 11, 2017 18:29 - CONCLUSION:  1. 

Negative 





 for pulmonary embolus. 2. Interval development of bilateral lung disease on a 





 background of emphysema. Findings are not entirely typical of 

bronchopneumonia. 





 Differential diagnosis includes some form of smoking related disease with 





 respiratory bronchiolitis or possibly Langerhans' cell histiocytosis.     

Bravo Robison MD 











Assessment and Plan


Problem List:  


(1) Pneumonia


(2) COPD (chronic obstructive pulmonary disease)


(3) Atrial fibrillation


Assessment and Plan


Slow progress. No recurrent AF or pauses. Continue monitoring. Pulm and ID eval 

in progress. Continue tx for pneumonia/COPD exac. LE edema improved, after we 

decreased diltiazem and added low dose metoprolol, BP controlled. Increase 

activity. Anticipate discharge home soon.





Problem Qualifiers





(1) Pneumonia:  





(2) COPD (chronic obstructive pulmonary disease):  


Qualified Code:  J44.1 - Chronic obstructive pulmonary disease with acute 

exacerbation


(3) Atrial fibrillation:  


Qualified Code:  I48.0 - Paroxysmal atrial fibrillation





Romi Davalos MD Feb 22, 2017 09:42

## 2017-02-22 NOTE — HHI.PR
Subjective


Remarks


SOB improved


has been ambulating with oxygen around room


no fever


thrust improving


eating very well


no cp


no diarrhea


feels motivated as he is starting to feel well


wants to go back to work in 2 weeks, d/w need to re-evaluate this as he will 

need several weeks to recuperate





Objective


Objective Results


-





 Vital Signs








  Date Time  Temp Pulse Resp B/P Pulse Ox O2 Delivery O2 Flow Rate FiO2


 


2/22/17 12:00 97.3 80 17 133/72 96   


 


2/22/17 10:16     92 Nasal Cannula 2.00 


 


2/22/17 08:00 97.3 82 19 110/62 95   


 


2/21/17 23:54 97.9 79 18 139/85 96   


 


2/21/17 20:00 96.5 81 20 144/71 96   


 


2/21/17 19:34     96 Nasal Cannula 2.00 


 


2/21/17 18:34     93 Nasal Cannula 3.00 








 I/O








 2/21/17 2/21/17 2/21/17 2/22/17 2/22/17 2/22/17





 07:00 15:00 23:00 07:00 15:00 23:00


 


Intake Total 240 ml 240 ml 500 ml 120 ml 640 ml 


 


Output Total  100 ml    


 


Balance 240 ml 140 ml 500 ml 120 ml 640 ml 


 


      


 


Intake Oral 240 ml 240 ml  120 ml 640 ml 


 


IV Total   500 ml   


 


Output Urine Total  100 ml    


 


# Voids 2   2 2 


 


# Bowel Movements 0 0  0 1 








Result Diagram:  


2/20/17 0528 2/20/17 0528





Other Results





Laboratory Tests








Test 2/22/17





 05:45


 


Hepatitis A IgM Antibody NEGATIVE 


 


Hepatitis B Surface Antigen NEGATIVE 


 


Hepatitis B Core IgM Antibody NEGATIVE 


 


Hepatitis C Antibody NEGATIVE 











ROS


General:  Weakness,  No: Fatigue, Other


HEENT:  Other (thrust ),  No: Sore Throat, Dysphagia


Cardiac:  No: Chest Pain, Edema, Palpitations


Pulmonary:  Cough, SOB, Wheezing


GI:  No: Abdominal Pain, BM, Diarrhea, N/V


/GYN:  No: Dysuria, Urgency


Neuro/MS:  No: Lightheaded, Confusion


Psych:  No: Anxiety, Depression


Skin:  No: Itching, Rash


Urinary Catheter:  No


Vascular Central Line Catheter:  No





A/P


Diagnosis:  


(1) COPD (chronic obstructive pulmonary disease)


(2) BPH (benign prostatic hyperplasia)


(3) Atrial arrhythmia


(4) Atrial fibrillation


(5) Pneumonia


(6) Atrial fibrillation


(7) SIRS (systemic inflammatory response syndrome)


(8) Hypoxia


(9) Pneumocystis carinii pneumonia


(10) Thrush


(11) Dehydration


(12) Ulcerative colitis


(13) Hypertension


Assessment and Plan


continue with empiric abx-Diflucan, Bactrim IV


appreciate ID input


HIV reflex +


BAL + PCP


appreciate ID input-pt with 2 AIDS defining conditions; thrush and PCP


CD4 count pending





appreciate pulmonary input


S/P bronch 2/15


continue oxygen, duonebs, PO steroids


improving slowly





Thrush


continue Diflucan


continue Nystatin





Hx afib, SR 


appreciate cardiology input


continue Cardizem and BB





Heparin for DVT prophylaxis


continue Asacol 





Case management consult DC planning, resume HHC, oxygen


D/W pt. at length, not likely to go back to work for some time. He's 

immunocompromised, significant SOB. Needs time to heal


need f/u with ID as OP


Hopefully dc 3 days when cleared by ID, pulm








D/W RN


D/W Dr. Marie


D/W pt


This pt. was seen by myself and Dr. Marie, this note is written on her behalf.





Problem Qualifiers





(1) COPD (chronic obstructive pulmonary disease):  


Qualified Code:  J44.1 - Chronic obstructive pulmonary disease with acute 

exacerbation


(2) BPH (benign prostatic hyperplasia):  


Qualified Code:  N40.0 - Benign prostatic hyperplasia, presence of lower 

urinary tract symptoms unspecified, unspecified morphology


(3) Atrial fibrillation:  


Qualified Code:  I48.0 - Paroxysmal atrial fibrillation


(4) Pneumonia:  





(5) Atrial fibrillation:  


Qualified Code:  I48.0 - Paroxysmal atrial fibrillation


(6) Pneumocystis carinii pneumonia:  





(7) Ulcerative colitis:  


Qualified Code:  K51.90 - Ulcerative colitis without complications, unspecified 

location


(8) Hypertension:  


Qualified Code:  I10 - Essential hypertension





Wilma Harrison Feb 22, 2017 15:15

## 2017-02-22 NOTE — HHI.PR
Subjective


Remarks


No fever today. On o2  2l.


Has some cough .Clear  sputum


Pneumocystis  was present on BAL now on bactrim IV.and on diflucan





Objective





 Vital Signs








  Date Time  Temp Pulse Resp B/P Pulse Ox O2 Delivery O2 Flow Rate FiO2


 


2/22/17 16:00 97.8 80 17 117/63 94   


 


2/22/17 15:31     92 Nasal Cannula 2.00 


 


2/22/17 12:00 97.3 80 17 133/72 96   


 


2/22/17 10:16     92 Nasal Cannula 2.00 


 


2/22/17 08:00 97.3 82 19 110/62 95   


 


2/21/17 23:54 97.9 79 18 139/85 96   


 


2/21/17 20:00 96.5 81 20 144/71 96   


 


2/21/17 19:34     96 Nasal Cannula 2.00 


 


2/21/17 18:34     93 Nasal Cannula 3.00 








 I/O








 2/21/17 2/21/17 2/21/17 2/22/17 2/22/17 2/22/17





 07:00 15:00 23:00 07:00 15:00 23:00


 


Intake Total 240 ml 240 ml 500 ml 120 ml 640 ml 


 


Output Total  100 ml    


 


Balance 240 ml 140 ml 500 ml 120 ml 640 ml 


 


      


 


Intake Oral 240 ml 240 ml  120 ml 640 ml 


 


IV Total   500 ml   


 


Output Urine Total  100 ml    


 


# Voids 2   2 2 


 


# Bowel Movements 0 0  0 1 








Result Diagram:  


2/20/17 0528 2/20/17 0528





Objective Remarks


GENERAL: This is a 61 year-old male, up  in bed.  He


is in no distress .


HEENT:  Mucous membranes moist. There is


no jaundice.Throat clear.


NECK: Supple.


CARDIOVASCULAR: Regular rate and rhythm.


RESPIRATORY: Few crackles  at bases  as well as  scattered wheeze.


GASTROINTESTINAL: Bowel sounds are present.


ABDOMEN: Not distended.No mass.


:  No CVA tenderness.


MUSCULOSKELETAL: No edema. moves all  well.


NEUROLOGIC: Alert, oriented. .Reflexes  1 +





Assessment and Plan


Assessment and Plan





IMPRESSION:


1. Sepsis on admission.


2. COPD exacerbation.


3. Hyponatremia.


4. Hospital acquired pneumonia.


5. History of A-fib, currently in sinus rhythm.


6. History of ulcerative colitis.


7. PCP pneumonia/Immunocompromised  state











Plan :





1. Cont Antibiotics as ordered.Bactrim/Diflucan





2. Nebs qid , duoneb.





3. CBC,Liver profile





4. IS  q3h.





5. Prednisone 20 mg bid.








ARIADNA Murry MD Feb 22, 2017 18:28

## 2017-02-23 VITALS
DIASTOLIC BLOOD PRESSURE: 82 MMHG | RESPIRATION RATE: 16 BRPM | HEART RATE: 86 BPM | SYSTOLIC BLOOD PRESSURE: 138 MMHG | TEMPERATURE: 97.8 F | OXYGEN SATURATION: 93 %

## 2017-02-23 VITALS
DIASTOLIC BLOOD PRESSURE: 76 MMHG | SYSTOLIC BLOOD PRESSURE: 131 MMHG | RESPIRATION RATE: 22 BRPM | TEMPERATURE: 98.2 F | HEART RATE: 89 BPM | OXYGEN SATURATION: 93 %

## 2017-02-23 VITALS
OXYGEN SATURATION: 95 % | HEART RATE: 90 BPM | SYSTOLIC BLOOD PRESSURE: 127 MMHG | RESPIRATION RATE: 20 BRPM | DIASTOLIC BLOOD PRESSURE: 72 MMHG | TEMPERATURE: 97.6 F

## 2017-02-23 VITALS
DIASTOLIC BLOOD PRESSURE: 76 MMHG | TEMPERATURE: 98.3 F | RESPIRATION RATE: 18 BRPM | HEART RATE: 76 BPM | OXYGEN SATURATION: 96 % | SYSTOLIC BLOOD PRESSURE: 123 MMHG

## 2017-02-23 VITALS
SYSTOLIC BLOOD PRESSURE: 132 MMHG | HEART RATE: 83 BPM | OXYGEN SATURATION: 96 % | TEMPERATURE: 99.4 F | RESPIRATION RATE: 19 BRPM | DIASTOLIC BLOOD PRESSURE: 75 MMHG

## 2017-02-23 VITALS — OXYGEN SATURATION: 90 %

## 2017-02-23 VITALS
OXYGEN SATURATION: 96 % | SYSTOLIC BLOOD PRESSURE: 130 MMHG | DIASTOLIC BLOOD PRESSURE: 70 MMHG | HEART RATE: 91 BPM | RESPIRATION RATE: 19 BRPM | TEMPERATURE: 98 F

## 2017-02-23 VITALS — OXYGEN SATURATION: 93 %

## 2017-02-23 VITALS — OXYGEN SATURATION: 92 %

## 2017-02-23 VITALS — OXYGEN SATURATION: 96 %

## 2017-02-23 RX ADMIN — GUAIFENESIN AND CODEINE PHOSPHATE PRN ML: 10; 100 LIQUID ORAL at 18:39

## 2017-02-23 RX ADMIN — SULFAMETHOXAZOLE AND TRIMETHOPRIM SCH MLS/HR: 80; 16 INJECTION, SOLUTION, CONCENTRATE INTRAVENOUS at 05:50

## 2017-02-23 RX ADMIN — GABAPENTIN SCH MG: 100 CAPSULE ORAL at 12:53

## 2017-02-23 RX ADMIN — METOPROLOL TARTRATE SCH MG: 25 TABLET, FILM COATED ORAL at 20:04

## 2017-02-23 RX ADMIN — IPRATROPIUM BROMIDE AND ALBUTEROL SULFATE PRN AMPULE: .5; 3 SOLUTION RESPIRATORY (INHALATION) at 05:33

## 2017-02-23 RX ADMIN — MESALAMINE SCH MG: 800 TABLET, DELAYED RELEASE ORAL at 12:53

## 2017-02-23 RX ADMIN — SULFAMETHOXAZOLE AND TRIMETHOPRIM SCH MLS/HR: 80; 16 INJECTION, SOLUTION, CONCENTRATE INTRAVENOUS at 00:37

## 2017-02-23 RX ADMIN — MESALAMINE SCH MG: 800 TABLET, DELAYED RELEASE ORAL at 05:50

## 2017-02-23 RX ADMIN — FLUCONAZOLE SCH MG: 200 TABLET ORAL at 09:09

## 2017-02-23 RX ADMIN — HEPARIN SODIUM SCH UNITS: 10000 INJECTION, SOLUTION INTRAVENOUS; SUBCUTANEOUS at 20:04

## 2017-02-23 RX ADMIN — GABAPENTIN SCH MG: 100 CAPSULE ORAL at 09:09

## 2017-02-23 RX ADMIN — SULFAMETHOXAZOLE AND TRIMETHOPRIM SCH MLS/HR: 80; 16 INJECTION, SOLUTION, CONCENTRATE INTRAVENOUS at 23:42

## 2017-02-23 RX ADMIN — Medication SCH TAB: at 09:09

## 2017-02-23 RX ADMIN — Medication SCH CAP: at 09:09

## 2017-02-23 RX ADMIN — NYSTATIN SCH ML: 100000 SUSPENSION ORAL at 13:01

## 2017-02-23 RX ADMIN — SODIUM CHLORIDE, PRESERVATIVE FREE SCH ML: 5 INJECTION INTRAVENOUS at 21:00

## 2017-02-23 RX ADMIN — GUAIFENESIN AND CODEINE PHOSPHATE PRN ML: 10; 100 LIQUID ORAL at 09:09

## 2017-02-23 RX ADMIN — FAMOTIDINE SCH MG: 20 TABLET, FILM COATED ORAL at 20:04

## 2017-02-23 RX ADMIN — FAMOTIDINE SCH MG: 20 TABLET, FILM COATED ORAL at 09:09

## 2017-02-23 RX ADMIN — MESALAMINE SCH MG: 800 TABLET, DELAYED RELEASE ORAL at 20:11

## 2017-02-23 RX ADMIN — HEPARIN SODIUM SCH UNITS: 10000 INJECTION, SOLUTION INTRAVENOUS; SUBCUTANEOUS at 09:10

## 2017-02-23 RX ADMIN — SULFAMETHOXAZOLE AND TRIMETHOPRIM SCH MLS/HR: 80; 16 INJECTION, SOLUTION, CONCENTRATE INTRAVENOUS at 18:39

## 2017-02-23 RX ADMIN — OXYCODONE HYDROCHLORIDE AND ACETAMINOPHEN SCH MG: 500 TABLET ORAL at 09:09

## 2017-02-23 RX ADMIN — GUAIFENESIN AND CODEINE PHOSPHATE PRN ML: 10; 100 LIQUID ORAL at 13:01

## 2017-02-23 RX ADMIN — SODIUM CHLORIDE, PRESERVATIVE FREE SCH ML: 5 INJECTION INTRAVENOUS at 09:00

## 2017-02-23 RX ADMIN — IPRATROPIUM BROMIDE AND ALBUTEROL SULFATE PRN AMPULE: .5; 3 SOLUTION RESPIRATORY (INHALATION) at 21:37

## 2017-02-23 RX ADMIN — GABAPENTIN SCH MG: 100 CAPSULE ORAL at 18:39

## 2017-02-23 RX ADMIN — NYSTATIN SCH ML: 100000 SUSPENSION ORAL at 09:09

## 2017-02-23 RX ADMIN — IPRATROPIUM BROMIDE AND ALBUTEROL SULFATE PRN AMPULE: .5; 3 SOLUTION RESPIRATORY (INHALATION) at 03:03

## 2017-02-23 RX ADMIN — NYSTATIN SCH ML: 100000 SUSPENSION ORAL at 20:05

## 2017-02-23 RX ADMIN — DILTIAZEM HYDROCHLORIDE SCH MG: 120 CAPSULE, EXTENDED RELEASE ORAL at 09:09

## 2017-02-23 RX ADMIN — NYSTATIN SCH ML: 100000 SUSPENSION ORAL at 18:39

## 2017-02-23 RX ADMIN — GUAIFENESIN AND CODEINE PHOSPHATE PRN ML: 10; 100 LIQUID ORAL at 01:45

## 2017-02-23 RX ADMIN — SULFAMETHOXAZOLE AND TRIMETHOPRIM SCH MLS/HR: 80; 16 INJECTION, SOLUTION, CONCENTRATE INTRAVENOUS at 12:53

## 2017-02-23 RX ADMIN — METOPROLOL TARTRATE SCH MG: 25 TABLET, FILM COATED ORAL at 09:09

## 2017-02-23 NOTE — HHI.PR
Subjective


Remarks


inc. SOB last night


required additional duonebs


+ cough and wheezing


mouth,tongue sore


eating okay


no fever





Objective


Objective Results


-





 Vital Signs








  Date Time  Temp Pulse Resp B/P Pulse Ox O2 Delivery O2 Flow Rate FiO2


 


2/23/17 08:00 98.0 91 19 130/70 96   


 


2/23/17 06:19 97.6 90 20 127/72 95   


 


2/23/17 05:33     93 Nasal Cannula 2.00 


 


2/23/17 03:08     90 Nasal Cannula 2.00 


 


2/23/17 00:00 97.8 86 16 138/82 93   


 


2/22/17 21:30     93 Nasal Cannula 3.00 


 


2/22/17 20:00 98.9 84 18 148/80 93   


 


2/22/17 16:00 97.8 80 17 117/63 94   


 


2/22/17 15:31     92 Nasal Cannula 2.00 








 I/O








 2/22/17 2/22/17 2/22/17 2/23/17 2/23/17 2/23/17





 07:00 15:00 23:00 07:00 15:00 23:00


 


Intake Total 120 ml 640 ml 860 ml 360 ml  


 


Output Total   420 ml   


 


Balance 120 ml 640 ml 440 ml 360 ml  


 


      


 


Intake Oral 120 ml 640 ml 360 ml 360 ml  


 


IV Total   500 ml   


 


Output Urine Total   420 ml   


 


# Voids 2 2  2  


 


# Bowel Movements 0 1 0 1  








Result Diagram:  


2/20/17 0528 2/20/17 0528








ROS


General:  No: Fatigue, Weakness


HEENT:  Sore Throat,  No: Dysphagia


Cardiac:  No: Chest Pain, Edema, Palpitations


Pulmonary:  Cough, SOB, Wheezing


GI:  No: Abdominal Pain, BM, Diarrhea, N/V


/GYN:  No: Dysuria, Urgency


Neuro/MS:  No: Lightheaded, Confusion


Psych:  No: Anxiety, Depression


Skin:  No: Itching, Rash





Physical Exam


Physical Exam


PHYSICAL EXAMINATION


GENERAL:  This is a slim, well-nourished   male 


who appears to be in no acute distress.  He  is alert and awake, answers 

questions appropriately.  


HEAD:  Normocephalic without any lesion or mass noted.  Facial features


appear symmetric.


OROPHARYNGEAL:  Oropharynx with erythema, has thrust 


NECK:  Supple.  No nuchal rigidity or lymphadenopathy.


Trachea midline without deviation.  


CARDIAC:  Regular rhythm, regular rate, S1 and S2 are heard.  Murmur none; no 

gallops or rubs.


LUNGS:  diminished, faint wheezes, non productive cough


ABDOMEN:  Soft, nontender, no organomegaly or masses.  Bowel sounds are


heard in all four quadrants.  No rebound.  No guarding.  Appetite fair


EXTREMITIES:  No edema.  Pulses equal bilateral.  No cyanosis.


NEUROLOGICAL:  Patient mood and affect appropriate. No focal deficit


Urinary Catheter:  No


Vascular Central Line Catheter:  No





A/P


Diagnosis:  


(1) COPD (chronic obstructive pulmonary disease)


(2) BPH (benign prostatic hyperplasia)


(3) Atrial arrhythmia


(4) Atrial fibrillation


(5) Pneumonia


(6) Atrial fibrillation


(7) SIRS (systemic inflammatory response syndrome)


(8) Hypoxia


(9) Pneumocystis carinii pneumonia


(10) Thrush


(11) Dehydration


(12) Ulcerative colitis


(13) Hypertension


Assessment and Plan


continue with empiric abx-Diflucan, Bactrim IV


appreciate ID input


HIV reflex +


BAL + PCP


appreciate ID input-pt with 2 AIDS defining conditions; thrush and PCP


CD4 count pending





appreciate pulmonary input


S/P bronch 2/15


continue oxygen, duonebs, PO steroids


improving slowly





Thrush


continue Diflucan


continue Nystatin





Hx afib, SR 


appreciate cardiology input


continue Cardizem and BB





Heparin for DVT prophylaxis


continue Asacol 





Case management consult DC planning, resume HHC, oxygen


D/W pt. at length, not likely to go back to work for some time. He's 

immunocompromised, significant SOB. Needs time to heal. Verbalizes 

understanding. 


need f/u with ID as OP


Hopefully dc 3 days when cleared by ID, pulm


continue with above tx 








D/W RN


D/W Dr. Marie


D/W pt


This pt. was seen by myself and Dr. Marie, this note is written on her behalf.





Problem Qualifiers





(1) COPD (chronic obstructive pulmonary disease):  


Qualified Code:  J44.1 - Chronic obstructive pulmonary disease with acute 

exacerbation


(2) BPH (benign prostatic hyperplasia):  


Qualified Code:  N40.0 - Benign prostatic hyperplasia, presence of lower 

urinary tract symptoms unspecified, unspecified morphology


(3) Atrial fibrillation:  


Qualified Code:  I48.0 - Paroxysmal atrial fibrillation


(4) Pneumonia:  





(5) Atrial fibrillation:  


Qualified Code:  I48.0 - Paroxysmal atrial fibrillation


(6) Pneumocystis carinii pneumonia:  





(7) Ulcerative colitis:  


Qualified Code:  K51.90 - Ulcerative colitis without complications, unspecified 

location


(8) Hypertension:  


Qualified Code:  I10 - Essential hypertension





Wilma Harrison Feb 23, 2017 12:36

## 2017-02-23 NOTE — PD.CARD.PN
Subjective


Subjective Remarks


No CP, mild SOB, no new c/o





Objective


Medications





 Current Medications








 Medications


  (Trade)  Dose


 Ordered  Sig/Gissel


 Route  Start Time


 Stop Time Status Last Admin


 


  (NS Flush)  2 ml  UNSCH  PRN


 IV FLUSH  2/11/17 19:15


    2/22/17 05:58


 


 


  (NS Flush)  2 ml  BID


 IV FLUSH  2/11/17 21:00


    2/22/17 21:35


 


 


  (Zofran Inj)  4 mg  Q6H  PRN


 IV  2/11/17 19:15


     


 


 


  (Heparin Inj)  5,000 units  Q12H


 SQ  2/11/17 21:00


    2/22/17 21:35


 


 


  (Pepcid)  20 mg  BID


 PO  2/11/17 21:00


    2/22/17 21:34


 


 


  (Vitamin C)  500 mg  DAILY


 PO  2/12/17 09:00


    2/22/17 08:57


 


 


  (Symbicort


 160-4.5 Inh)  1 puff  Q12HR


 INH  2/11/17 21:00


   Hold 2/12/17 09:00


 


 


  (Bentyl)  20 mg  TID  PRN


 PO  2/11/17 19:15


     


 


 


  (Neurontin)  100 mg  TID


 PO  2/12/17 09:00


    2/22/17 18:14


 


 


  (Asacol Hd Dr)  1,600 mg  Q8HR


 PO  2/11/17 22:00


    2/23/17 05:50


 


 


  (Theragran


 Hematinic)  1 tab  DAILY


 PO  2/12/17 09:00


    2/22/17 08:56


 


 


  (Nephrocaps)  1 cap  DAILY


 PO  2/12/17 09:00


    2/22/17 08:56


 


 


  (Tylenol)  650 mg  Q4H  PRN


 PO  2/11/17 23:30


     


 


 


  (Mycostatin  Liq)  5 ml  QID


 SWISH-SWAL  2/11/17 23:30


    2/22/17 21:34


 


 


  (Robitussin Ac


 200-20 Mg/10 ml


 Liq)  10 ml  Q4H  PRN


 PO  2/12/17 10:30


    2/23/17 01:45


 


 


  (KlonoPIN)  1 mg  HS  PRN


 PO  2/12/17 21:30


    2/22/17 21:34


 


 


  (Chloraseptic


 Spray)  2 spray  Q2H  PRN


 OROPHARYNG  2/14/17 14:15


    2/18/17 09:58


 


 


  (Diflucan)  200 mg  DAILY


 PO  2/15/17 09:00


    2/22/17 08:57


 


 


  (Cardizem Cd)  120 mg  DAILY


 PO  2/16/17 11:30


    2/22/17 08:56


 


 


  (Lopressor)  25 mg  BID


 PO  2/16/17 21:00


    2/22/17 21:41


 


 


  (Deltasone)  40 mg  DAILY


 PO  2/27/17 09:00


 3/4/17 08:59   


 


 


  (Deltasone)  20 mg  DAILY


 PO  3/4/17 09:00


 3/15/17 08:59   


 


 


 Prednisone 40 mg  40 mg  BID


 PO  2/21/17 16:00


 2/27/17 08:59  2/22/17 21:34


 


 


  (Bactrim Inj/D5W


 500 ml Inj)  514.0625


 ml @ 


 514....  Q6HR


 IV  2/21/17 18:00


    2/23/17 05:50


 








Vital Signs / I&O





 Vital Signs








  Date Time  Temp Pulse Resp B/P Pulse Ox O2 Delivery O2 Flow Rate FiO2


 


2/23/17 08:00 98.0 91 19 130/70 96   


 


2/23/17 06:19 97.6 90 20 127/72 95   


 


2/23/17 05:33     93 Nasal Cannula 2.00 


 


2/23/17 03:08     90 Nasal Cannula 2.00 


 


2/23/17 00:00 97.8 86 16 138/82 93   


 


2/22/17 21:30     93 Nasal Cannula 3.00 


 


2/22/17 20:00 98.9 84 18 148/80 93   


 


2/22/17 16:00 97.8 80 17 117/63 94   


 


2/22/17 15:31     92 Nasal Cannula 2.00 


 


2/22/17 12:00 97.3 80 17 133/72 96   


 


2/22/17 10:16     92 Nasal Cannula 2.00 








 I/O








 2/22/17 2/22/17 2/22/17 2/23/17 2/23/17 2/23/17





 07:00 15:00 23:00 07:00 15:00 23:00


 


Intake Total 120 ml 640 ml 860 ml 360 ml  


 


Output Total   420 ml   


 


Balance 120 ml 640 ml 440 ml 360 ml  


 


      


 


Intake Oral 120 ml 640 ml 360 ml 360 ml  


 


IV Total   500 ml   


 


Output Urine Total   420 ml   


 


# Voids 2 2  2  


 


# Bowel Movements 0 1 0 1  








Physical Exam


GENERAL: On O2


SKIN: Warm and dry.


HEAD: Normocephalic.


EYES: No scleral icterus. No injection or drainage. 


NECK: Supple, trachea midline. No JVD or lymphadenopathy.


CARDIOVASCULAR: Regular rate and rhythm without murmurs, gallops, or rubs. Few 

rhonchi.


RESPIRATORY: Breath sounds equal bilaterally. No accessory muscle use.


GASTROINTESTINAL: Abdomen soft, non-tender, nondistended. 


MUSCULOSKELETAL: No cyanosis, or edema. 





Laboratory





 Laboratory Tests








Test 2/20/17 2/20/17 2/21/17 2/22/17





 05:28 19:30 14:31 05:45


 


White Blood Count 9.1 TH/MM3   


 


Red Blood Count 4.99 MIL/MM3   


 


Hemoglobin 14.8 GM/DL   


 


Hematocrit 43.2 %   


 


Mean Corpuscular Volume 86.6 FL   


 


Mean Corpuscular Hemoglobin 29.6 PG   


 


Mean Corpuscular Hemoglobin 34.2 %   





Concent    


 


Red Cell Distribution Width 14.5 %   


 


Platelet Count 225 TH/MM3   


 


Mean Platelet Volume 7.8 FL   


 


Sodium Level 138 MEQ/L   


 


Potassium Level 4.1 MEQ/L   


 


Chloride Level 102 MEQ/L   


 


Carbon Dioxide Level 25.6 MEQ/L   


 


Anion Gap 10 MEQ/L   


 


Blood Urea Nitrogen 21 MG/DL   


 


Creatinine 0.80 MG/DL   


 


Estimat Glomerular Filtration 99 ML/MIN   





Rate    


 


Random Glucose 145 MG/DL   


 


Calcium Level 8.6 MG/DL   


 


Stool C. difficile Toxin (PCR)  NEGATIVE   


 


Stl C. difficile Toxin  PRESUMPTIVE  





Epiderm 027  NEGATIVE  


 


HIV (1&2) Antibody   REFLEX  


 


Hepatitis A IgM Antibody    NEGATIVE 


 


Hepatitis B Surface Antigen    NEGATIVE 


 


Hepatitis B Core IgM Antibody    NEGATIVE 


 


Hepatitis C Antibody    NEGATIVE 








Imaging


rad


Last Impressions








Chest X-Ray 2/20/17 0600 Signed





Impressions: 





 Service Date/Time:  Monday, February 20, 2017 05:30 - CONCLUSION:  Diffuse 





 increase interstitial markings related to diffuse processes such as edema or 





 diffuse infection.     Nahun Azul MD 


 


CT Angiography 2/11/17 0000 Signed





Impressions: 





 Service Date/Time:  Saturday, February 11, 2017 18:29 - CONCLUSION:  1. 

Negative 





 for pulmonary embolus. 2. Interval development of bilateral lung disease on a 





 background of emphysema. Findings are not entirely typical of 

bronchopneumonia. 





 Differential diagnosis includes some form of smoking related disease with 





 respiratory bronchiolitis or possibly Langerhans' cell histiocytosis.     

Bravo Robison MD 











Assessment and Plan


Problem List:  


(1) Pneumonia


(2) COPD (chronic obstructive pulmonary disease)


(3) Atrial fibrillation


Assessment and Plan


Slow progress. No recurrent AF or pauses. Continue monitoring. Pulm and ID eval 

in progress, AIDS suspected. Continue tx for pneumonia/COPD exac. LE edema 

improved after we decreased diltiazem and added low dose metoprolol, BP 

controlled. Increase activity. Anticipate discharge home soon. No new card 

issues.





Problem Qualifiers





(1) Pneumonia:  





(2) COPD (chronic obstructive pulmonary disease):  


Qualified Code:  J44.1 - Chronic obstructive pulmonary disease with acute 

exacerbation


(3) Atrial fibrillation:  


Qualified Code:  I48.0 - Paroxysmal atrial fibrillation





Romi Davalos MD Feb 23, 2017 09:04

## 2017-02-24 VITALS
SYSTOLIC BLOOD PRESSURE: 131 MMHG | DIASTOLIC BLOOD PRESSURE: 70 MMHG | HEART RATE: 97 BPM | TEMPERATURE: 99.8 F | OXYGEN SATURATION: 95 % | RESPIRATION RATE: 22 BRPM

## 2017-02-24 VITALS
HEART RATE: 96 BPM | RESPIRATION RATE: 20 BRPM | SYSTOLIC BLOOD PRESSURE: 110 MMHG | DIASTOLIC BLOOD PRESSURE: 61 MMHG | TEMPERATURE: 96.6 F | OXYGEN SATURATION: 92 %

## 2017-02-24 VITALS
DIASTOLIC BLOOD PRESSURE: 70 MMHG | HEART RATE: 85 BPM | TEMPERATURE: 97.4 F | OXYGEN SATURATION: 93 % | RESPIRATION RATE: 20 BRPM | SYSTOLIC BLOOD PRESSURE: 113 MMHG

## 2017-02-24 VITALS
TEMPERATURE: 97.5 F | OXYGEN SATURATION: 97 % | HEART RATE: 92 BPM | DIASTOLIC BLOOD PRESSURE: 83 MMHG | RESPIRATION RATE: 18 BRPM | SYSTOLIC BLOOD PRESSURE: 125 MMHG

## 2017-02-24 VITALS
RESPIRATION RATE: 20 BRPM | DIASTOLIC BLOOD PRESSURE: 66 MMHG | TEMPERATURE: 98 F | SYSTOLIC BLOOD PRESSURE: 120 MMHG | OXYGEN SATURATION: 98 % | HEART RATE: 80 BPM

## 2017-02-24 VITALS — OXYGEN SATURATION: 94 %

## 2017-02-24 VITALS — OXYGEN SATURATION: 92 %

## 2017-02-24 LAB
CD 19 PERCENT: 53 % (ref 6–29)
CD3 CELLS # BLD: 57 /UL (ref 840–3060)
CD3 CELLS NFR BLD: 40 % (ref 57–85)
CD3+CD4+ CELLS NFR BLD: 3 % (ref 30–61)
CD3+CD4+ CELLS/CD3+CD8+ CLL BLD: 0.1 % (ref 0.86–5)
CD3+CD8+ CELLS # BLD: 52 /UL (ref 180–1170)
CD3+CD8+ CELLS NFR BLD: 35 % (ref 12–42)
LYMPHOCYTES # BLD AUTO: 144 10*3/UL (ref 850–3900)

## 2017-02-24 RX ADMIN — HEPARIN SODIUM SCH UNITS: 10000 INJECTION, SOLUTION INTRAVENOUS; SUBCUTANEOUS at 21:33

## 2017-02-24 RX ADMIN — NYSTATIN SCH ML: 100000 SUSPENSION ORAL at 17:46

## 2017-02-24 RX ADMIN — GABAPENTIN SCH MG: 100 CAPSULE ORAL at 17:46

## 2017-02-24 RX ADMIN — MESALAMINE SCH MG: 800 TABLET, DELAYED RELEASE ORAL at 21:34

## 2017-02-24 RX ADMIN — GABAPENTIN SCH MG: 100 CAPSULE ORAL at 09:41

## 2017-02-24 RX ADMIN — GUAIFENESIN AND CODEINE PHOSPHATE PRN ML: 10; 100 LIQUID ORAL at 21:34

## 2017-02-24 RX ADMIN — MESALAMINE SCH MG: 800 TABLET, DELAYED RELEASE ORAL at 05:45

## 2017-02-24 RX ADMIN — FAMOTIDINE SCH MG: 20 TABLET, FILM COATED ORAL at 21:33

## 2017-02-24 RX ADMIN — Medication SCH CAP: at 09:41

## 2017-02-24 RX ADMIN — SULFAMETHOXAZOLE AND TRIMETHOPRIM SCH MLS/HR: 80; 16 INJECTION, SOLUTION, CONCENTRATE INTRAVENOUS at 17:46

## 2017-02-24 RX ADMIN — IPRATROPIUM BROMIDE AND ALBUTEROL SULFATE SCH AMPULE: .5; 3 SOLUTION RESPIRATORY (INHALATION) at 15:53

## 2017-02-24 RX ADMIN — NYSTATIN SCH ML: 100000 SUSPENSION ORAL at 12:45

## 2017-02-24 RX ADMIN — GUAIFENESIN AND CODEINE PHOSPHATE PRN ML: 10; 100 LIQUID ORAL at 09:41

## 2017-02-24 RX ADMIN — NYSTATIN SCH ML: 100000 SUSPENSION ORAL at 21:34

## 2017-02-24 RX ADMIN — METOPROLOL TARTRATE SCH MG: 25 TABLET, FILM COATED ORAL at 09:42

## 2017-02-24 RX ADMIN — GABAPENTIN SCH MG: 100 CAPSULE ORAL at 12:45

## 2017-02-24 RX ADMIN — FLUCONAZOLE SCH MG: 200 TABLET ORAL at 09:41

## 2017-02-24 RX ADMIN — Medication SCH TAB: at 09:41

## 2017-02-24 RX ADMIN — NYSTATIN SCH ML: 100000 SUSPENSION ORAL at 09:41

## 2017-02-24 RX ADMIN — MESALAMINE SCH MG: 800 TABLET, DELAYED RELEASE ORAL at 16:21

## 2017-02-24 RX ADMIN — SULFAMETHOXAZOLE AND TRIMETHOPRIM SCH MLS/HR: 80; 16 INJECTION, SOLUTION, CONCENTRATE INTRAVENOUS at 05:44

## 2017-02-24 RX ADMIN — DILTIAZEM HYDROCHLORIDE SCH MG: 120 CAPSULE, EXTENDED RELEASE ORAL at 09:41

## 2017-02-24 RX ADMIN — FAMOTIDINE SCH MG: 20 TABLET, FILM COATED ORAL at 09:41

## 2017-02-24 RX ADMIN — SULFAMETHOXAZOLE AND TRIMETHOPRIM SCH MLS/HR: 80; 16 INJECTION, SOLUTION, CONCENTRATE INTRAVENOUS at 12:45

## 2017-02-24 RX ADMIN — HEPARIN SODIUM SCH UNITS: 10000 INJECTION, SOLUTION INTRAVENOUS; SUBCUTANEOUS at 09:42

## 2017-02-24 RX ADMIN — SODIUM CHLORIDE, PRESERVATIVE FREE SCH ML: 5 INJECTION INTRAVENOUS at 09:48

## 2017-02-24 RX ADMIN — GUAIFENESIN AND CODEINE PHOSPHATE PRN ML: 10; 100 LIQUID ORAL at 17:46

## 2017-02-24 RX ADMIN — IPRATROPIUM BROMIDE AND ALBUTEROL SULFATE SCH AMPULE: .5; 3 SOLUTION RESPIRATORY (INHALATION) at 20:00

## 2017-02-24 RX ADMIN — METOPROLOL TARTRATE SCH MG: 25 TABLET, FILM COATED ORAL at 21:31

## 2017-02-24 RX ADMIN — OXYCODONE HYDROCHLORIDE AND ACETAMINOPHEN SCH MG: 500 TABLET ORAL at 09:42

## 2017-02-24 NOTE — PD.CARD.PN
Subjective


Subjective Remarks


No CP, SOB improved, off O2





Objective


Vital Signs / I&O





 Current Medications








 Medications


  (Trade)  Dose


 Ordered  Sig/Gissel


 Route  Start Time


 Stop Time Status Last Admin


 


  (NS Flush)  2 ml  UNSCH  PRN


 IV FLUSH  2/11/17 19:15


    2/22/17 05:58


 


 


  (NS Flush)  2 ml  BID


 IV FLUSH  2/11/17 21:00


    2/24/17 09:48


 


 


  (Zofran Inj)  4 mg  Q6H  PRN


 IV  2/11/17 19:15


     


 


 


  (Heparin Inj)  5,000 units  Q12H


 SQ  2/11/17 21:00


    2/24/17 09:42


 


 


  (Pepcid)  20 mg  BID


 PO  2/11/17 21:00


    2/24/17 09:41


 


 


  (Vitamin C)  500 mg  DAILY


 PO  2/12/17 09:00


    2/24/17 09:42


 


 


  (Symbicort


 160-4.5 Inh)  1 puff  Q12HR


 INH  2/11/17 21:00


   Hold 2/12/17 09:00


 


 


  (Bentyl)  20 mg  TID  PRN


 PO  2/11/17 19:15


     


 


 


  (Neurontin)  100 mg  TID


 PO  2/12/17 09:00


    2/24/17 09:41


 


 


  (Asacol Hd Dr)  1,600 mg  Q8HR


 PO  2/11/17 22:00


    2/24/17 05:45


 


 


  (Theragran


 Hematinic)  1 tab  DAILY


 PO  2/12/17 09:00


    2/24/17 09:41


 


 


  (Nephrocaps)  1 cap  DAILY


 PO  2/12/17 09:00


    2/24/17 09:41


 


 


  (Tylenol)  650 mg  Q4H  PRN


 PO  2/11/17 23:30


     


 


 


  (Mycostatin  Liq)  5 ml  QID


 SWISH-SWAL  2/11/17 23:30


    2/24/17 09:41


 


 


  (Robitussin Ac


 200-20 Mg/10 ml


 Liq)  10 ml  Q4H  PRN


 PO  2/12/17 10:30


    2/24/17 09:41


 


 


  (KlonoPIN)  1 mg  HS  PRN


 PO  2/12/17 21:30


    2/23/17 21:20


 


 


  (Chloraseptic


 Spray)  2 spray  Q2H  PRN


 OROPHARYNG  2/14/17 14:15


    2/18/17 09:58


 


 


  (Diflucan)  200 mg  DAILY


 PO  2/15/17 09:00


    2/24/17 09:41


 


 


  (Cardizem Cd)  120 mg  DAILY


 PO  2/16/17 11:30


    2/24/17 09:41


 


 


  (Lopressor)  25 mg  BID


 PO  2/16/17 21:00


    2/24/17 09:42


 


 


  (Deltasone)  40 mg  DAILY


 PO  2/27/17 09:00


 3/4/17 08:59   


 


 


  (Deltasone)  20 mg  DAILY


 PO  3/4/17 09:00


 3/15/17 08:59   


 


 


 Prednisone 40 mg  40 mg  BID


 PO  2/21/17 16:00


 2/27/17 08:59  2/24/17 09:42


 


 


  (Bactrim Inj/D5W


 500 ml Inj)  514.0625


 ml @ 


 514....  Q6HR


 IV  2/21/17 18:00


    2/24/17 05:44


 


 


  (Zithromax)  1,200 mg  Q7D


 PO  2/27/17 09:00


     


 








 Vital Signs








  Date Time  Temp Pulse Resp B/P Pulse Ox O2 Delivery O2 Flow Rate FiO2


 


2/24/17 09:17 96.6 96 20 110/61 92   


 


2/24/17 04:59     92   21


 


2/24/17 00:00 98.0 80 20 120/66 98   


 


2/23/17 21:41     92 Nasal Cannula 2.00 


 


2/23/17 20:10     93  2.00 


 


2/23/17 20:00 98.2 89 22 131/76 93   


 


2/23/17 16:35     96 Nasal Cannula 2.00 


 


2/23/17 16:00 98.3 76 18 123/76 96   


 


2/23/17 12:00 99.4 83 19 132/75 96   








 I/O








 2/23/17 2/23/17 2/23/17 2/24/17 2/24/17 2/24/17





 07:00 15:00 23:00 07:00 15:00 23:00


 


Intake Total 360 ml 720 ml 1220 ml 240 ml  


 


Balance 360 ml 720 ml 1220 ml 240 ml  


 


      


 


Intake Oral 360 ml 720 ml 720 ml 240 ml  


 


IV Total   500 ml   


 


# Voids 2 2 2 2  


 


# Bowel Movements 1 1 0 1  








Physical Exam


GENERAL: On O2


SKIN: Warm and dry.


HEAD: Normocephalic.


EYES: No scleral icterus. No injection or drainage. 


NECK: Supple, trachea midline. No JVD or lymphadenopathy.


CARDIOVASCULAR: Regular rate and rhythm without murmurs, gallops, or rubs.


RESPIRATORY: Breath sounds equal bilaterally. No accessory muscle use.


GASTROINTESTINAL: Abdomen soft, non-tender, nondistended. 


MUSCULOSKELETAL: No cyanosis, or edema. 





Laboratory





 Laboratory Tests








Test 2/20/17 2/20/17 2/21/17 2/22/17





 05:28 19:30 14:31 05:45


 


White Blood Count 9.1 TH/MM3   


 


Red Blood Count 4.99 MIL/MM3   


 


Hemoglobin 14.8 GM/DL   


 


Hematocrit 43.2 %   


 


Mean Corpuscular Volume 86.6 FL   


 


Mean Corpuscular Hemoglobin 29.6 PG   


 


Mean Corpuscular Hemoglobin 34.2 %   





Concent    


 


Red Cell Distribution Width 14.5 %   


 


Platelet Count 225 TH/MM3   


 


Mean Platelet Volume 7.8 FL   


 


Sodium Level 138 MEQ/L   


 


Potassium Level 4.1 MEQ/L   


 


Chloride Level 102 MEQ/L   


 


Carbon Dioxide Level 25.6 MEQ/L   


 


Anion Gap 10 MEQ/L   


 


Blood Urea Nitrogen 21 MG/DL   


 


Creatinine 0.80 MG/DL   


 


Estimat Glomerular Filtration 99 ML/MIN   





Rate    


 


Random Glucose 145 MG/DL   


 


Calcium Level 8.6 MG/DL   


 


Stool C. difficile Toxin (PCR)  NEGATIVE   


 


Stl C. difficile Toxin  PRESUMPTIVE  





Epiderm 027  NEGATIVE  


 


HIV (1&2) Antibody   REFLEX  


 


HIV (1&2) Ag and Ab Screen   Reactive  





Referral    


 


HIV-1 Ab Differentiation   Positive  


 


HIV-2 Ab Differentiation   Negative  


 


Absolute Lymphocytes (Cell   144  





Immunity    


 


Percent CD3 Cells   40 % 


 


Absolute CD3 Count   57  


 


Percent CD3-/CD16+/CD56+ Cells   5 % 


 


Absolute CD3-/CD16+/CD56+   LESS THAN 20  





Count    


 


Percent CD4 Cells   3 % 


 


Absolute CD4 Count   LESS THAN 20  


 


T-Jackson/Suppressor Ratio   0.1  


 


Percent CD8 Cells   35 % 


 


Absolute CD8 Count   52  


 


Percent CD19 Cells   53 % 


 


Absolute CD19 Count   76  


 


Hepatitis A IgM Antibody    NEGATIVE 


 


Hepatitis B Surface Antigen    NEGATIVE 


 


Hepatitis B Core IgM Antibody    NEGATIVE 


 


Hepatitis C Antibody    NEGATIVE 








Imaging





Last Impressions








Chest X-Ray 2/20/17 0600 Signed





Impressions: 





 Service Date/Time:  Monday, February 20, 2017 05:30 - CONCLUSION:  Diffuse 





 increase interstitial markings related to diffuse processes such as edema or 





 diffuse infection.     Nahun Azul MD 


 


CT Angiography 2/11/17 0000 Signed





Impressions: 





 Service Date/Time:  Saturday, February 11, 2017 18:29 - CONCLUSION:  1. 

Negative 





 for pulmonary embolus. 2. Interval development of bilateral lung disease on a 





 background of emphysema. Findings are not entirely typical of 

bronchopneumonia. 





 Differential diagnosis includes some form of smoking related disease with 





 respiratory bronchiolitis or possibly Langerhans' cell histiocytosis.     

Bravo Robison MD 











Assessment and Plan


Problem List:  


(1) Pneumonia


(2) COPD (chronic obstructive pulmonary disease)


(3) Atrial fibrillation


Assessment and Plan


Overall improvement. No recurrent AF or pauses. Continue monitoring. Pulm and 

ID eval in progress, AIDS suspected. Continue tx for pneumonia/COPD exac. LE 

edema improved after we decreased diltiazem and added low dose metoprolol, BP 

controlled. Increase activity. Anticipate discharge home soon. No new card 

issues. Will schedule cardiology f/u as outpatient.





Problem Qualifiers





(1) Pneumonia:  





(2) COPD (chronic obstructive pulmonary disease):  


Qualified Code:  J44.1 - Chronic obstructive pulmonary disease with acute 

exacerbation


(3) Atrial fibrillation:  


Qualified Code:  I48.0 - Paroxysmal atrial fibrillation





Romi Davalos MD Feb 24, 2017 12:02

## 2017-02-24 NOTE — HHI.PR
Subjective


Remarks


inc. SOB at night, duonebs are delayed at time, would like q 4


no fever


eating ok, tongue very sore, takes his time eating 


no cp


fatigued


agreeable with staying in hospital over weekend





Objective


Objective Results


-





 Vital Signs








  Date Time  Temp Pulse Resp B/P Pulse Ox O2 Delivery O2 Flow Rate FiO2


 


2/24/17 12:50 97.5 92 18 125/83 97   


 


2/24/17 09:17 96.6 96 20 110/61 92   


 


2/24/17 04:59     92   21


 


2/24/17 00:00 98.0 80 20 120/66 98   


 


2/23/17 21:41     92 Nasal Cannula 2.00 


 


2/23/17 20:10     93  2.00 


 


2/23/17 20:00 98.2 89 22 131/76 93   


 


2/23/17 16:35     96 Nasal Cannula 2.00 


 


2/23/17 16:00 98.3 76 18 123/76 96   








 I/O








 2/23/17 2/23/17 2/23/17 2/24/17 2/24/17 2/24/17





 07:00 15:00 23:00 07:00 15:00 23:00


 


Intake Total 360 ml 720 ml 1220 ml 240 ml  


 


Balance 360 ml 720 ml 1220 ml 240 ml  


 


      


 


Intake Oral 360 ml 720 ml 720 ml 240 ml  


 


IV Total   500 ml   


 


# Voids 2 2 2 2  


 


# Bowel Movements 1 1 0 1  








Result Diagram:  


2/20/17 0528 2/20/17 0528








ROS


General:  Fatigue,  No: Weakness, Other


HEENT:  Sore Throat


Cardiac:  No: Chest Pain, Edema, Palpitations


Pulmonary:  Cough, SOB, Wheezing,  No: Other


GI:  No: Abdominal Pain, BM, Diarrhea, N/V


/GYN:  No: Dysuria, Urgency


Neuro/MS:  No: Lightheaded, Confusion


Psych:  No: Anxiety, Depression


Skin:  No: Itching, Rash





Physical Exam


Physical Exam


PHYSICAL EXAMINATION


GENERAL:  This is a slim, well-nourished   male 


who appears to be in no acute distress.  He  is alert and awake, answers 

questions appropriately.  


HEAD:  Normocephalic without any lesion or mass noted.  Facial features


appear symmetric.


OROPHARYNGEAL:  Oropharynx with erythema, has thrust 


NECK:  Supple.  No nuchal rigidity or lymphadenopathy.


Trachea midline without deviation.  


CARDIAC:  Regular rhythm, regular rate, S1 and S2 are heard.  Murmur none; no 

gallops or rubs.


LUNGS:  diminished, faint wheezes, coarse ronchi upper airway, non productive 

cough


ABDOMEN:  Soft, nontender, no organomegaly or masses.  Bowel sounds are


heard in all four quadrants.  No rebound.  No guarding.  Appetite fair


EXTREMITIES:  No edema.  Pulses equal bilateral.  No cyanosis.


NEUROLOGICAL:  Patient mood and affect appropriate. No focal deficit


Urinary Catheter:  No


Vascular Central Line Catheter:  No





A/P


Diagnosis:  


(1) COPD (chronic obstructive pulmonary disease)


(2) BPH (benign prostatic hyperplasia)


(3) Atrial arrhythmia


(4) Atrial fibrillation


(5) Pneumonia


(6) Atrial fibrillation


(7) SIRS (systemic inflammatory response syndrome)


(8) Hypoxia


(9) Pneumocystis carinii pneumonia


(10) Thrush


(11) Dehydration


(12) Ulcerative colitis


(13) Hypertension


Assessment and Plan


continue with empiric abx-Diflucan, Bactrim IV


appreciate ID input


HIV reflex +


BAL + PCP


HIV + - confirmed cw advanced AIDS


Undetectable CD4 count (< 20)


Needs MAC profilaxis  Azithro 1200 q monday


Pt will need to establish fu with HIV provider (Dr Ernandez) ASAP preferably with 

1st appointment within 1 wk after d/c


appreciate ID input, d/w Dr. Ray, keep over weekend. Needs to establish 

ASAP upon dc with Dr. Sheridan. 


Pt. will call today to make appointment for next week today 








appreciate pulmonary input


S/P bronch 2/15


continue oxygen,  PO steroids


change Duonebs to q 4WA and q 2 PRN 








Thrush


continue Diflucan


continue Nystatin





Hx afib, SR stable 


appreciate cardiology input


continue Cardizem and BB





Heparin for DVT prophylaxis


continue Asacol 





Case management consult DC planning, resume HHC, oxygen


D/W pt. at length, not likely to go back to work for some time. He's 

immunocompromised, significant SOB. Needs time to heal. Verbalizes 

understanding. 


keep over weekend, poss. dc on Monday 





D/W RN


D/W Dr. Marie


D/W pt


This pt. was seen by myself and Dr. Marie, this note is written on her behalf.





Problem Qualifiers





(1) COPD (chronic obstructive pulmonary disease):  


Qualified Code:  J44.1 - Chronic obstructive pulmonary disease with acute 

exacerbation


(2) BPH (benign prostatic hyperplasia):  


Qualified Code:  N40.0 - Benign prostatic hyperplasia, presence of lower 

urinary tract symptoms unspecified, unspecified morphology


(3) Atrial fibrillation:  


Qualified Code:  I48.0 - Paroxysmal atrial fibrillation


(4) Pneumonia:  





(5) Atrial fibrillation:  


Qualified Code:  I48.0 - Paroxysmal atrial fibrillation


(6) Pneumocystis carinii pneumonia:  





(7) Ulcerative colitis:  


Qualified Code:  K51.90 - Ulcerative colitis without complications, unspecified 

location


(8) Hypertension:  


Qualified Code:  I10 - Essential hypertension





Wilma Harrison Feb 24, 2017 14:02

## 2017-02-24 NOTE — HHI.PR
Subjective


Remarks


Some SOB  at rest. On o2  3 l.


Has some cough .HIV +ve. CD count <20. 


Pneumocystis  was present on BAL now on bactrim IV.and on diflucan





Objective





 Vital Signs








  Date Time  Temp Pulse Resp B/P Pulse Ox O2 Delivery O2 Flow Rate FiO2


 


2/24/17 17:29 97.4 85 20 113/70 93   


 


2/24/17 15:53     94 Nasal Cannula 2.00 


 


2/24/17 14:30     94 Nasal Cannula 2.00 


 


2/24/17 12:50 97.5 92 18 125/83 97   


 


2/24/17 10:00     92 Nasal Cannula 2.00 21





      Humidified  


 


2/24/17 09:17 96.6 96 20 110/61 92   


 


2/24/17 04:59     92   21


 


2/24/17 00:00 98.0 80 20 120/66 98   


 


2/23/17 21:41     92 Nasal Cannula 2.00 


 


2/23/17 20:10     93  2.00 


 


2/23/17 20:00 98.2 89 22 131/76 93   








 I/O








 2/23/17 2/23/17 2/23/17 2/24/17 2/24/17 2/24/17





 07:00 15:00 23:00 07:00 15:00 23:00


 


Intake Total 360 ml 720 ml 1220 ml 240 ml 480 ml 


 


Balance 360 ml 720 ml 1220 ml 240 ml 480 ml 


 


      


 


Intake Oral 360 ml 720 ml 720 ml 240 ml 480 ml 


 


IV Total   500 ml   


 


# Voids 2 2 2 2 2 


 


# Bowel Movements 1 1 0 1  








Result Diagram:  


2/20/17 0528                                                                   

             2/20/17 0528





Objective Remarks


GENERAL: This is a 61 year-old male, up and 


is in no distress .


HEENT:  Mucous membranes moist. There is


no jaundice.Throat clear.


NECK: Supple.


CARDIOVASCULAR: Regular rate and rhythm.


RESPIRATORY: Few crackles  at bases  .


GASTROINTESTINAL: Bowel sounds are present.


ABDOMEN: Not distended.No mass.


:  No CVA tenderness.


MUSCULOSKELETAL: No edema. moves all  well.


NEUROLOGIC: Alert, oriented. .Reflexes  1 +





Assessment and Plan


Assessment and Plan





IMPRESSION:


1. Sepsis on admission.


2. COPD exacerbation.


3. Hyponatremia.


4. Hospital acquired pneumonia.


5. History of A-fib, currently in sinus rhythm.


6. History of ulcerative colitis.


7. PCP pneumonia/Immunocompromised  state











Plan :





1. Cont Antibiotics as ordered.Bactrim/Diflucan/Azithromycin





2. Nebs qid , duoneb.





3. CBC,Liver profile





4. Taper Prednisone 





5. F/U at HIV  clinic 





6. Chest X ray in am








ARIADNA Murry MD Feb 24, 2017 19:57

## 2017-02-24 NOTE — HHI.PR
Addendum to Inpatient Note


Additional Information


HIV + - confirmed cw advanced AIDS


Undetectable CD4 count (< 20)


Needs MAC profilaxis  Azithro 1200 q monday


Pt will need to establish fu with HIV provider (Dr Ernandez) ASAP preferably with 

1st appointment within 1 wk after d/c








Deborah Ray MD Feb 24, 2017 10:50

## 2017-02-25 VITALS
RESPIRATION RATE: 20 BRPM | OXYGEN SATURATION: 96 % | SYSTOLIC BLOOD PRESSURE: 114 MMHG | TEMPERATURE: 98.8 F | HEART RATE: 89 BPM | DIASTOLIC BLOOD PRESSURE: 69 MMHG

## 2017-02-25 VITALS
SYSTOLIC BLOOD PRESSURE: 133 MMHG | TEMPERATURE: 97.6 F | DIASTOLIC BLOOD PRESSURE: 69 MMHG | OXYGEN SATURATION: 95 % | RESPIRATION RATE: 17 BRPM | HEART RATE: 91 BPM

## 2017-02-25 VITALS
HEART RATE: 97 BPM | TEMPERATURE: 97.3 F | DIASTOLIC BLOOD PRESSURE: 76 MMHG | RESPIRATION RATE: 22 BRPM | SYSTOLIC BLOOD PRESSURE: 126 MMHG | OXYGEN SATURATION: 98 %

## 2017-02-25 VITALS
HEART RATE: 88 BPM | DIASTOLIC BLOOD PRESSURE: 95 MMHG | TEMPERATURE: 98.2 F | SYSTOLIC BLOOD PRESSURE: 137 MMHG | RESPIRATION RATE: 20 BRPM | OXYGEN SATURATION: 95 %

## 2017-02-25 VITALS
HEART RATE: 92 BPM | OXYGEN SATURATION: 95 % | DIASTOLIC BLOOD PRESSURE: 74 MMHG | RESPIRATION RATE: 20 BRPM | SYSTOLIC BLOOD PRESSURE: 125 MMHG | TEMPERATURE: 98.6 F

## 2017-02-25 VITALS — OXYGEN SATURATION: 96 %

## 2017-02-25 VITALS — OXYGEN SATURATION: 89 %

## 2017-02-25 RX ADMIN — SULFAMETHOXAZOLE AND TRIMETHOPRIM SCH MLS/HR: 80; 16 INJECTION, SOLUTION, CONCENTRATE INTRAVENOUS at 18:41

## 2017-02-25 RX ADMIN — CEFEPIME SCH MLS/HR: 2 INJECTION, POWDER, FOR SOLUTION INTRAVENOUS at 16:03

## 2017-02-25 RX ADMIN — Medication SCH TAB: at 08:42

## 2017-02-25 RX ADMIN — HYDROMORPHONE HYDROCHLORIDE PRN MG: 1 INJECTION, SOLUTION INTRAMUSCULAR; INTRAVENOUS; SUBCUTANEOUS at 17:19

## 2017-02-25 RX ADMIN — IPRATROPIUM BROMIDE AND ALBUTEROL SULFATE SCH AMPULE: .5; 3 SOLUTION RESPIRATORY (INHALATION) at 11:38

## 2017-02-25 RX ADMIN — SODIUM CHLORIDE, PRESERVATIVE FREE SCH ML: 5 INJECTION INTRAVENOUS at 08:44

## 2017-02-25 RX ADMIN — GUAIFENESIN AND CODEINE PHOSPHATE PRN ML: 10; 100 LIQUID ORAL at 11:35

## 2017-02-25 RX ADMIN — Medication SCH CAP: at 08:42

## 2017-02-25 RX ADMIN — DILTIAZEM HYDROCHLORIDE SCH MG: 120 CAPSULE, EXTENDED RELEASE ORAL at 08:43

## 2017-02-25 RX ADMIN — GUAIFENESIN AND CODEINE PHOSPHATE PRN ML: 10; 100 LIQUID ORAL at 17:18

## 2017-02-25 RX ADMIN — MESALAMINE SCH MG: 800 TABLET, DELAYED RELEASE ORAL at 21:32

## 2017-02-25 RX ADMIN — METOPROLOL TARTRATE SCH MG: 25 TABLET, FILM COATED ORAL at 08:43

## 2017-02-25 RX ADMIN — CEFEPIME SCH MLS/HR: 2 INJECTION, POWDER, FOR SOLUTION INTRAVENOUS at 23:36

## 2017-02-25 RX ADMIN — IPRATROPIUM BROMIDE AND ALBUTEROL SULFATE SCH AMPULE: .5; 3 SOLUTION RESPIRATORY (INHALATION) at 07:55

## 2017-02-25 RX ADMIN — IPRATROPIUM BROMIDE AND ALBUTEROL SULFATE SCH AMPULE: .5; 3 SOLUTION RESPIRATORY (INHALATION) at 16:02

## 2017-02-25 RX ADMIN — CEFEPIME SCH MLS/HR: 2 INJECTION, POWDER, FOR SOLUTION INTRAVENOUS at 01:18

## 2017-02-25 RX ADMIN — NYSTATIN SCH ML: 100000 SUSPENSION ORAL at 11:31

## 2017-02-25 RX ADMIN — NYSTATIN SCH ML: 100000 SUSPENSION ORAL at 21:31

## 2017-02-25 RX ADMIN — MESALAMINE SCH MG: 800 TABLET, DELAYED RELEASE ORAL at 11:32

## 2017-02-25 RX ADMIN — SODIUM CHLORIDE, PRESERVATIVE FREE SCH ML: 5 INJECTION INTRAVENOUS at 21:28

## 2017-02-25 RX ADMIN — IPRATROPIUM BROMIDE AND ALBUTEROL SULFATE PRN AMPULE: .5; 3 SOLUTION RESPIRATORY (INHALATION) at 03:41

## 2017-02-25 RX ADMIN — SULFAMETHOXAZOLE AND TRIMETHOPRIM SCH MLS/HR: 80; 16 INJECTION, SOLUTION, CONCENTRATE INTRAVENOUS at 01:16

## 2017-02-25 RX ADMIN — IPRATROPIUM BROMIDE AND ALBUTEROL SULFATE SCH AMPULE: .5; 3 SOLUTION RESPIRATORY (INHALATION) at 20:40

## 2017-02-25 RX ADMIN — GABAPENTIN SCH MG: 100 CAPSULE ORAL at 08:42

## 2017-02-25 RX ADMIN — HEPARIN SODIUM SCH UNITS: 10000 INJECTION, SOLUTION INTRAVENOUS; SUBCUTANEOUS at 08:43

## 2017-02-25 RX ADMIN — GABAPENTIN SCH MG: 100 CAPSULE ORAL at 11:32

## 2017-02-25 RX ADMIN — SULFAMETHOXAZOLE AND TRIMETHOPRIM SCH MLS/HR: 80; 16 INJECTION, SOLUTION, CONCENTRATE INTRAVENOUS at 11:31

## 2017-02-25 RX ADMIN — FLUCONAZOLE SCH MG: 200 TABLET ORAL at 08:43

## 2017-02-25 RX ADMIN — HEPARIN SODIUM SCH UNITS: 10000 INJECTION, SOLUTION INTRAVENOUS; SUBCUTANEOUS at 21:31

## 2017-02-25 RX ADMIN — FAMOTIDINE SCH MG: 20 TABLET, FILM COATED ORAL at 08:42

## 2017-02-25 RX ADMIN — NYSTATIN SCH ML: 100000 SUSPENSION ORAL at 16:03

## 2017-02-25 RX ADMIN — METOPROLOL TARTRATE SCH MG: 25 TABLET, FILM COATED ORAL at 21:30

## 2017-02-25 RX ADMIN — NYSTATIN SCH ML: 100000 SUSPENSION ORAL at 08:43

## 2017-02-25 RX ADMIN — GABAPENTIN SCH MG: 100 CAPSULE ORAL at 16:03

## 2017-02-25 RX ADMIN — SULFAMETHOXAZOLE AND TRIMETHOPRIM SCH MLS/HR: 80; 16 INJECTION, SOLUTION, CONCENTRATE INTRAVENOUS at 23:36

## 2017-02-25 RX ADMIN — CEFEPIME SCH MLS/HR: 2 INJECTION, POWDER, FOR SOLUTION INTRAVENOUS at 08:44

## 2017-02-25 RX ADMIN — FAMOTIDINE SCH MG: 20 TABLET, FILM COATED ORAL at 21:31

## 2017-02-25 RX ADMIN — METHYLPREDNISOLONE SODIUM SUCCINATE SCH MG: 40 INJECTION, POWDER, FOR SOLUTION INTRAMUSCULAR; INTRAVENOUS at 18:44

## 2017-02-25 RX ADMIN — ACYCLOVIR SODIUM SCH MLS/HR: 50 INJECTION, SOLUTION INTRAVENOUS at 21:30

## 2017-02-25 RX ADMIN — MESALAMINE SCH MG: 800 TABLET, DELAYED RELEASE ORAL at 05:47

## 2017-02-25 RX ADMIN — SULFAMETHOXAZOLE AND TRIMETHOPRIM SCH MLS/HR: 80; 16 INJECTION, SOLUTION, CONCENTRATE INTRAVENOUS at 05:49

## 2017-02-25 RX ADMIN — OXYCODONE HYDROCHLORIDE AND ACETAMINOPHEN SCH MG: 500 TABLET ORAL at 08:42

## 2017-02-25 RX ADMIN — GUAIFENESIN AND CODEINE PHOSPHATE PRN ML: 10; 100 LIQUID ORAL at 21:32

## 2017-02-25 RX ADMIN — GUAIFENESIN AND CODEINE PHOSPHATE PRN ML: 10; 100 LIQUID ORAL at 05:48

## 2017-02-25 RX ADMIN — IPRATROPIUM BROMIDE AND ALBUTEROL SULFATE PRN AMPULE: .5; 3 SOLUTION RESPIRATORY (INHALATION) at 23:38

## 2017-02-25 RX ADMIN — SODIUM CHLORIDE SCH MLS/HR: 900 INJECTION INTRAVENOUS at 17:14

## 2017-02-25 RX ADMIN — SODIUM CHLORIDE, PRESERVATIVE FREE SCH ML: 5 INJECTION INTRAVENOUS at 01:16

## 2017-02-25 NOTE — HHI.PR
Subjective


Remarks


Tongue edema, with coating quite yellow, erythema around the edges.  Patient 

states worse this a.m.


No chest pain 


shortness of breath exertional and at rest, mild improved at rest


Voice hoarse, O2 on nasal cannula


Anxiety mild


No headache


Quieter affect (Coral Li)





Objective


Objective Results


-





 Vital Signs








  Date Time  Temp Pulse Resp B/P Pulse Ox O2 Delivery O2 Flow Rate FiO2


 


2/25/17 08:00 98.6 92 20 125/74 95   


 


2/25/17 07:55     89 Nasal Cannula 2.00 


 


2/25/17 03:41     96 Nasal Cannula 2.00 


 


2/25/17 00:00 98.8 89 20 114/69 96   


 


2/24/17 21:30     95 Nasal Cannula 2.00 21





      Humidified  


 


2/24/17 20:00 99.8 97 22 131/70 95   


 


2/24/17 17:29 97.4 85 20 113/70 93   


 


2/24/17 15:53     94 Nasal Cannula 2.00 


 


2/24/17 14:30     94 Nasal Cannula 2.00 








 I/O








 2/24/17 2/24/17 2/24/17 2/25/17 2/25/17 2/25/17





 07:00 15:00 23:00 07:00 15:00 23:00


 


Intake Total 240 ml 480 ml 240 ml 906 ml  


 


Balance 240 ml 480 ml 240 ml 906 ml  


 


      


 


Intake Oral 240 ml 480 ml 240 ml 240 ml  


 


IV Total    666 ml  


 


# Voids 2 2 2 2  


 


# Bowel Movements 1  0 0  





 (Coral Li)


Other Results





Last Impressions








Chest X-Ray 2/25/17 0600 Signed





Impressions: 





 Service Date/Time:  Saturday, February 25, 2017 06:09 - CONCLUSION:  No 





 significant change has occurred.       Oresets Apodaca MD 


 


CT Angiography 2/11/17 0000 Signed





Impressions: 





 Service Date/Time:  Saturday, February 11, 2017 18:29 - CONCLUSION:  1. 

Negative 





 for pulmonary embolus. 2. Interval development of bilateral lung disease on a 





 background of emphysema. Findings are not entirely typical of 

bronchopneumonia. 





 Differential diagnosis includes some form of smoking related disease with 





 respiratory bronchiolitis or possibly Langerhans' cell histiocytosis.     

Bravo Robison MD 








Medications and IVs





 Active Medications


Azithromycin 1200 mg 1,200 mg Q7D PO;  Start 2/27/17 at 09:00


Cefepime HCl/ Sodium Chloride (Maxipime Inj/NS Inj) 100 ml @  200 mls/hr Q8H IV 

Last administered on 2/25/17at 08:44; Admin Dose 200 MLS/HR;  Start 2/25/17 at 

00:00


Prednisone (Deltasone) 20 mg DAILY PO;  Start 3/4/17 at 09:00;  Stop 3/15/17 at 

08:59


Prednisone (Deltasone) 40 mg DAILY PO;  Start 2/27/17 at 09:00;  Stop 3/4/17 at 

08:59 (Coral Li)





ROS


General:  Fatigue, Weakness (generalized, ), Other (10 point ROS done positives 

noted otherwise systems unremarkable)


HEENT:  Other (tongue and oral candida)


Pulmonary:  SOB (more so with activity)


GI:  Abdominal Pain (controlled today)


Neuro/MS:  Other (anxiety mild) (Coral Li)





Physical Exam


Physical Exam


PHYSICAL EXAMINATION


GENERAL:  This is a well-developed slim,    male 


who appears to be in mild distress.  He  is alert and awake, anxious  


HEAD:  Normocephalic without any lesion or mass noted.  Facial features


appear symmetric.


OROPHARYNGEAL:  Oropharynx with  erythema or edema.,  Oral thrush, increasing 

with edema but not obstructing breathing


NECK:  Supple.  No nuchal rigidity or lymphadenopathy.


Trachea midline without deviation.  


CARDIAC:  Regular rhythm, regular rate, S1 and S2 are heard.  Murmur none; no 

gallops or rubs.


LUNGS:  Decreased bases to auscultation bilaterally.  No wheeze, mild rhonchi 

or rale.  No


use of accessory muscles on inspiration or expiration.


ABDOMEN:  Soft, nontender, no organomegaly or masses.  Bowel sounds are


heard in all four quadrants.  No rebound.  No guarding.


EXTREMITIES:  No edema.  Pulses equal bilateral.  No cyanosis.


NEUROLOGICAL:  Patient mood and affect appropriate. No focal deficit


SKIN:Warm and moist


Objective Remarks


My tongue is more swollen today and really painful (Coral Li)





A/P


Assessment and Plan


Assessment and Plan


1. Sepsis on admission.


2. COPD exacerbation.


3. Hyponatremia.


4. Hospital acquired pneumonia.


5. History of A-fib, currently in sinus rhythm.


6. History of ulcerative colitis.


7. Hypokalemia


8. AIDS, new diagnosis


9. Dehydration


10 Candida, oral


 


DISCUSSION:


Labs review, vital signs reviewed.


gentle hydration encouraged by mouthcontinue with empiric abx-Diflucan, Bactrim 

IV


appreciate ID input


HIV reflex +


BAL + PCP


HIV + - confirmed cw advanced AIDS


Undetectable CD4 count (< 20)


Needs MAC profilaxis  Azithro 1200 q monday


Pt will need to establish fu with HIV provider (Dr Ernandez) ASAP preferably with 

1st appointment within 1 wk after d/c


appreciate ID input,  


Supportive care, patient seems quieter today.  Encouraged questions or any 

acute needs.











appreciate pulmonary input


S/P bronch 2/15


continue oxygen,  PO steroids


change Duonebs to q 4WA and q 2 PRN 








Thrush, worsening today with mild increased edema affecting his eating habits, 

but not his airway.


ID to make medical judgment calls for any acute changes on meds


continue Diflucan


continue Nystatin





Hx afib, SR stable 


appreciate cardiology input


continue Cardizem and BB





Heparin for DVT prophylaxis


continue Asacol 





Case management consult DC planning, resume HHC, oxygen


D/W pt. at length, not likely to go back to work for some time. He's 

immunocompromised, significant SOB. Needs time to heal. Verbalizes 

understanding. 


keep over weekend, poss. dc on Monday 





D/W RN


D/W Dr. Marie


D/W pt


This pt. was seen by myself and Dr. Marie, this note is written on her behalf.





Discharge Planning


Possible home Monday or seen with the support of family and friends


Discussed With:  Nurse (Veronica), Family (patient), Other ( mild polo 

patient seen on her behalf) (Coral Li)


Assessment and Plan


patient seen and examined


 much more distressed today


complaining of painful tongue


discussed with Dr Ray: planning to switch to different antifungal,?trial 

of acyclovir


pain control


discussed with patient


discussed with Coral MCGILL (Vianey Marie MD)








Coral Li Feb 25, 2017 14:09


Vianey Marie MD Feb 25, 2017 16:09

## 2017-02-25 NOTE — RADRPT
EXAM DATE/TIME:  02/25/2017 06:09 

 

[facility] COMPARISON:     

CHEST SINGLE AP, February 20, 2017, 5:30.

 

                     

INDICATIONS :     

Infiltrate

                     

 

MEDICAL HISTORY :     

Chronic obstructive pulmonary disease.  Hypertension        

 

SURGICAL HISTORY :     

None.   

 

ENCOUNTER:     

Subsequent                                        

 

ACUITY:     

1 week      

 

PAIN SCORE:     

5/10

 

LOCATION:     

Bilateral chest 

 

FINDINGS:     

Patchy alveolar infiltrates and mild and show prominence noted bilaterally, not significantly changed
. Cardiomegaly. No effusions. Osseous structures are intact.

 

CONCLUSION:     

No significant change has occurred.  

 

 

 

 Orestes Apodaca MD on February 25, 2017 at 6:38           

Board Certified Radiologist.

 This report was verified electronically.

## 2017-02-25 NOTE — HHI.PR
Subjective


Remarks


Had SOB last   nite. On o2  2 l.


Has some cough .HIV +ve. CD count <20. 


Pneumocystis  was present on BAL now on bactrim IV.and on Mycafungin





Objective





 Vital Signs








  Date Time  Temp Pulse Resp B/P Pulse Ox O2 Delivery O2 Flow Rate FiO2


 


2/25/17 16:00 97.3 97 22 126/76 98   


 


2/25/17 12:00 97.6 91 17 133/69 95   


 


2/25/17 08:00 98.6 92 20 125/74 95   


 


2/25/17 07:55     89 Nasal Cannula 2.00 


 


2/25/17 03:41     96 Nasal Cannula 2.00 


 


2/25/17 00:00 98.8 89 20 114/69 96   


 


2/24/17 21:30     95 Nasal Cannula 2.00 21





      Humidified  


 


2/24/17 20:00 99.8 97 22 131/70 95   








 I/O








 2/24/17 2/24/17 2/24/17 2/25/17 2/25/17 2/25/17





 07:00 15:00 23:00 07:00 15:00 23:00


 


Intake Total 240 ml 480 ml 240 ml 906 ml 977 ml 


 


Balance 240 ml 480 ml 240 ml 906 ml 977 ml 


 


      


 


Intake Oral 240 ml 480 ml 240 ml 240 ml 420 ml 


 


IV Total    666 ml 557 ml 


 


# Voids 2 2 2 2 3 


 


# Bowel Movements 1  0 0 0 








Objective Remarks


GENERAL: This is a 61 year-old male, up and 


is in no distress .


HEENT:  Mucous membranes moist. There is


no jaundice.Throat with thrush.


NECK: Supple.


CARDIOVASCULAR: Regular rate and rhythm.


RESPIRATORY: Few crackles  at bases  .Occ wheeze


GASTROINTESTINAL: Bowel sounds are present.


ABDOMEN: Not distended.No mass.


:  No CVA tenderness.


MUSCULOSKELETAL: No edema. moves all  well.


NEUROLOGIC: Alert, oriented. .Reflexes  1 +





Assessment and Plan


Assessment and Plan





IMPRESSION:


1. Sepsis on admission.


2. COPD exacerbation.


3. Hyponatremia.


4. Hospital acquired pneumonia.


5. History of A-fib, currently in sinus rhythm.


6. History of ulcerative colitis.


7. PCP pneumonia/Immunocompromised  state











Plan :





1. Cont Antibiotics as ordered.Bactrim/Diflucan/Azithromycin





2. Nebs qid , duoneb.





3. Will add Solumedrol 40 mg IV X1





4. Taper Prednisone 





5. F/U at HIV  clinic 





6. O2  at  2 L.








ARIADNA Murry MD Feb 25, 2017 18:10

## 2017-02-26 VITALS
HEART RATE: 85 BPM | RESPIRATION RATE: 20 BRPM | OXYGEN SATURATION: 94 % | SYSTOLIC BLOOD PRESSURE: 133 MMHG | TEMPERATURE: 94 F | DIASTOLIC BLOOD PRESSURE: 68 MMHG

## 2017-02-26 VITALS
SYSTOLIC BLOOD PRESSURE: 118 MMHG | DIASTOLIC BLOOD PRESSURE: 88 MMHG | TEMPERATURE: 99.4 F | RESPIRATION RATE: 20 BRPM | OXYGEN SATURATION: 94 % | HEART RATE: 86 BPM

## 2017-02-26 VITALS
TEMPERATURE: 97.1 F | SYSTOLIC BLOOD PRESSURE: 131 MMHG | RESPIRATION RATE: 18 BRPM | OXYGEN SATURATION: 95 % | HEART RATE: 89 BPM | DIASTOLIC BLOOD PRESSURE: 93 MMHG

## 2017-02-26 VITALS
RESPIRATION RATE: 20 BRPM | HEART RATE: 101 BPM | OXYGEN SATURATION: 97 % | TEMPERATURE: 96.4 F | DIASTOLIC BLOOD PRESSURE: 102 MMHG | SYSTOLIC BLOOD PRESSURE: 133 MMHG

## 2017-02-26 VITALS
DIASTOLIC BLOOD PRESSURE: 90 MMHG | TEMPERATURE: 96.4 F | RESPIRATION RATE: 20 BRPM | SYSTOLIC BLOOD PRESSURE: 173 MMHG | OXYGEN SATURATION: 97 % | HEART RATE: 84 BPM

## 2017-02-26 VITALS — OXYGEN SATURATION: 94 %

## 2017-02-26 RX ADMIN — ACYCLOVIR SODIUM SCH MLS/HR: 50 INJECTION, SOLUTION INTRAVENOUS at 04:06

## 2017-02-26 RX ADMIN — HEPARIN SODIUM SCH UNITS: 10000 INJECTION, SOLUTION INTRAVENOUS; SUBCUTANEOUS at 09:20

## 2017-02-26 RX ADMIN — SODIUM CHLORIDE, PRESERVATIVE FREE SCH ML: 5 INJECTION INTRAVENOUS at 20:59

## 2017-02-26 RX ADMIN — GUAIFENESIN AND CODEINE PHOSPHATE PRN ML: 10; 100 LIQUID ORAL at 04:06

## 2017-02-26 RX ADMIN — GABAPENTIN SCH MG: 100 CAPSULE ORAL at 09:20

## 2017-02-26 RX ADMIN — ACYCLOVIR SODIUM SCH MLS/HR: 50 INJECTION, SOLUTION INTRAVENOUS at 20:59

## 2017-02-26 RX ADMIN — FLUCONAZOLE SCH MG: 200 TABLET ORAL at 09:21

## 2017-02-26 RX ADMIN — FAMOTIDINE SCH MG: 20 TABLET, FILM COATED ORAL at 09:21

## 2017-02-26 RX ADMIN — GABAPENTIN SCH MG: 100 CAPSULE ORAL at 17:41

## 2017-02-26 RX ADMIN — SULFAMETHOXAZOLE AND TRIMETHOPRIM SCH MLS/HR: 80; 16 INJECTION, SOLUTION, CONCENTRATE INTRAVENOUS at 12:32

## 2017-02-26 RX ADMIN — SODIUM CHLORIDE, PRESERVATIVE FREE SCH ML: 5 INJECTION INTRAVENOUS at 09:00

## 2017-02-26 RX ADMIN — HYDROMORPHONE HYDROCHLORIDE PRN MG: 1 INJECTION, SOLUTION INTRAMUSCULAR; INTRAVENOUS; SUBCUTANEOUS at 14:55

## 2017-02-26 RX ADMIN — CEFEPIME SCH MLS/HR: 2 INJECTION, POWDER, FOR SOLUTION INTRAVENOUS at 14:53

## 2017-02-26 RX ADMIN — IPRATROPIUM BROMIDE AND ALBUTEROL SULFATE PRN AMPULE: .5; 3 SOLUTION RESPIRATORY (INHALATION) at 23:48

## 2017-02-26 RX ADMIN — NYSTATIN SCH ML: 100000 SUSPENSION ORAL at 20:58

## 2017-02-26 RX ADMIN — CEFEPIME SCH MLS/HR: 2 INJECTION, POWDER, FOR SOLUTION INTRAVENOUS at 09:18

## 2017-02-26 RX ADMIN — SULFAMETHOXAZOLE AND TRIMETHOPRIM SCH MLS/HR: 80; 16 INJECTION, SOLUTION, CONCENTRATE INTRAVENOUS at 04:23

## 2017-02-26 RX ADMIN — MESALAMINE SCH MG: 800 TABLET, DELAYED RELEASE ORAL at 22:11

## 2017-02-26 RX ADMIN — SULFAMETHOXAZOLE AND TRIMETHOPRIM SCH MLS/HR: 80; 16 INJECTION, SOLUTION, CONCENTRATE INTRAVENOUS at 17:41

## 2017-02-26 RX ADMIN — GUAIFENESIN AND CODEINE PHOSPHATE PRN ML: 10; 100 LIQUID ORAL at 22:12

## 2017-02-26 RX ADMIN — NYSTATIN SCH ML: 100000 SUSPENSION ORAL at 17:41

## 2017-02-26 RX ADMIN — IPRATROPIUM BROMIDE AND ALBUTEROL SULFATE SCH AMPULE: .5; 3 SOLUTION RESPIRATORY (INHALATION) at 12:46

## 2017-02-26 RX ADMIN — IPRATROPIUM BROMIDE AND ALBUTEROL SULFATE PRN AMPULE: .5; 3 SOLUTION RESPIRATORY (INHALATION) at 03:42

## 2017-02-26 RX ADMIN — IPRATROPIUM BROMIDE AND ALBUTEROL SULFATE SCH AMPULE: .5; 3 SOLUTION RESPIRATORY (INHALATION) at 16:31

## 2017-02-26 RX ADMIN — MESALAMINE SCH MG: 800 TABLET, DELAYED RELEASE ORAL at 11:23

## 2017-02-26 RX ADMIN — Medication SCH CAP: at 09:21

## 2017-02-26 RX ADMIN — OXYCODONE HYDROCHLORIDE AND ACETAMINOPHEN SCH MG: 500 TABLET ORAL at 09:21

## 2017-02-26 RX ADMIN — Medication SCH TAB: at 09:20

## 2017-02-26 RX ADMIN — MESALAMINE SCH MG: 800 TABLET, DELAYED RELEASE ORAL at 04:14

## 2017-02-26 RX ADMIN — HYDROMORPHONE HYDROCHLORIDE PRN MG: 1 INJECTION, SOLUTION INTRAMUSCULAR; INTRAVENOUS; SUBCUTANEOUS at 09:18

## 2017-02-26 RX ADMIN — HYDROMORPHONE HYDROCHLORIDE PRN MG: 1 INJECTION, SOLUTION INTRAMUSCULAR; INTRAVENOUS; SUBCUTANEOUS at 04:14

## 2017-02-26 RX ADMIN — HEPARIN SODIUM SCH UNITS: 10000 INJECTION, SOLUTION INTRAVENOUS; SUBCUTANEOUS at 21:08

## 2017-02-26 RX ADMIN — CEFEPIME SCH MLS/HR: 2 INJECTION, POWDER, FOR SOLUTION INTRAVENOUS at 23:49

## 2017-02-26 RX ADMIN — METHYLPREDNISOLONE SODIUM SUCCINATE SCH MG: 40 INJECTION, POWDER, FOR SOLUTION INTRAMUSCULAR; INTRAVENOUS at 09:00

## 2017-02-26 RX ADMIN — SULFAMETHOXAZOLE AND TRIMETHOPRIM SCH MLS/HR: 80; 16 INJECTION, SOLUTION, CONCENTRATE INTRAVENOUS at 23:56

## 2017-02-26 RX ADMIN — DILTIAZEM HYDROCHLORIDE SCH MG: 120 CAPSULE, EXTENDED RELEASE ORAL at 09:21

## 2017-02-26 RX ADMIN — IPRATROPIUM BROMIDE AND ALBUTEROL SULFATE SCH AMPULE: .5; 3 SOLUTION RESPIRATORY (INHALATION) at 21:27

## 2017-02-26 RX ADMIN — NYSTATIN SCH ML: 100000 SUSPENSION ORAL at 09:21

## 2017-02-26 RX ADMIN — ACYCLOVIR SODIUM SCH MLS/HR: 50 INJECTION, SOLUTION INTRAVENOUS at 11:16

## 2017-02-26 RX ADMIN — GABAPENTIN SCH MG: 100 CAPSULE ORAL at 11:23

## 2017-02-26 RX ADMIN — SODIUM CHLORIDE SCH MLS/HR: 900 INJECTION INTRAVENOUS at 16:27

## 2017-02-26 RX ADMIN — METOPROLOL TARTRATE SCH MG: 25 TABLET, FILM COATED ORAL at 20:58

## 2017-02-26 RX ADMIN — FAMOTIDINE SCH MG: 20 TABLET, FILM COATED ORAL at 20:58

## 2017-02-26 RX ADMIN — METHYLPREDNISOLONE SODIUM SUCCINATE SCH MG: 40 INJECTION, POWDER, FOR SOLUTION INTRAMUSCULAR; INTRAVENOUS at 09:18

## 2017-02-26 RX ADMIN — METOPROLOL TARTRATE SCH MG: 25 TABLET, FILM COATED ORAL at 09:21

## 2017-02-26 RX ADMIN — IPRATROPIUM BROMIDE AND ALBUTEROL SULFATE SCH AMPULE: .5; 3 SOLUTION RESPIRATORY (INHALATION) at 08:18

## 2017-02-26 RX ADMIN — NYSTATIN SCH ML: 100000 SUSPENSION ORAL at 11:23

## 2017-02-26 NOTE — HHI.PR
Subjective


Remarks


No SOB  today. On o2  2 l.


Has some cough .HIV +ve. CD count <20. 


Pneumocystis  was present on BAL now on bactrim IV.and on Micafungin





Objective





 Vital Signs








  Date Time  Temp Pulse Resp B/P Pulse Ox O2 Delivery O2 Flow Rate FiO2


 


2/26/17 12:00 94.0 85 20 133/68 94   


 


2/26/17 09:48   20     


 


2/26/17 08:18     94 Nasal Cannula 3.00 


 


2/26/17 08:00 96.4 101 20 133/102 97   


 


2/26/17 00:24 99.4 86 20 118/88 94   


 


2/25/17 22:28 98.2 88 20 137/95 95   


 


2/25/17 20:40     96 Nasal Cannula 3.00 


 


2/25/17 20:30      Nasal Cannula 2.00 93








 I/O








 2/25/17 2/25/17 2/25/17 2/26/17 2/26/17 2/26/17





 07:00 15:00 23:00 07:00 15:00 23:00


 


Intake Total 906 ml 977 ml 995 ml  1956 ml 


 


Balance 906 ml 977 ml 995 ml  1956 ml 


 


      


 


Intake Oral 240 ml 420 ml 240 ml   


 


IV Total 666 ml 557 ml 755 ml  1956 ml 


 


# Voids 2 3 2   


 


# Bowel Movements 0 0 0   








Objective Remarks


GENERAL: This is a 61 year-old male, up and 


is in no distress .


HEENT:  Mucous membranes moist. Has    herpes  simplex.There is


no jaundice.Throat with thrush.


NECK: Supple.


CARDIOVASCULAR: Regular rate and rhythm.


RESPIRATORY: Few crackles  at bases  .Occ wheeze


GASTROINTESTINAL: Bowel sounds are present.


ABDOMEN: Not distended.No mass.


:  No CVA tenderness.


MUSCULOSKELETAL: No edema. moves all  well.


NEUROLOGIC: Alert, oriented. .Reflexes  1 +





Assessment and Plan


Assessment and Plan





IMPRESSION:


1. Sepsis on admission.


2. COPD exacerbation.


3. Hyponatremia.


4. Hospital acquired pneumonia.


5. History of A-fib, currently in sinus rhythm.


6. History of ulcerative colitis.


7. PCP pneumonia/Immunocompromised  state











Plan :





1. Cont Antibiotics as ordered.Bactrim/Micafungin/Azithromycin





2. Nebs qid , duoneb.





3. Switch  to PO meds in am





4. Taper Prednisone 





5. F/U at HIV  clinic 





6. O2  at  2 L.








ARIADNA Murry MD Feb 26, 2017 16:38

## 2017-02-26 NOTE — HHI.PR
Subjective


Remarks


overnight had bronchospasms


some cough


very sob with activity


no fever


no cp


mouth is more sore 


d/w pt. end of life care, adv. directives, code status-want full code, and 

requested to completed paperwork to designate healthcare surrogate





Objective


Objective Results


-





 Vital Signs








  Date Time  Temp Pulse Resp B/P Pulse Ox O2 Delivery O2 Flow Rate FiO2


 


2/26/17 09:48   20     


 


2/26/17 08:18     94 Nasal Cannula 3.00 


 


2/26/17 08:00 96.4 101 20 133/102 97   


 


2/26/17 00:24 99.4 86 20 118/88 94   


 


2/25/17 22:28 98.2 88 20 137/95 95   


 


2/25/17 20:40     96 Nasal Cannula 3.00 


 


2/25/17 20:30      Nasal Cannula 2.00 93


 


2/25/17 16:00 97.3 97 22 126/76 98   








 I/O








 2/25/17 2/25/17 2/25/17 2/26/17 2/26/17 2/26/17





 07:00 15:00 23:00 07:00 15:00 23:00


 


Intake Total 906 ml 977 ml 995 ml   


 


Balance 906 ml 977 ml 995 ml   


 


      


 


Intake Oral 240 ml 420 ml 240 ml   


 


IV Total 666 ml 557 ml 755 ml   


 


# Voids 2 3 2   


 


# Bowel Movements 0 0 0   








Other Results











 Date/Time Procedure Status





Source Growth 


 


 2/25/17 01:45 Gram Stain - Final Resulted





Sputum Expectorated Sputum  





 2/25/17 01:45 Sputum Culture Resulted





Sputum Expectorated Sputum Pending 











ROS


General:  Weakness,  No: Fatigue, Other


HEENT:  Other (mouth sore ),  No: Sore Throat, Dysphagia


Cardiac:  No: Chest Pain, Edema, Palpitations


Pulmonary:  Cough, SOB, Wheezing


GI:  No: Abdominal Pain, BM, Diarrhea, N/V


/GYN:  No: Dysuria, Urgency


Neuro/MS:  No: Lightheaded, Confusion


Psych:  No: Anxiety, Depression


Skin:  No: Itching, Rash





Physical Exam


Physical Exam


PHYSICAL EXAMINATION


GENERAL:  This is a slim, well-nourished   male 


who appears to be in no acute distress.  He  is alert and awake, answers 

questions appropriately.  


HEAD:  Normocephalic without any lesion or mass noted.  Facial features


appear symmetric.


OROPHARYNGEAL:  Oropharynx with erythema, has thrust 


NECK:  Supple.  No nuchal rigidity or lymphadenopathy.


Trachea midline without deviation.  


CARDIAC:  Regular rhythm, regular rate, S1 and S2 are heard.  Murmur none; no 

gallops or rubs.


LUNGS:  diminished, faint wheezes, non productive cough


ABDOMEN:  Soft, nontender, no organomegaly or masses.  Bowel sounds are


heard in all four quadrants.  No rebound.  No guarding.  Appetite fair


EXTREMITIES:  No edema.  Pulses equal bilateral.  No cyanosis.


NEUROLOGICAL:  Patient mood and affect appropriate. No focal deficit


Urinary Catheter:  No


Vascular Central Line Catheter:  No





A/P


Diagnosis:  


(1) COPD (chronic obstructive pulmonary disease)


(2) BPH (benign prostatic hyperplasia)


(3) Atrial arrhythmia


(4) Atrial fibrillation


(5) Pneumonia


(6) Atrial fibrillation


(7) SIRS (systemic inflammatory response syndrome)


(8) Hypoxia


(9) Pneumocystis carinii pneumonia


(10) Thrush


(11) Dehydration


(12) Ulcerative colitis


(13) Hypertension


Assessment and Plan


continue with empiric abx-Diflucan, Bactrim IV


appreciate ID input


HIV reflex +


BAL + PCP


HIV + - confirmed cw advanced AIDS


Undetectable CD4 count (< 20)


Needs MAC profilaxis  Azithro 1200 q monday


worsening thrush--ID added - micafungin and acyclovir


Pt will need to establish fu with HIV provider (Dr Ernandez) ASAP preferably with 

1st appointment within 1 wk after d/c


appreciate ID input, d/w Dr. Ray, keep over weekend. Needs to establish 

ASAP upon dc with Dr. Sheridan. 


Pt. has  appointment for next week 








appreciate pulmonary input


S/P bronch 2/15


continue oxygen,  PO steroids


change Duonebs to q 4WA and q 2 PRN 








Thrush


continue Diflucan


continue Nystatin





Hx afib, SR stable 


appreciate cardiology input


continue Cardizem and BB





Heparin for DVT prophylaxis


continue Asacol 





End of life care, code status, adv. directives d/w pt. at length. He's 

requesting that no information is provided to his sister. Designates Kathryn Novoa and Lillie Paz (as secondary) to make health care decisions if he's 

not able. If resp. distress, agrees to intubation for a short period.  


Agreeable with filling out paperwork 


Not ready for discharge yet, continues to have episodes of resp. distress, 

bronchospasms


condition guarded








D/W RN


D/W Dr. Marie


D/W pt


This pt. was seen by myself and Dr. Marie, this note is written on her behalf.


Discussed With:  Nurse (Veronica), Family (patient), Other (Dr. mild leak 

patient seen on her behalf)





Problem Qualifiers





(1) COPD (chronic obstructive pulmonary disease):  


Qualified Code:  J44.1 - Chronic obstructive pulmonary disease with acute 

exacerbation


(2) BPH (benign prostatic hyperplasia):  


Qualified Code:  N40.0 - Benign prostatic hyperplasia, presence of lower 

urinary tract symptoms unspecified, unspecified morphology


(3) Atrial fibrillation:  


Qualified Code:  I48.0 - Paroxysmal atrial fibrillation


(4) Pneumonia:  





(5) Atrial fibrillation:  


Qualified Code:  I48.0 - Paroxysmal atrial fibrillation


(6) Pneumocystis carinii pneumonia:  





(7) Ulcerative colitis:  


Qualified Code:  K51.90 - Ulcerative colitis without complications, unspecified 

location


(8) Hypertension:  


Qualified Code:  I10 - Essential hypertension





Wilma Harrison Feb 26, 2017 13:18

## 2017-02-27 VITALS
TEMPERATURE: 97.7 F | RESPIRATION RATE: 18 BRPM | DIASTOLIC BLOOD PRESSURE: 83 MMHG | OXYGEN SATURATION: 95 % | SYSTOLIC BLOOD PRESSURE: 153 MMHG | HEART RATE: 93 BPM

## 2017-02-27 VITALS
TEMPERATURE: 96 F | HEART RATE: 93 BPM | RESPIRATION RATE: 20 BRPM | OXYGEN SATURATION: 96 % | SYSTOLIC BLOOD PRESSURE: 164 MMHG | DIASTOLIC BLOOD PRESSURE: 85 MMHG

## 2017-02-27 VITALS
DIASTOLIC BLOOD PRESSURE: 90 MMHG | OXYGEN SATURATION: 96 % | TEMPERATURE: 96.2 F | SYSTOLIC BLOOD PRESSURE: 152 MMHG | RESPIRATION RATE: 18 BRPM | HEART RATE: 91 BPM

## 2017-02-27 VITALS
DIASTOLIC BLOOD PRESSURE: 90 MMHG | RESPIRATION RATE: 16 BRPM | OXYGEN SATURATION: 95 % | HEART RATE: 76 BPM | TEMPERATURE: 97.6 F | SYSTOLIC BLOOD PRESSURE: 146 MMHG

## 2017-02-27 VITALS
SYSTOLIC BLOOD PRESSURE: 142 MMHG | RESPIRATION RATE: 18 BRPM | DIASTOLIC BLOOD PRESSURE: 82 MMHG | TEMPERATURE: 97.5 F | OXYGEN SATURATION: 94 % | HEART RATE: 87 BPM

## 2017-02-27 VITALS — OXYGEN SATURATION: 93 %

## 2017-02-27 VITALS — OXYGEN SATURATION: 95 %

## 2017-02-27 LAB — LEGIONELLA SPEC CULT: (no result)

## 2017-02-27 RX ADMIN — FAMOTIDINE SCH MG: 20 TABLET, FILM COATED ORAL at 20:20

## 2017-02-27 RX ADMIN — GABAPENTIN SCH MG: 100 CAPSULE ORAL at 12:33

## 2017-02-27 RX ADMIN — HEPARIN SODIUM SCH UNITS: 10000 INJECTION, SOLUTION INTRAVENOUS; SUBCUTANEOUS at 08:43

## 2017-02-27 RX ADMIN — HYDROMORPHONE HYDROCHLORIDE PRN MG: 1 INJECTION, SOLUTION INTRAMUSCULAR; INTRAVENOUS; SUBCUTANEOUS at 03:18

## 2017-02-27 RX ADMIN — IPRATROPIUM BROMIDE AND ALBUTEROL SULFATE SCH AMPULE: .5; 3 SOLUTION RESPIRATORY (INHALATION) at 12:14

## 2017-02-27 RX ADMIN — FLUCONAZOLE SCH MG: 200 TABLET ORAL at 08:42

## 2017-02-27 RX ADMIN — GABAPENTIN SCH MG: 100 CAPSULE ORAL at 17:33

## 2017-02-27 RX ADMIN — SODIUM CHLORIDE SCH MLS/HR: 900 INJECTION INTRAVENOUS at 17:36

## 2017-02-27 RX ADMIN — GABAPENTIN SCH MG: 100 CAPSULE ORAL at 08:42

## 2017-02-27 RX ADMIN — CEFEPIME SCH MLS/HR: 2 INJECTION, POWDER, FOR SOLUTION INTRAVENOUS at 15:27

## 2017-02-27 RX ADMIN — CEFEPIME SCH MLS/HR: 2 INJECTION, POWDER, FOR SOLUTION INTRAVENOUS at 08:40

## 2017-02-27 RX ADMIN — SULFAMETHOXAZOLE AND TRIMETHOPRIM SCH MLS/HR: 80; 16 INJECTION, SOLUTION, CONCENTRATE INTRAVENOUS at 17:36

## 2017-02-27 RX ADMIN — HYDROMORPHONE HYDROCHLORIDE PRN MG: 1 INJECTION, SOLUTION INTRAMUSCULAR; INTRAVENOUS; SUBCUTANEOUS at 18:46

## 2017-02-27 RX ADMIN — GUAIFENESIN AND CODEINE PHOSPHATE PRN ML: 10; 100 LIQUID ORAL at 21:33

## 2017-02-27 RX ADMIN — IPRATROPIUM BROMIDE AND ALBUTEROL SULFATE PRN AMPULE: .5; 3 SOLUTION RESPIRATORY (INHALATION) at 03:55

## 2017-02-27 RX ADMIN — METOPROLOL TARTRATE SCH MG: 25 TABLET, FILM COATED ORAL at 20:19

## 2017-02-27 RX ADMIN — MESALAMINE SCH MG: 800 TABLET, DELAYED RELEASE ORAL at 06:07

## 2017-02-27 RX ADMIN — IPRATROPIUM BROMIDE AND ALBUTEROL SULFATE SCH AMPULE: .5; 3 SOLUTION RESPIRATORY (INHALATION) at 21:04

## 2017-02-27 RX ADMIN — FAMOTIDINE SCH MG: 20 TABLET, FILM COATED ORAL at 08:42

## 2017-02-27 RX ADMIN — HEPARIN SODIUM SCH UNITS: 10000 INJECTION, SOLUTION INTRAVENOUS; SUBCUTANEOUS at 20:19

## 2017-02-27 RX ADMIN — SODIUM CHLORIDE, PRESERVATIVE FREE SCH ML: 5 INJECTION INTRAVENOUS at 20:27

## 2017-02-27 RX ADMIN — Medication SCH TAB: at 08:42

## 2017-02-27 RX ADMIN — SULFAMETHOXAZOLE AND TRIMETHOPRIM SCH MLS/HR: 80; 16 INJECTION, SOLUTION, CONCENTRATE INTRAVENOUS at 12:00

## 2017-02-27 RX ADMIN — HYDROMORPHONE HYDROCHLORIDE PRN MG: 1 INJECTION, SOLUTION INTRAMUSCULAR; INTRAVENOUS; SUBCUTANEOUS at 08:47

## 2017-02-27 RX ADMIN — NYSTATIN SCH ML: 100000 SUSPENSION ORAL at 12:33

## 2017-02-27 RX ADMIN — METOPROLOL TARTRATE SCH MG: 25 TABLET, FILM COATED ORAL at 09:00

## 2017-02-27 RX ADMIN — SULFAMETHOXAZOLE AND TRIMETHOPRIM SCH MLS/HR: 80; 16 INJECTION, SOLUTION, CONCENTRATE INTRAVENOUS at 06:09

## 2017-02-27 RX ADMIN — ACYCLOVIR SODIUM SCH MLS/HR: 50 INJECTION, SOLUTION INTRAVENOUS at 03:18

## 2017-02-27 RX ADMIN — HYDROMORPHONE HYDROCHLORIDE PRN MG: 1 INJECTION, SOLUTION INTRAMUSCULAR; INTRAVENOUS; SUBCUTANEOUS at 23:01

## 2017-02-27 RX ADMIN — NYSTATIN SCH ML: 100000 SUSPENSION ORAL at 08:40

## 2017-02-27 RX ADMIN — AZITHROMYCIN SCH MG: 600 TABLET, FILM COATED ORAL at 08:41

## 2017-02-27 RX ADMIN — Medication SCH CAP: at 08:42

## 2017-02-27 RX ADMIN — ACYCLOVIR SODIUM SCH MLS/HR: 50 INJECTION, SOLUTION INTRAVENOUS at 20:20

## 2017-02-27 RX ADMIN — DILTIAZEM HYDROCHLORIDE SCH MG: 120 CAPSULE, EXTENDED RELEASE ORAL at 08:41

## 2017-02-27 RX ADMIN — ACYCLOVIR SODIUM SCH MLS/HR: 50 INJECTION, SOLUTION INTRAVENOUS at 12:00

## 2017-02-27 RX ADMIN — OXYCODONE HYDROCHLORIDE AND ACETAMINOPHEN SCH MG: 500 TABLET ORAL at 08:42

## 2017-02-27 RX ADMIN — IPRATROPIUM BROMIDE AND ALBUTEROL SULFATE SCH AMPULE: .5; 3 SOLUTION RESPIRATORY (INHALATION) at 08:03

## 2017-02-27 RX ADMIN — HYDROMORPHONE HYDROCHLORIDE PRN MG: 1 INJECTION, SOLUTION INTRAMUSCULAR; INTRAVENOUS; SUBCUTANEOUS at 12:34

## 2017-02-27 RX ADMIN — MESALAMINE SCH MG: 800 TABLET, DELAYED RELEASE ORAL at 20:20

## 2017-02-27 RX ADMIN — MESALAMINE SCH MG: 800 TABLET, DELAYED RELEASE ORAL at 12:33

## 2017-02-27 RX ADMIN — SODIUM CHLORIDE, PRESERVATIVE FREE SCH ML: 5 INJECTION INTRAVENOUS at 09:00

## 2017-02-27 RX ADMIN — IPRATROPIUM BROMIDE AND ALBUTEROL SULFATE SCH AMPULE: .5; 3 SOLUTION RESPIRATORY (INHALATION) at 16:15

## 2017-02-27 NOTE — HHI.PR
Subjective


Remarks


No SOB  today. Weak  and on  o2  2 l.


Has some cough . Sore in  mouth.


Pneumocystis  was present on BAL now on bactrim IV.and on Micafungin





Objective





 Vital Signs








  Date Time  Temp Pulse Resp B/P Pulse Ox O2 Delivery O2 Flow Rate FiO2


 


2/27/17 16:00 97.6 76 16 158/90 96   


 


2/27/17 12:00 97.7 93 18 153/83 95   


 


2/27/17 08:30      Nasal Cannula 3.00 


 


2/27/17 08:07     93 Nasal Cannula 3.00 


 


2/27/17 08:00 97.5 87 18 142/82 94   


 


2/27/17 00:00 96.2 91 18 152/90 96   


 


2/26/17 21:29      Nasal Cannula 3.00 


 


2/26/17 20:00      Nasal Cannula 2.00 96


 


2/26/17 20:00 96.4 84 20 173/90 97   








 I/O








 2/26/17 2/26/17 2/26/17 2/27/17 2/27/17 2/27/17





 07:00 15:00 23:00 07:00 15:00 23:00


 


Intake Total  2436 ml 480 ml 240 ml 750 ml 


 


Balance  2436 ml 480 ml 240 ml 750 ml 


 


      


 


Intake Oral  480 ml 480 ml 240 ml 750 ml 


 


IV Total  1956 ml    


 


# Voids  3 1 2 3 


 


# Bowel Movements  0    








Objective Remarks


GENERAL: This is a 61 year-old male, up and 


is in no distress .


HEENT:  Mucous membranes moist. Has    herpes  simplex.There is


no jaundice.Throat with thrush.


NECK: Supple.


CARDIOVASCULAR: Regular rate and rhythm.


RESPIRATORY: Few crackles  at bases  .Occ wheeze  scattered .


GASTROINTESTINAL: Bowel sounds are present.


ABDOMEN: Not distended.No mass.


:  No CVA tenderness.


MUSCULOSKELETAL: No edema. moves all  well.


NEUROLOGIC: Alert, oriented. .Reflexes  1 +





Assessment and Plan


Assessment and Plan





IMPRESSION:


1. Sepsis on admission.


2. COPD exacerbation.


3. Hyponatremia.


4. Hospital acquired pneumonia.


5. History of A-fib, currently in sinus rhythm.


6. History of ulcerative colitis.


7. PCP pneumonia/Immunocompromised  state











Plan :





1. Cont Antibiotics as ordered.Bactrim/Micafungin/Azithromycin





2. Nebs qid , duoneb.





3. Switch  to PO meds in am





4. Taper Prednisone to 10 mg bid.





5.  Needs rehab  placement.





6. O2  at  2 L.








ARIADNA Murry MD Feb 27, 2017 17:59

## 2017-02-27 NOTE — HHI.PR
Subjective


Remarks


slight improvement 


mouth very sore, tongue with swelling


able to eat entire tray but takes his time


swallowing okay


very tremulous


weak, ambulates only room, becomes SOB 


doesn't want rehab but will consider CIR. Dr. Murry recommending rehab as 

well. 


no fever





Objective


Objective Results


-





 Vital Signs








  Date Time  Temp Pulse Resp B/P Pulse Ox O2 Delivery O2 Flow Rate FiO2


 


2/27/17 12:00 97.7 93 18 153/83 95   


 


2/27/17 08:30      Nasal Cannula 3.00 


 


2/27/17 08:07     93 Nasal Cannula 3.00 


 


2/27/17 08:00 97.5 87 18 142/82 94   


 


2/27/17 00:00 96.2 91 18 152/90 96   


 


2/26/17 21:29      Nasal Cannula 3.00 


 


2/26/17 20:00      Nasal Cannula 2.00 96


 


2/26/17 20:00 96.4 84 20 173/90 97   


 


2/26/17 16:00 97.1 89 18 131/93 95   


 


2/26/17 15:25   20     








 I/O








 2/26/17 2/26/17 2/26/17 2/27/17 2/27/17 2/27/17





 07:00 15:00 23:00 07:00 15:00 23:00


 


Intake Total  2436 ml 480 ml 240 ml  


 


Balance  2436 ml 480 ml 240 ml  


 


      


 


Intake Oral  480 ml 480 ml 240 ml  


 


IV Total  1956 ml    


 


# Voids  3 1 2  


 


# Bowel Movements  0    








Other Results











 Date/Time Procedure Status





Source Growth 


 


 2/25/17 17:30 Herpes Simplex Virus Culture Received





Oral Mouth Pending 


 


 2/25/17 01:45 Gram Stain - Final Complete





Sputum Expectorated Sputum  





 2/25/17 01:45 Sputum Culture - Final Complete





Sputum Expectorated Sputum HEAVY GROWTH NORMAL RESPIRATORY JACKELYN 











ROS


General:  Weakness,  No: Fatigue, Other


HEENT:  Sore Throat, Other (tongue sore),  No: Dysphagia


Cardiac:  No: Chest Pain, Edema, Palpitations


Pulmonary:  Cough, SOB, Wheezing


GI:  No: Abdominal Pain, BM, Diarrhea, N/V


/GYN:  No: Dysuria, Urgency


Neuro/MS:  No: Lightheaded, Confusion


Psych:  No: Anxiety, Depression


Skin:  No: Itching, Rash





Physical Exam


Physical Exam


PHYSICAL EXAMINATION


GENERAL:  This is a slim, well-nourished   male 


who appears to be in no acute distress.  He  is alert and awake, answers 

questions appropriately.  


HEAD:  Normocephalic without any lesion or mass noted.  Facial features


appear symmetric.


OROPHARYNGEAL:  Oropharynx with erythema, has thrust, tongue sore


NECK:  Supple.  No nuchal rigidity or lymphadenopathy.


Trachea midline without deviation.  


CARDIAC:  Regular rhythm, regular rate, S1 and S2 are heard.  Murmur none; no 

gallops or rubs.


LUNGS:  diminished, wheezes, scattered ronchi


ABDOMEN:  Soft, nontender, no organomegaly or masses.  Bowel sounds are


heard in all four quadrants.  No rebound.  No guarding.  Appetite fair


EXTREMITIES:  No edema.  Pulses equal bilateral.  No cyanosis.


NEUROLOGICAL:  Patient mood and affect appropriate. No focal deficit


Urinary Catheter:  No


Vascular Central Line Catheter:  No





A/P


Diagnosis:  


(1) COPD (chronic obstructive pulmonary disease)


(2) BPH (benign prostatic hyperplasia)


(3) Atrial arrhythmia


(4) Atrial fibrillation


(5) Pneumonia


(6) Atrial fibrillation


(7) SIRS (systemic inflammatory response syndrome)


(8) Hypoxia


(9) Pneumocystis carinii pneumonia


(10) Thrush


(11) Dehydration


(12) Ulcerative colitis


(13) Hypertension


Assessment and Plan


continue with empiric abx-Diflucan, Bactrim IV


appreciate ID input


HIV reflex +


BAL + PCP


HIV + - confirmed cw advanced AIDS


Undetectable CD4 count (< 20)


Needs MAC profilaxis  Azithro 1200 q monday


worsening thrush--ID added - micafungin and acyclovir


Pt will need to establish fu with HIV provider (Dr Ernandez) ASAP preferably with 

1st appointment within 1 wk after d/c


appreciate ID input, d/w Dr. Ray, keep over weekend. Needs to establish 

ASAP upon dc with Dr. Sheridan. 


Pt. has  appointment for next week 








appreciate pulmonary input


S/P bronch 2/15


continue oxygen,  PO steroids


change Duonebs to q 4WA and q 2 PRN 








Thrush


continue Diflucan


DC  Nystatin


Try Magic mouthwash 





Hx afib, SR stable 


appreciate cardiology input


continue Cardizem and BB





Heparin for DVT prophylaxis


continue Asacol 





End of life care, code status, adv. directives d/w pt. at length. He's 

requesting that no information is provided to his sister. Designates Kathryn 

Sanidad and Lillie Paz (as secondary) to make health care decisions if he's 

not able. If resp. distress, agrees to intubation for a short period.  CM to 

provide forms


CIR eval


PT/OT


Hopefully to rehab soon, d/w pt.going home is not a good option, will likely 

return worst. Willing to go to [facility] if approved. 








D/W RN


D/W Dr. Marie


D/W Dr. Murry, agrees with rehab 


D/W pt


This pt. was seen by myself and Dr. Marie, this note is written on her behalf.


Discussed With:  Nurse (Veronica), Family (patient), Other (Dr. mild leak 

patient seen on her behalf)





Problem Qualifiers





(1) COPD (chronic obstructive pulmonary disease):  


Qualified Code:  J44.1 - Chronic obstructive pulmonary disease with acute 

exacerbation


(2) BPH (benign prostatic hyperplasia):  


Qualified Code:  N40.0 - Benign prostatic hyperplasia, presence of lower 

urinary tract symptoms unspecified, unspecified morphology


(3) Atrial fibrillation:  


Qualified Code:  I48.0 - Paroxysmal atrial fibrillation


(4) Pneumonia:  





(5) Atrial fibrillation:  


Qualified Code:  I48.0 - Paroxysmal atrial fibrillation


(6) Pneumocystis carinii pneumonia:  





(7) Ulcerative colitis:  


Qualified Code:  K51.90 - Ulcerative colitis without complications, unspecified 

location


(8) Hypertension:  


Qualified Code:  I10 - Essential hypertension





Wilma Harrison Feb 27, 2017 15:05

## 2017-02-28 VITALS
DIASTOLIC BLOOD PRESSURE: 79 MMHG | SYSTOLIC BLOOD PRESSURE: 135 MMHG | TEMPERATURE: 97.7 F | HEART RATE: 82 BPM | OXYGEN SATURATION: 95 % | RESPIRATION RATE: 18 BRPM

## 2017-02-28 VITALS — OXYGEN SATURATION: 96 %

## 2017-02-28 VITALS
RESPIRATION RATE: 20 BRPM | TEMPERATURE: 95.5 F | OXYGEN SATURATION: 98 % | HEART RATE: 95 BPM | SYSTOLIC BLOOD PRESSURE: 127 MMHG | DIASTOLIC BLOOD PRESSURE: 86 MMHG

## 2017-02-28 VITALS
OXYGEN SATURATION: 93 % | DIASTOLIC BLOOD PRESSURE: 93 MMHG | SYSTOLIC BLOOD PRESSURE: 157 MMHG | HEART RATE: 97 BPM | TEMPERATURE: 96.1 F | RESPIRATION RATE: 16 BRPM

## 2017-02-28 VITALS
RESPIRATION RATE: 19 BRPM | DIASTOLIC BLOOD PRESSURE: 86 MMHG | SYSTOLIC BLOOD PRESSURE: 152 MMHG | HEART RATE: 85 BPM | TEMPERATURE: 97.1 F

## 2017-02-28 VITALS
HEART RATE: 84 BPM | DIASTOLIC BLOOD PRESSURE: 81 MMHG | TEMPERATURE: 97.7 F | RESPIRATION RATE: 16 BRPM | OXYGEN SATURATION: 95 % | SYSTOLIC BLOOD PRESSURE: 136 MMHG

## 2017-02-28 VITALS — OXYGEN SATURATION: 94 %

## 2017-02-28 VITALS — OXYGEN SATURATION: 95 %

## 2017-02-28 RX ADMIN — ACYCLOVIR SODIUM SCH MLS/HR: 50 INJECTION, SOLUTION INTRAVENOUS at 12:05

## 2017-02-28 RX ADMIN — SODIUM CHLORIDE SCH MLS/HR: 900 INJECTION INTRAVENOUS at 16:31

## 2017-02-28 RX ADMIN — DILTIAZEM HYDROCHLORIDE SCH MG: 120 CAPSULE, EXTENDED RELEASE ORAL at 09:03

## 2017-02-28 RX ADMIN — SULFAMETHOXAZOLE AND TRIMETHOPRIM SCH MLS/HR: 80; 16 INJECTION, SOLUTION, CONCENTRATE INTRAVENOUS at 00:10

## 2017-02-28 RX ADMIN — HYDROMORPHONE HYDROCHLORIDE PRN MG: 1 INJECTION, SOLUTION INTRAMUSCULAR; INTRAVENOUS; SUBCUTANEOUS at 13:31

## 2017-02-28 RX ADMIN — GABAPENTIN SCH MG: 100 CAPSULE ORAL at 09:03

## 2017-02-28 RX ADMIN — CEFEPIME SCH MLS/HR: 2 INJECTION, POWDER, FOR SOLUTION INTRAVENOUS at 00:10

## 2017-02-28 RX ADMIN — FLUCONAZOLE SCH MG: 200 TABLET ORAL at 09:02

## 2017-02-28 RX ADMIN — CEFEPIME SCH MLS/HR: 2 INJECTION, POWDER, FOR SOLUTION INTRAVENOUS at 09:04

## 2017-02-28 RX ADMIN — IPRATROPIUM BROMIDE AND ALBUTEROL SULFATE SCH AMPULE: .5; 3 SOLUTION RESPIRATORY (INHALATION) at 11:30

## 2017-02-28 RX ADMIN — HEPARIN SODIUM SCH UNITS: 10000 INJECTION, SOLUTION INTRAVENOUS; SUBCUTANEOUS at 20:26

## 2017-02-28 RX ADMIN — OXYCODONE HYDROCHLORIDE AND ACETAMINOPHEN SCH MG: 500 TABLET ORAL at 09:03

## 2017-02-28 RX ADMIN — ACYCLOVIR SODIUM SCH MLS/HR: 50 INJECTION, SOLUTION INTRAVENOUS at 05:21

## 2017-02-28 RX ADMIN — IPRATROPIUM BROMIDE AND ALBUTEROL SULFATE SCH AMPULE: .5; 3 SOLUTION RESPIRATORY (INHALATION) at 15:21

## 2017-02-28 RX ADMIN — FAMOTIDINE SCH MG: 20 TABLET, FILM COATED ORAL at 20:26

## 2017-02-28 RX ADMIN — ACETAMINOPHEN PRN MG: 325 TABLET ORAL at 02:12

## 2017-02-28 RX ADMIN — HYDROMORPHONE HYDROCHLORIDE PRN MG: 1 INJECTION, SOLUTION INTRAMUSCULAR; INTRAVENOUS; SUBCUTANEOUS at 23:14

## 2017-02-28 RX ADMIN — GUAIFENESIN AND CODEINE PHOSPHATE PRN ML: 10; 100 LIQUID ORAL at 02:11

## 2017-02-28 RX ADMIN — CEFEPIME SCH MLS/HR: 2 INJECTION, POWDER, FOR SOLUTION INTRAVENOUS at 17:37

## 2017-02-28 RX ADMIN — IPRATROPIUM BROMIDE AND ALBUTEROL SULFATE SCH AMPULE: .5; 3 SOLUTION RESPIRATORY (INHALATION) at 08:47

## 2017-02-28 RX ADMIN — SULFAMETHOXAZOLE AND TRIMETHOPRIM SCH MLS/HR: 80; 16 INJECTION, SOLUTION, CONCENTRATE INTRAVENOUS at 05:21

## 2017-02-28 RX ADMIN — FAMOTIDINE SCH MG: 20 TABLET, FILM COATED ORAL at 09:03

## 2017-02-28 RX ADMIN — IPRATROPIUM BROMIDE AND ALBUTEROL SULFATE PRN AMPULE: .5; 3 SOLUTION RESPIRATORY (INHALATION) at 03:16

## 2017-02-28 RX ADMIN — SULFAMETHOXAZOLE AND TRIMETHOPRIM SCH MLS/HR: 80; 16 INJECTION, SOLUTION, CONCENTRATE INTRAVENOUS at 12:05

## 2017-02-28 RX ADMIN — SODIUM CHLORIDE, PRESERVATIVE FREE SCH ML: 5 INJECTION INTRAVENOUS at 21:00

## 2017-02-28 RX ADMIN — SODIUM CHLORIDE, PRESERVATIVE FREE SCH ML: 5 INJECTION INTRAVENOUS at 09:04

## 2017-02-28 RX ADMIN — MESALAMINE SCH MG: 800 TABLET, DELAYED RELEASE ORAL at 20:26

## 2017-02-28 RX ADMIN — MESALAMINE SCH MG: 800 TABLET, DELAYED RELEASE ORAL at 13:30

## 2017-02-28 RX ADMIN — GUAIFENESIN AND CODEINE PHOSPHATE PRN ML: 10; 100 LIQUID ORAL at 20:28

## 2017-02-28 RX ADMIN — CEFEPIME SCH MLS/HR: 2 INJECTION, POWDER, FOR SOLUTION INTRAVENOUS at 23:15

## 2017-02-28 RX ADMIN — ACYCLOVIR SODIUM SCH MLS/HR: 50 INJECTION, SOLUTION INTRAVENOUS at 20:25

## 2017-02-28 RX ADMIN — METOPROLOL TARTRATE SCH MG: 25 TABLET, FILM COATED ORAL at 20:27

## 2017-02-28 RX ADMIN — GABAPENTIN SCH MG: 100 CAPSULE ORAL at 13:30

## 2017-02-28 RX ADMIN — IPRATROPIUM BROMIDE AND ALBUTEROL SULFATE PRN AMPULE: .5; 3 SOLUTION RESPIRATORY (INHALATION) at 00:08

## 2017-02-28 RX ADMIN — HYDROMORPHONE HYDROCHLORIDE PRN MG: 1 INJECTION, SOLUTION INTRAMUSCULAR; INTRAVENOUS; SUBCUTANEOUS at 09:07

## 2017-02-28 RX ADMIN — Medication SCH CAP: at 09:03

## 2017-02-28 RX ADMIN — HYDROMORPHONE HYDROCHLORIDE PRN MG: 1 INJECTION, SOLUTION INTRAMUSCULAR; INTRAVENOUS; SUBCUTANEOUS at 17:38

## 2017-02-28 RX ADMIN — HEPARIN SODIUM SCH UNITS: 10000 INJECTION, SOLUTION INTRAVENOUS; SUBCUTANEOUS at 09:03

## 2017-02-28 RX ADMIN — IPRATROPIUM BROMIDE AND ALBUTEROL SULFATE PRN AMPULE: .5; 3 SOLUTION RESPIRATORY (INHALATION) at 19:10

## 2017-02-28 RX ADMIN — Medication SCH TAB: at 09:02

## 2017-02-28 RX ADMIN — MESALAMINE SCH MG: 800 TABLET, DELAYED RELEASE ORAL at 05:22

## 2017-02-28 RX ADMIN — METOPROLOL TARTRATE SCH MG: 25 TABLET, FILM COATED ORAL at 09:03

## 2017-02-28 RX ADMIN — SULFAMETHOXAZOLE AND TRIMETHOPRIM SCH MLS/HR: 80; 16 INJECTION, SOLUTION, CONCENTRATE INTRAVENOUS at 23:15

## 2017-02-28 RX ADMIN — SULFAMETHOXAZOLE AND TRIMETHOPRIM SCH MLS/HR: 80; 16 INJECTION, SOLUTION, CONCENTRATE INTRAVENOUS at 17:37

## 2017-02-28 RX ADMIN — GABAPENTIN SCH MG: 100 CAPSULE ORAL at 17:37

## 2017-02-28 NOTE — HHI.PR
Subjective


Remarks


Has hoarseness   today. Weak  and on  o2  2 l.


Has some cough . Sore in  mouth.and tongue


Poor  Oral Intake.





Objective





 Vital Signs








  Date Time  Temp Pulse Resp B/P Pulse Ox O2 Delivery O2 Flow Rate FiO2


 


2/28/17 17:17 97.7 84 16 136/81 95   


 


2/28/17 15:21     94 Nasal Cannula 2.00 


 


2/28/17 14:22     95 Nasal Cannula 2.00 


 


2/28/17 12:30 96.1 97 16 157/93 93   


 


2/28/17 08:50     96 Nasal Cannula 2.00 


 


2/28/17 08:30 97.1 85 19 152/86    


 


2/28/17 03:12   16     


 


2/28/17 00:00 97.7 82 18 135/79 95   


 


2/27/17 23:31   16     


 


2/27/17 21:06     95 Nasal Cannula 3.00 


 


2/27/17 20:15      Nasal Cannula 2.00 


 


2/27/17 20:00 96.0 93 20 164/85 96   








 I/O








 2/27/17 2/27/17 2/27/17 2/28/17 2/28/17 2/28/17





 07:00 15:00 23:00 07:00 15:00 23:00


 


Intake Total 240 ml 2814 ml 960 ml 240 ml  


 


Balance 240 ml 2814 ml 960 ml 240 ml  


 


      


 


Intake Oral 240 ml 750 ml 360 ml 240 ml  


 


IV Total  2064 ml 600 ml   


 


# Voids 2 3 2 1  


 


# Bowel Movements   2 0  








Objective Remarks


GENERAL: This is a 61 year-old male, up and 


is in no distress .


HEENT:  Mucous membranes moist. Has  excoriation of mucosa.Throat with thrush.


NECK: Supple.


CARDIOVASCULAR: Regular rate and rhythm.


RESPIRATORY: Few crackles  at bases  and  wheeze  scattered .


GASTROINTESTINAL: Bowel sounds are present.


ABDOMEN: Not distended.No mass.


:  No CVA tenderness.


MUSCULOSKELETAL: No edema. moves all  well.


NEUROLOGIC: Alert, oriented. .Reflexes  1 +





Assessment and Plan


Assessment and Plan





IMPRESSION:


1. Sepsis on admission.


2. COPD exacerbation.


3. Hyponatremia.


4. Hospital acquired pneumonia.


5. History of A-fib, currently in sinus rhythm.


6. History of ulcerative colitis.


7. PCP pneumonia/Immunocompromised  state











Plan :





1. Cont Antibiotics as ordered.





2. Nebs tid , duoneb.





3. CBC,BMP





4. Prednisone to 10 mg bid.





5. Refused rehab  placement.





6. O2  at  2 L.





7. Magic mouthwash 10 cc qid.








ARIADNA Murry MD Feb 28, 2017 18:18

## 2017-02-28 NOTE — HHI.PR
Subjective


Remarks


Tongue edema, with coating quite yellow, erythema around the edges.  No acute 

changes


No chest pain 


shortness of breath exertional and at rest, mild improved at rest


Voice hoarse


Anxiety mild to moderate today


using o2


Quieter affect (Coral Li)





Objective


Objective Results


-





 Vital Signs








  Date Time  Temp Pulse Resp B/P Pulse Ox O2 Delivery O2 Flow Rate FiO2


 


2/28/17 08:50     96 Nasal Cannula 2.00 


 


2/28/17 08:30 97.1 85 19 152/86    


 


2/28/17 03:12   16     


 


2/28/17 00:00 97.7 82 18 135/79 95   


 


2/27/17 23:31   16     


 


2/27/17 21:06     95 Nasal Cannula 3.00 


 


2/27/17 20:15      Nasal Cannula 2.00 


 


2/27/17 20:00 96.0 93 20 164/85 96   


 


2/27/17 16:00 97.6 76 16 158/90 96   








 I/O








 2/27/17 2/27/17 2/27/17 2/28/17 2/28/17 2/28/17





 07:00 15:00 23:00 07:00 15:00 23:00


 


Intake Total 240 ml 2814 ml 960 ml 240 ml  


 


Balance 240 ml 2814 ml 960 ml 240 ml  


 


      


 


Intake Oral 240 ml 750 ml 360 ml 240 ml  


 


IV Total  2064 ml 600 ml   


 


# Voids 2 3 2 1  


 


# Bowel Movements   2 0  





 (Coral Li)


Medications and IVs





 Active Medications


Lidocaine/ Diphenhydr/Alum/ Mg/Simeth (Magic Mouthwash Pediatric/Adult Liq) 5 

ml ACHS SWISH-SWAL Last administered on 2/28/17at 12:04; Admin Dose 5 ML;  

Start 2/27/17 at 16:00


Prednisone (Deltasone) 20 mg DAILY PO;  Start 3/4/17 at 09:00;  Stop 3/15/17 at 

08:59 (Coral Li)





ROS


General:  Fatigue, Weakness, Other (10 point ROS done.  Tungue coating 

unchanged.  difficulty eating.  SOB, fatique, otherwise negative.)


HEENT:  Sore Throat, Other (thrush) (Coral Li)





Physical Exam


Physical Exam


GENERAL:  This is a slim, well-nourished   male 


who appears no respiratory distress.  He  is alert and awake, answers questions 

appropriately,  mildly withdrawn today.


HEAD:  Normocephalic without any lesion or mass noted.  Facial features


appear symmetric.


OROPHARYNGEAL:  Oropharynx with erythema, has thrust, tongue sore


NECK:  Supple.  No nuchal rigidity or lymphadenopathy.


Trachea midline without deviation.  


CARDIAC:  Regular rhythm, regular rate, S1 and S2 are heard.  Murmur none; no 

gallops or rubs.


LUNGS:  diminished, wheezes, scattered ronchi


ABDOMEN:  Soft, nontender, no organomegaly or masses.  Bowel sounds are


heard in all four quadrants.  No rebound.  No guarding.  Appetite fair


EXTREMITIES:  No edema.  Pulses equal bilateral.  No cyanosis.


NEUROLOGICAL:  Patient mood withdrawn today. No focal deficit


Urinary Catheter:  No


Vascular Central Line Catheter:  No (Coral Li)





A/P


Assessment and Plan


Assessment and Plan


1. Sepsis on admission.


2. COPD exacerbation.


3. Hyponatremia.


4. Hospital acquired pneumonia.


5. History of A-fib, currently in sinus rhythm.


6. History of ulcerative colitis.


7. Hypokalemia


8. AIDS, new diagnosis


9. Dehydration


10 Candida, oral


 


DISCUSSION:


Labs review, vital signs reviewed.


gentle hydration encouraged by mouthcontinue with empiric abx-Diflucan, Bactrim 

IV


appreciate ID input


HIV reflex +


BAL + PCP


HIV + - confirmed cw advanced AIDS


Undetectable CD4 count (< 20)


Needs MAC profilaxis  Azithro 1200 q monday


Pt will need to establish fu with HIV provider (Dr Ernandez) ASAP preferably with 

1st appointment within 1 wk after d/c


appreciate ID input,  


Supportive care, patient seems quieter today.  Encouraged questions or any 

acute needs.











appreciate pulmonary input


S/P bronch 2/15


continue oxygen,  PO steroids


change Duonebs to q 4WA and q 2 PRN 








Thrush, worsening today with mild increased edema affecting his eating habits, 

but not his airway.


ID to make medical judgment calls for any acute changes on meds


continue Diflucan


continue Nystatin


Seen per ID





Hx afib, SR stable 


appreciate cardiology input


continue Cardizem and BB





Heparin for DVT prophylaxis


continue Asacol 





Case management consult DC planning, resume HHC, oxygen


D/W pt. at length, not likely to go back to work for some time. He's 

immunocompromised, significant SOB. Needs time to heal. Verbalizes 

understanding. 


[facility] evaluated, but patient didnt meet criteria.  Given a list of SNF rehab 

possibilities.  Encouraged to review and consider options.  





D/W Dr. Marie


D/W pt


This pt. was seen by myself and Dr. Marie, this note is written on her behalf.





Discharge Planning


Possible home Monday or seen with the support of family and friends


Discussed With:  Nurse (Veronica), Family (patient), Other (Dr. mild leak 

patient seen on her behalf) (Coral Li)


Assessment and Plan


Patient seen and  examined 


Upset about not being accepted at [facility]


discussed with him, about going to rehab


patient agreeable 


consult case management


will d/w ID regarding switching to Po antibiotics


possible discharge to rehab soon


outpatient follow up with DR Gandara


discussed with patient


 discussed with Coral MCGILL (Vianey Marie MD)








Coral Li Feb 28, 2017 13:55


Vianey Marie MD Feb 28, 2017 15:58

## 2017-03-01 VITALS
DIASTOLIC BLOOD PRESSURE: 84 MMHG | HEART RATE: 84 BPM | SYSTOLIC BLOOD PRESSURE: 128 MMHG | OXYGEN SATURATION: 94 % | TEMPERATURE: 97.8 F | RESPIRATION RATE: 20 BRPM

## 2017-03-01 VITALS
TEMPERATURE: 96.5 F | HEART RATE: 95 BPM | SYSTOLIC BLOOD PRESSURE: 137 MMHG | DIASTOLIC BLOOD PRESSURE: 91 MMHG | OXYGEN SATURATION: 94 % | RESPIRATION RATE: 24 BRPM

## 2017-03-01 VITALS
TEMPERATURE: 98.4 F | OXYGEN SATURATION: 95 % | DIASTOLIC BLOOD PRESSURE: 84 MMHG | SYSTOLIC BLOOD PRESSURE: 134 MMHG | RESPIRATION RATE: 20 BRPM | HEART RATE: 88 BPM

## 2017-03-01 VITALS
TEMPERATURE: 97 F | SYSTOLIC BLOOD PRESSURE: 106 MMHG | RESPIRATION RATE: 20 BRPM | HEART RATE: 74 BPM | DIASTOLIC BLOOD PRESSURE: 74 MMHG | OXYGEN SATURATION: 93 %

## 2017-03-01 VITALS — OXYGEN SATURATION: 95 %

## 2017-03-01 LAB
ANION GAP SERPL CALC-SCNC: 10 MEQ/L (ref 5–15)
BASOPHILS # BLD AUTO: 0 TH/MM3 (ref 0–0.2)
BASOPHILS NFR BLD: 0.1 % (ref 0–2)
BUN SERPL-MCNC: 17 MG/DL (ref 7–18)
CHLORIDE SERPL-SCNC: 96 MEQ/L (ref 98–107)
EOSINOPHIL # BLD: 0.1 TH/MM3 (ref 0–0.4)
EOSINOPHIL NFR BLD: 0.8 % (ref 0–4)
ERYTHROCYTE [DISTWIDTH] IN BLOOD BY AUTOMATED COUNT: 15.5 % (ref 11.6–17.2)
GFR SERPLBLD BASED ON 1.73 SQ M-ARVRAT: 92 ML/MIN (ref 89–?)
HCO3 BLD-SCNC: 22.2 MEQ/L (ref 21–32)
HCT VFR BLD CALC: 41.9 % (ref 39–51)
HEMO FLAGS: (no result)
LYMPHOCYTES # BLD AUTO: 0.2 TH/MM3 (ref 1–4.8)
LYMPHOCYTES NFR BLD AUTO: 2.3 % (ref 9–44)
MCH RBC QN AUTO: 29.4 PG (ref 27–34)
MCHC RBC AUTO-ENTMCNC: 34 % (ref 32–36)
MCV RBC AUTO: 86.3 FL (ref 80–100)
METAMYELOCYTES NFR BLD: 3 % (ref 0–1)
MONOCYTES NFR BLD: 2.5 % (ref 0–8)
MYELOCYTES NFR BLD: 3 % (ref 0–0)
NEUTROPHILS # BLD AUTO: 8.5 TH/MM3 (ref 1.8–7.7)
NEUTROPHILS NFR BLD AUTO: 94.3 % (ref 16–70)
NEUTS BAND # BLD MANUAL: 8.6 TH/MM3 (ref 1.8–7.7)
NEUTS BAND NFR BLD: 19 % (ref 0–6)
NEUTS SEG NFR BLD MANUAL: 70 % (ref 16–70)
PLAT MORPH BLD: NORMAL
PLATELET # BLD: 158 TH/MM3 (ref 150–450)
PLATELET BLD QL SMEAR: NORMAL
POTASSIUM SERPL-SCNC: 4.9 MEQ/L (ref 3.5–5.1)
RBC # BLD AUTO: 4.85 MIL/MM3 (ref 4.5–5.9)
RBC MORPH BLD: NORMAL
SCAN/DIFF: (no result)
SODIUM SERPL-SCNC: 128 MEQ/L (ref 136–145)
WBC # BLD AUTO: 9 TH/MM3 (ref 4–11)
WBC DIFF SAMPLE: 100

## 2017-03-01 RX ADMIN — Medication SCH TAB: at 09:00

## 2017-03-01 RX ADMIN — HYDROMORPHONE HYDROCHLORIDE PRN MG: 1 INJECTION, SOLUTION INTRAMUSCULAR; INTRAVENOUS; SUBCUTANEOUS at 16:17

## 2017-03-01 RX ADMIN — ACYCLOVIR SODIUM SCH MLS/HR: 50 INJECTION, SOLUTION INTRAVENOUS at 11:47

## 2017-03-01 RX ADMIN — MESALAMINE SCH MG: 800 TABLET, DELAYED RELEASE ORAL at 05:10

## 2017-03-01 RX ADMIN — CEFEPIME SCH MLS/HR: 2 INJECTION, POWDER, FOR SOLUTION INTRAVENOUS at 17:00

## 2017-03-01 RX ADMIN — OXYCODONE HYDROCHLORIDE AND ACETAMINOPHEN SCH MG: 500 TABLET ORAL at 09:11

## 2017-03-01 RX ADMIN — SODIUM CHLORIDE, PRESERVATIVE FREE SCH ML: 5 INJECTION INTRAVENOUS at 21:00

## 2017-03-01 RX ADMIN — SODIUM CHLORIDE SCH MLS/HR: 900 INJECTION INTRAVENOUS at 16:21

## 2017-03-01 RX ADMIN — METOPROLOL TARTRATE SCH MG: 25 TABLET, FILM COATED ORAL at 09:11

## 2017-03-01 RX ADMIN — GABAPENTIN SCH MG: 100 CAPSULE ORAL at 13:00

## 2017-03-01 RX ADMIN — SULFAMETHOXAZOLE AND TRIMETHOPRIM SCH MLS/HR: 80; 16 INJECTION, SOLUTION, CONCENTRATE INTRAVENOUS at 05:10

## 2017-03-01 RX ADMIN — DILTIAZEM HYDROCHLORIDE SCH MG: 120 CAPSULE, EXTENDED RELEASE ORAL at 09:11

## 2017-03-01 RX ADMIN — GABAPENTIN SCH MG: 100 CAPSULE ORAL at 17:00

## 2017-03-01 RX ADMIN — ACYCLOVIR SODIUM SCH MLS/HR: 50 INJECTION, SOLUTION INTRAVENOUS at 21:59

## 2017-03-01 RX ADMIN — ACYCLOVIR SODIUM SCH MLS/HR: 50 INJECTION, SOLUTION INTRAVENOUS at 03:35

## 2017-03-01 RX ADMIN — SULFAMETHOXAZOLE AND TRIMETHOPRIM SCH MLS/HR: 80; 16 INJECTION, SOLUTION, CONCENTRATE INTRAVENOUS at 18:00

## 2017-03-01 RX ADMIN — MESALAMINE SCH MG: 800 TABLET, DELAYED RELEASE ORAL at 14:00

## 2017-03-01 RX ADMIN — SULFAMETHOXAZOLE AND TRIMETHOPRIM SCH MLS/HR: 80; 16 INJECTION, SOLUTION, CONCENTRATE INTRAVENOUS at 11:48

## 2017-03-01 RX ADMIN — SODIUM CHLORIDE, PRESERVATIVE FREE SCH ML: 5 INJECTION INTRAVENOUS at 09:00

## 2017-03-01 RX ADMIN — HEPARIN SODIUM SCH UNITS: 10000 INJECTION, SOLUTION INTRAVENOUS; SUBCUTANEOUS at 21:59

## 2017-03-01 RX ADMIN — HEPARIN SODIUM SCH UNITS: 10000 INJECTION, SOLUTION INTRAVENOUS; SUBCUTANEOUS at 09:11

## 2017-03-01 RX ADMIN — CEFEPIME SCH MLS/HR: 2 INJECTION, POWDER, FOR SOLUTION INTRAVENOUS at 09:09

## 2017-03-01 RX ADMIN — METOPROLOL TARTRATE SCH MG: 25 TABLET, FILM COATED ORAL at 22:02

## 2017-03-01 RX ADMIN — FLUCONAZOLE SCH MG: 200 TABLET ORAL at 09:11

## 2017-03-01 RX ADMIN — FAMOTIDINE SCH MG: 20 TABLET, FILM COATED ORAL at 22:01

## 2017-03-01 RX ADMIN — PHENYTOIN SODIUM SCH MLS/HR: 50 INJECTION INTRAMUSCULAR; INTRAVENOUS at 16:00

## 2017-03-01 RX ADMIN — FAMOTIDINE SCH MG: 20 TABLET, FILM COATED ORAL at 09:10

## 2017-03-01 RX ADMIN — Medication SCH CAP: at 09:10

## 2017-03-01 RX ADMIN — GABAPENTIN SCH MG: 100 CAPSULE ORAL at 09:10

## 2017-03-01 RX ADMIN — MESALAMINE SCH MG: 800 TABLET, DELAYED RELEASE ORAL at 21:59

## 2017-03-01 NOTE — HHI.PR
Subjective


Remarks


Has hoarseness  still . Feels  Weak  and on  o2  2 l.


Has some cough . Sore in  mouth.and tongue


Poor  Oral Intake.





Objective





 Vital Signs








  Date Time  Temp Pulse Resp B/P Pulse Ox O2 Delivery O2 Flow Rate FiO2


 


3/1/17 12:00 98.4 88 20 134/84 95   


 


3/1/17 09:21     95 Nasal Cannula 2.00 


 


3/1/17 08:00 96.5 95 24 137/91 94   


 


3/1/17 00:07   18     


 


3/1/17 00:00 97.8 84 20 128/84 94   


 


2/28/17 20:00     94 Room Air  


 


2/28/17 20:00 95.5 95 20 127/86 98   








 I/O








 2/28/17 2/28/17 2/28/17 3/1/17 3/1/17 3/1/17





 07:00 15:00 23:00 07:00 15:00 23:00


 


Intake Total 240 ml 715 ml 1200 ml 240 ml  


 


Balance 240 ml 715 ml 1200 ml 240 ml  


 


      


 


Intake Oral 240 ml 715 ml 1200 ml 240 ml  


 


# Voids 1 3 2 2  


 


# Bowel Movements 0 0    








Result Diagram:  


3/1/17 0502                                                                    

            3/1/17 0502





Objective Remarks


GENERAL: This is a 61 year-old male, up and 


is in mild  distress .


HEENT:  Mucous membranes moist. Has  excoriation of mucosa.Throat with thrush.


NECK: Supple.


CARDIOVASCULAR: Regular rate and rhythm.


RESPIRATORY: Few crackles  at bases  and  wheeze  scattered .


GASTROINTESTINAL: Bowel sounds are present.


ABDOMEN: Not distended.No mass.


:  No CVA tenderness.


MUSCULOSKELETAL: No edema. moves all  well.


NEUROLOGIC: Alert, oriented. .Reflexes  1 +





Assessment and Plan


Assessment and Plan





IMPRESSION:


1. Sepsis on admission.


2. COPD exacerbation.


3. Hyponatremia.


4. Hospital acquired pneumonia.


5. History of A-fib, currently in sinus rhythm.


6. History of ulcerative colitis.


7. PCP pneumonia/Immunocompromised  state











Plan :





1. Cont Antibiotics as ordered.





2. Nebs tid , duoneb.





3. EZ Pap  with nebs qid.





4. Prednisone to 10 mg bid.





5. Refused rehab  placement.





6. O2  at  2 L.





7. Magic mouthwash 10 cc qid.








ARIADNA Murry MD Mar 1, 2017 18:22

## 2017-03-01 NOTE — HHI.PR
Subjective


Remarks


Tongue edema, with coating quite yellow, erythema around the edges.  Still very 

sore when eating


No chest pain 


shortness of breath exertional and at rest, mild improved at rest


Voice hoarse


using o2


Conversational (Coral Li)





Objective


Objective Results


-





 Vital Signs








  Date Time  Temp Pulse Resp B/P Pulse Ox O2 Delivery O2 Flow Rate FiO2


 


3/1/17 12:00 98.4 88 20 134/84 95   


 


3/1/17 09:21     95 Nasal Cannula 2.00 


 


3/1/17 08:00 96.5 95 24 137/91 94   


 


3/1/17 00:07   18     


 


3/1/17 00:00 97.8 84 20 128/84 94   


 


2/28/17 20:00     94 Room Air  


 


2/28/17 20:00 95.5 95 20 127/86 98   


 


2/28/17 17:17 97.7 84 16 136/81 95   








 I/O








 2/28/17 2/28/17 2/28/17 3/1/17 3/1/17 3/1/17





 07:00 15:00 23:00 07:00 15:00 23:00


 


Intake Total 240 ml 715 ml 1200 ml 240 ml  


 


Balance 240 ml 715 ml 1200 ml 240 ml  


 


      


 


Intake Oral 240 ml 715 ml 1200 ml 240 ml  


 


# Voids 1 3 2 2  


 


# Bowel Movements 0 0    





 (Coral Li)


Result Diagram:  


3/1/17 0502                                                                    

            3/1/17 0502








ROS


General:  Fatigue, Weakness (generalized), Other (10 point ROS done positives 

noted in history of present illness otherwise negative)


HEENT:  Other (tongue Candida, )


Pulmonary:  Cough, SOB, Other (wears O2 consistently)


GI:  Abdominal Pain (none) (Coral Li)





Physical Exam


Physical Exam


PHYSICAL EXAMINATION


GENERAL:  This is a well-developed, well-nourished   male 


who appears to be in no acute distress.  He  is alert and awake,   


HEAD:  Normocephalic without any lesion or mass noted.  Facial features


appear symmetric.


OROPHARYNGEAL:  Oropharynx without erythema or edema.


NECK:  Supple.  No nuchal rigidity or lymphadenopathy.


Trachea midline without deviation.  


CARDIAC:  Regular rhythm, regular rate, S1 and S2 are heard.  Murmur none no 

gallops or rubs.


LUNGS:  Clear to auscultation bilaterally.  Low volumes


ABDOMEN:  Soft, nontender, no organomegaly or masses.  Bowel sounds are


heard in all four quadrants.  No rebound.  No guarding.


EXTREMITIES:  no edema.  Pulses equal bilateral.  


NEUROLOGICAL:  Patient mood and affect appropriate. No focal deficit


SKIN:Warm and moist


Objective Remarks


Still feel really bad (Coral Li)





A/P


Assessment and Plan


Assessment and Plan


1. Sepsis on admission.


2. COPD exacerbation.


3. Hyponatremia.


4. Hospital acquired pneumonia.


5. History of A-fib, currently in sinus rhythm.


6. History of ulcerative colitis.


7. Hypokalemia


8. AIDS, new diagnosis


9. Dehydration


10 Candida, oral


 


DISCUSSION:


Labs review, vital signs reviewed.


gentle hydration encouraged by mouthcontinue with empiric abx-Diflucan, Bactrim 

IV


appreciate ID input


HIV reflex +





Pt will need to establish fu with HIV provider (Dr Ernandez) ASAP preferably with 

1st appointment within 1 wk after d/c


appreciate ID input,  


Supportive care, patient seems quieter today.  Encouraged questions or any 

acute needs.











appreciate pulmonary input


continue oxygen,  PO steroids


change Duonebs to q 4WA and q 2 PRN 








Thrush, worsening today with mild increased edema affecting his eating habits, 

but not his airway.


ID to make medical judgment calls for any acute changes on meds


continue Diflucan


continue Nystatin


Seen per ID


Patient is on IV micafungin.  Will follow IDs course of treatment





Hx afib, SR stable 


appreciate cardiology input


continue Cardizem and BB





Heparin for DVT prophylaxis


continue Asacol 





Case management consult DC planning, working to process when patient is stable


[facility] evaluated, but patient didnt meet criteria.  Given a list of SNF rehab 

possibilities.  Encouraged to review and consider options.  Currently is hoping 

to get strong enough to go home with support of family and friends.








D/W Dr. Marie


D/W pt


This pt. was seen by myself and Dr. Marie, this note is written on her behalf.





Discharge Planning


Initiated


Discussed With:  Nurse (Veronica), Family (patient), Other (Dr. mild leak 

patient seen on her behalf) (Coral Li)


Assessment and Plan


patient seen and examined


 not feeling well today


on 2L


hyponatremia ? sec to Bactrim


check urine and serum osmolality


NS added


recheck labs in am


on multiple antibiotics per Dr Ray


HIV genotype ordered


 patient does not want to go to rehab


likely home with [facility] when stable


will need to follow up with Dr Sheridan outpatient to initiate HAART


discussed with patient in detail discussed with Dr Ray


discussed with Coral MCGILL


 (Vianey Marie MD)








Coral Li Mar 1, 2017 15:38


Vianey Marie MD Mar 1, 2017 16:36

## 2017-03-02 VITALS
DIASTOLIC BLOOD PRESSURE: 76 MMHG | RESPIRATION RATE: 19 BRPM | OXYGEN SATURATION: 94 % | TEMPERATURE: 99.6 F | HEART RATE: 78 BPM | SYSTOLIC BLOOD PRESSURE: 130 MMHG

## 2017-03-02 VITALS
OXYGEN SATURATION: 95 % | TEMPERATURE: 97.9 F | HEART RATE: 80 BPM | RESPIRATION RATE: 20 BRPM | DIASTOLIC BLOOD PRESSURE: 64 MMHG | SYSTOLIC BLOOD PRESSURE: 104 MMHG

## 2017-03-02 VITALS
RESPIRATION RATE: 17 BRPM | SYSTOLIC BLOOD PRESSURE: 114 MMHG | DIASTOLIC BLOOD PRESSURE: 74 MMHG | TEMPERATURE: 100.2 F | OXYGEN SATURATION: 90 % | HEART RATE: 86 BPM

## 2017-03-02 VITALS
DIASTOLIC BLOOD PRESSURE: 80 MMHG | HEART RATE: 89 BPM | SYSTOLIC BLOOD PRESSURE: 129 MMHG | RESPIRATION RATE: 19 BRPM | TEMPERATURE: 100.7 F | OXYGEN SATURATION: 93 %

## 2017-03-02 VITALS
OXYGEN SATURATION: 95 % | DIASTOLIC BLOOD PRESSURE: 72 MMHG | HEART RATE: 88 BPM | RESPIRATION RATE: 18 BRPM | TEMPERATURE: 97 F | SYSTOLIC BLOOD PRESSURE: 117 MMHG

## 2017-03-02 VITALS — OXYGEN SATURATION: 94 %

## 2017-03-02 LAB
ANION GAP SERPL CALC-SCNC: 12 MEQ/L (ref 5–15)
BASOPHILS # BLD AUTO: 0 TH/MM3 (ref 0–0.2)
BASOPHILS NFR BLD: 0.2 % (ref 0–2)
BUN SERPL-MCNC: 19 MG/DL (ref 7–18)
CHLORIDE SERPL-SCNC: 98 MEQ/L (ref 98–107)
EOSINOPHIL # BLD: 0.1 TH/MM3 (ref 0–0.4)
EOSINOPHIL NFR BLD: 1.1 % (ref 0–4)
EOSINOPHIL NFR BLD: 2 % (ref 0–4)
ERYTHROCYTE [DISTWIDTH] IN BLOOD BY AUTOMATED COUNT: 15.3 % (ref 11.6–17.2)
GFR SERPLBLD BASED ON 1.73 SQ M-ARVRAT: 87 ML/MIN (ref 89–?)
HCO3 BLD-SCNC: 20.5 MEQ/L (ref 21–32)
HCT VFR BLD CALC: 41.2 % (ref 39–51)
HEMO FLAGS: (no result)
HIV RNA LOG COPIES: 5.45
LYMPHOCYTES # BLD AUTO: 0.1 TH/MM3 (ref 1–4.8)
LYMPHOCYTES NFR BLD AUTO: 1.7 % (ref 9–44)
MCH RBC QN AUTO: 29.6 PG (ref 27–34)
MCHC RBC AUTO-ENTMCNC: 34.8 % (ref 32–36)
MCV RBC AUTO: 85.2 FL (ref 80–100)
METAMYELOCYTES NFR BLD: 5 % (ref 0–1)
MONOCYTES NFR BLD: 2.1 % (ref 0–8)
MYELOCYTES NFR BLD: 1 % (ref 0–0)
NEUTROPHILS # BLD AUTO: 7.4 TH/MM3 (ref 1.8–7.7)
NEUTROPHILS NFR BLD AUTO: 94.9 % (ref 16–70)
NEUTS BAND # BLD MANUAL: 7.3 TH/MM3 (ref 1.8–7.7)
NEUTS BAND NFR BLD: 14 % (ref 0–6)
NEUTS SEG NFR BLD MANUAL: 74 % (ref 16–70)
PLAT MORPH BLD: NORMAL
PLATELET # BLD: 150 TH/MM3 (ref 150–450)
PLATELET BLD QL SMEAR: NORMAL
POTASSIUM SERPL-SCNC: 4.4 MEQ/L (ref 3.5–5.1)
RBC # BLD AUTO: 4.84 MIL/MM3 (ref 4.5–5.9)
RBC MORPH BLD: NORMAL
SCAN/DIFF: (no result)
SODIUM SERPL-SCNC: 130 MEQ/L (ref 136–145)
WBC # BLD AUTO: 7.8 TH/MM3 (ref 4–11)
WBC DIFF SAMPLE: 100

## 2017-03-02 RX ADMIN — HYDROMORPHONE HYDROCHLORIDE PRN MG: 1 INJECTION, SOLUTION INTRAMUSCULAR; INTRAVENOUS; SUBCUTANEOUS at 05:37

## 2017-03-02 RX ADMIN — GABAPENTIN SCH MG: 100 CAPSULE ORAL at 16:47

## 2017-03-02 RX ADMIN — ACYCLOVIR SODIUM SCH MLS/HR: 50 INJECTION, SOLUTION INTRAVENOUS at 20:47

## 2017-03-02 RX ADMIN — MESALAMINE SCH MG: 800 TABLET, DELAYED RELEASE ORAL at 05:27

## 2017-03-02 RX ADMIN — PHENYTOIN SODIUM SCH MLS/HR: 50 INJECTION INTRAMUSCULAR; INTRAVENOUS at 15:49

## 2017-03-02 RX ADMIN — CEFEPIME SCH MLS/HR: 2 INJECTION, POWDER, FOR SOLUTION INTRAVENOUS at 23:25

## 2017-03-02 RX ADMIN — SULFAMETHOXAZOLE AND TRIMETHOPRIM SCH MLS/HR: 80; 16 INJECTION, SOLUTION, CONCENTRATE INTRAVENOUS at 11:36

## 2017-03-02 RX ADMIN — SULFAMETHOXAZOLE AND TRIMETHOPRIM SCH MLS/HR: 80; 16 INJECTION, SOLUTION, CONCENTRATE INTRAVENOUS at 00:53

## 2017-03-02 RX ADMIN — CEFEPIME SCH MLS/HR: 2 INJECTION, POWDER, FOR SOLUTION INTRAVENOUS at 00:16

## 2017-03-02 RX ADMIN — GABAPENTIN SCH MG: 100 CAPSULE ORAL at 08:34

## 2017-03-02 RX ADMIN — GABAPENTIN SCH MG: 100 CAPSULE ORAL at 13:37

## 2017-03-02 RX ADMIN — SULFAMETHOXAZOLE AND TRIMETHOPRIM SCH MLS/HR: 80; 16 INJECTION, SOLUTION, CONCENTRATE INTRAVENOUS at 07:15

## 2017-03-02 RX ADMIN — MESALAMINE SCH MG: 800 TABLET, DELAYED RELEASE ORAL at 20:45

## 2017-03-02 RX ADMIN — SODIUM CHLORIDE SCH MLS/HR: 900 INJECTION INTRAVENOUS at 16:47

## 2017-03-02 RX ADMIN — ACYCLOVIR SODIUM SCH MLS/HR: 50 INJECTION, SOLUTION INTRAVENOUS at 11:41

## 2017-03-02 RX ADMIN — DILTIAZEM HYDROCHLORIDE SCH MG: 120 CAPSULE, EXTENDED RELEASE ORAL at 08:35

## 2017-03-02 RX ADMIN — ACYCLOVIR SODIUM SCH MLS/HR: 50 INJECTION, SOLUTION INTRAVENOUS at 05:27

## 2017-03-02 RX ADMIN — SODIUM CHLORIDE, PRESERVATIVE FREE SCH ML: 5 INJECTION INTRAVENOUS at 08:35

## 2017-03-02 RX ADMIN — SULFAMETHOXAZOLE AND TRIMETHOPRIM SCH MLS/HR: 80; 16 INJECTION, SOLUTION, CONCENTRATE INTRAVENOUS at 23:25

## 2017-03-02 RX ADMIN — SODIUM CHLORIDE, PRESERVATIVE FREE SCH ML: 5 INJECTION INTRAVENOUS at 20:47

## 2017-03-02 RX ADMIN — CEFEPIME SCH MLS/HR: 2 INJECTION, POWDER, FOR SOLUTION INTRAVENOUS at 16:47

## 2017-03-02 RX ADMIN — Medication SCH TAB: at 08:34

## 2017-03-02 RX ADMIN — HEPARIN SODIUM SCH UNITS: 10000 INJECTION, SOLUTION INTRAVENOUS; SUBCUTANEOUS at 08:35

## 2017-03-02 RX ADMIN — Medication SCH CAP: at 08:34

## 2017-03-02 RX ADMIN — METOPROLOL TARTRATE SCH MG: 25 TABLET, FILM COATED ORAL at 08:35

## 2017-03-02 RX ADMIN — FAMOTIDINE SCH MG: 20 TABLET, FILM COATED ORAL at 08:35

## 2017-03-02 RX ADMIN — MESALAMINE SCH MG: 800 TABLET, DELAYED RELEASE ORAL at 13:37

## 2017-03-02 RX ADMIN — METOPROLOL TARTRATE SCH MG: 25 TABLET, FILM COATED ORAL at 20:46

## 2017-03-02 RX ADMIN — OXYCODONE HYDROCHLORIDE AND ACETAMINOPHEN SCH MG: 500 TABLET ORAL at 08:35

## 2017-03-02 RX ADMIN — FAMOTIDINE SCH MG: 20 TABLET, FILM COATED ORAL at 20:45

## 2017-03-02 RX ADMIN — HYDROMORPHONE HYDROCHLORIDE PRN MG: 1 INJECTION, SOLUTION INTRAMUSCULAR; INTRAVENOUS; SUBCUTANEOUS at 00:53

## 2017-03-02 RX ADMIN — CEFEPIME SCH MLS/HR: 2 INJECTION, POWDER, FOR SOLUTION INTRAVENOUS at 08:31

## 2017-03-02 RX ADMIN — HEPARIN SODIUM SCH UNITS: 10000 INJECTION, SOLUTION INTRAVENOUS; SUBCUTANEOUS at 20:47

## 2017-03-02 RX ADMIN — SULFAMETHOXAZOLE AND TRIMETHOPRIM SCH MLS/HR: 80; 16 INJECTION, SOLUTION, CONCENTRATE INTRAVENOUS at 17:33

## 2017-03-02 NOTE — RADRPT
EXAM DATE/TIME:  03/02/2017 06:01 

 

[facility] COMPARISON:     

CHEST SINGLE AP, February 25, 2017, 6:09.

 

                     

INDICATIONS :     

Pneumonia.

                     

 

MEDICAL HISTORY :     

None.          

 

SURGICAL HISTORY :     

None.   

 

ENCOUNTER:     

Subsequent                                        

 

ACUITY:     

3 days      

 

PAIN SCORE:     

5/10

 

LOCATION:     

Bilateral chest 

 

FINDINGS:     

There is persistent ill-defined airspace disease in both lungs, left greater than right not significa
nt changed from February 25. Cardiomegaly. No effusion. No pneumothorax.

 

CONCLUSION:     

1. Stable ill-defined airspace disease in the lungs, left greater than right compared with February 2
5. No new infiltrate or effusion.

 

 

 

 Bravo Robison MD on March 02, 2017 at 6:13           

Board Certified Radiologist.

 This report was verified electronically.

## 2017-03-02 NOTE — HHI.PR
Subjective


Remarks


Has hoarseness  still  and has  a low grade  fever . Feels  Weak  and on  o2  2 

l.


Has some cough . Sore in  mouth and tongue.


Poor  Oral Intake.





Objective





 Vital Signs








  Date Time  Temp Pulse Resp B/P Pulse Ox O2 Delivery O2 Flow Rate FiO2


 


3/2/17 12:51     94 Nasal Cannula 2.00 


 


3/2/17 12:00 99.6 78 19 130/76 94   


 


3/2/17 08:00 100.2 86 17 114/74 90   


 


3/2/17 04:00 100.7 89 19 129/80 93   


 


3/1/17 21:57     93 Nasal Cannula 2.00 





      Humidified  








 I/O








 3/1/17 3/1/17 3/1/17 3/2/17 3/2/17 3/2/17





 07:00 15:00 23:00 07:00 15:00 23:00


 


Intake Total 240 ml 1400 ml 500 ml 1291 ml 1080 ml 


 


Output Total     600 ml 


 


Balance 240 ml 1400 ml 500 ml 1291 ml 480 ml 


 


      


 


Intake Oral 240 ml 1400 ml  480 ml 1080 ml 


 


IV Total   500 ml 811 ml  


 


Output Urine Total     600 ml 


 


# Voids 2 1  4  


 


# Bowel Movements  1   1 








Result Diagram:  


3/2/17 0440                                                                    

            3/2/17 0440





Objective Remarks


GENERAL: This is a 61 year-old male, up and  is in mild  distress .


HEENT:  Mucous membranes moist. Has  excoriation of mucosa.Throat with thrush.


NECK: Supple.


CARDIOVASCULAR: Regular rate and rhythm.


RESPIRATORY: Few crackles  at  both bases  and  wheeze  scattered .


GASTROINTESTINAL: Bowel sounds are present.


ABDOMEN: Not distended.No mass.


:  No CVA tenderness.


MUSCULOSKELETAL: No edema. moves all  well.


NEUROLOGIC: Alert, oriented. .Reflexes  1 +





Assessment and Plan


Assessment and Plan





IMPRESSION:


1. Sepsis on admission.


2. COPD exacerbation.


3. Hyponatremia.


4. Hospital acquired pneumonia.


5. History of A-fib, currently in sinus rhythm.


6. History of ulcerative colitis.


7. PCP pneumonia/Immunocompromised  state











Plan :





1. Cont Antibiotics as ordered  per ID.





2. Nebs tid , duoneb.





3. EZ Pap  with nebs qid.





4. Prednisone to 10 mg daily.





5. Chest X ray on Monday





6. O2  at  2 L.





7. Magic mouthwash 10 cc qid.








ARIADNA Murry MD Mar 2, 2017 16:52

## 2017-03-02 NOTE — HHI.PR
Subjective


Remarks


Tongue edema, with coating quite yellow, erythema around the edges.  Still very 

sore when eating.  Mild minimal improvement


No chest pain 


Exertional shortness of breath more so.  O2 sat 92-96 at rest


Voice hoarse


using o2 continuously


Anxious





Objective


Objective Results


-





 Vital Signs








  Date Time  Temp Pulse Resp B/P Pulse Ox O2 Delivery O2 Flow Rate FiO2


 


3/2/17 12:51     94 Nasal Cannula 2.00 


 


3/2/17 08:00 100.2 86 17 114/74 90   


 


3/2/17 04:00 100.7 89 19 129/80 93   


 


3/1/17 21:57     93 Nasal Cannula 2.00 





      Humidified  


 


3/1/17 16:00 97.0 74 20 106/74 93   








 I/O








 3/1/17 3/1/17 3/1/17 3/2/17 3/2/17 3/2/17





 07:00 15:00 23:00 07:00 15:00 23:00


 


Intake Total 240 ml 1400 ml 500 ml 1291 ml 120 ml 


 


Balance 240 ml 1400 ml 500 ml 1291 ml 120 ml 


 


      


 


Intake Oral 240 ml 1400 ml  480 ml 120 ml 


 


IV Total   500 ml 811 ml  


 


# Voids 2 1  4  


 


# Bowel Movements  1    








Result Diagram:  


3/2/17 0440                                                                    

            3/2/17 0440





Medications and IVs





 Active Medications


Prednisone (Deltasone) 20 mg DAILY PO;  Start 3/3/17 at 09:00;  Stop 3/14/17 at 

08:59


Prednisone 20 mg 20 mg DAILY PO;  Start 3/4/17 at 09:00;  Stop 3/4/17 at 09:00;

  Status DC


Sodium Chloride (NS 1000 ml Inj) 1,000 ml @  42 mls/hr [id number] IV Last 

administered on 3/1/17at 16:00; Admin Dose 42 MLS/HR;  Start 3/1/17 at 16:00





ROS


General:  Fatigue (continues), Weakness (continues), Other (10 point ROS done 

positives noted otherwise negative are unremarkable exam)


HEENT:  Sore Throat (and tongue candida severe)


Pulmonary:  Cough, SOB (exertional)





Physical Exam


Physical Exam


PHYSICAL EXAMINATION


GENERAL:  This is a ill appearing thin   male 


who appears to be in mild to moderate distress.  He  is alert and awake. 


HEAD:  Normocephalic without any lesion or mass noted.  Facial features


appear symmetric.


OROPHARYNGEAL:  Oropharynx without erythema or edema.


NECK:  Supple.  No nuchal rigidity or lymphadenopathy.


Trachea midline without deviation.  


CARDIAC:  Regular rhythm, regular rate, S1 and S2 are heard.  


LUNGS:  Diminished to auscultation bilaterally. 


ABDOMEN:  Soft, nontender,  Bowel sounds are


heard in all four quadrants.  No diarrhea


EXTREMITIES:  No edema.  Pulses equal bilateral. 


NEUROLOGICAL:  Patient mood and affect anxious 


SKIN:Warm and moist, pale dry


Objective Remarks


I just still feels so bad





A/P


Assessment and Plan


Assessment and Plan


1. Sepsis on admission.


2. COPD exacerbation.


3. Hyponatremia.


4. Hospital acquired pneumonia.


5. History of A-fib, currently in sinus rhythm.


6. History of ulcerative colitis.


7. Hypokalemia


8. AIDS, new diagnosis


9. Dehydration


10 Candida, oral


 


DISCUSSION:


Labs review, vital signs reviewed.


Increased hydration.  Recurrent hyponatremia.  encouraged by mouthcontinue with 

empiric abx-Diflucan, Bactrim IV


Will monitor labs .





Exertional persistent hypoxia usually with activity





appreciate ID input


Confirmed HIV/AIDS, 


HCV/HBV negative


Pt will need to establish fu with HIV provider (Dr Ernandez) ASAP preferably with 

1st appointment within 1 wk after d/c


Patient understands the need to have outpatient treatment started since he is 

discharged.


continues with IV medications, micafungin, and Diflucan, nystatin


Oral candidiasis, severe, minimal improvement.  Continues to affect his eating.


Continue prednisone





appreciate pulmonary input


continue oxygen,  PO steroids


change Duonebs to q 4WA and q 2 PRN 





Heparin for DVT prophylaxis








Case management consult DC planning,   not ready today








   


   























Discharge Planning


Initiated


Discussed With:  Nurse, Family (patient), Other (Dr. shepard patient seen on her 

behalf)








Coral Li Mar 2, 2017 14:18

## 2017-03-03 VITALS
HEART RATE: 90 BPM | SYSTOLIC BLOOD PRESSURE: 138 MMHG | DIASTOLIC BLOOD PRESSURE: 68 MMHG | RESPIRATION RATE: 20 BRPM | OXYGEN SATURATION: 94 % | TEMPERATURE: 96.8 F

## 2017-03-03 VITALS
DIASTOLIC BLOOD PRESSURE: 65 MMHG | TEMPERATURE: 100.6 F | SYSTOLIC BLOOD PRESSURE: 118 MMHG | OXYGEN SATURATION: 95 % | HEART RATE: 84 BPM | RESPIRATION RATE: 20 BRPM

## 2017-03-03 VITALS
DIASTOLIC BLOOD PRESSURE: 61 MMHG | TEMPERATURE: 100.7 F | SYSTOLIC BLOOD PRESSURE: 107 MMHG | OXYGEN SATURATION: 94 % | RESPIRATION RATE: 22 BRPM | HEART RATE: 100 BPM

## 2017-03-03 VITALS
TEMPERATURE: 97.2 F | DIASTOLIC BLOOD PRESSURE: 78 MMHG | SYSTOLIC BLOOD PRESSURE: 116 MMHG | RESPIRATION RATE: 20 BRPM | OXYGEN SATURATION: 92 % | HEART RATE: 91 BPM

## 2017-03-03 VITALS
OXYGEN SATURATION: 95 % | HEART RATE: 96 BPM | SYSTOLIC BLOOD PRESSURE: 133 MMHG | RESPIRATION RATE: 20 BRPM | DIASTOLIC BLOOD PRESSURE: 69 MMHG | TEMPERATURE: 98.2 F

## 2017-03-03 VITALS — TEMPERATURE: 99.8 F

## 2017-03-03 VITALS — OXYGEN SATURATION: 96 %

## 2017-03-03 RX ADMIN — SULFAMETHOXAZOLE AND TRIMETHOPRIM SCH MLS/HR: 80; 16 INJECTION, SOLUTION, CONCENTRATE INTRAVENOUS at 05:51

## 2017-03-03 RX ADMIN — METOPROLOL TARTRATE SCH MG: 25 TABLET, FILM COATED ORAL at 08:20

## 2017-03-03 RX ADMIN — Medication SCH MG: at 20:49

## 2017-03-03 RX ADMIN — FAMOTIDINE SCH MG: 20 TABLET, FILM COATED ORAL at 20:49

## 2017-03-03 RX ADMIN — SODIUM CHLORIDE, PRESERVATIVE FREE PRN ML: 5 INJECTION INTRAVENOUS at 16:02

## 2017-03-03 RX ADMIN — HYDROMORPHONE HYDROCHLORIDE PRN MG: 1 INJECTION, SOLUTION INTRAMUSCULAR; INTRAVENOUS; SUBCUTANEOUS at 16:02

## 2017-03-03 RX ADMIN — SODIUM CHLORIDE, PRESERVATIVE FREE SCH ML: 5 INJECTION INTRAVENOUS at 08:19

## 2017-03-03 RX ADMIN — DILTIAZEM HYDROCHLORIDE SCH MG: 120 CAPSULE, EXTENDED RELEASE ORAL at 08:21

## 2017-03-03 RX ADMIN — GABAPENTIN SCH MG: 100 CAPSULE ORAL at 17:36

## 2017-03-03 RX ADMIN — ACYCLOVIR SODIUM SCH MLS/HR: 50 INJECTION, SOLUTION INTRAVENOUS at 13:06

## 2017-03-03 RX ADMIN — PHENYTOIN SODIUM SCH MLS/HR: 50 INJECTION INTRAMUSCULAR; INTRAVENOUS at 15:38

## 2017-03-03 RX ADMIN — SULFAMETHOXAZOLE AND TRIMETHOPRIM SCH MLS/HR: 80; 16 INJECTION, SOLUTION, CONCENTRATE INTRAVENOUS at 23:48

## 2017-03-03 RX ADMIN — SODIUM CHLORIDE SCH MLS/HR: 900 INJECTION INTRAVENOUS at 17:01

## 2017-03-03 RX ADMIN — OXYCODONE HYDROCHLORIDE AND ACETAMINOPHEN SCH MG: 500 TABLET ORAL at 08:21

## 2017-03-03 RX ADMIN — METOPROLOL TARTRATE SCH MG: 25 TABLET, FILM COATED ORAL at 20:50

## 2017-03-03 RX ADMIN — FAMOTIDINE SCH MG: 20 TABLET, FILM COATED ORAL at 08:21

## 2017-03-03 RX ADMIN — HEPARIN SODIUM SCH UNITS: 10000 INJECTION, SOLUTION INTRAVENOUS; SUBCUTANEOUS at 08:21

## 2017-03-03 RX ADMIN — GABAPENTIN SCH MG: 100 CAPSULE ORAL at 08:20

## 2017-03-03 RX ADMIN — CEFTRIAXONE SODIUM SCH MLS/HR: 2 INJECTION, POWDER, FOR SOLUTION INTRAMUSCULAR; INTRAVENOUS at 15:42

## 2017-03-03 RX ADMIN — MESALAMINE SCH MG: 800 TABLET, DELAYED RELEASE ORAL at 13:13

## 2017-03-03 RX ADMIN — HYDROMORPHONE HYDROCHLORIDE PRN MG: 1 INJECTION, SOLUTION INTRAMUSCULAR; INTRAVENOUS; SUBCUTANEOUS at 08:17

## 2017-03-03 RX ADMIN — IPRATROPIUM BROMIDE AND ALBUTEROL SULFATE PRN AMPULE: .5; 3 SOLUTION RESPIRATORY (INHALATION) at 11:54

## 2017-03-03 RX ADMIN — GUAIFENESIN AND CODEINE PHOSPHATE PRN ML: 10; 100 LIQUID ORAL at 10:59

## 2017-03-03 RX ADMIN — GUAIFENESIN AND CODEINE PHOSPHATE PRN ML: 10; 100 LIQUID ORAL at 18:02

## 2017-03-03 RX ADMIN — Medication SCH CAP: at 08:20

## 2017-03-03 RX ADMIN — SULFAMETHOXAZOLE AND TRIMETHOPRIM SCH MLS/HR: 80; 16 INJECTION, SOLUTION, CONCENTRATE INTRAVENOUS at 11:08

## 2017-03-03 RX ADMIN — HYDROMORPHONE HYDROCHLORIDE PRN MG: 1 INJECTION, SOLUTION INTRAMUSCULAR; INTRAVENOUS; SUBCUTANEOUS at 23:49

## 2017-03-03 RX ADMIN — Medication SCH TAB: at 08:20

## 2017-03-03 RX ADMIN — CEFEPIME SCH MLS/HR: 2 INJECTION, POWDER, FOR SOLUTION INTRAVENOUS at 08:10

## 2017-03-03 RX ADMIN — IPRATROPIUM BROMIDE AND ALBUTEROL SULFATE PRN AMPULE: .5; 3 SOLUTION RESPIRATORY (INHALATION) at 20:27

## 2017-03-03 RX ADMIN — SODIUM CHLORIDE, PRESERVATIVE FREE SCH ML: 5 INJECTION INTRAVENOUS at 21:00

## 2017-03-03 RX ADMIN — ACYCLOVIR SODIUM SCH MLS/HR: 50 INJECTION, SOLUTION INTRAVENOUS at 04:38

## 2017-03-03 RX ADMIN — HYDROMORPHONE HYDROCHLORIDE PRN MG: 1 INJECTION, SOLUTION INTRAMUSCULAR; INTRAVENOUS; SUBCUTANEOUS at 00:30

## 2017-03-03 RX ADMIN — GABAPENTIN SCH MG: 100 CAPSULE ORAL at 13:13

## 2017-03-03 RX ADMIN — SULFAMETHOXAZOLE AND TRIMETHOPRIM SCH MLS/HR: 80; 16 INJECTION, SOLUTION, CONCENTRATE INTRAVENOUS at 17:59

## 2017-03-03 RX ADMIN — MESALAMINE SCH MG: 800 TABLET, DELAYED RELEASE ORAL at 20:49

## 2017-03-03 RX ADMIN — HYDROMORPHONE HYDROCHLORIDE PRN MG: 1 INJECTION, SOLUTION INTRAMUSCULAR; INTRAVENOUS; SUBCUTANEOUS at 12:01

## 2017-03-03 RX ADMIN — ACETAMINOPHEN PRN MG: 325 TABLET ORAL at 20:47

## 2017-03-03 RX ADMIN — HYDROMORPHONE HYDROCHLORIDE PRN MG: 1 INJECTION, SOLUTION INTRAMUSCULAR; INTRAVENOUS; SUBCUTANEOUS at 04:37

## 2017-03-03 RX ADMIN — MESALAMINE SCH MG: 800 TABLET, DELAYED RELEASE ORAL at 04:36

## 2017-03-03 NOTE — HHI.PR
Subjective


Remarks


Tongue edema, improving slow, still painful


No chest pain 


Exertional shortness of breath only for now


Voice hoarse


using o2 continuously


Anxious


sleeping more





Objective


Objective Results


-





 Vital Signs








  Date Time  Temp Pulse Resp B/P Pulse Ox O2 Delivery O2 Flow Rate FiO2


 


3/3/17 12:31   18     


 


3/3/17 12:00 96.8 90 20 138/68 94   


 


3/3/17 11:56     96 Nasal Cannula 2.00 


 


3/3/17 08:20      Nasal Cannula 2.00 96


 


3/3/17 08:00 98.2 96 20 133/69 95   


 


3/3/17 00:00 100.6 84 20 118/65 95   


 


3/2/17 20:33     95 Nasal Cannula 2.00 


 


3/2/17 20:00 97.0 88 18 117/72 95   


 


3/2/17 18:00 97.9 80 20 104/64 95   








 I/O








 3/2/17 3/2/17 3/2/17 3/3/17 3/3/17 3/3/17





 07:00 15:00 23:00 07:00 15:00 23:00


 


Intake Total 1291 ml 1080 ml 886 ml 1180 ml  


 


Output Total  600 ml  600 ml  


 


Balance 1291 ml 480 ml 886 ml 580 ml  


 


      


 


Intake Oral 480 ml 1080 ml 560 ml 480 ml  


 


IV Total 811 ml  326 ml 700 ml  


 


Output Urine Total  600 ml  600 ml  


 


# Voids 4  2 1  


 


# Bowel Movements  1 1 0  








Result Diagram:  


3/2/17 0440                                                                    

            3/2/17 0440





Other Results





 Laboratory Tests








Test 3/1/17 3/1/17 3/2/17





 17:15 18:40 04:40


 


Urine Osmolality 159 MOSM/KG  


 


Serum Osmolality  281 MOSM/KG 


 


White Blood Count   7.8 TH/MM3


 


Red Blood Count   4.84 MIL/MM3


 


Hemoglobin   14.3 GM/DL


 


Hematocrit   41.2 %


 


Mean Corpuscular Volume   85.2 FL


 


Mean Corpuscular Hemoglobin   29.6 PG


 


Mean Corpuscular Hemoglobin   34.8 %





Concent   


 


Red Cell Distribution Width   15.3 %


 


Platelet Count   150 TH/MM3


 


Mean Platelet Volume   8.3 FL


 


Neutrophils (%) (Auto)   94.9 %


 


Lymphocytes (%) (Auto)   1.7 %


 


Monocytes (%) (Auto)   2.1 %


 


Eosinophils (%) (Auto)   1.1 %


 


Basophils (%) (Auto)   0.2 %


 


Neutrophils # (Auto)   7.4 TH/MM3


 


Lymphocytes # (Auto)   0.1 TH/MM3


 


Monocytes # (Auto)   0.2 TH/MM3


 


Eosinophils # (Auto)   0.1 TH/MM3


 


Basophils # (Auto)   0.0 TH/MM3


 


CBC Comment   AUTO DIFF 


 


Differential Total Cells   100 





Counted   


 


Neutrophils % (Manual)   74 %


 


Band Neutrophils %   14 %


 


Lymphocytes %   4 %


 


Eosinophils %   2 %


 


Neutrophils # (Manual)   7.3 TH/MM3


 


Metamyelocytes   5 %


 


Myelocytes   1 %


 


Differential Comment   FINAL DIFF





   MANUAL


 


Platelet Estimate   NORMAL 


 


Platelet Morphology Comment   NORMAL 


 


Red Cell Morphology Comment   NORMAL 


 


Sodium Level   130 MEQ/L


 


Potassium Level   4.4 MEQ/L


 


Chloride Level   98 MEQ/L


 


Carbon Dioxide Level   20.5 MEQ/L


 


Anion Gap   12 MEQ/L


 


Blood Urea Nitrogen   19 MG/DL


 


Creatinine   0.89 MG/DL


 


Estimat Glomerular Filtration   87 ML/MIN





Rate   


 


Random Glucose   95 MG/DL


 


Calcium Level   8.7 MG/DL








Medications and IVs





 Active Medications


Prednisone (Deltasone) 20 mg DAILY PO Last administered on 3/3/17at 08:21; 

Admin Dose 20 MG;  Start 3/3/17 at 09:00;  Stop 3/14/17 at 08:59


Prednisone (Deltasone) 20 mg DAILY PO;  Start 3/4/17 at 09:00;  Stop 3/4/17 at 

09:00;  Status DC





ROS


General:  Fatigue, Weakness, Other (10 point ROS done.  Positives noted of 

systems negative are unremarkable)


HEENT:  Sore Throat, Dysphagia


Pulmonary:  Cough, SOB, Wheezing (occasional)


GI:  BM (normal consistency)





Physical Exam


Physical Exam


PHYSICAL EXAMINATION


GENERAL:  This is a thin   male 


who appears to be in no acute distress at rest.  Responds To verbal stimuli


HEAD:  Normocephalic without any lesion or mass noted.  Facial features


appear symmetric.


OROPHARYNGEAL:  Tongue with edema, blisters on the tip, erythema.  Oral cavity 

also sore, slow improvement


NECK:  Supple.  No nuchal rigidity or lymphadenopathy.


Trachea midline without deviation.  


CARDIAC:  Regular rhythm, regular rate, S1 and S2 are heard.  


LUNGS:  Clear to auscultation bilaterally.  Occasional wheeze, low volumes O2 

at 2 L constant


ABDOMEN:  Soft, nontender, no organomegaly or masses.  No diarrhea


EXTREMITIES:  No edema.  Pulses equal bilateral.  


NEUROLOGICAL:  Patient mood and affect appropriate. No focal deficit


SKIN:Warm and moist, dry


Objective Remarks


Still feel really bad.  Trying to eat a little more soft foods.  It is slow 

improvement.





A/P


Assessment and Plan





1. Sepsis on admission.


2. COPD exacerbation.


3. Hyponatremia.


4. Hospital acquired pneumonia.


5. History of A-fib, currently in sinus rhythm.,  Resolved


6. History of ulcerative colitis.


7. Hypokalemia, resolved


8. AIDS, new diagnosis


9. Dehydration


10 Candida, oral


 


DISCUSSION:


Sepsis


Labs review, vital signs reviewed.  Sepsis resolved white count normal range.  

Hemoglobin hematocrit normal range


Increased hydration.  Recurrent hyponatremia.  encouraged by mouthcontinue with 

empiric abx-Diflucan, Bactrim IV


Will monitor labs .





COPD exacerbation


Exertional persistent hypoxia usually with activity.  No shortness of breath 

noted rest.  Patient is wearing 2 L nasal cannula constant. 


Resolving carbon dioxide blood levels decreasing, now 20.5


Monitor labs whenever needed





appreciate ID input


Confirmed HIV/AIDS, 


HCV/HBV negative


Pt will need to establish fu with HIV provider (Dr Ernandez) ASAP preferably with 

1st appointment within 1 wk after d/c


Patient understands the need to have outpatient treatment started since he is 

discharged.


continues with IV medications, micafungin, and Diflucan, nystatin


Pneumonia resolving





Ulcerative colitis, resolved, continues on Flagyl, no diarrhea


Monitor





Oral candidiasis, severe, minimal improvement.  Continues to affect his eating.


Continue prednisone





appreciate pulmonary input


continue oxygen,  PO steroids


change Duonebs to q 4WA and q 2 PRN 





Heparin for DVT prophylaxis





Hyponatremia, 


encourage by mouth fluids, gentle hydration 84 cc an hour


Monitor labs





Dehydration, mild


Monitor labs, IV fluids


Encourage by mouth fluids








Case management consult DC planning,   not ready today, still requiring IV 

medication


Working towards an outpatient plan








   


   























Discharge Planning


Initiated


Discussed With:  Nurse, Family (patient), Other (Dr. alejandra, patient seen on 

her behalf)








Coral Li Mar 3, 2017 14:15

## 2017-03-04 VITALS — TEMPERATURE: 99.5 F

## 2017-03-04 VITALS
TEMPERATURE: 96.9 F | RESPIRATION RATE: 20 BRPM | SYSTOLIC BLOOD PRESSURE: 115 MMHG | OXYGEN SATURATION: 94 % | HEART RATE: 78 BPM | DIASTOLIC BLOOD PRESSURE: 69 MMHG

## 2017-03-04 VITALS
HEART RATE: 98 BPM | SYSTOLIC BLOOD PRESSURE: 129 MMHG | OXYGEN SATURATION: 94 % | DIASTOLIC BLOOD PRESSURE: 69 MMHG | TEMPERATURE: 100.4 F | RESPIRATION RATE: 20 BRPM

## 2017-03-04 VITALS
DIASTOLIC BLOOD PRESSURE: 73 MMHG | SYSTOLIC BLOOD PRESSURE: 122 MMHG | RESPIRATION RATE: 20 BRPM | TEMPERATURE: 97.2 F | OXYGEN SATURATION: 94 % | HEART RATE: 88 BPM

## 2017-03-04 VITALS — OXYGEN SATURATION: 91 %

## 2017-03-04 VITALS
RESPIRATION RATE: 18 BRPM | HEART RATE: 78 BPM | DIASTOLIC BLOOD PRESSURE: 50 MMHG | SYSTOLIC BLOOD PRESSURE: 91 MMHG | OXYGEN SATURATION: 95 % | TEMPERATURE: 98.1 F

## 2017-03-04 VITALS
HEART RATE: 90 BPM | OXYGEN SATURATION: 95 % | SYSTOLIC BLOOD PRESSURE: 126 MMHG | TEMPERATURE: 97.5 F | RESPIRATION RATE: 18 BRPM | DIASTOLIC BLOOD PRESSURE: 73 MMHG

## 2017-03-04 VITALS
SYSTOLIC BLOOD PRESSURE: 108 MMHG | OXYGEN SATURATION: 96 % | HEART RATE: 81 BPM | RESPIRATION RATE: 22 BRPM | DIASTOLIC BLOOD PRESSURE: 61 MMHG | TEMPERATURE: 97.3 F

## 2017-03-04 RX ADMIN — HYDROMORPHONE HYDROCHLORIDE PRN MG: 1 INJECTION, SOLUTION INTRAMUSCULAR; INTRAVENOUS; SUBCUTANEOUS at 13:29

## 2017-03-04 RX ADMIN — ACETAMINOPHEN PRN MG: 325 TABLET ORAL at 08:06

## 2017-03-04 RX ADMIN — SODIUM CHLORIDE SCH MLS/HR: 900 INJECTION INTRAVENOUS at 17:01

## 2017-03-04 RX ADMIN — SULFAMETHOXAZOLE AND TRIMETHOPRIM SCH MLS/HR: 80; 16 INJECTION, SOLUTION, CONCENTRATE INTRAVENOUS at 17:59

## 2017-03-04 RX ADMIN — OXYCODONE HYDROCHLORIDE AND ACETAMINOPHEN SCH MG: 500 TABLET ORAL at 08:07

## 2017-03-04 RX ADMIN — HYDROMORPHONE HYDROCHLORIDE PRN MG: 1 INJECTION, SOLUTION INTRAMUSCULAR; INTRAVENOUS; SUBCUTANEOUS at 09:46

## 2017-03-04 RX ADMIN — SULFAMETHOXAZOLE AND TRIMETHOPRIM SCH MLS/HR: 80; 16 INJECTION, SOLUTION, CONCENTRATE INTRAVENOUS at 05:20

## 2017-03-04 RX ADMIN — Medication SCH MG: at 20:22

## 2017-03-04 RX ADMIN — HEPARIN SODIUM SCH UNITS: 10000 INJECTION, SOLUTION INTRAVENOUS; SUBCUTANEOUS at 08:07

## 2017-03-04 RX ADMIN — Medication SCH TAB: at 08:07

## 2017-03-04 RX ADMIN — MESALAMINE SCH MG: 800 TABLET, DELAYED RELEASE ORAL at 20:22

## 2017-03-04 RX ADMIN — IPRATROPIUM BROMIDE AND ALBUTEROL SULFATE PRN AMPULE: .5; 3 SOLUTION RESPIRATORY (INHALATION) at 20:38

## 2017-03-04 RX ADMIN — GUAIFENESIN AND CODEINE PHOSPHATE PRN ML: 10; 100 LIQUID ORAL at 16:26

## 2017-03-04 RX ADMIN — HEPARIN SODIUM SCH UNITS: 10000 INJECTION, SOLUTION INTRAVENOUS; SUBCUTANEOUS at 00:05

## 2017-03-04 RX ADMIN — SODIUM CHLORIDE, PRESERVATIVE FREE SCH ML: 5 INJECTION INTRAVENOUS at 08:12

## 2017-03-04 RX ADMIN — METOPROLOL TARTRATE SCH MG: 25 TABLET, FILM COATED ORAL at 08:07

## 2017-03-04 RX ADMIN — FAMOTIDINE SCH MG: 20 TABLET, FILM COATED ORAL at 20:22

## 2017-03-04 RX ADMIN — GABAPENTIN SCH MG: 100 CAPSULE ORAL at 17:09

## 2017-03-04 RX ADMIN — MESALAMINE SCH MG: 800 TABLET, DELAYED RELEASE ORAL at 05:11

## 2017-03-04 RX ADMIN — Medication SCH CAP: at 08:07

## 2017-03-04 RX ADMIN — MESALAMINE SCH MG: 800 TABLET, DELAYED RELEASE ORAL at 13:08

## 2017-03-04 RX ADMIN — SULFAMETHOXAZOLE AND TRIMETHOPRIM SCH MLS/HR: 80; 16 INJECTION, SOLUTION, CONCENTRATE INTRAVENOUS at 11:45

## 2017-03-04 RX ADMIN — CEFTRIAXONE SODIUM SCH MLS/HR: 2 INJECTION, POWDER, FOR SOLUTION INTRAMUSCULAR; INTRAVENOUS at 16:25

## 2017-03-04 RX ADMIN — SULFAMETHOXAZOLE AND TRIMETHOPRIM SCH MLS/HR: 80; 16 INJECTION, SOLUTION, CONCENTRATE INTRAVENOUS at 23:26

## 2017-03-04 RX ADMIN — SODIUM CHLORIDE, PRESERVATIVE FREE SCH ML: 5 INJECTION INTRAVENOUS at 20:25

## 2017-03-04 RX ADMIN — ACETAMINOPHEN PRN MG: 325 TABLET ORAL at 23:56

## 2017-03-04 RX ADMIN — PHENYTOIN SODIUM SCH MLS/HR: 50 INJECTION INTRAMUSCULAR; INTRAVENOUS at 15:27

## 2017-03-04 RX ADMIN — GABAPENTIN SCH MG: 100 CAPSULE ORAL at 08:07

## 2017-03-04 RX ADMIN — DILTIAZEM HYDROCHLORIDE SCH MG: 120 CAPSULE, EXTENDED RELEASE ORAL at 08:07

## 2017-03-04 RX ADMIN — HEPARIN SODIUM SCH UNITS: 10000 INJECTION, SOLUTION INTRAVENOUS; SUBCUTANEOUS at 20:25

## 2017-03-04 RX ADMIN — Medication SCH MG: at 13:08

## 2017-03-04 RX ADMIN — Medication SCH MG: at 05:12

## 2017-03-04 RX ADMIN — HYDROMORPHONE HYDROCHLORIDE PRN MG: 1 INJECTION, SOLUTION INTRAMUSCULAR; INTRAVENOUS; SUBCUTANEOUS at 17:09

## 2017-03-04 RX ADMIN — METOPROLOL TARTRATE SCH MG: 25 TABLET, FILM COATED ORAL at 20:22

## 2017-03-04 RX ADMIN — FAMOTIDINE SCH MG: 20 TABLET, FILM COATED ORAL at 08:06

## 2017-03-04 RX ADMIN — HYDROMORPHONE HYDROCHLORIDE PRN MG: 1 INJECTION, SOLUTION INTRAMUSCULAR; INTRAVENOUS; SUBCUTANEOUS at 05:17

## 2017-03-04 RX ADMIN — IPRATROPIUM BROMIDE AND ALBUTEROL SULFATE PRN AMPULE: .5; 3 SOLUTION RESPIRATORY (INHALATION) at 00:49

## 2017-03-04 RX ADMIN — GABAPENTIN SCH MG: 100 CAPSULE ORAL at 13:08

## 2017-03-04 NOTE — HHI.PR
Subjective


Remarks


Slowly ambulating in room. 


Tongue edema, improving slow, still painful


No chest pain 


Exertional shortness of breath only for now


Voice hoarse


using o2 continuously


Anxious





Objective


Objective Results


-





 Vital Signs








  Date Time  Temp Pulse Resp B/P Pulse Ox O2 Delivery O2 Flow Rate FiO2


 


3/4/17 13:59   18     


 


3/4/17 08:30      Nasal Cannula 2.00 96


 


3/4/17 08:00 100.4 98 20 129/69 94   


 


3/4/17 05:36 97.5 90 18 126/73 95   


 


3/4/17 00:00 97.2 88 20 122/73 94   


 


3/3/17 20:49     94 Nasal Cannula 2.00 


 


3/3/17 20:27     96 Nasal Cannula 2.00 


 


3/3/17 20:00 100.7 100 22 107/61 94   


 


3/3/17 16:00 97.2 91 20 116/78 92   


 


3/3/17 15:30 99.8       


 


3/3/17 15:30 99.8       








 I/O








 3/3/17 3/3/17 3/3/17 3/4/17 3/4/17 3/4/17





 07:00 15:00 23:00 07:00 15:00 23:00


 


Intake Total 1180 ml 3239 ml 860 ml 1240 ml  


 


Output Total 600 ml   900 ml  


 


Balance 580 ml 3239 ml 860 ml 340 ml  


 


      


 


Intake Oral 480 ml 800 ml 360 ml 240 ml  


 


IV Total 700 ml 2439 ml 500 ml 1000 ml  


 


Output Urine Total 600 ml   900 ml  


 


# Voids 1 3 2   


 


# Bowel Movements 0 0 0 0  








Result Diagram:  


3/2/17 0440                                                                    

            3/2/17 0440





Other Results











 Date/Time Procedure Status





Source Growth 


 


 3/2/17 13:30 Gram Stain - Final Complete





Sputum Expectorated Sputum  





 3/2/17 13:30 Sputum Culture - Final Complete





Sputum Expectorated Sputum HEAVY GROWTH NORMAL RESPIRATORY JACKELYN 


 


 3/2/17 11:21 Aerobic Blood Culture - Preliminary Resulted





Blood Peripheral NO GROWTH IN 2 DAYS 





 3/2/17 11:21 Anaerobic Blood Culture - Preliminary Resulted





Blood Peripheral NO GROWTH IN 2 DAYS 











ROS


General:  Fatigue, Weakness


HEENT:  Sore Throat, Dysphagia


Cardiac:  No: Chest Pain, Edema, Palpitations, Other


Pulmonary:  SOB


GI:  No: Abdominal Pain, BM, Diarrhea, N/V, Other


/GYN:  No: Dysuria, Urgency, Other


Neuro/MS:  No: Lightheaded, Confusion, Other


Psych:  No: Anxiety, Depression, Other


Skin:  No: Itching, Rash, Other





Physical Exam


Physical Exam


PHYSICAL EXAMINATION


GENERAL:  This is a well-developed, well-nourished   male 


who appears to be in no acute distress.  He  is alert and awake.


HEAD:  Normocephalic without any lesion or mass noted.  Facial features


appear symmetric.


EYES:  [place], Normal eye movement, no Icterus.


OROPHARYNGEAL:  Tongue with edema, blisters on the tip, erythema.  Oral cavity 

also sore, slow improvement


MOUTH/THROAT:  Buccal mucosa is moist 


NECK:  Supple.  No nuchal rigidity or lymphadenopathy.


Trachea midline without deviation.  Thyroid not palpable, no bruits


appreciated.


CARDIAC:  Regular rhythm, regular rate, S1 and S2 are heard.  No murmur.


LUNGS:  Clear to auscultation bilaterally.  Occ wheezes.


ABDOMEN:  Soft, nontender, no organomegaly or masses.  Bowel sounds are


heard in all four quadrants.  No rebound.  No guarding.


EXTREMITIES:  Trace pedal edema.  Pulses equal bilateral. 


NEUROLOGICAL:  No focal deficits.


SKIN:Warm and moist


PSYCH: Mood and affect appropriate





A/P


Assessment and Plan


1. Sepsis on admission.


2. COPD exacerbation.


3. Hyponatremia.


4. Hospital acquired pneumonia.


5. History of A-fib, currently in sinus rhythm.,  Resolved


6. History of ulcerative colitis.


7. Hypokalemia, resolved


8. AIDS, new diagnosis


9. Dehydration


10 Candida, oral


 


Management:


Sepsis


Labs reviewed, vital signs reviewed.  Sepsis resolved white count normal range.

  Hemoglobin hematocrit normal range


Increased hydration. Recurrent hyponatremia.  encouraged by mouth. continue 

with empiric abx-Diflucan, Bactrim IV





COPD exacerbation


Exertional persistent hypoxia usually with activity.  No shortness of breath 

noted rest.  Patient is wearing 2 L nasal cannula constant. 





appreciate ID input


Confirmed HIV/AIDS, 


HCV/HBV negative


Pt will need to establish fu with HIV provider (Dr Thompson) ASAP preferably with 

1st appointment within 1 wk after d/c


Patient understands the need to have outpatient treatment started since he is 

discharged.


continues with IV medications, micafungin, and Diflucan, nystatin





Ulcerative colitis, resolved, continues on Flagyl, no diarrhea


Oral candidiasis, severe, minimal improvement.  Continues to affect his eating.


Continue prednisone





Pneumonia resolving


appreciate pulmonary input


continue oxygen,  PO steroids


changed Duonebs to q 4WA and q 2 PRN 





Heparin for DVT prophylaxis





Hyponatremia, 


encourage by mouth fluids, gentle hydration 84 cc an hour


Monitor labs





Dehydration, mild


Monitor labs, IV fluids


Encourage by mouth fluids





Case management consulted for DC planning,  not ready today, still requiring IV 

medication


Working towards an outpatient plan





AM labs.





discussed with pt/RN.


Discussed With:  Nurse, Family (patient), Other (Dr. alejandra, patient seen on 

her behalf)








Pippa Cervantes MD Mar 4, 2017 14:52

## 2017-03-04 NOTE — RADRPT
EXAM DATE/TIME:  03/04/2017 05:25 

 

[facility] COMPARISON:     

CHEST SINGLE AP, March 02, 2017, 6:01.

 

                     

INDICATIONS :     

Shortness of breath.

                     

 

MEDICAL HISTORY :     

Hypertension.  Chronic obstructive pulmonary disease.        

 

SURGICAL HISTORY :     

None.   

 

ENCOUNTER:     

Subsequent                                        

 

ACUITY:     

3 weeks      

 

PAIN SCORE:     

Non-responsive.

 

LOCATION:     

Bilateral chest 

 

FINDINGS:     

Fairly symmetric patchy infiltrates persist grossly unchanged. Likely small effusions. Cardiac contou
r is grossly unchanged.

 

CONCLUSION:     

No significant interval change

 

 

 

 Nahun Donato MD on March 04, 2017 at 6:48           

Board Certified Radiologist.

 This report was verified electronically.

## 2017-03-05 VITALS
RESPIRATION RATE: 21 BRPM | OXYGEN SATURATION: 96 % | HEART RATE: 96 BPM | TEMPERATURE: 100.1 F | SYSTOLIC BLOOD PRESSURE: 140 MMHG | DIASTOLIC BLOOD PRESSURE: 79 MMHG

## 2017-03-05 VITALS
OXYGEN SATURATION: 92 % | RESPIRATION RATE: 20 BRPM | DIASTOLIC BLOOD PRESSURE: 78 MMHG | TEMPERATURE: 98.1 F | HEART RATE: 92 BPM | SYSTOLIC BLOOD PRESSURE: 135 MMHG

## 2017-03-05 VITALS
RESPIRATION RATE: 18 BRPM | DIASTOLIC BLOOD PRESSURE: 72 MMHG | HEART RATE: 94 BPM | SYSTOLIC BLOOD PRESSURE: 117 MMHG | OXYGEN SATURATION: 96 % | TEMPERATURE: 97.8 F

## 2017-03-05 VITALS
HEART RATE: 82 BPM | SYSTOLIC BLOOD PRESSURE: 112 MMHG | DIASTOLIC BLOOD PRESSURE: 66 MMHG | OXYGEN SATURATION: 96 % | RESPIRATION RATE: 18 BRPM | TEMPERATURE: 97 F

## 2017-03-05 VITALS
SYSTOLIC BLOOD PRESSURE: 108 MMHG | RESPIRATION RATE: 20 BRPM | DIASTOLIC BLOOD PRESSURE: 63 MMHG | OXYGEN SATURATION: 95 % | HEART RATE: 88 BPM | TEMPERATURE: 96.2 F

## 2017-03-05 VITALS
TEMPERATURE: 97.3 F | SYSTOLIC BLOOD PRESSURE: 104 MMHG | OXYGEN SATURATION: 95 % | DIASTOLIC BLOOD PRESSURE: 65 MMHG | RESPIRATION RATE: 22 BRPM | HEART RATE: 85 BPM

## 2017-03-05 VITALS
RESPIRATION RATE: 22 BRPM | OXYGEN SATURATION: 95 % | HEART RATE: 96 BPM | SYSTOLIC BLOOD PRESSURE: 99 MMHG | DIASTOLIC BLOOD PRESSURE: 57 MMHG | TEMPERATURE: 97.7 F

## 2017-03-05 VITALS — OXYGEN SATURATION: 94 %

## 2017-03-05 VITALS — TEMPERATURE: 98.3 F

## 2017-03-05 VITALS — OXYGEN SATURATION: 93 %

## 2017-03-05 LAB
ANION GAP SERPL CALC-SCNC: 10 MEQ/L (ref 5–15)
BASOPHILS # BLD AUTO: 0 TH/MM3 (ref 0–0.2)
BASOPHILS NFR BLD AUTO: 1 % (ref 0–2)
BASOPHILS NFR BLD: 0.4 % (ref 0–2)
BUN SERPL-MCNC: 13 MG/DL (ref 7–18)
CHLORIDE SERPL-SCNC: 100 MEQ/L (ref 98–107)
EOSINOPHIL # BLD: 0.2 TH/MM3 (ref 0–0.4)
EOSINOPHIL NFR BLD: 2 % (ref 0–4)
EOSINOPHIL NFR BLD: 3.7 % (ref 0–4)
ERYTHROCYTE [DISTWIDTH] IN BLOOD BY AUTOMATED COUNT: 15.4 % (ref 11.6–17.2)
GFR SERPLBLD BASED ON 1.73 SQ M-ARVRAT: 82 ML/MIN (ref 89–?)
HCO3 BLD-SCNC: 22 MEQ/L (ref 21–32)
HCT VFR BLD CALC: 37.9 % (ref 39–51)
HEMO FLAGS: (no result)
LYMPHOCYTES # BLD AUTO: 0.2 TH/MM3 (ref 1–4.8)
LYMPHOCYTES NFR BLD AUTO: 2.8 % (ref 9–44)
MCH RBC QN AUTO: 29.6 PG (ref 27–34)
MCHC RBC AUTO-ENTMCNC: 34.2 % (ref 32–36)
MCV RBC AUTO: 86.5 FL (ref 80–100)
METAMYELOCYTES NFR BLD: 3 % (ref 0–1)
MONOCYTES NFR BLD: 2.9 % (ref 0–8)
MYELOCYTES NFR BLD: 1 % (ref 0–0)
NEUTROPHILS # BLD AUTO: 6.1 TH/MM3 (ref 1.8–7.7)
NEUTROPHILS NFR BLD AUTO: 90.2 % (ref 16–70)
NEUTS BAND # BLD MANUAL: 6.4 TH/MM3 (ref 1.8–7.7)
NEUTS BAND NFR BLD: 7 % (ref 0–6)
NEUTS SEG NFR BLD MANUAL: 83 % (ref 16–70)
PLAT MORPH BLD: NORMAL
PLATELET # BLD: 157 TH/MM3 (ref 150–450)
PLATELET BLD QL SMEAR: NORMAL
POTASSIUM SERPL-SCNC: 4.2 MEQ/L (ref 3.5–5.1)
PROMYELOCYTES NFR BLD: 2 % (ref 0–0)
RBC # BLD AUTO: 4.39 MIL/MM3 (ref 4.5–5.9)
RBC MORPH BLD: NORMAL
SCAN/DIFF: (no result)
SODIUM SERPL-SCNC: 132 MEQ/L (ref 136–145)
WBC # BLD AUTO: 6.7 TH/MM3 (ref 4–11)
WBC DIFF SAMPLE: 100

## 2017-03-05 RX ADMIN — GABAPENTIN SCH MG: 100 CAPSULE ORAL at 13:47

## 2017-03-05 RX ADMIN — MESALAMINE SCH MG: 800 TABLET, DELAYED RELEASE ORAL at 10:30

## 2017-03-05 RX ADMIN — SULFAMETHOXAZOLE AND TRIMETHOPRIM SCH MLS/HR: 80; 16 INJECTION, SOLUTION, CONCENTRATE INTRAVENOUS at 17:56

## 2017-03-05 RX ADMIN — METOPROLOL TARTRATE SCH MG: 25 TABLET, FILM COATED ORAL at 20:27

## 2017-03-05 RX ADMIN — IPRATROPIUM BROMIDE AND ALBUTEROL SULFATE PRN AMPULE: .5; 3 SOLUTION RESPIRATORY (INHALATION) at 19:54

## 2017-03-05 RX ADMIN — HYDROMORPHONE HYDROCHLORIDE PRN MG: 1 INJECTION, SOLUTION INTRAMUSCULAR; INTRAVENOUS; SUBCUTANEOUS at 10:41

## 2017-03-05 RX ADMIN — HYDROMORPHONE HYDROCHLORIDE PRN MG: 1 INJECTION, SOLUTION INTRAMUSCULAR; INTRAVENOUS; SUBCUTANEOUS at 14:52

## 2017-03-05 RX ADMIN — Medication SCH TAB: at 09:00

## 2017-03-05 RX ADMIN — Medication SCH MG: at 20:27

## 2017-03-05 RX ADMIN — FAMOTIDINE SCH MG: 20 TABLET, FILM COATED ORAL at 10:31

## 2017-03-05 RX ADMIN — MESALAMINE SCH MG: 800 TABLET, DELAYED RELEASE ORAL at 20:26

## 2017-03-05 RX ADMIN — SODIUM CHLORIDE, PRESERVATIVE FREE SCH ML: 5 INJECTION INTRAVENOUS at 20:27

## 2017-03-05 RX ADMIN — HYDROMORPHONE HYDROCHLORIDE PRN MG: 1 INJECTION, SOLUTION INTRAMUSCULAR; INTRAVENOUS; SUBCUTANEOUS at 19:31

## 2017-03-05 RX ADMIN — HYDROMORPHONE HYDROCHLORIDE PRN MG: 1 INJECTION, SOLUTION INTRAMUSCULAR; INTRAVENOUS; SUBCUTANEOUS at 06:33

## 2017-03-05 RX ADMIN — Medication SCH CAP: at 10:29

## 2017-03-05 RX ADMIN — SULFAMETHOXAZOLE AND TRIMETHOPRIM SCH MLS/HR: 80; 16 INJECTION, SOLUTION, CONCENTRATE INTRAVENOUS at 06:29

## 2017-03-05 RX ADMIN — PHENYTOIN SODIUM SCH MLS/HR: 50 INJECTION INTRAMUSCULAR; INTRAVENOUS at 20:30

## 2017-03-05 RX ADMIN — METOPROLOL TARTRATE SCH MG: 25 TABLET, FILM COATED ORAL at 10:31

## 2017-03-05 RX ADMIN — DILTIAZEM HYDROCHLORIDE SCH MG: 120 CAPSULE, EXTENDED RELEASE ORAL at 10:30

## 2017-03-05 RX ADMIN — SULFAMETHOXAZOLE AND TRIMETHOPRIM SCH MLS/HR: 80; 16 INJECTION, SOLUTION, CONCENTRATE INTRAVENOUS at 13:47

## 2017-03-05 RX ADMIN — GABAPENTIN SCH MG: 100 CAPSULE ORAL at 10:31

## 2017-03-05 RX ADMIN — IPRATROPIUM BROMIDE AND ALBUTEROL SULFATE PRN AMPULE: .5; 3 SOLUTION RESPIRATORY (INHALATION) at 00:05

## 2017-03-05 RX ADMIN — HEPARIN SODIUM SCH UNITS: 10000 INJECTION, SOLUTION INTRAVENOUS; SUBCUTANEOUS at 20:27

## 2017-03-05 RX ADMIN — SODIUM CHLORIDE, PRESERVATIVE FREE SCH ML: 5 INJECTION INTRAVENOUS at 14:53

## 2017-03-05 RX ADMIN — HYDROMORPHONE HYDROCHLORIDE PRN MG: 1 INJECTION, SOLUTION INTRAMUSCULAR; INTRAVENOUS; SUBCUTANEOUS at 00:01

## 2017-03-05 RX ADMIN — HEPARIN SODIUM SCH UNITS: 10000 INJECTION, SOLUTION INTRAVENOUS; SUBCUTANEOUS at 10:32

## 2017-03-05 RX ADMIN — Medication SCH MG: at 06:29

## 2017-03-05 RX ADMIN — IPRATROPIUM BROMIDE AND ALBUTEROL SULFATE PRN AMPULE: .5; 3 SOLUTION RESPIRATORY (INHALATION) at 12:59

## 2017-03-05 RX ADMIN — SODIUM CHLORIDE SCH MLS/HR: 900 INJECTION INTRAVENOUS at 17:20

## 2017-03-05 RX ADMIN — MESALAMINE SCH MG: 800 TABLET, DELAYED RELEASE ORAL at 06:29

## 2017-03-05 RX ADMIN — OXYCODONE HYDROCHLORIDE AND ACETAMINOPHEN SCH MG: 500 TABLET ORAL at 10:31

## 2017-03-05 RX ADMIN — GABAPENTIN SCH MG: 100 CAPSULE ORAL at 17:20

## 2017-03-05 RX ADMIN — Medication SCH MG: at 13:47

## 2017-03-05 RX ADMIN — FAMOTIDINE SCH MG: 20 TABLET, FILM COATED ORAL at 20:26

## 2017-03-05 RX ADMIN — IPRATROPIUM BROMIDE AND ALBUTEROL SULFATE PRN AMPULE: .5; 3 SOLUTION RESPIRATORY (INHALATION) at 07:22

## 2017-03-05 RX ADMIN — ACETAMINOPHEN PRN MG: 325 TABLET ORAL at 12:32

## 2017-03-05 RX ADMIN — CEFTRIAXONE SODIUM SCH MLS/HR: 2 INJECTION, POWDER, FOR SOLUTION INTRAMUSCULAR; INTRAVENOUS at 14:53

## 2017-03-05 NOTE — HHI.PR
Subjective


Remarks


febrile 100.1


shaky


weak


SOB improved


little wheezing and cough


tongue swelling slightly improved.


no diarrhea





Objective


Objective Results


-





 Vital Signs








  Date Time  Temp Pulse Resp B/P Pulse Ox O2 Delivery O2 Flow Rate FiO2


 


3/5/17 13:46 98.3       


 


3/5/17 12:00 100.1 96 21 140/79 96   


 


3/5/17 08:00 98.1 92 20 135/78 92   


 


3/5/17 07:55      Nasal Cannula 2.00 96


 


3/5/17 07:24     94 Nasal Cannula 2.00 


 


3/5/17 06:46 97.0 82 18 112/66 96   


 


3/5/17 04:40 97.3 85 22 104/65 95   


 


3/5/17 01:16   20     


 


3/5/17 00:08     93 Nasal Cannula 2.00 


 


3/5/17 00:00 97.7 96 22 99/57 95   


 


3/4/17 23:39 99.5       


 


3/4/17 20:41     91 Nasal Cannula 2.00 


 


3/4/17 20:20      Nasal Cannula 2.00 96


 


3/4/17 20:00 97.3 81 22 108/61 96   


 


3/4/17 16:00 96.9 78 20 115/69 94   








 I/O








 3/4/17 3/4/17 3/4/17 3/5/17 3/5/17 3/5/17





 07:00 15:00 23:00 07:00 15:00 23:00


 


Intake Total 1240 ml 1925 ml 1255 ml 120 ml 920 ml 


 


Output Total 900 ml 600 ml    


 


Balance 340 ml 1325 ml 1255 ml 120 ml 920 ml 


 


      


 


Intake Oral 240 ml 800 ml 240 ml 120 ml  


 


IV Total 1000 ml 1125 ml 1015 ml  920 ml 


 


Output Urine Total 900 ml 600 ml    


 


# Voids   2 2  


 


# Bowel Movements 0 0  0  








Result Diagram:  


3/5/17 0615                                                                    

            3/5/17 0615





Other Results





Laboratory Tests








Test 3/5/17





 06:15


 


White Blood Count 6.7 


 


Red Blood Count 4.39 


 


Hemoglobin 13.0 


 


Hematocrit 37.9 


 


Mean Corpuscular Volume 86.5 


 


Mean Corpuscular Hemoglobin 29.6 


 


Mean Corpuscular Hemoglobin 34.2 





Concent 


 


Red Cell Distribution Width 15.4 


 


Platelet Count 157 


 


Mean Platelet Volume 8.0 


 


Neutrophils (%) (Auto) 90.2 


 


Lymphocytes (%) (Auto) 2.8 


 


Monocytes (%) (Auto) 2.9 


 


Eosinophils (%) (Auto) 3.7 


 


Basophils (%) (Auto) 0.4 


 


Neutrophils # (Auto) 6.1 


 


Lymphocytes # (Auto) 0.2 


 


Monocytes # (Auto) 0.2 


 


Eosinophils # (Auto) 0.2 


 


Basophils # (Auto) 0.0 


 


CBC Comment AUTO DIFF 


 


Differential Total Cells 100 





Counted 


 


Neutrophils % (Manual) 83 


 


Band Neutrophils % 7 


 


Lymphocytes % 1 


 


Eosinophils % 2 


 


Basophils % 1 


 


Neutrophils # (Manual) 6.4 


 


Metamyelocytes 3 


 


Myelocytes 1 


 


Promyelocytes 2 


 


Differential Comment FINAL DIFF





 MANUAL


 


Platelet Estimate NORMAL 


 


Platelet Morphology Comment NORMAL 


 


Red Cell Morphology Comment NORMAL 


 


Sodium Level 132 


 


Potassium Level 4.2 


 


Chloride Level 100 


 


Carbon Dioxide Level 22.0 


 


Anion Gap 10 


 


Blood Urea Nitrogen 13 


 


Creatinine 0.94 


 


Estimat Glomerular Filtration 82 





Rate 


 


Random Glucose 82 


 


Calcium Level 8.6 














 Date/Time Procedure Status





Source Growth 


 


 3/2/17 13:30 Gram Stain - Final Complete





Sputum Expectorated Sputum  





 3/2/17 13:30 Sputum Culture - Final Complete





Sputum Expectorated Sputum HEAVY GROWTH NORMAL RESPIRATORY JACKELYN 


 


 3/2/17 11:21 Aerobic Blood Culture - Preliminary Resulted





Blood Peripheral NO GROWTH IN 3 DAYS 





 3/2/17 11:21 Anaerobic Blood Culture - Preliminary Resulted





Blood Peripheral NO GROWTH IN 3 DAYS 











ROS


General:  Fatigue


HEENT:  Other (tongue swelling )


Pulmonary:  Cough, SOB, Wheezing


Psych:  Anxiety





Physical Exam


Physical Exam


PHYSICAL EXAMINATION


GENERAL:  This is a slim, well-nourished   male 


who appears to be in no acute distress.  He  is alert and awake, answers 

questions appropriately.  


HEAD:  Normocephalic without any lesion or mass noted.  Facial features


appear symmetric.


OROPHARYNGEAL:  Oropharynx with erythema, has thrust, tongue sore/swelling 


NECK:  Supple.  No nuchal rigidity or lymphadenopathy.


Trachea midline without deviation.  


CARDIAC:  Regular rhythm, regular rate, S1 and S2 are heard.  Murmur none; no 

gallops or rubs.


LUNGS:  diminished, faint wheezes 


ABDOMEN:  Soft, nontender, no organomegaly or masses.  Bowel sounds are


heard in all four quadrants.  No rebound.  No guarding.  Appetite fair


EXTREMITIES:  No edema.  Pulses equal bilateral.  No cyanosis.


NEUROLOGICAL:  Patient mood and affect appropriate. No focal deficit


Urinary Catheter:  No


Vascular Central Line Catheter:  No





A/P


Diagnosis:  


(1) COPD (chronic obstructive pulmonary disease)


(2) BPH (benign prostatic hyperplasia)


(3) Atrial arrhythmia


(4) Atrial fibrillation


(5) Pneumonia


(6) Atrial fibrillation


(7) SIRS (systemic inflammatory response syndrome)


(8) Hypoxia


(9) Pneumocystis carinii pneumonia


(10) Thrush


(11) Dehydration


(12) Ulcerative colitis


(13) Hypertension


Assessment and Plan


continue with empiric abx-Bactrim IV, Micafungin, Rocephin, Acyclovir


appreciate ID input


HIV reflex +


BAL + PCP


HIV + - confirmed cw advanced AIDS


Undetectable CD4 count (< 20)


HCV/HBV negative


Needs MAC profilaxis  Azithro 1200 q monday


Pt will need to establish fu with HIV provider (Dr Ernandez) ASAP preferably with 

1st appointment within 1 wk after d/c





Febrile overnight


BC 3/2 negative 


continue to monitor 





Oral candidiasis, severe , improving on micafungin


Ulcerations of the tongue - confirmed  herpetic


Magic mouth wash 





appreciate pulmonary input


S/P bronch 2/15


continue oxygen,  PO steroids


Duonebs to q 4WA and q 2 PRN 





Hx afib, SR stable 


appreciate cardiology input


continue Cardizem and BB





Heparin for DVT prophylaxis





Ulcerative colitis


continue Asacol 


no diarrhea


neg. cdiff 





End of life care, code status, adv. directives d/w pt. at length. He has 

designated 2 friends to make decision on his behalf. Requesting that no 

information is provided to his sister. Designates Kathryn Novoa and Lillie Paz (as secondary) to make health care decisions if he's not able. If 

resp. distress, agrees to intubation for a short period.  


Hopefully home with [facility] this week, declined by CIR. Doesn't want to go to SNF











D/W RN


D/W Dr. Cervantes 


D/W pt


This pt. was seen by myself and Dr. Cervantes, this note is written on her behalf.


Discussed With:  Nurse, Family (patient), Other (Dr. alejandra, patient seen on 

her behalf)





Problem Qualifiers





(1) COPD (chronic obstructive pulmonary disease):  


Qualified Code:  J44.1 - Chronic obstructive pulmonary disease with acute 

exacerbation


(2) BPH (benign prostatic hyperplasia):  


Qualified Code:  N40.0 - Benign prostatic hyperplasia, presence of lower 

urinary tract symptoms unspecified, unspecified morphology


(3) Atrial fibrillation:  


Qualified Code:  I48.0 - Paroxysmal atrial fibrillation


(4) Pneumonia:  





(5) Atrial fibrillation:  


Qualified Code:  I48.0 - Paroxysmal atrial fibrillation


(6) Pneumocystis carinii pneumonia:  





(7) Ulcerative colitis:  


Qualified Code:  K51.90 - Ulcerative colitis without complications, unspecified 

location


(8) Hypertension:  


Qualified Code:  I10 - Essential hypertension





Wilma Harrison Mar 5, 2017 15:37

## 2017-03-06 VITALS
SYSTOLIC BLOOD PRESSURE: 104 MMHG | RESPIRATION RATE: 21 BRPM | DIASTOLIC BLOOD PRESSURE: 55 MMHG | HEART RATE: 94 BPM | OXYGEN SATURATION: 94 % | TEMPERATURE: 97.5 F

## 2017-03-06 VITALS — OXYGEN SATURATION: 94 %

## 2017-03-06 VITALS
TEMPERATURE: 98.4 F | HEART RATE: 88 BPM | DIASTOLIC BLOOD PRESSURE: 71 MMHG | SYSTOLIC BLOOD PRESSURE: 112 MMHG | OXYGEN SATURATION: 91 % | RESPIRATION RATE: 17 BRPM

## 2017-03-06 VITALS
TEMPERATURE: 98.8 F | SYSTOLIC BLOOD PRESSURE: 99 MMHG | RESPIRATION RATE: 18 BRPM | OXYGEN SATURATION: 96 % | HEART RATE: 97 BPM | DIASTOLIC BLOOD PRESSURE: 59 MMHG

## 2017-03-06 VITALS
TEMPERATURE: 98.6 F | HEART RATE: 90 BPM | SYSTOLIC BLOOD PRESSURE: 124 MMHG | RESPIRATION RATE: 18 BRPM | DIASTOLIC BLOOD PRESSURE: 82 MMHG | OXYGEN SATURATION: 97 %

## 2017-03-06 VITALS
DIASTOLIC BLOOD PRESSURE: 65 MMHG | RESPIRATION RATE: 17 BRPM | OXYGEN SATURATION: 93 % | TEMPERATURE: 98.1 F | HEART RATE: 80 BPM | SYSTOLIC BLOOD PRESSURE: 108 MMHG

## 2017-03-06 VITALS
SYSTOLIC BLOOD PRESSURE: 123 MMHG | HEART RATE: 97 BPM | OXYGEN SATURATION: 94 % | RESPIRATION RATE: 22 BRPM | TEMPERATURE: 99.2 F | DIASTOLIC BLOOD PRESSURE: 67 MMHG

## 2017-03-06 VITALS — OXYGEN SATURATION: 95 %

## 2017-03-06 RX ADMIN — Medication SCH MG: at 14:06

## 2017-03-06 RX ADMIN — SULFAMETHOXAZOLE AND TRIMETHOPRIM SCH MLS/HR: 80; 16 INJECTION, SOLUTION, CONCENTRATE INTRAVENOUS at 12:07

## 2017-03-06 RX ADMIN — IPRATROPIUM BROMIDE AND ALBUTEROL SULFATE PRN AMPULE: .5; 3 SOLUTION RESPIRATORY (INHALATION) at 19:23

## 2017-03-06 RX ADMIN — FAMOTIDINE SCH MG: 20 TABLET, FILM COATED ORAL at 09:15

## 2017-03-06 RX ADMIN — GABAPENTIN SCH MG: 100 CAPSULE ORAL at 14:06

## 2017-03-06 RX ADMIN — AZITHROMYCIN SCH MG: 600 TABLET, FILM COATED ORAL at 09:14

## 2017-03-06 RX ADMIN — IPRATROPIUM BROMIDE AND ALBUTEROL SULFATE PRN AMPULE: .5; 3 SOLUTION RESPIRATORY (INHALATION) at 00:12

## 2017-03-06 RX ADMIN — HEPARIN SODIUM SCH UNITS: 10000 INJECTION, SOLUTION INTRAVENOUS; SUBCUTANEOUS at 20:48

## 2017-03-06 RX ADMIN — SULFAMETHOXAZOLE AND TRIMETHOPRIM SCH MLS/HR: 80; 16 INJECTION, SOLUTION, CONCENTRATE INTRAVENOUS at 00:05

## 2017-03-06 RX ADMIN — METOPROLOL TARTRATE SCH MG: 25 TABLET, FILM COATED ORAL at 20:48

## 2017-03-06 RX ADMIN — IPRATROPIUM BROMIDE AND ALBUTEROL SULFATE PRN AMPULE: .5; 3 SOLUTION RESPIRATORY (INHALATION) at 16:40

## 2017-03-06 RX ADMIN — SULFAMETHOXAZOLE AND TRIMETHOPRIM SCH MLS/HR: 80; 16 INJECTION, SOLUTION, CONCENTRATE INTRAVENOUS at 06:03

## 2017-03-06 RX ADMIN — HYDROMORPHONE HYDROCHLORIDE PRN MG: 1 INJECTION, SOLUTION INTRAMUSCULAR; INTRAVENOUS; SUBCUTANEOUS at 18:08

## 2017-03-06 RX ADMIN — MESALAMINE SCH MG: 800 TABLET, DELAYED RELEASE ORAL at 14:07

## 2017-03-06 RX ADMIN — IPRATROPIUM BROMIDE AND ALBUTEROL SULFATE PRN AMPULE: .5; 3 SOLUTION RESPIRATORY (INHALATION) at 12:14

## 2017-03-06 RX ADMIN — SODIUM CHLORIDE SCH MLS/HR: 900 INJECTION INTRAVENOUS at 16:35

## 2017-03-06 RX ADMIN — IPRATROPIUM BROMIDE AND ALBUTEROL SULFATE PRN AMPULE: .5; 3 SOLUTION RESPIRATORY (INHALATION) at 07:54

## 2017-03-06 RX ADMIN — SULFAMETHOXAZOLE AND TRIMETHOPRIM SCH MLS/HR: 80; 16 INJECTION, SOLUTION, CONCENTRATE INTRAVENOUS at 18:08

## 2017-03-06 RX ADMIN — METOPROLOL TARTRATE SCH MG: 25 TABLET, FILM COATED ORAL at 09:16

## 2017-03-06 RX ADMIN — Medication SCH CAP: at 09:16

## 2017-03-06 RX ADMIN — HYDROMORPHONE HYDROCHLORIDE PRN MG: 1 INJECTION, SOLUTION INTRAMUSCULAR; INTRAVENOUS; SUBCUTANEOUS at 14:06

## 2017-03-06 RX ADMIN — HYDROMORPHONE HYDROCHLORIDE PRN MG: 1 INJECTION, SOLUTION INTRAMUSCULAR; INTRAVENOUS; SUBCUTANEOUS at 23:35

## 2017-03-06 RX ADMIN — GUAIFENESIN AND CODEINE PHOSPHATE PRN ML: 10; 100 LIQUID ORAL at 05:09

## 2017-03-06 RX ADMIN — SODIUM CHLORIDE, PRESERVATIVE FREE SCH ML: 5 INJECTION INTRAVENOUS at 09:00

## 2017-03-06 RX ADMIN — GABAPENTIN SCH MG: 100 CAPSULE ORAL at 16:35

## 2017-03-06 RX ADMIN — DILTIAZEM HYDROCHLORIDE SCH MG: 120 CAPSULE, EXTENDED RELEASE ORAL at 09:15

## 2017-03-06 RX ADMIN — HYDROMORPHONE HYDROCHLORIDE PRN MG: 1 INJECTION, SOLUTION INTRAMUSCULAR; INTRAVENOUS; SUBCUTANEOUS at 05:09

## 2017-03-06 RX ADMIN — HYDROMORPHONE HYDROCHLORIDE PRN MG: 1 INJECTION, SOLUTION INTRAMUSCULAR; INTRAVENOUS; SUBCUTANEOUS at 00:06

## 2017-03-06 RX ADMIN — MESALAMINE SCH MG: 800 TABLET, DELAYED RELEASE ORAL at 06:03

## 2017-03-06 RX ADMIN — HYDROMORPHONE HYDROCHLORIDE PRN MG: 1 INJECTION, SOLUTION INTRAMUSCULAR; INTRAVENOUS; SUBCUTANEOUS at 09:38

## 2017-03-06 RX ADMIN — Medication SCH MG: at 20:46

## 2017-03-06 RX ADMIN — OXYCODONE HYDROCHLORIDE AND ACETAMINOPHEN SCH MG: 500 TABLET ORAL at 09:14

## 2017-03-06 RX ADMIN — SODIUM CHLORIDE, PRESERVATIVE FREE SCH ML: 5 INJECTION INTRAVENOUS at 20:49

## 2017-03-06 RX ADMIN — IPRATROPIUM BROMIDE AND ALBUTEROL SULFATE PRN AMPULE: .5; 3 SOLUTION RESPIRATORY (INHALATION) at 03:14

## 2017-03-06 RX ADMIN — FAMOTIDINE SCH MG: 20 TABLET, FILM COATED ORAL at 20:46

## 2017-03-06 RX ADMIN — Medication SCH TAB: at 09:14

## 2017-03-06 RX ADMIN — Medication SCH MG: at 06:03

## 2017-03-06 RX ADMIN — GABAPENTIN SCH MG: 100 CAPSULE ORAL at 09:15

## 2017-03-06 RX ADMIN — HEPARIN SODIUM SCH UNITS: 10000 INJECTION, SOLUTION INTRAVENOUS; SUBCUTANEOUS at 09:16

## 2017-03-06 RX ADMIN — MESALAMINE SCH MG: 800 TABLET, DELAYED RELEASE ORAL at 20:46

## 2017-03-06 RX ADMIN — SULFAMETHOXAZOLE AND TRIMETHOPRIM SCH MLS/HR: 80; 16 INJECTION, SOLUTION, CONCENTRATE INTRAVENOUS at 23:38

## 2017-03-06 NOTE — HHI.PR
Subjective


Remarks


Has hoarseness  still  and no  fever . on  o2  2 l.


Has some cough . Sore in  mouth and tongue.


Better intake.





Objective





 Vital Signs








  Date Time  Temp Pulse Resp B/P Pulse Ox O2 Delivery O2 Flow Rate FiO2


 


3/6/17 16:00 98.1 80 17 108/65 93   


 


3/6/17 12:00 98.4 88 17 112/71 91   


 


3/6/17 09:26      Nasal Cannula 2.00 


 


3/6/17 08:00 98.6 90 18 124/82 97   


 


3/6/17 07:54     94 Nasal Cannula 2.00 


 


3/6/17 06:01   20     


 


3/6/17 04:00 98.8 97 18 99/59 96   


 


3/6/17 03:16     94 Nasal Cannula 2.00 


 


3/6/17 02:00 99.2 97 22 123/67 94   


 


3/5/17 20:25      Nasal Cannula 2.00 96


 


3/5/17 20:00 97.8 94 18 117/72 96   


 


3/5/17 19:54     94 Nasal Cannula 2.00 








 I/O








 3/5/17 3/5/17 3/5/17 3/6/17 3/6/17 3/6/17





 07:00 15:00 23:00 07:00 15:00 23:00


 


Intake Total 120 ml 1720 ml 2385 ml 1231 ml 120 ml 


 


Output Total  600 ml  600 ml  


 


Balance 120 ml 1120 ml 2385 ml 631 ml 120 ml 


 


      


 


Intake Oral 120 ml 800 ml 480 ml 240 ml 120 ml 


 


IV Total  920 ml 1905 ml 991 ml  


 


Output Urine Total  600 ml  600 ml  


 


# Voids 2  2  2 


 


# Bowel Movements 0 1   0 








Result Diagram:  


3/5/17 0615                                                                    

            3/5/17 0615





Objective Remarks


GENERAL: This is a 61 year-old male, up and  is in no  distress .


HEENT:  Mucous membranes moist. Throat clear.


NECK: Supple.


CARDIOVASCULAR: Regular rate and rhythm.


RESPIRATORY: Few crackles  at  both bases  and  Occ  wheeze  scattered .


GASTROINTESTINAL: Bowel sounds are present.


ABDOMEN: Not distended.No mass.


:  No CVA tenderness.


MUSCULOSKELETAL: No edema. moves all  well.


NEUROLOGIC: Alert, oriented. .Reflexes  1 +. No deficits.





Assessment and Plan


Assessment and Plan





IMPRESSION:


1. Sepsis on admission.


2. COPD exacerbation.


3. Hyponatremia.


4. Hospital acquired pneumonia.


5. History of A-fib, currently in sinus rhythm.


6. History of ulcerative colitis.


7. PCP pneumonia/Immunocompromised  state











Plan :





1. Cont Antibiotics as ordered  per ID.





2. Nebs tid , duoneb.





3. D/C EZ Pap  with nebs 





4. Prednisone to 10 mg daily.





5. Arrange O2 at rehab





6. O2  at  2 L.





7. Transfer to rehab soon.








ARIADNA Murry MD Mar 6, 2017 18:04

## 2017-03-06 NOTE — RADRPT
EXAM DATE/TIME:  03/06/2017 05:50 

 

[facility] COMPARISON:     

CHEST SINGLE AP, March 04, 2017, 5:25.

 

                     

INDICATIONS :     

Short of breath, weakness, evaluate infiltrates

                     

 

MEDICAL HISTORY :     

Hypertension.  Chronic obstructive pulmonary disease.        

 

SURGICAL HISTORY :     

None.   

 

ENCOUNTER:     

Subsequent                                        

 

ACUITY:     

3 weeks      

 

PAIN SCORE:     

0/10

 

LOCATION:     

Bilateral chest 

 

FINDINGS:     

A single view of the chest demonstrates persistent patchy infiltrates predominantly laterally in the 
midlung fields. There is some blunting of the costophrenic angles possibly represent small effusions.
 The appearance is stable. No infiltrates. Heart size is normal. Osseous structures are intact

 

CONCLUSION:     

1. Stable patchy airspace disease laterally in the midlung fields.

2. Possible small associated effusions.

3. No significant change from prior.

 

 

 

 Richard Barnard MD on March 06, 2017 at 6:52           

Board Certified Radiologist.

 This report was verified electronically.

## 2017-03-06 NOTE — HHI.PR
Subjective


Remarks


no fever overnight


tongue pain and swelling slowly improving


less shaky, more awake


no wheezing


not as SOB


eating slowly but tries to eat most food





Objective


Objective Results


-





 Vital Signs








  Date Time  Temp Pulse Resp B/P Pulse Ox O2 Delivery O2 Flow Rate FiO2


 


3/6/17 09:26      Nasal Cannula 2.00 


 


3/6/17 08:00 98.6 90 18 124/82 97   


 


3/6/17 07:54     94 Nasal Cannula 2.00 


 


3/6/17 06:01   20     


 


3/6/17 04:00 98.8 97 18 99/59 96   


 


3/6/17 03:16     94 Nasal Cannula 2.00 


 


3/6/17 02:00 99.2 97 22 123/67 94   


 


3/5/17 20:25      Nasal Cannula 2.00 96


 


3/5/17 20:00 97.8 94 18 117/72 96   


 


3/5/17 19:54     94 Nasal Cannula 2.00 


 


3/5/17 16:00 96.2 88 20 108/63 95   


 


3/5/17 13:46 98.3       


 


3/5/17 12:00 100.1 96 21 140/79 96   








 I/O








 3/5/17 3/5/17 3/5/17 3/6/17 3/6/17 3/6/17





 07:00 15:00 23:00 07:00 15:00 23:00


 


Intake Total 120 ml 1720 ml 2385 ml 1231 ml  


 


Output Total  600 ml  600 ml  


 


Balance 120 ml 1120 ml 2385 ml 631 ml  


 


      


 


Intake Oral 120 ml 800 ml 480 ml 240 ml  


 


IV Total  920 ml 1905 ml 991 ml  


 


Output Urine Total  600 ml  600 ml  


 


# Voids 2  2   


 


# Bowel Movements 0 1    








Result Diagram:  


3/5/17 0615                                                                    

            3/5/17 0615





Other Results











 Date/Time Procedure Status





Source Growth 


 


 3/2/17 13:30 Gram Stain - Final Complete





Sputum Expectorated Sputum  





 3/2/17 13:30 Sputum Culture - Final Complete





Sputum Expectorated Sputum HEAVY GROWTH NORMAL RESPIRATORY JACKELYN 


 


 3/2/17 11:21 Aerobic Blood Culture - Preliminary Resulted





Blood Peripheral NO GROWTH IN 3 DAYS 





 3/2/17 11:21 Anaerobic Blood Culture - Preliminary Resulted





Blood Peripheral NO GROWTH IN 3 DAYS 











ROS


HEENT:  Other (tongue swelling, pain )


Pulmonary:  Cough, Wheezing





Physical Exam


Physical Exam


PHYSICAL EXAMINATION


GENERAL:  This is a slim, well-nourished   male 


who appears to be in no acute distress.  He  is alert and awake, answers 

questions appropriately.  


HEAD:  Normocephalic without any lesion or mass noted.  Facial features


appear symmetric.


OROPHARYNGEAL:  Oropharynx with erythema, has thrust, tongue sore/swelling 


NECK:  Supple.  No nuchal rigidity or lymphadenopathy.


Trachea midline without deviation.  


CARDIAC:  Regular rhythm, regular rate, S1 and S2 are heard.  Murmur none; no 

gallops or rubs.


LUNGS:  diminished, faint wheezes 


ABDOMEN:  Soft, nontender, no organomegaly or masses.  Bowel sounds are


heard in all four quadrants.  No rebound.  No guarding.  Appetite fair


EXTREMITIES:  No edema.  Pulses equal bilateral.  No cyanosis.


NEUROLOGICAL:  Patient mood and affect appropriate. No focal deficit


Urinary Catheter:  No


Vascular Central Line Catheter:  No





A/P


Diagnosis:  


(1) COPD (chronic obstructive pulmonary disease)


(2) BPH (benign prostatic hyperplasia)


(3) Atrial arrhythmia


(4) Atrial fibrillation


(5) Pneumonia


(6) Atrial fibrillation


(7) SIRS (systemic inflammatory response syndrome)


(8) Hypoxia


(9) Pneumocystis carinii pneumonia


(10) Thrush


(11) Dehydration


(12) Ulcerative colitis


(13) Hypertension


Assessment and Plan


continue with empiric abx-Bactrim IV, Micafungin, Rocephin, Acyclovir


appreciate ID input


HIV reflex +


BAL + PCP


HIV + - confirmed cw advanced AIDS


Undetectable CD4 count (< 20)


HCV/HBV negative


Needs MAC profilaxis  Azithro 1200 q monday


Pt will need to establish fu with HIV provider (Dr Ernandez) ASAP preferably with 

1st appointment within 1 wk after d/c





Febrile overnight


BC 3/2 negative 


continue to monitor 





Oral candidiasis, severe , improving on micafungin


Ulcerations of the tongue - confirmed  herpetic


Magic mouth wash 





appreciate pulmonary input


S/P bronch 2/15


continue oxygen,  PO steroids


Duonebs to q 4WA and q 2 PRN 





Hx afib, SR stable 


appreciate cardiology input


continue Cardizem and BB





Heparin for DVT prophylaxis





Ulcerative colitis


continue Asacol 


no diarrhea


neg. cdiff 





End of life care, code status, adv. directives d/w pt. at length. He has 

designated 2 friends to make decision on his behalf. Requesting that no 

information is provided to his sister. Designates Kathryn Sanidad and Lillie Paz (as secondary) to make health care decisions if he's not able. If 

resp. distress, agrees to intubation for a short period.  


[facility]fully home with [facility] this week, declined by [facility]. Doesn't want to go to SNF


improving slowly, continue with above


poss. home in 2-3 days








D/W RN


D/W Dr. Alejandra


D/W pt


This pt. was seen by myself and Dr. Alejandra, this note is written on her behalf.


Discussed With:  Nurse, Family (patient), Other (Dr. alejandra, patient seen on 

her behalf)





Problem Qualifiers





(1) COPD (chronic obstructive pulmonary disease):  


Qualified Code:  J44.1 - Chronic obstructive pulmonary disease with acute 

exacerbation


(2) BPH (benign prostatic hyperplasia):  


Qualified Code:  N40.0 - Benign prostatic hyperplasia, presence of lower 

urinary tract symptoms unspecified, unspecified morphology


(3) Atrial fibrillation:  


Qualified Code:  I48.0 - Paroxysmal atrial fibrillation


(4) Pneumonia:  





(5) Atrial fibrillation:  


Qualified Code:  I48.0 - Paroxysmal atrial fibrillation


(6) Pneumocystis carinii pneumonia:  





(7) Ulcerative colitis:  


Qualified Code:  K51.90 - Ulcerative colitis without complications, unspecified 

location


(8) Hypertension:  


Qualified Code:  I10 - Essential hypertension





Wilma Harrison Mar 6, 2017 10:24

## 2017-03-07 VITALS — OXYGEN SATURATION: 97 %

## 2017-03-07 VITALS
RESPIRATION RATE: 21 BRPM | TEMPERATURE: 98.1 F | DIASTOLIC BLOOD PRESSURE: 68 MMHG | HEART RATE: 90 BPM | OXYGEN SATURATION: 92 % | SYSTOLIC BLOOD PRESSURE: 133 MMHG

## 2017-03-07 VITALS
DIASTOLIC BLOOD PRESSURE: 78 MMHG | SYSTOLIC BLOOD PRESSURE: 132 MMHG | HEART RATE: 81 BPM | TEMPERATURE: 98.1 F | RESPIRATION RATE: 17 BRPM | OXYGEN SATURATION: 97 %

## 2017-03-07 VITALS
SYSTOLIC BLOOD PRESSURE: 120 MMHG | DIASTOLIC BLOOD PRESSURE: 78 MMHG | TEMPERATURE: 96.5 F | HEART RATE: 93 BPM | RESPIRATION RATE: 18 BRPM | OXYGEN SATURATION: 100 %

## 2017-03-07 VITALS
OXYGEN SATURATION: 97 % | RESPIRATION RATE: 17 BRPM | HEART RATE: 78 BPM | SYSTOLIC BLOOD PRESSURE: 113 MMHG | DIASTOLIC BLOOD PRESSURE: 71 MMHG | TEMPERATURE: 98.9 F

## 2017-03-07 VITALS
TEMPERATURE: 96.6 F | OXYGEN SATURATION: 94 % | HEART RATE: 87 BPM | RESPIRATION RATE: 20 BRPM | SYSTOLIC BLOOD PRESSURE: 119 MMHG | DIASTOLIC BLOOD PRESSURE: 68 MMHG

## 2017-03-07 VITALS
TEMPERATURE: 98.2 F | SYSTOLIC BLOOD PRESSURE: 114 MMHG | DIASTOLIC BLOOD PRESSURE: 76 MMHG | HEART RATE: 93 BPM | RESPIRATION RATE: 18 BRPM | OXYGEN SATURATION: 96 %

## 2017-03-07 LAB
ABACAVIR ISLT GENOTYP: (no result)
ATAZANAVIR+RITONAVIR ISLT GENOTYP: (no result)
DARUNAVIR+RITONAVIR ISLT GENOTYP: (no result)
DIDANOSINE ISLT GENOTYP: (no result)
EFAVIRENZ [MASS/VOLUME] IN SERUM OR PLASMA: (no result) UG/ML
EMTRICITABINE ISLT GENOTYP: (no result)
ETRAVIRINE ISLT GENOTYP: (no result)
FOSAMPRENAVIR+RITONAVIR ISLT GENOTYP: (no result)
HIV NNRTI GENE MUT DET ISLT: (no result)
HIV NRTI GENE MUT DET ISLT: (no result)
HIV PI GENE MUT DET ISLT: (no result)
HIV RT GENE MUT DET ISLT: (no result)
INDINAVIR+RITONAVIR ISLT GENOTYP: (no result)
LAMIVUDINE: (no result)
LOPINAVIR+RITONAVIR ISLT GENOTYP: (no result)
NELFINAVIR [MASS/VOLUME] IN SERUM OR PLASMA: (no result) UG/ML
NEVIRAPINE [MASS/VOLUME] IN SERUM OR PLASMA: (no result) UG/ML
RILPIVIRINE ISLT GENOTYP: (no result)
SAQUINAVIR+RITONAVIR ISLT GENOTYP: (no result)
STAVUDINE ISLT GENOTYP: (no result)
TENOFOVIR ISLT GENOTYP: (no result)
TIPRANAVIR+RITONAVIR ISLT GENOTYP: (no result)
ZIDOVUDINE ISLT GENOTYP: (no result)

## 2017-03-07 RX ADMIN — GABAPENTIN SCH MG: 100 CAPSULE ORAL at 13:24

## 2017-03-07 RX ADMIN — MESALAMINE SCH MG: 800 TABLET, DELAYED RELEASE ORAL at 06:07

## 2017-03-07 RX ADMIN — METOPROLOL TARTRATE SCH MG: 25 TABLET, FILM COATED ORAL at 09:29

## 2017-03-07 RX ADMIN — OXYCODONE HYDROCHLORIDE AND ACETAMINOPHEN SCH MG: 500 TABLET ORAL at 09:29

## 2017-03-07 RX ADMIN — Medication SCH TAB: at 09:28

## 2017-03-07 RX ADMIN — IPRATROPIUM BROMIDE AND ALBUTEROL SULFATE PRN AMPULE: .5; 3 SOLUTION RESPIRATORY (INHALATION) at 19:10

## 2017-03-07 RX ADMIN — HYDROMORPHONE HYDROCHLORIDE PRN MG: 1 INJECTION, SOLUTION INTRAMUSCULAR; INTRAVENOUS; SUBCUTANEOUS at 09:44

## 2017-03-07 RX ADMIN — MESALAMINE SCH MG: 800 TABLET, DELAYED RELEASE ORAL at 13:24

## 2017-03-07 RX ADMIN — Medication SCH CAP: at 09:00

## 2017-03-07 RX ADMIN — GABAPENTIN SCH MG: 100 CAPSULE ORAL at 16:23

## 2017-03-07 RX ADMIN — SULFAMETHOXAZOLE AND TRIMETHOPRIM SCH MLS/HR: 80; 16 INJECTION, SOLUTION, CONCENTRATE INTRAVENOUS at 18:25

## 2017-03-07 RX ADMIN — SODIUM CHLORIDE, PRESERVATIVE FREE SCH ML: 5 INJECTION INTRAVENOUS at 20:27

## 2017-03-07 RX ADMIN — Medication SCH MG: at 20:30

## 2017-03-07 RX ADMIN — HYDROMORPHONE HYDROCHLORIDE PRN MG: 1 INJECTION, SOLUTION INTRAMUSCULAR; INTRAVENOUS; SUBCUTANEOUS at 06:08

## 2017-03-07 RX ADMIN — HYDROMORPHONE HYDROCHLORIDE PRN MG: 1 INJECTION, SOLUTION INTRAMUSCULAR; INTRAVENOUS; SUBCUTANEOUS at 13:29

## 2017-03-07 RX ADMIN — FAMOTIDINE SCH MG: 20 TABLET, FILM COATED ORAL at 20:29

## 2017-03-07 RX ADMIN — BUDESONIDE AND FORMOTEROL FUMARATE DIHYDRATE SCH PUFF: 160; 4.5 AEROSOL RESPIRATORY (INHALATION) at 09:00

## 2017-03-07 RX ADMIN — Medication SCH MG: at 06:17

## 2017-03-07 RX ADMIN — HEPARIN SODIUM SCH UNITS: 10000 INJECTION, SOLUTION INTRAVENOUS; SUBCUTANEOUS at 09:30

## 2017-03-07 RX ADMIN — BUDESONIDE AND FORMOTEROL FUMARATE DIHYDRATE SCH PUFF: 160; 4.5 AEROSOL RESPIRATORY (INHALATION) at 20:44

## 2017-03-07 RX ADMIN — SODIUM CHLORIDE, PRESERVATIVE FREE SCH ML: 5 INJECTION INTRAVENOUS at 09:00

## 2017-03-07 RX ADMIN — SULFAMETHOXAZOLE AND TRIMETHOPRIM SCH MLS/HR: 80; 16 INJECTION, SOLUTION, CONCENTRATE INTRAVENOUS at 23:56

## 2017-03-07 RX ADMIN — HEPARIN SODIUM SCH UNITS: 10000 INJECTION, SOLUTION INTRAVENOUS; SUBCUTANEOUS at 20:29

## 2017-03-07 RX ADMIN — HYDROMORPHONE HYDROCHLORIDE PRN MG: 1 INJECTION, SOLUTION INTRAMUSCULAR; INTRAVENOUS; SUBCUTANEOUS at 23:57

## 2017-03-07 RX ADMIN — Medication SCH MG: at 13:24

## 2017-03-07 RX ADMIN — SODIUM CHLORIDE SCH MLS/HR: 900 INJECTION INTRAVENOUS at 16:24

## 2017-03-07 RX ADMIN — IPRATROPIUM BROMIDE AND ALBUTEROL SULFATE PRN AMPULE: .5; 3 SOLUTION RESPIRATORY (INHALATION) at 23:46

## 2017-03-07 RX ADMIN — SULFAMETHOXAZOLE AND TRIMETHOPRIM SCH MLS/HR: 80; 16 INJECTION, SOLUTION, CONCENTRATE INTRAVENOUS at 06:11

## 2017-03-07 RX ADMIN — DILTIAZEM HYDROCHLORIDE SCH MG: 120 CAPSULE, EXTENDED RELEASE ORAL at 09:29

## 2017-03-07 RX ADMIN — METOPROLOL TARTRATE SCH MG: 25 TABLET, FILM COATED ORAL at 20:41

## 2017-03-07 RX ADMIN — HYDROMORPHONE HYDROCHLORIDE PRN MG: 1 INJECTION, SOLUTION INTRAMUSCULAR; INTRAVENOUS; SUBCUTANEOUS at 18:24

## 2017-03-07 RX ADMIN — FAMOTIDINE SCH MG: 20 TABLET, FILM COATED ORAL at 09:29

## 2017-03-07 RX ADMIN — GABAPENTIN SCH MG: 100 CAPSULE ORAL at 09:28

## 2017-03-07 RX ADMIN — SULFAMETHOXAZOLE AND TRIMETHOPRIM SCH MLS/HR: 80; 16 INJECTION, SOLUTION, CONCENTRATE INTRAVENOUS at 13:23

## 2017-03-07 RX ADMIN — MESALAMINE SCH MG: 800 TABLET, DELAYED RELEASE ORAL at 20:30

## 2017-03-07 NOTE — HHI.PR
Subjective


Remarks


feeling better today 


tongue less swollen


still using duonebs q 4 at night


eating okay


less shaky


wants to remain in hospital and finish Micafungin


no fever


SOB with activity


no cp





Objective


Objective Results


-





 Vital Signs








  Date Time  Temp Pulse Resp B/P Pulse Ox O2 Delivery O2 Flow Rate FiO2


 


3/7/17 12:00 98.1 81 17 132/78 97   


 


3/7/17 09:36      Nasal Cannula 2.00 


 


3/7/17 08:00 96.5 93 18 120/78 100   


 


3/7/17 04:00 96.6 87 20 119/68 94   


 


3/7/17 00:24 98.1 90 21 133/68 92   


 


3/7/17 00:05   16     


 


3/6/17 20:36 97.5 94 21 104/55 94   


 


3/6/17 20:00      Nasal Cannula 2.00 


 


3/6/17 19:23     95 Nasal Cannula 3.00 








 I/O








 3/6/17 3/6/17 3/6/17 3/7/17 3/7/17 3/7/17





 07:00 15:00 23:00 07:00 15:00 23:00


 


Intake Total 1231 ml 120 ml 380 ml  240 ml 


 


Output Total 600 ml     


 


Balance 631 ml 120 ml 380 ml  240 ml 


 


      


 


Intake Oral 240 ml 120 ml 380 ml  240 ml 


 


IV Total 991 ml     


 


Output Urine Total 600 ml     


 


# Voids  2 2  3 


 


# Bowel Movements  0   0 








Result Diagram:  


3/5/17 0615                                                                    

            3/5/17 0615








ROS


General:  Weakness


HEENT:  No: Sore Throat, Dysphagia


Cardiac:  No: Chest Pain, Edema, Palpitations


Pulmonary:  Cough, SOB, Wheezing


GI:  No: Abdominal Pain, BM, Diarrhea, N/V


/GYN:  No: Dysuria, Urgency


Neuro/MS:  No: Lightheaded, Confusion


Psych:  No: Anxiety, Depression


Skin:  No: Itching, Rash





Physical Exam


Physical Exam


PHYSICAL EXAMINATION


GENERAL:  This is a slim, well-nourished   male 


who appears to be in no acute distress.  He  is alert and awake, answers 

questions appropriately.  


HEAD:  Normocephalic without any lesion or mass noted.  Facial features


appear symmetric.


OROPHARYNGEAL:  Oropharynx with erythema, has thrust, tongue sore/swelling 


NECK:  Supple.  No nuchal rigidity or lymphadenopathy.


Trachea midline without deviation.  


CARDIAC:  Regular rhythm, regular rate, S1 and S2 are heard.  Murmur none; no 

gallops or rubs.


LUNGS:  diminished, faint wheezes 


ABDOMEN:  Soft, nontender, no organomegaly or masses.  Bowel sounds are


heard in all four quadrants.  No rebound.  No guarding.  Appetite fair


EXTREMITIES:  No edema.  Pulses equal bilateral.  No cyanosis.


NEUROLOGICAL:  Patient mood and affect appropriate. No focal deficit


Urinary Catheter:  No


Vascular Central Line Catheter:  No





A/P


Diagnosis:  


(1) COPD (chronic obstructive pulmonary disease)


(2) BPH (benign prostatic hyperplasia)


(3) Atrial arrhythmia


(4) Atrial fibrillation


(5) Pneumonia


(6) Atrial fibrillation


(7) SIRS (systemic inflammatory response syndrome)


(8) Hypoxia


(9) Pneumocystis carinii pneumonia


(10) Thrush


(11) Dehydration


(12) Ulcerative colitis


(13) Hypertension


Assessment and Plan


continue with empiric abx-Bactrim IV, Micafungin, Acyclovir


appreciate ID input


HIV reflex +


BAL + PCP


HIV + - confirmed cw advanced AIDS


Undetectable CD4 count (< 20)


HCV/HBV negative


Needs MAC profilaxis  Azithro 1200 q monday


Pt will need to establish fu with HIV provider (Dr Ernandez) ASAP preferably with 

1st appointment within 1 wk after d/c


Per ID:- bactrim can be changed to oral 2 DS TID to complete total of 21 days 

when pt is ready for dc


   - Stop Bactrim on 3/14


            -continue Prednisone 40 mg twice daily for 5 days, followed by


      Prednisone 40 mg daily for 5 days, followed by


      Prednisone 20 mg daily for 11 days


   - cont azithro  1200 q Mon for MAC profilaxis


   - cont micafungin for severe resistant oral thrush; will need at least  full 

2 weeks (thru 3/10)


   -cont  acyclovir, can be switched to po 400 tid  untill lesions completely 

healed








No fever overnight


BC 3/2 negative 


continue to monitor 





Oral candidiasis, severe , improving on micafungin


Ulcerations of the tongue - confirmed  herpetic


Magic mouth wash 





appreciate pulmonary input


S/P bronch 2/15


continue oxygen,  PO steroids


Duonebs to q 4WA and q 2 PRN 





Hx afib, SR stable 


appreciate cardiology input


continue Cardizem and BB





Heparin for DVT prophylaxis





Ulcerative colitis


continue Asacol 


no diarrhea


neg. cdiff 





End of life care, code status, adv. directives d/w pt. at length. He has 

designated 2 friends to make decision on his behalf. Requesting that no 

information is provided to his sister. Designates Kathryn Looneyblayne and Llilie Paz (as secondary) to make health care decisions if he's not able. If 

resp. distress, agrees to intubation for a short period.  


Hopefully home with [facility] this week, declined by CIR. Doesn't want to go to SNF


improving slowly, continue with above


plan to dc on 3/10 with [facility] 








D/W RN


D/W Dr. Alejandra


D/W pt


This pt. was seen by myself and Dr. Alejandra, this note is written on her behalf.


Discussed With:  Nurse, Family (patient), Other (Dr. alejandra, patient seen on 

her behalf)





Problem Qualifiers





(1) COPD (chronic obstructive pulmonary disease):  


Qualified Code:  J44.1 - Chronic obstructive pulmonary disease with acute 

exacerbation


(2) BPH (benign prostatic hyperplasia):  


Qualified Code:  N40.0 - Benign prostatic hyperplasia, presence of lower 

urinary tract symptoms unspecified, unspecified morphology


(3) Atrial fibrillation:  


Qualified Code:  I48.0 - Paroxysmal atrial fibrillation


(4) Pneumonia:  





(5) Atrial fibrillation:  


Qualified Code:  I48.0 - Paroxysmal atrial fibrillation


(6) Pneumocystis carinii pneumonia:  





(7) Ulcerative colitis:  


Qualified Code:  K51.90 - Ulcerative colitis without complications, unspecified 

location


(8) Hypertension:  


Qualified Code:  I10 - Essential hypertension





Wilma Harrison Mar 7, 2017 16:45

## 2017-03-07 NOTE — HHI.PR
Subjective


Remarks


Better today . on  o2  2 l.


Has no  cough . Oral  thrush  resolving.


Better intake.





Objective





 Vital Signs








  Date Time  Temp Pulse Resp B/P Pulse Ox O2 Delivery O2 Flow Rate FiO2


 


3/7/17 18:04     97 Nasal Cannula 4.00 


 


3/7/17 16:00 98.9 78 17 113/71 97   


 


3/7/17 12:00 98.1 81 17 132/78 97   


 


3/7/17 09:36      Nasal Cannula 2.00 


 


3/7/17 08:00 96.5 93 18 120/78 100   


 


3/7/17 04:00 96.6 87 20 119/68 94   


 


3/7/17 00:24 98.1 90 21 133/68 92   


 


3/7/17 00:05   16     


 


3/6/17 20:36 97.5 94 21 104/55 94   


 


3/6/17 20:00      Nasal Cannula 2.00 








 I/O








 3/6/17 3/6/17 3/6/17 3/7/17 3/7/17 3/7/17





 07:00 15:00 23:00 07:00 15:00 23:00


 


Intake Total 1231 ml 120 ml 380 ml  240 ml 


 


Output Total 600 ml     


 


Balance 631 ml 120 ml 380 ml  240 ml 


 


      


 


Intake Oral 240 ml 120 ml 380 ml  240 ml 


 


IV Total 991 ml     


 


Output Urine Total 600 ml     


 


# Voids  2 2  3 


 


# Bowel Movements  0   0 








Result Diagram:  


3/5/17 0615                                                                    

            3/5/17 0615





Objective Remarks


GENERAL: This is a 61 year-old male, up and  is in no  distress .


HEENT:  Mucous membranes moist. Throat clear.


NECK: Supple.


CARDIOVASCULAR: Regular rate and rhythm.


RESPIRATORY: Few crackles  at  both bases  and  Occ  wheeze  scattered .


GASTROINTESTINAL: Bowel sounds are present.


ABDOMEN: Not distended.No mass.


MUSCULOSKELETAL: No edema. moves all  well.


NEUROLOGIC: Alert, oriented. .Reflexes  1 +. No deficits.





Assessment and Plan


Assessment and Plan





IMPRESSION:


1. Sepsis on admission.


2. COPD exacerbation.


3. Hyponatremia.


4. Hospital acquired pneumonia.


5. History of A-fib, currently in sinus rhythm.


6. History of ulcerative colitis.


7. PCP pneumonia/Immunocompromised  state











Plan :





1. Cont Antibiotics as ordered  per ID.





2. Nebs tid , duoneb.





3. IS and acapella  at bedside  qid.





4. Prednisone to 20 mg daily x 3





5. Arrange O2 at home 





6. O2  at  2 L.








ARIADNA Murry MD Mar 7, 2017 19:27

## 2017-03-08 VITALS
RESPIRATION RATE: 18 BRPM | SYSTOLIC BLOOD PRESSURE: 122 MMHG | OXYGEN SATURATION: 96 % | TEMPERATURE: 97.4 F | HEART RATE: 79 BPM | DIASTOLIC BLOOD PRESSURE: 69 MMHG

## 2017-03-08 VITALS — OXYGEN SATURATION: 98 %

## 2017-03-08 VITALS
TEMPERATURE: 96.2 F | HEART RATE: 83 BPM | SYSTOLIC BLOOD PRESSURE: 104 MMHG | OXYGEN SATURATION: 98 % | RESPIRATION RATE: 18 BRPM | DIASTOLIC BLOOD PRESSURE: 65 MMHG

## 2017-03-08 VITALS
DIASTOLIC BLOOD PRESSURE: 56 MMHG | RESPIRATION RATE: 17 BRPM | TEMPERATURE: 96.3 F | OXYGEN SATURATION: 97 % | SYSTOLIC BLOOD PRESSURE: 97 MMHG | HEART RATE: 82 BPM

## 2017-03-08 VITALS
OXYGEN SATURATION: 97 % | DIASTOLIC BLOOD PRESSURE: 70 MMHG | RESPIRATION RATE: 20 BRPM | HEART RATE: 85 BPM | SYSTOLIC BLOOD PRESSURE: 127 MMHG | TEMPERATURE: 98.6 F

## 2017-03-08 VITALS — OXYGEN SATURATION: 97 %

## 2017-03-08 RX ADMIN — METOPROLOL TARTRATE SCH MG: 25 TABLET, FILM COATED ORAL at 09:49

## 2017-03-08 RX ADMIN — Medication SCH MG: at 20:30

## 2017-03-08 RX ADMIN — SODIUM CHLORIDE, PRESERVATIVE FREE SCH ML: 5 INJECTION INTRAVENOUS at 09:47

## 2017-03-08 RX ADMIN — SODIUM CHLORIDE, PRESERVATIVE FREE SCH ML: 5 INJECTION INTRAVENOUS at 20:29

## 2017-03-08 RX ADMIN — SULFAMETHOXAZOLE AND TRIMETHOPRIM SCH MLS/HR: 80; 16 INJECTION, SOLUTION, CONCENTRATE INTRAVENOUS at 05:32

## 2017-03-08 RX ADMIN — GABAPENTIN SCH MG: 100 CAPSULE ORAL at 17:23

## 2017-03-08 RX ADMIN — HYDROMORPHONE HYDROCHLORIDE PRN MG: 1 INJECTION, SOLUTION INTRAMUSCULAR; INTRAVENOUS; SUBCUTANEOUS at 23:13

## 2017-03-08 RX ADMIN — Medication SCH CAP: at 09:48

## 2017-03-08 RX ADMIN — HYDROMORPHONE HYDROCHLORIDE PRN MG: 1 INJECTION, SOLUTION INTRAMUSCULAR; INTRAVENOUS; SUBCUTANEOUS at 13:05

## 2017-03-08 RX ADMIN — HYDROMORPHONE HYDROCHLORIDE PRN MG: 1 INJECTION, SOLUTION INTRAMUSCULAR; INTRAVENOUS; SUBCUTANEOUS at 05:38

## 2017-03-08 RX ADMIN — HYDROMORPHONE HYDROCHLORIDE PRN MG: 1 INJECTION, SOLUTION INTRAMUSCULAR; INTRAVENOUS; SUBCUTANEOUS at 17:27

## 2017-03-08 RX ADMIN — FAMOTIDINE SCH MG: 20 TABLET, FILM COATED ORAL at 09:48

## 2017-03-08 RX ADMIN — IPRATROPIUM BROMIDE AND ALBUTEROL SULFATE PRN AMPULE: .5; 3 SOLUTION RESPIRATORY (INHALATION) at 03:26

## 2017-03-08 RX ADMIN — METOPROLOL TARTRATE SCH MG: 25 TABLET, FILM COATED ORAL at 20:29

## 2017-03-08 RX ADMIN — SULFAMETHOXAZOLE AND TRIMETHOPRIM SCH MLS/HR: 80; 16 INJECTION, SOLUTION, CONCENTRATE INTRAVENOUS at 12:19

## 2017-03-08 RX ADMIN — SULFAMETHOXAZOLE AND TRIMETHOPRIM SCH MLS/HR: 80; 16 INJECTION, SOLUTION, CONCENTRATE INTRAVENOUS at 18:37

## 2017-03-08 RX ADMIN — FAMOTIDINE SCH MG: 20 TABLET, FILM COATED ORAL at 20:29

## 2017-03-08 RX ADMIN — GABAPENTIN SCH MG: 100 CAPSULE ORAL at 13:00

## 2017-03-08 RX ADMIN — Medication SCH MG: at 14:00

## 2017-03-08 RX ADMIN — MESALAMINE SCH MG: 800 TABLET, DELAYED RELEASE ORAL at 05:35

## 2017-03-08 RX ADMIN — Medication SCH TAB: at 09:48

## 2017-03-08 RX ADMIN — GABAPENTIN SCH MG: 100 CAPSULE ORAL at 09:48

## 2017-03-08 RX ADMIN — BUDESONIDE AND FORMOTEROL FUMARATE DIHYDRATE SCH PUFF: 160; 4.5 AEROSOL RESPIRATORY (INHALATION) at 09:53

## 2017-03-08 RX ADMIN — MESALAMINE SCH MG: 800 TABLET, DELAYED RELEASE ORAL at 20:30

## 2017-03-08 RX ADMIN — Medication SCH MG: at 05:32

## 2017-03-08 RX ADMIN — BUDESONIDE AND FORMOTEROL FUMARATE DIHYDRATE SCH PUFF: 160; 4.5 AEROSOL RESPIRATORY (INHALATION) at 20:31

## 2017-03-08 RX ADMIN — MESALAMINE SCH MG: 800 TABLET, DELAYED RELEASE ORAL at 14:00

## 2017-03-08 RX ADMIN — OXYCODONE HYDROCHLORIDE AND ACETAMINOPHEN SCH MG: 500 TABLET ORAL at 09:48

## 2017-03-08 RX ADMIN — HYDROMORPHONE HYDROCHLORIDE PRN MG: 1 INJECTION, SOLUTION INTRAMUSCULAR; INTRAVENOUS; SUBCUTANEOUS at 09:47

## 2017-03-08 RX ADMIN — DILTIAZEM HYDROCHLORIDE SCH MG: 120 CAPSULE, EXTENDED RELEASE ORAL at 09:48

## 2017-03-08 RX ADMIN — SODIUM CHLORIDE, PRESERVATIVE FREE PRN ML: 5 INJECTION INTRAVENOUS at 17:27

## 2017-03-08 RX ADMIN — HEPARIN SODIUM SCH UNITS: 10000 INJECTION, SOLUTION INTRAVENOUS; SUBCUTANEOUS at 20:31

## 2017-03-08 RX ADMIN — SULFAMETHOXAZOLE AND TRIMETHOPRIM SCH MLS/HR: 80; 16 INJECTION, SOLUTION, CONCENTRATE INTRAVENOUS at 23:13

## 2017-03-08 RX ADMIN — SODIUM CHLORIDE SCH MLS/HR: 900 INJECTION INTRAVENOUS at 17:23

## 2017-03-08 RX ADMIN — HEPARIN SODIUM SCH UNITS: 10000 INJECTION, SOLUTION INTRAVENOUS; SUBCUTANEOUS at 09:50

## 2017-03-08 RX ADMIN — IPRATROPIUM BROMIDE AND ALBUTEROL SULFATE PRN AMPULE: .5; 3 SOLUTION RESPIRATORY (INHALATION) at 20:32

## 2017-03-08 NOTE — HHI.PR
Subjective


Remarks


better today


less shaky


no wheezing


tongue less swollen


eating okay


less SOB with activity


anxious to go home





Objective


Objective Results


-





 Vital Signs








  Date Time  Temp Pulse Resp B/P Pulse Ox O2 Delivery O2 Flow Rate FiO2


 


3/8/17 12:00 96.3 82 17 97/56 97   


 


3/8/17 09:04     97 Nasal Cannula 2.00 


 


3/8/17 08:30      Nasal Cannula 2.00 


 


3/8/17 08:00 98.6 85 20 127/70 97   


 


3/8/17 00:00 97.4 79 18 122/69 96   


 


3/7/17 23:51     97 Nasal Cannula 2.00 


 


3/7/17 20:00 98.2 93 18 114/76 96   


 


3/7/17 18:04     97 Nasal Cannula 4.00 


 


3/7/17 16:00 98.9 78 17 113/71 97   








 I/O








 3/7/17 3/7/17 3/7/17 3/8/17 3/8/17 3/8/17





 07:00 15:00 23:00 07:00 15:00 23:00


 


Intake Total  240 ml 1200 ml 1740 ml  


 


Balance  240 ml 1200 ml 1740 ml  


 


      


 


Intake Oral  240 ml 1200 ml 240 ml  


 


IV Total   0 ml 1500 ml  


 


# Voids  3 3 1  


 


# Bowel Movements  0    








Result Diagram:  


3/5/17 0615                                                                    

            3/5/17 0615








ROS


General:  No: Fatigue, Weakness


HEENT:  Other (tongue swelling )


Cardiac:  No: Chest Pain, Edema, Palpitations


Pulmonary:  SOB, Wheezing


GI:  No: Abdominal Pain, BM, Diarrhea, N/V


/GYN:  No: Dysuria, Urgency


Neuro/MS:  No: Lightheaded, Confusion


Psych:  Anxiety


Skin:  No: Itching, Rash





Physical Exam


Physical Exam


PHYSICAL EXAMINATION


GENERAL:  This is a slim, well-nourished   male 


who appears to be in no acute distress.  He  is alert and awake, answers 

questions appropriately.  


HEAD:  Normocephalic without any lesion or mass noted.  Facial features


appear symmetric.


OROPHARYNGEAL:  Oropharynx with erythema, has thrust, tongue sore/swelling 


NECK:  Supple.  No nuchal rigidity or lymphadenopathy.


Trachea midline without deviation.  


CARDIAC:  Regular rhythm, regular rate, S1 and S2 are heard.  Murmur none; no 

gallops or rubs.


LUNGS:  diminished, faint wheezes 


ABDOMEN:  Soft, nontender, no organomegaly or masses.  Bowel sounds are


heard in all four quadrants.  No rebound.  No guarding.  Appetite fair


EXTREMITIES:  No edema.  Pulses equal bilateral.  No cyanosis.


NEUROLOGICAL:  Patient mood and affect appropriate. No focal deficit


Urinary Catheter:  No


Vascular Central Line Catheter:  No





A/P


Diagnosis:  


(1) COPD (chronic obstructive pulmonary disease)


(2) BPH (benign prostatic hyperplasia)


(3) Atrial arrhythmia


(4) Atrial fibrillation


(5) Pneumonia


(6) Atrial fibrillation


(7) SIRS (systemic inflammatory response syndrome)


(8) Hypoxia


(9) Pneumocystis carinii pneumonia


(10) Thrush


(11) Dehydration


(12) Ulcerative colitis


(13) Hypertension


Assessment and Plan


continue with empiric abx-Bactrim IV, Micafungin, Acyclovir


appreciate ID input


HIV reflex +


BAL + PCP


HIV + - confirmed cw advanced AIDS


Undetectable CD4 count (< 20)


HCV/HBV negative


Needs MAC profilaxis  Azithro 1200 q monday


Pt will need to establish fu with HIV provider (Dr Ernandez) ASAP preferably with 

1st appointment within 1 wk after d/c


Per ID:- bactrim can be changed to oral 2 DS TID to complete total of 21 days 

when pt is ready for dc


   - Stop Bactrim on 3/14


            -continue Prednisone 40 mg twice daily for 5 days, followed by


      Prednisone 40 mg daily for 5 days, followed by


      Prednisone 20 mg daily for 11 days


   - cont azithro  1200 q Mon for MAC profilaxis


   - cont micafungin for severe resistant oral thrush; will need at least  full 

2 weeks (thru 3/10)


   -cont  acyclovir, can be switched to po 400 tid  untill lesions completely 

healed


obtain PICC line


CM to arrange abx at home 





No fever overnight


BC 3/2 negative 


continue to monitor 





Oral candidiasis, severe , improving on micafungin


Ulcerations of the tongue - confirmed  herpetic


Magic mouth wash 





appreciate pulmonary input


S/P bronch 2/15


continue oxygen,  PO steroids


Duonebs to q 4WA and q 2 PRN 





Hx afib, SR stable 


appreciate cardiology input


continue Cardizem and BB





Heparin for DVT prophylaxis





Ulcerative colitis


continue Asacol 


no diarrhea


neg. cdiff 





End of life care, code status, adv. directives d/w pt. at length. He has 

designated 2 friends to make decision on his behalf. Requesting that no 

information is provided to his sister. Designates Kathryn Novoa and Lillie Paz (as secondary) to make health care decisions if he's not able. If 

resp. distress, agrees to intubation for a short period.  


Hopefully home with HHC this week, declined by [facility]. Doesn't want to go to SNF


improving


CM to arrange home abx, PICC to be done


poss dc tomorrow











D/W RN


D/W Dr. Alejandra


D/W pt


This pt. was seen by myself and Dr. Alejandra, this note is written on her behalf.


Discussed With:  Nurse, Family (patient), Other (Dr. alejandra, patient seen on 

her behalf)





Problem Qualifiers





(1) COPD (chronic obstructive pulmonary disease):  


Qualified Code:  J44.1 - Chronic obstructive pulmonary disease with acute 

exacerbation


(2) BPH (benign prostatic hyperplasia):  


Qualified Code:  N40.0 - Benign prostatic hyperplasia, presence of lower 

urinary tract symptoms unspecified, unspecified morphology


(3) Atrial fibrillation:  


Qualified Code:  I48.0 - Paroxysmal atrial fibrillation


(4) Pneumonia:  





(5) Atrial fibrillation:  


Qualified Code:  I48.0 - Paroxysmal atrial fibrillation


(6) Pneumocystis carinii pneumonia:  





(7) Ulcerative colitis:  


Qualified Code:  K51.90 - Ulcerative colitis without complications, unspecified 

location


(8) Hypertension:  


Qualified Code:  I10 - Essential hypertension





Wilma Harrison Mar 8, 2017 14:38

## 2017-03-08 NOTE — HHI.PR
Subjective


Remarks


Hoarseness is less . on  o2  2 l.


Has no  cough . Oral  thrush  resolving.


Better intake.





Objective





 Vital Signs








  Date Time  Temp Pulse Resp B/P Pulse Ox O2 Delivery O2 Flow Rate FiO2


 


3/8/17 12:00 96.3 82 17 97/56 97   


 


3/8/17 09:04     97 Nasal Cannula 2.00 


 


3/8/17 08:30      Nasal Cannula 2.00 


 


3/8/17 08:00 98.6 85 20 127/70 97   


 


3/8/17 00:00 97.4 79 18 122/69 96   


 


3/7/17 23:51     97 Nasal Cannula 2.00 


 


3/7/17 20:00 98.2 93 18 114/76 96   


 


3/7/17 18:04     97 Nasal Cannula 4.00 








 I/O








 3/7/17 3/7/17 3/7/17 3/8/17 3/8/17 3/8/17





 07:00 15:00 23:00 07:00 15:00 23:00


 


Intake Total  240 ml 1200 ml 1740 ml 500 ml 


 


Balance  240 ml 1200 ml 1740 ml 500 ml 


 


      


 


Intake Oral  240 ml 1200 ml 240 ml  


 


IV Total   0 ml 1500 ml 500 ml 


 


# Voids  3 3 1  


 


# Bowel Movements  0    








Result Diagram:  


3/5/17 0615                                                                    

            3/5/17 0615





Objective Remarks


GENERAL: This is a 61 year-old male,in no  distress .


HEENT:  Mucous membranes moist. Throat clear.


NECK: Supple.


CARDIOVASCULAR: Regular rate and rhythm.


RESPIRATORY: Few  wheeze  scattered .Distant breath sounds.


GASTROINTESTINAL: Bowel sounds are present.


ABDOMEN: Not distended.No mass.


MUSCULOSKELETAL: No edema. moves all  well.


NEUROLOGIC: Alert, oriented. .Reflexes  1 +. No deficits.





Assessment and Plan


Assessment and Plan





IMPRESSION:


1. Sepsis on admission.


2. COPD exacerbation.


3. Hyponatremia.


4. Hospital acquired pneumonia.


5. History of A-fib, currently in sinus rhythm.


6. History of ulcerative colitis.


7. PCP pneumonia/Immunocompromised  state











Plan :





1. Cont Antibiotics as ordered  per ID.





2. Nebs tid , duoneb.





3. IS and acapella  at bedside  qid.





4. Chest X ray in am.





5. Arrange O2 at home








ARIADNA Murry MD Mar 8, 2017 17:06

## 2017-03-09 VITALS
TEMPERATURE: 96 F | HEART RATE: 73 BPM | RESPIRATION RATE: 20 BRPM | DIASTOLIC BLOOD PRESSURE: 68 MMHG | SYSTOLIC BLOOD PRESSURE: 111 MMHG | OXYGEN SATURATION: 96 %

## 2017-03-09 VITALS
TEMPERATURE: 98 F | SYSTOLIC BLOOD PRESSURE: 142 MMHG | OXYGEN SATURATION: 98 % | DIASTOLIC BLOOD PRESSURE: 80 MMHG | RESPIRATION RATE: 20 BRPM | HEART RATE: 78 BPM

## 2017-03-09 VITALS
TEMPERATURE: 98.1 F | HEART RATE: 78 BPM | SYSTOLIC BLOOD PRESSURE: 117 MMHG | RESPIRATION RATE: 24 BRPM | DIASTOLIC BLOOD PRESSURE: 73 MMHG | OXYGEN SATURATION: 94 %

## 2017-03-09 VITALS
HEART RATE: 82 BPM | TEMPERATURE: 97.4 F | DIASTOLIC BLOOD PRESSURE: 80 MMHG | RESPIRATION RATE: 20 BRPM | OXYGEN SATURATION: 98 % | SYSTOLIC BLOOD PRESSURE: 143 MMHG

## 2017-03-09 VITALS
OXYGEN SATURATION: 95 % | SYSTOLIC BLOOD PRESSURE: 120 MMHG | HEART RATE: 82 BPM | TEMPERATURE: 97.4 F | DIASTOLIC BLOOD PRESSURE: 72 MMHG | RESPIRATION RATE: 20 BRPM

## 2017-03-09 VITALS — OXYGEN SATURATION: 98 %

## 2017-03-09 RX ADMIN — Medication SCH TAB: at 13:16

## 2017-03-09 RX ADMIN — METOPROLOL TARTRATE SCH MG: 25 TABLET, FILM COATED ORAL at 09:51

## 2017-03-09 RX ADMIN — METOPROLOL TARTRATE SCH MG: 25 TABLET, FILM COATED ORAL at 20:20

## 2017-03-09 RX ADMIN — Medication SCH CAP: at 09:51

## 2017-03-09 RX ADMIN — HYDROMORPHONE HYDROCHLORIDE PRN MG: 1 INJECTION, SOLUTION INTRAMUSCULAR; INTRAVENOUS; SUBCUTANEOUS at 20:27

## 2017-03-09 RX ADMIN — HEPARIN SODIUM SCH UNITS: 10000 INJECTION, SOLUTION INTRAVENOUS; SUBCUTANEOUS at 09:50

## 2017-03-09 RX ADMIN — GABAPENTIN SCH MG: 100 CAPSULE ORAL at 17:17

## 2017-03-09 RX ADMIN — DILTIAZEM HYDROCHLORIDE SCH MG: 120 CAPSULE, EXTENDED RELEASE ORAL at 09:51

## 2017-03-09 RX ADMIN — Medication SCH TAB: at 09:51

## 2017-03-09 RX ADMIN — GABAPENTIN SCH MG: 100 CAPSULE ORAL at 09:51

## 2017-03-09 RX ADMIN — HYDROMORPHONE HYDROCHLORIDE PRN MG: 1 INJECTION, SOLUTION INTRAMUSCULAR; INTRAVENOUS; SUBCUTANEOUS at 06:12

## 2017-03-09 RX ADMIN — SULFAMETHOXAZOLE AND TRIMETHOPRIM SCH MLS/HR: 80; 16 INJECTION, SOLUTION, CONCENTRATE INTRAVENOUS at 06:01

## 2017-03-09 RX ADMIN — SODIUM CHLORIDE, PRESERVATIVE FREE SCH ML: 5 INJECTION INTRAVENOUS at 21:00

## 2017-03-09 RX ADMIN — BUDESONIDE AND FORMOTEROL FUMARATE DIHYDRATE SCH PUFF: 160; 4.5 AEROSOL RESPIRATORY (INHALATION) at 21:00

## 2017-03-09 RX ADMIN — FAMOTIDINE SCH MG: 20 TABLET, FILM COATED ORAL at 09:51

## 2017-03-09 RX ADMIN — SULFAMETHOXAZOLE AND TRIMETHOPRIM SCH MLS/HR: 80; 16 INJECTION, SOLUTION, CONCENTRATE INTRAVENOUS at 13:05

## 2017-03-09 RX ADMIN — SULFAMETHOXAZOLE AND TRIMETHOPRIM SCH MLS/HR: 80; 16 INJECTION, SOLUTION, CONCENTRATE INTRAVENOUS at 18:26

## 2017-03-09 RX ADMIN — HEPARIN SODIUM SCH UNITS: 10000 INJECTION, SOLUTION INTRAVENOUS; SUBCUTANEOUS at 20:21

## 2017-03-09 RX ADMIN — MESALAMINE SCH MG: 800 TABLET, DELAYED RELEASE ORAL at 20:20

## 2017-03-09 RX ADMIN — Medication SCH MG: at 13:16

## 2017-03-09 RX ADMIN — OXYCODONE HYDROCHLORIDE AND ACETAMINOPHEN SCH MG: 500 TABLET ORAL at 09:51

## 2017-03-09 RX ADMIN — GABAPENTIN SCH MG: 100 CAPSULE ORAL at 13:05

## 2017-03-09 RX ADMIN — Medication SCH MG: at 06:03

## 2017-03-09 RX ADMIN — SODIUM CHLORIDE SCH MLS/HR: 900 INJECTION INTRAVENOUS at 17:15

## 2017-03-09 RX ADMIN — Medication SCH MG: at 20:20

## 2017-03-09 RX ADMIN — MESALAMINE SCH MG: 800 TABLET, DELAYED RELEASE ORAL at 13:17

## 2017-03-09 RX ADMIN — MESALAMINE SCH MG: 800 TABLET, DELAYED RELEASE ORAL at 06:03

## 2017-03-09 RX ADMIN — SULFAMETHOXAZOLE AND TRIMETHOPRIM SCH MLS/HR: 80; 16 INJECTION, SOLUTION, CONCENTRATE INTRAVENOUS at 23:18

## 2017-03-09 RX ADMIN — FAMOTIDINE SCH MG: 20 TABLET, FILM COATED ORAL at 20:20

## 2017-03-09 NOTE — HHI.PR
Subjective


Remarks


Feels tired today On O2  2l.


C/O Chest tightness.





Objective





 Vital Signs








  Date Time  Temp Pulse Resp B/P Pulse Ox O2 Delivery O2 Flow Rate FiO2


 


3/9/17 15:59 96.0 73 20 111/68 96   


 


3/9/17 12:43 98.0 78 20 142/80 98   


 


3/9/17 10:20     98 Nasal Cannula 2.00 


 


3/9/17 09:30      Nasal Cannula 2.00 


 


3/9/17 08:57 97.4 82 20 143/80 98   


 


3/9/17 00:00 97.4 82 20 120/72 95   








 I/O








 3/8/17 3/8/17 3/8/17 3/9/17 3/9/17 3/9/17





 07:00 15:00 23:00 07:00 15:00 23:00


 


Intake Total 1740 ml 500 ml 440 ml 980 ml  


 


Output Total    1125 ml  


 


Balance 1740 ml 500 ml 440 ml -145 ml  


 


      


 


Intake Oral 240 ml  440 ml 480 ml  


 


IV Total 1500 ml 500 ml  500 ml  


 


Output Urine Total    1125 ml  


 


# Voids 1  3   3


 


# Bowel Movements   0 0  








Result Diagram:  


3/5/17 0615                                                                    

            3/5/17 0615





Objective Remarks


GENERAL: This is a 61 year-old male,in no  distress .


HEENT:  Mucous membranes moist. Throat clear.


NECK: Supple.


CARDIOVASCULAR: Regular rate and rhythm.


RESPIRATORY: Few basal crackles, and Distant breath sounds.


GASTROINTESTINAL: Bowel sounds are present.


ABDOMEN: Not distended.No mass.


MUSCULOSKELETAL: No edema. moves all  well.


NEUROLOGIC: Alert, oriented. .Reflexes  1 +. No deficits.





Assessment and Plan


Assessment and Plan





IMPRESSION:


1. Sepsis on admission.


2. COPD exacerbation.


3. Hyponatremia.


4. Hospital acquired pneumonia.


5. History of A-fib, currently in sinus rhythm.


6. History of ulcerative colitis.


7. PCP pneumonia/Immunocompromised  state











Plan :





1. Cont Antibiotics as ordered  per ID.





2. Nebs tid , duoneb.





3. IS and acapella  at bedside  qid.





4. CBC,CMP.





5. PT eval.








ARIADNA Murry MD Mar 9, 2017 21:33

## 2017-03-09 NOTE — HHI.PR
Subjective


Remarks


Tongue pain/edema improving


No SOB at rest, low volumes


Voice hoarse continues


using o2 continuously


Sleeping but responds to verbal stimuli





Objective


Objective Results


-





 Vital Signs








  Date Time  Temp Pulse Resp B/P Pulse Ox O2 Delivery O2 Flow Rate FiO2


 


3/9/17 10:20     98 Nasal Cannula 2.00 


 


3/9/17 09:30      Nasal Cannula 2.00 


 


3/9/17 08:57 97.4 82 20 143/80 98   


 


3/9/17 00:00 97.4 82 20 120/72 95   


 


3/8/17 20:32     98 Nasal Cannula 2.00 


 


3/8/17 20:30      Nasal Cannula 2.00 


 


3/8/17 16:00 96.2 83 18 104/65 98   








 I/O








 3/8/17 3/8/17 3/8/17 3/9/17 3/9/17 3/9/17





 07:00 15:00 23:00 07:00 15:00 23:00


 


Intake Total 1740 ml 500 ml 440 ml 980 ml  


 


Output Total    1125 ml  


 


Balance 1740 ml 500 ml 440 ml -145 ml  


 


      


 


Intake Oral 240 ml  440 ml 480 ml  


 


IV Total 1500 ml 500 ml  500 ml  


 


Output Urine Total    1125 ml  


 


# Voids 1  3   


 


# Bowel Movements   0 0  








Result Diagram:  


3/5/17 0615                                                                    

            3/5/17 0615








ROS


General:  Fatigue, Weakness (generalize continues), Other (10 point ROS done 

positives noted otherwise unremarkable)


HEENT:  Sore Throat (with sore tongue, gradual slow improvement)


Pulmonary:  Cough (occasional), SOB (it more exertional)





Physical Exam


Physical Exam


PHYSICAL EXAMINATION


GENERAL:  This is a thin, ill appearing   male 


 He  he is resting now but responds to verbal stimuli


HEAD:  Normocephalic without any lesion or mass noted.  Facial features


appear symmetric.


OROPHARYNGEAL:  Tongue and oral pharynx still have some mild erythema with mild 

blisters on tongue


NECK:  Supple.  No nuchal rigidity or lymphadenopathy.


Trachea midline without deviation.  


CARDIAC:  Regular rhythm, regular rate, S1 and S2 are heard.  Murmur none; no 

gallops or rubs.


LUNGS:  Clear to auscultation bilaterally, low volumes.  wheeze, rhonchi   No


use of accessory muscles on inspiration or expiration at rest but still has 

exertional dyspnea.  Wearing O2 constant


ABDOMEN:  Soft, nontender, no organomegaly or masses.  Bowel sounds are


heard in all four quadrants.  No rebound.  No guarding.,  No diarrhea


EXTREMITIES:  No edema.  Pulses equal bilateral.  


NEUROLOGICAL:  Patient mood and affect flat, mild anxiety No focal deficit


SKIN:Warm and moist, dry, slightly pale


Objective Remarks


I don't feel is good today as I did yesterday





A/P


Assessment and Plan


Diagnosis:  


(1) COPD (chronic obstructive pulmonary disease)


(2) BPH (benign prostatic hyperplasia)


(3) Atrial arrhythmia, resolved


(4) Atrial fibrillation, stable


(5) Pneumonia, now on by mouth antibiotics


(6) Atrial fibrillation, stable


(7) SIRS (systemic inflammatory response syndrome)


(8) Hypoxia, no acute issues while on oxygen at rest


(9) Pneumocystis carinii pneumonia


(10) Thrush, gradual slow improvement, medical management


(11) Dehydration, continue to encourage food and by mouth fluids.  Stable


(12) Ulcerative colitis, resolved


(13) Hypertension


14. AIDs


Assessment and Plan


continue with empiric abx-Bactrim IV, Micafungin, Acyclovir


appreciate ID input


Will need final dose of microfungin  Friday, 2 more days,  before discharge


Pt will need to establish fu with HIV provider (Dr Ernandez) [facility] .  This has been 

discussed with patient.





Vital signs monitored, within normal ranges for now


BC 3/2 negative 


continue to monitor 





Oral candidiasis, improvement gradual, improving on micafungin until Friday


Using Magic mouthwash when necessary





Patient continues to use oxygen around the clock, and has exertional dyspnea 

when up.  We will look at home needs this week


Heparin for DVT prophylaxis





GI symptoms of nausea vomiting diarrhea constipation have been monitored.  

Currently patient denies any diarrhea.  May need further GI workup in the 

future if diarrhea persists.








Patient has decided on POA, and or friends to assist him if he cannot speak for 

himself.  Supportive care to patient,  and friends.





Other medical comorbidities have been monitored and managed and stabilized or 

resolve during this hospital stay.


Case management for possible discharge planning Friday.


Discharge Planning


Initiated


Discussed With:  Nurse, Family (patient), Other (Dr. alejandra, patient seen on 

her behalf)








Coral Li Mar 9, 2017 12:14

## 2017-03-10 VITALS
DIASTOLIC BLOOD PRESSURE: 57 MMHG | HEART RATE: 75 BPM | OXYGEN SATURATION: 97 % | TEMPERATURE: 97.2 F | SYSTOLIC BLOOD PRESSURE: 114 MMHG | RESPIRATION RATE: 17 BRPM

## 2017-03-10 VITALS
RESPIRATION RATE: 18 BRPM | SYSTOLIC BLOOD PRESSURE: 123 MMHG | TEMPERATURE: 96.8 F | DIASTOLIC BLOOD PRESSURE: 74 MMHG | OXYGEN SATURATION: 98 % | HEART RATE: 78 BPM

## 2017-03-10 VITALS
RESPIRATION RATE: 16 BRPM | TEMPERATURE: 98.1 F | SYSTOLIC BLOOD PRESSURE: 134 MMHG | OXYGEN SATURATION: 98 % | HEART RATE: 84 BPM | DIASTOLIC BLOOD PRESSURE: 72 MMHG

## 2017-03-10 VITALS
RESPIRATION RATE: 17 BRPM | OXYGEN SATURATION: 98 % | SYSTOLIC BLOOD PRESSURE: 120 MMHG | HEART RATE: 95 BPM | TEMPERATURE: 96.6 F | DIASTOLIC BLOOD PRESSURE: 73 MMHG

## 2017-03-10 VITALS
HEART RATE: 80 BPM | DIASTOLIC BLOOD PRESSURE: 78 MMHG | SYSTOLIC BLOOD PRESSURE: 120 MMHG | OXYGEN SATURATION: 92 % | TEMPERATURE: 97.6 F | RESPIRATION RATE: 20 BRPM

## 2017-03-10 VITALS — OXYGEN SATURATION: 98 %

## 2017-03-10 RX ADMIN — HYDROMORPHONE HYDROCHLORIDE PRN MG: 1 INJECTION, SOLUTION INTRAMUSCULAR; INTRAVENOUS; SUBCUTANEOUS at 13:59

## 2017-03-10 RX ADMIN — HYDROMORPHONE HYDROCHLORIDE PRN MG: 1 INJECTION, SOLUTION INTRAMUSCULAR; INTRAVENOUS; SUBCUTANEOUS at 10:00

## 2017-03-10 RX ADMIN — SULFAMETHOXAZOLE AND TRIMETHOPRIM SCH MLS/HR: 80; 16 INJECTION, SOLUTION, CONCENTRATE INTRAVENOUS at 06:05

## 2017-03-10 RX ADMIN — GABAPENTIN SCH MG: 100 CAPSULE ORAL at 09:50

## 2017-03-10 RX ADMIN — Medication SCH MG: at 06:05

## 2017-03-10 RX ADMIN — SODIUM CHLORIDE SCH MLS/HR: 900 INJECTION INTRAVENOUS at 17:12

## 2017-03-10 RX ADMIN — MESALAMINE SCH MG: 800 TABLET, DELAYED RELEASE ORAL at 20:38

## 2017-03-10 RX ADMIN — SULFAMETHOXAZOLE AND TRIMETHOPRIM SCH MLS/HR: 80; 16 INJECTION, SOLUTION, CONCENTRATE INTRAVENOUS at 23:52

## 2017-03-10 RX ADMIN — SODIUM CHLORIDE, PRESERVATIVE FREE PRN ML: 5 INJECTION INTRAVENOUS at 18:21

## 2017-03-10 RX ADMIN — HYDROMORPHONE HYDROCHLORIDE PRN MG: 1 INJECTION, SOLUTION INTRAMUSCULAR; INTRAVENOUS; SUBCUTANEOUS at 22:53

## 2017-03-10 RX ADMIN — HEPARIN SODIUM SCH UNITS: 10000 INJECTION, SOLUTION INTRAVENOUS; SUBCUTANEOUS at 20:38

## 2017-03-10 RX ADMIN — Medication SCH CAP: at 09:51

## 2017-03-10 RX ADMIN — MESALAMINE SCH MG: 800 TABLET, DELAYED RELEASE ORAL at 06:05

## 2017-03-10 RX ADMIN — SULFAMETHOXAZOLE AND TRIMETHOPRIM SCH MLS/HR: 80; 16 INJECTION, SOLUTION, CONCENTRATE INTRAVENOUS at 18:17

## 2017-03-10 RX ADMIN — SODIUM CHLORIDE, PRESERVATIVE FREE PRN ML: 5 INJECTION INTRAVENOUS at 14:02

## 2017-03-10 RX ADMIN — IPRATROPIUM BROMIDE AND ALBUTEROL SULFATE PRN AMPULE: .5; 3 SOLUTION RESPIRATORY (INHALATION) at 19:49

## 2017-03-10 RX ADMIN — FAMOTIDINE SCH MG: 20 TABLET, FILM COATED ORAL at 20:38

## 2017-03-10 RX ADMIN — GABAPENTIN SCH MG: 100 CAPSULE ORAL at 13:03

## 2017-03-10 RX ADMIN — FAMOTIDINE SCH MG: 20 TABLET, FILM COATED ORAL at 09:50

## 2017-03-10 RX ADMIN — SODIUM CHLORIDE, PRESERVATIVE FREE SCH ML: 5 INJECTION INTRAVENOUS at 09:53

## 2017-03-10 RX ADMIN — HEPARIN SODIUM SCH UNITS: 10000 INJECTION, SOLUTION INTRAVENOUS; SUBCUTANEOUS at 09:52

## 2017-03-10 RX ADMIN — DILTIAZEM HYDROCHLORIDE SCH MG: 120 CAPSULE, EXTENDED RELEASE ORAL at 09:52

## 2017-03-10 RX ADMIN — METOPROLOL TARTRATE SCH MG: 25 TABLET, FILM COATED ORAL at 09:51

## 2017-03-10 RX ADMIN — METOPROLOL TARTRATE SCH MG: 25 TABLET, FILM COATED ORAL at 20:38

## 2017-03-10 RX ADMIN — OXYCODONE HYDROCHLORIDE AND ACETAMINOPHEN SCH MG: 500 TABLET ORAL at 09:50

## 2017-03-10 RX ADMIN — Medication SCH MG: at 20:37

## 2017-03-10 RX ADMIN — Medication SCH MG: at 13:04

## 2017-03-10 RX ADMIN — MESALAMINE SCH MG: 800 TABLET, DELAYED RELEASE ORAL at 13:03

## 2017-03-10 RX ADMIN — BUDESONIDE AND FORMOTEROL FUMARATE DIHYDRATE SCH PUFF: 160; 4.5 AEROSOL RESPIRATORY (INHALATION) at 09:53

## 2017-03-10 RX ADMIN — GABAPENTIN SCH MG: 100 CAPSULE ORAL at 18:17

## 2017-03-10 RX ADMIN — BUDESONIDE AND FORMOTEROL FUMARATE DIHYDRATE SCH PUFF: 160; 4.5 AEROSOL RESPIRATORY (INHALATION) at 20:40

## 2017-03-10 RX ADMIN — SULFAMETHOXAZOLE AND TRIMETHOPRIM SCH MLS/HR: 80; 16 INJECTION, SOLUTION, CONCENTRATE INTRAVENOUS at 13:03

## 2017-03-10 RX ADMIN — HYDROMORPHONE HYDROCHLORIDE PRN MG: 1 INJECTION, SOLUTION INTRAMUSCULAR; INTRAVENOUS; SUBCUTANEOUS at 18:20

## 2017-03-10 RX ADMIN — SODIUM CHLORIDE, PRESERVATIVE FREE SCH ML: 5 INJECTION INTRAVENOUS at 20:41

## 2017-03-10 NOTE — HHI.PR
Subjective


Remarks


Improved  today. On O2  2l.


Able to eat  more  and throat pan is less.





Objective





 Vital Signs








  Date Time  Temp Pulse Resp B/P Pulse Ox O2 Delivery O2 Flow Rate FiO2


 


3/10/17 16:00 96.6 95 17 120/73 98   


 


3/10/17 12:00 97.2 75 17 114/57 97   


 


3/10/17 08:00 98.1 84 16 134/72 98   


 


3/10/17 00:00 97.6 80 20 120/78 92   


 


3/9/17 20:57   18     


 


3/9/17 20:20       2.00 


 


3/9/17 20:00 98.1 78 24 117/73 94   








 I/O








 3/9/17 3/9/17 3/9/17 3/10/17 3/10/17 3/10/17





 07:00 15:00 23:00 07:00 15:00 23:00


 


Intake Total 980 ml  480 ml 340 ml 2355 ml 


 


Output Total 1125 ml  700 ml   


 


Balance -145 ml  -220 ml 340 ml 2355 ml 


 


      


 


Intake Oral 480 ml  480 ml 340 ml 535 ml 


 


IV Total 500 ml    1820 ml 


 


Output Urine Total 1125 ml  700 ml   


 


# Voids   3 4 3 


 


# Bowel Movements 0  0 0 0 








Objective Remarks


GENERAL: This is a 61 year-old male,in no  distress .


HEENT:  Mucous membranes moist. Throat clear.


NECK: Supple.


CARDIOVASCULAR: Regular rate and rhythm.


RESPIRATORY: Few basal crackles, and Distant breath sounds.


GASTROINTESTINAL: Bowel sounds are present.


ABDOMEN: Not distended.No mass.


MUSCULOSKELETAL: No edema. moves all  well.


NEUROLOGIC: Alert, oriented. .Reflexes  1 +. No deficits.





Assessment and Plan


Assessment and Plan





IMPRESSION:


1. Sepsis on admission.


2. COPD exacerbation.


3. Hyponatremia.


4. Hospital acquired pneumonia.


5. History of A-fib, currently in sinus rhythm.


6. History of ulcerative colitis.


7. PCP pneumonia/Immunocompromised  state











Plan :





1. Cont Antibiotics as ordered  per ID.





2. Nebs tid , duoneb.





3. IS and acapella  at bedside  qid.





4. Ensure supplements bid.





5. PT evaluation. 





6. Chest Xray in am


Code Status:  ACLS ARIADNA Giron MD Mar 10, 2017 17:26

## 2017-03-10 NOTE — HHI.PR
Subjective


Remarks


Tongue pain/edema improving to the point the patient is now able to eat some


No SOB at rest, low volumes


Voice hoarse continues


using o2 continuously


Up in chair, ambulated and exercise today, some exertional dyspnea but improving

 (Coral Li)





Objective


Objective Results


-





 Vital Signs








  Date Time  Temp Pulse Resp B/P Pulse Ox O2 Delivery O2 Flow Rate FiO2


 


3/10/17 12:00 97.2 75 17 114/57 97   


 


3/10/17 08:00 98.1 84 16 134/72 98   


 


3/10/17 00:00 97.6 80 20 120/78 92   


 


3/9/17 20:57   18     


 


3/9/17 20:20       2.00 


 


3/9/17 20:00 98.1 78 24 117/73 94   








 I/O








 3/9/17 3/9/17 3/9/17 3/10/17 3/10/17 3/10/17





 07:00 15:00 23:00 07:00 15:00 23:00


 


Intake Total 980 ml  480 ml 340 ml 2355 ml 


 


Output Total 1125 ml  700 ml   


 


Balance -145 ml  -220 ml 340 ml 2355 ml 


 


      


 


Intake Oral 480 ml  480 ml 340 ml 535 ml 


 


IV Total 500 ml    1820 ml 


 


Output Urine Total 1125 ml  700 ml   


 


# Voids   3 4 3 


 


# Bowel Movements 0  0 0 0 





 (Coral Li)





ROS


General:  Fatigue (generalized), Weakness (generalized)


HEENT:  Sore Throat (improving)


Pulmonary:  Cough (mild), SOB (exertional), Other (Coral Li)





Physical Exam


Physical Exam


PHYSICAL EXAMINATION


GENERAL:  This is a well-developed, well-nourished   male 


who appears to be in no acute distress.  He  is alert and awake, slightly 

anxious


HEAD:  Normocephalic without any lesion or mass noted.  Facial features


appear symmetric.


OROPHARYNGEAL:  Oropharynx without erythema or edema.


NECK:  Supple.  No nuchal rigidity or lymphadenopathy.


Trachea midline without deviation.  


CARDIAC:  Regular rhythm, regular rate, S1 and S2 are heard.  Murmur none no 

gallops or rubs.


LUNGS:  Clear to auscultation bilaterally.  No wheeze, mild rhonchi   No


use of accessory muscles on inspiration or expiration, at rest.  Some chest 

tightness noted this a.m. ,muscle skeletal with cough.


ABDOMEN:  Soft, nontender, no organomegaly or masses.  Bowel sounds are


heard in all four quadrants.  No rebound.  No guarding.


EXTREMITIES:  No edema.  Pulses equal bilateral.  


NEUROLOGICAL:  Patient mood and affect appropriate, mild anxiety. No focal 

deficit


SKIN:Warm and moist


Objective Remarks


I'm feeling better just want to make sure everything is lined up before I go 

home (Coral Li)





A/P


Assessment and Plan


Diagnosis:  


(1) COPD (chronic obstructive pulmonary disease)


(2) BPH (benign prostatic hyperplasia)


(3) Atrial arrhythmia, resolved


(4) Atrial fibrillation, stable


(5) Pneumonia, now on by mouth antibiotics


(6) Atrial fibrillation, stable


(7) SIRS (systemic inflammatory response syndrome)


(8) Hypoxia, no acute issues while on oxygen at rest


(9) Pneumocystis carinii pneumonia


(10) Thrush, gradual slow improvement, medical management


(11) Dehydration, continue to encourage food and by mouth fluids.  Stable


(12) Ulcerative colitis, resolved


(13) Hypertension


14. AIDs


Assessment and Plan


continue with empiric abx-Bactrim IV, Micafungin, Acyclovir


appreciate ID input


Will need final dose of microfungin, do today


Pt will need to establish fu with HIV provider (Dr Ernandez) [facility] .  This has been 

discussed with patient.





Vital signs monitored, within normal ranges for now


BC 3/2 negative 


continue to monitor 





Oral candidiasis, improvement gradual continues and is improving patient is now 

able to eat more food


Using Magic mouthwash when necessary





Patient continues to use oxygen around the clock, and has exertional dyspnea 

when up.  We will look at home needs this week


Heparin for DVT prophylaxis





GI symptoms of nausea vomiting diarrhea constipation have been monitored.  

Currently patient denies any diarrhea.  May need further GI workup in the 

future if diarrhea persists.








Patient has decided on POA, and or friends to assist him if he cannot speak for 

himself.  Supportive care to patient,  and friends.





Other medical comorbidities have been monitored and managed and stabilized or 

resolve during this hospital stay.


Patient complained of some chest tightness with cough today.  Appears to be 

muscle skeletal


Anxiety continues over discharge, but discussed a planning option, so people 

can be with him when he goes home.  We discussed this weekend, and working 

towards a process.  


Patient did get up and ambulate today, and did some exercises.  Tolerated 

fairly well





D/W patient


D/W Dr. Sutton, patient seen on his behalf


Discharge Planning


Initiated


Discussed With:  Nurse, Family (patient), Other (Dr. sutton, patient seen on 

her behalf) (Coral Li)


Assessment and Plan


  seen and examined by myself,Dr Sutton , on 3/10/17


Discussed with patient


Can be discharged home when agreeable with consultants


Discussed with mid-level practitioner 


The exam, history, and the medical decision-making described in the above note 

were completed with the assistance of the mid-level provider. I reviewed the 

findings presented.  I attest that I had a face-to-face encounter with the 

patient on the same day, and personally performed and documented my assessment 

and findings in the medical record (Mari Sutton MD)








Coral Li Mar 10, 2017 16:49


Mari Sutton MD Mar 11, 2017 14:39

## 2017-03-11 VITALS
OXYGEN SATURATION: 94 % | RESPIRATION RATE: 20 BRPM | HEART RATE: 75 BPM | SYSTOLIC BLOOD PRESSURE: 122 MMHG | DIASTOLIC BLOOD PRESSURE: 70 MMHG | TEMPERATURE: 95.6 F

## 2017-03-11 VITALS
HEART RATE: 92 BPM | DIASTOLIC BLOOD PRESSURE: 71 MMHG | OXYGEN SATURATION: 95 % | RESPIRATION RATE: 20 BRPM | SYSTOLIC BLOOD PRESSURE: 131 MMHG | TEMPERATURE: 95.9 F

## 2017-03-11 VITALS
RESPIRATION RATE: 17 BRPM | HEART RATE: 85 BPM | OXYGEN SATURATION: 98 % | TEMPERATURE: 97.6 F | SYSTOLIC BLOOD PRESSURE: 129 MMHG | DIASTOLIC BLOOD PRESSURE: 74 MMHG

## 2017-03-11 VITALS
SYSTOLIC BLOOD PRESSURE: 100 MMHG | RESPIRATION RATE: 20 BRPM | DIASTOLIC BLOOD PRESSURE: 61 MMHG | HEART RATE: 76 BPM | OXYGEN SATURATION: 98 % | TEMPERATURE: 96.1 F

## 2017-03-11 VITALS
RESPIRATION RATE: 20 BRPM | SYSTOLIC BLOOD PRESSURE: 106 MMHG | TEMPERATURE: 96.3 F | DIASTOLIC BLOOD PRESSURE: 58 MMHG | HEART RATE: 82 BPM | OXYGEN SATURATION: 98 %

## 2017-03-11 VITALS
SYSTOLIC BLOOD PRESSURE: 128 MMHG | HEART RATE: 79 BPM | RESPIRATION RATE: 18 BRPM | OXYGEN SATURATION: 97 % | TEMPERATURE: 97.6 F | DIASTOLIC BLOOD PRESSURE: 73 MMHG

## 2017-03-11 RX ADMIN — Medication SCH MG: at 05:53

## 2017-03-11 RX ADMIN — Medication SCH MG: at 20:27

## 2017-03-11 RX ADMIN — GABAPENTIN SCH MG: 100 CAPSULE ORAL at 09:11

## 2017-03-11 RX ADMIN — METOPROLOL TARTRATE SCH MG: 25 TABLET, FILM COATED ORAL at 20:26

## 2017-03-11 RX ADMIN — Medication SCH TAB: at 09:15

## 2017-03-11 RX ADMIN — GABAPENTIN SCH MG: 100 CAPSULE ORAL at 17:52

## 2017-03-11 RX ADMIN — HEPARIN SODIUM SCH UNITS: 10000 INJECTION, SOLUTION INTRAVENOUS; SUBCUTANEOUS at 09:10

## 2017-03-11 RX ADMIN — BUDESONIDE AND FORMOTEROL FUMARATE DIHYDRATE SCH PUFF: 160; 4.5 AEROSOL RESPIRATORY (INHALATION) at 09:00

## 2017-03-11 RX ADMIN — OXYCODONE HYDROCHLORIDE AND ACETAMINOPHEN SCH MG: 500 TABLET ORAL at 09:10

## 2017-03-11 RX ADMIN — SULFAMETHOXAZOLE AND TRIMETHOPRIM SCH MLS/HR: 80; 16 INJECTION, SOLUTION, CONCENTRATE INTRAVENOUS at 17:51

## 2017-03-11 RX ADMIN — HEPARIN SODIUM SCH UNITS: 10000 INJECTION, SOLUTION INTRAVENOUS; SUBCUTANEOUS at 20:29

## 2017-03-11 RX ADMIN — HYDROMORPHONE HYDROCHLORIDE PRN MG: 1 INJECTION, SOLUTION INTRAMUSCULAR; INTRAVENOUS; SUBCUTANEOUS at 20:38

## 2017-03-11 RX ADMIN — Medication SCH CAP: at 09:10

## 2017-03-11 RX ADMIN — SODIUM CHLORIDE, PRESERVATIVE FREE SCH ML: 5 INJECTION INTRAVENOUS at 20:29

## 2017-03-11 RX ADMIN — SULFAMETHOXAZOLE AND TRIMETHOPRIM SCH MLS/HR: 80; 16 INJECTION, SOLUTION, CONCENTRATE INTRAVENOUS at 13:05

## 2017-03-11 RX ADMIN — FAMOTIDINE SCH MG: 20 TABLET, FILM COATED ORAL at 20:27

## 2017-03-11 RX ADMIN — FAMOTIDINE SCH MG: 20 TABLET, FILM COATED ORAL at 09:11

## 2017-03-11 RX ADMIN — HYDROMORPHONE HYDROCHLORIDE PRN MG: 1 INJECTION, SOLUTION INTRAMUSCULAR; INTRAVENOUS; SUBCUTANEOUS at 10:20

## 2017-03-11 RX ADMIN — SODIUM CHLORIDE, PRESERVATIVE FREE SCH ML: 5 INJECTION INTRAVENOUS at 09:00

## 2017-03-11 RX ADMIN — METOPROLOL TARTRATE SCH MG: 25 TABLET, FILM COATED ORAL at 09:11

## 2017-03-11 RX ADMIN — SULFAMETHOXAZOLE AND TRIMETHOPRIM SCH MLS/HR: 80; 16 INJECTION, SOLUTION, CONCENTRATE INTRAVENOUS at 05:53

## 2017-03-11 RX ADMIN — MESALAMINE SCH MG: 800 TABLET, DELAYED RELEASE ORAL at 20:27

## 2017-03-11 RX ADMIN — HYDROMORPHONE HYDROCHLORIDE PRN MG: 1 INJECTION, SOLUTION INTRAMUSCULAR; INTRAVENOUS; SUBCUTANEOUS at 05:54

## 2017-03-11 RX ADMIN — Medication SCH MG: at 13:07

## 2017-03-11 RX ADMIN — GABAPENTIN SCH MG: 100 CAPSULE ORAL at 13:05

## 2017-03-11 RX ADMIN — BUDESONIDE AND FORMOTEROL FUMARATE DIHYDRATE SCH PUFF: 160; 4.5 AEROSOL RESPIRATORY (INHALATION) at 20:31

## 2017-03-11 RX ADMIN — MESALAMINE SCH MG: 800 TABLET, DELAYED RELEASE ORAL at 13:07

## 2017-03-11 RX ADMIN — MESALAMINE SCH MG: 800 TABLET, DELAYED RELEASE ORAL at 05:53

## 2017-03-11 RX ADMIN — SODIUM CHLORIDE SCH MLS/HR: 900 INJECTION INTRAVENOUS at 16:44

## 2017-03-11 RX ADMIN — HYDROMORPHONE HYDROCHLORIDE PRN MG: 1 INJECTION, SOLUTION INTRAMUSCULAR; INTRAVENOUS; SUBCUTANEOUS at 16:42

## 2017-03-11 RX ADMIN — DILTIAZEM HYDROCHLORIDE SCH MG: 120 CAPSULE, EXTENDED RELEASE ORAL at 09:10

## 2017-03-11 NOTE — RADRPT
EXAM DATE/TIME:  03/11/2017 05:25 

 

[facility] COMPARISON:     

CHEST SINGLE AP, March 06, 2017, 5:50.

 

                     

INDICATIONS :     

Shortness of breath.

                     

 

MEDICAL HISTORY :     

Hypertension.  Chronic obstructive pulmonary disease.        

 

SURGICAL HISTORY :     

None.   

 

ENCOUNTER:     

Subsequent                                        

 

ACUITY:     

1 month      

 

PAIN SCORE:     

0/10

 

LOCATION:     

Bilateral chest 

 

FINDINGS:     

The patchy infiltrates in the lateral right lung are less prominent than on prior examination.  There
 is non-consolidative infiltrate is present in the medial right lower lung and in the

 

CONCLUSION:     

Patchy areas of non-consolidative infiltrate both lower lungs, new.  Decreased infiltrates in the upp
er lungs.

 

 

 

 Newton Yanes MD on March 11, 2017 at 6:08           

Board Certified Radiologist.

 This report was verified electronically.

## 2017-03-11 NOTE — HHI.PR
Subjective


Remarks


Tongue pain/edema improving to the point the patient is now able to eat some on 

the left side.


No SOB at rest, low volumes gradually improving


Voice hoarse improving


using o2 continuously, increasing ambulation and exercise


Working on a discharge plan with patient's involvement (Coral Li)





Objective


Objective Results


-





 Vital Signs








  Date Time  Temp Pulse Resp B/P Pulse Ox O2 Delivery O2 Flow Rate FiO2


 


3/11/17 12:00 96.1 76 20 100/61 98   


 


3/11/17 09:18   18     


 


3/11/17 08:45       2.00 


 


3/11/17 08:00 95.6 75 20 122/70 94   


 


3/11/17 00:00 95.9 92 20 131/71 95   


 


3/10/17 20:38       2.00 


 


3/10/17 20:00 96.8 78 18 123/74 98   


 


3/10/17 19:49     98 Nasal Cannula 2.00 


 


3/10/17 16:00 96.6 95 17 120/73 98   








 I/O








 3/10/17 3/10/17 3/10/17 3/11/17 3/11/17 3/11/17





 07:00 15:00 23:00 07:00 15:00 23:00


 


Intake Total 340 ml 4450 ml 480 ml 0 ml  


 


Balance 340 ml 4450 ml 480 ml 0 ml  


 


      


 


Intake Oral 340 ml 535 ml 480 ml 0 ml  


 


IV Total  3915 ml    


 


# Voids 4 3 2 1  


 


# Bowel Movements 0 0    





 (Coral Li)





ROS


General:  Weakness (generalized but improving)


Pulmonary:  SOB (with exertion slow gradual improvement)


GI:  Diarrhea (none) (Coral Li)





Physical Exam


Physical Exam


PHYSICAL EXAMINATION


GENERAL:  This is a well-developed, well-nourished   male 


who appears to be in no acute distress.  He  is alert and awake, responds 

readily 


HEAD:  Normocephalic without any lesion or mass noted.  Facial features


appear symmetric.


OROPHARYNGEAL:  Oropharynx without erythema or edema.


NECK:  Supple.  No nuchal rigidity or lymphadenopathy.


Trachea midline without deviation.  


CARDIAC:  Regular rhythm, regular rate, S1 and S2 are heard.  Murmur none no 

gallops or rubs.


LUNGS:  Clear to auscultation bilaterally.  None wheeze, none rhonchi . 


ABDOMEN:  Soft, nontender, no organomegaly or masses.  Bowel sounds are


heard in all four quadrants.  No rebound.  No guarding.


EXTREMITIES:  no edema.  Pulses equal bilateral.  


NEUROLOGICAL:  Patient mood and affect appropriate. No focal deficit


SKIN:Warm and moist


Objective Remarks


I am able to eat and she is now on the left side of my mouth (Coral Li)





A/P


Assessment and Plan


Diagnosis:  


(1) COPD (chronic obstructive pulmonary disease)


(2) BPH (benign prostatic hyperplasia)


(3) Atrial arrhythmia, resolved


(4) Atrial fibrillation, stable


(5) Pneumonia, now on by mouth antibiotics


(6) Atrial fibrillation, stable


(7) SIRS (systemic inflammatory response syndrome)


(8) Hypoxia, no acute issues while on oxygen at rest


(9) Pneumocystis carinii pneumonia


(10) Thrush, gradual slow improvement, medical management


(11) Dehydration, continue to encourage food and by mouth fluids.  Stable


(12) Ulcerative colitis, resolved


(13) Hypertension


14. AIDs


Assessment and Plan








Pt will need to establish fu with HIV provider (Dr Ernandez) ASAP .  Patient is 

aware and ready for the plan.





Vital signs reviewed and monitored, within normal ranges for now





Oral candidiasis, improvement gradual continues and is improving patient is now 

able to eat more food.  Chewing now on the left side of his mouth.  Doing good 

oral care per patient.


Using Magic mouthwash when necessary





Patient continues to use oxygen ,  We will look at home needs this week.  We 

will need walk test probably Monday to evaluate O2 needs


Heparin for DVT prophylaxis





GI symptoms of nausea vomiting diarrhea constipation have been monitored.  

Currently patient denies any diarrhea.  May need further GI workup in the 

future if diarrhea persists.








Patient has decided on POA, and or friends to assist him if he cannot speak for 

himself.  Supportive care to patient,  and friends.


Discussed discharge planning for 314 when IV antibiotics are complete.  At that 

point medications should be by mouth per ID.


Patient is planning and involved with his discharge.  We are discussing Monday, 

after he is evaluated.  


No other acute changes.  Encourage patient to stay active, but with frequent 

rest periods.  Continue good nutrition with ensure supplements.


D/W patient


D/W Dr. Cervantes, patient seen on her behalf


Discharge Planning


Initiated


Discussed With:  Nurse, Family (patient), Other (Dr. alejandra, patient seen on 

her behalf) (Coral Li)


Assessment and Plan


61yr old male seen and examined today. 


Feeling much better. 


Decreased SOB. Decreased hoarseness of voice. 


Appetite improving. 


Agree with above A/P. 


Will monitor.  (Pippa Cervantes MD)








Coral Li Mar 11, 2017 15:13


Pippa Cervantes MD Mar 11, 2017 16:02

## 2017-03-12 VITALS
DIASTOLIC BLOOD PRESSURE: 64 MMHG | TEMPERATURE: 98.1 F | HEART RATE: 75 BPM | OXYGEN SATURATION: 99 % | SYSTOLIC BLOOD PRESSURE: 124 MMHG | RESPIRATION RATE: 19 BRPM

## 2017-03-12 VITALS
RESPIRATION RATE: 18 BRPM | HEART RATE: 78 BPM | TEMPERATURE: 97.3 F | SYSTOLIC BLOOD PRESSURE: 140 MMHG | DIASTOLIC BLOOD PRESSURE: 74 MMHG | OXYGEN SATURATION: 97 %

## 2017-03-12 VITALS
TEMPERATURE: 98.1 F | HEART RATE: 78 BPM | SYSTOLIC BLOOD PRESSURE: 104 MMHG | OXYGEN SATURATION: 98 % | DIASTOLIC BLOOD PRESSURE: 68 MMHG | RESPIRATION RATE: 18 BRPM

## 2017-03-12 VITALS
RESPIRATION RATE: 20 BRPM | DIASTOLIC BLOOD PRESSURE: 63 MMHG | OXYGEN SATURATION: 97 % | HEART RATE: 76 BPM | SYSTOLIC BLOOD PRESSURE: 107 MMHG | TEMPERATURE: 97.7 F

## 2017-03-12 RX ADMIN — BUDESONIDE AND FORMOTEROL FUMARATE DIHYDRATE SCH PUFF: 160; 4.5 AEROSOL RESPIRATORY (INHALATION) at 08:22

## 2017-03-12 RX ADMIN — FAMOTIDINE SCH MG: 20 TABLET, FILM COATED ORAL at 20:47

## 2017-03-12 RX ADMIN — METOPROLOL TARTRATE SCH MG: 25 TABLET, FILM COATED ORAL at 20:47

## 2017-03-12 RX ADMIN — HYDROMORPHONE HYDROCHLORIDE PRN MG: 1 INJECTION, SOLUTION INTRAMUSCULAR; INTRAVENOUS; SUBCUTANEOUS at 23:13

## 2017-03-12 RX ADMIN — Medication SCH MG: at 05:15

## 2017-03-12 RX ADMIN — GABAPENTIN SCH MG: 100 CAPSULE ORAL at 11:42

## 2017-03-12 RX ADMIN — BUDESONIDE AND FORMOTEROL FUMARATE DIHYDRATE SCH PUFF: 160; 4.5 AEROSOL RESPIRATORY (INHALATION) at 20:52

## 2017-03-12 RX ADMIN — MESALAMINE SCH MG: 800 TABLET, DELAYED RELEASE ORAL at 20:47

## 2017-03-12 RX ADMIN — Medication SCH CAP: at 08:21

## 2017-03-12 RX ADMIN — SULFAMETHOXAZOLE AND TRIMETHOPRIM SCH MLS/HR: 80; 16 INJECTION, SOLUTION, CONCENTRATE INTRAVENOUS at 05:15

## 2017-03-12 RX ADMIN — SULFAMETHOXAZOLE AND TRIMETHOPRIM SCH MLS/HR: 80; 16 INJECTION, SOLUTION, CONCENTRATE INTRAVENOUS at 18:14

## 2017-03-12 RX ADMIN — HEPARIN SODIUM SCH UNITS: 10000 INJECTION, SOLUTION INTRAVENOUS; SUBCUTANEOUS at 08:22

## 2017-03-12 RX ADMIN — MESALAMINE SCH MG: 800 TABLET, DELAYED RELEASE ORAL at 13:40

## 2017-03-12 RX ADMIN — OXYCODONE HYDROCHLORIDE AND ACETAMINOPHEN SCH MG: 500 TABLET ORAL at 08:21

## 2017-03-12 RX ADMIN — SULFAMETHOXAZOLE AND TRIMETHOPRIM SCH MLS/HR: 80; 16 INJECTION, SOLUTION, CONCENTRATE INTRAVENOUS at 11:40

## 2017-03-12 RX ADMIN — MESALAMINE SCH MG: 800 TABLET, DELAYED RELEASE ORAL at 05:16

## 2017-03-12 RX ADMIN — FAMOTIDINE SCH MG: 20 TABLET, FILM COATED ORAL at 08:21

## 2017-03-12 RX ADMIN — SODIUM CHLORIDE, PRESERVATIVE FREE SCH ML: 5 INJECTION INTRAVENOUS at 08:22

## 2017-03-12 RX ADMIN — SODIUM CHLORIDE SCH MLS/HR: 900 INJECTION INTRAVENOUS at 16:18

## 2017-03-12 RX ADMIN — Medication SCH MG: at 13:40

## 2017-03-12 RX ADMIN — HEPARIN SODIUM SCH UNITS: 10000 INJECTION, SOLUTION INTRAVENOUS; SUBCUTANEOUS at 20:49

## 2017-03-12 RX ADMIN — SULFAMETHOXAZOLE AND TRIMETHOPRIM SCH MLS/HR: 80; 16 INJECTION, SOLUTION, CONCENTRATE INTRAVENOUS at 00:16

## 2017-03-12 RX ADMIN — METOPROLOL TARTRATE SCH MG: 25 TABLET, FILM COATED ORAL at 08:21

## 2017-03-12 RX ADMIN — Medication SCH TAB: at 08:21

## 2017-03-12 RX ADMIN — HYDROMORPHONE HYDROCHLORIDE PRN MG: 1 INJECTION, SOLUTION INTRAMUSCULAR; INTRAVENOUS; SUBCUTANEOUS at 05:23

## 2017-03-12 RX ADMIN — HYDROMORPHONE HYDROCHLORIDE PRN MG: 1 INJECTION, SOLUTION INTRAMUSCULAR; INTRAVENOUS; SUBCUTANEOUS at 18:15

## 2017-03-12 RX ADMIN — HYDROMORPHONE HYDROCHLORIDE PRN MG: 1 INJECTION, SOLUTION INTRAMUSCULAR; INTRAVENOUS; SUBCUTANEOUS at 13:44

## 2017-03-12 RX ADMIN — GABAPENTIN SCH MG: 100 CAPSULE ORAL at 18:14

## 2017-03-12 RX ADMIN — GABAPENTIN SCH MG: 100 CAPSULE ORAL at 08:21

## 2017-03-12 RX ADMIN — SODIUM CHLORIDE, PRESERVATIVE FREE SCH ML: 5 INJECTION INTRAVENOUS at 20:52

## 2017-03-12 RX ADMIN — Medication SCH MG: at 20:48

## 2017-03-12 RX ADMIN — DILTIAZEM HYDROCHLORIDE SCH MG: 120 CAPSULE, EXTENDED RELEASE ORAL at 08:21

## 2017-03-12 NOTE — HHI.PR
Subjective


Remarks


Tongue pain/edema improving to the point the patient is now able to eat some on 

the left side, resolving


No SOB at rest, has improved every day over the past week in his stamina, slow 

and gradual


Voice hoarse improving, clear speech


Resting in bed, O2 and lap but not in


Working on a discharge plan with patient's involvement, planned Monday





Objective


Objective Results


-





 Vital Signs








  Date Time  Temp Pulse Resp B/P Pulse Ox O2 Delivery O2 Flow Rate FiO2


 


3/12/17 12:00 98.1 75 19 124/64 99   


 


3/12/17 08:45      Nasal Cannula 2.00 98


 


3/12/17 08:00 97.3 78 18 140/74 97   


 


3/11/17 22:00 97.6 79 18 128/73 97   


 


3/11/17 20:25      Nasal Cannula 2.00 


 


3/11/17 20:00 97.6 85 17 129/74 98   


 


3/11/17 16:00 96.3 82 20 106/58 98   








 I/O








 3/11/17 3/11/17 3/11/17 3/12/17 3/12/17 3/12/17





 07:00 15:00 23:00 07:00 15:00 23:00


 


Intake Total 0 ml 1340 ml 740 ml 777 ml  


 


Balance 0 ml 1340 ml 740 ml 777 ml  


 


      


 


Intake Oral 0 ml 840 ml 240 ml 240 ml  


 


IV Total  500 ml 500 ml 537 ml  


 


# Voids 1 3 3 2  


 


# Bowel Movements  0 1 0  











ROS


General:  Fatigue (general improvement), Weakness, Other (10 point ROS done.  

Positives include generalized weakness and fatigue sore throat is improving 

occasional cough, exertional shortness of breath.  The systems negative or 

unremarkable)


HEENT:  Sore Throat


Pulmonary:  Cough, SOB





Physical Exam


Physical Exam


PHYSICAL EXAMINATION


GENERAL:  This is a thin   male 


who appears to be in no acute distress at rest  He  is drowsy but arouses to 

verbal stimuli


HEAD:  Normocephalic without any lesion or mass noted.  Facial features


appear symmetric.


OROPHARYNGEAL:  Oropharynx moist, mild erythema right side


NECK:  Supple.  


Trachea midline without deviation.  


CARDIAC:  Regular rhythm, regular rate, S1 and S2 are heard.  Murmur none


LUNGS:  Clear to auscultation bilaterally


ABDOMEN:  Soft, nontender, no organomegaly or masses.  Bowel sounds are


heard in all four quadrants.  No rebound.  No guarding.  No diarrhea


EXTREMITIES: No edema.  Pulses equal bilateral.  


NEUROLOGICAL:  Patient mood and affect appropriate. No focal deficit.  Making 

plans for his discharge and does appear less anxious.


SKIN:Warm and moist


Objective Remarks


I'm feeling much better this past week and I feel on building my strength up.





A/P


Assessment and Plan


Diagnosis:  


(1) COPD (chronic obstructive pulmonary disease)


(2) BPH (benign prostatic hyperplasia)


(3) Atrial arrhythmia, resolved


(4) Atrial fibrillation, stable


(5) Pneumonia, now on by mouth antibiotics


(6) Atrial fibrillation, stable


(7) SIRS (systemic inflammatory response syndrome)


(8) Hypoxia, no acute issues while on oxygen at rest


(9) Pneumocystis carinii pneumonia


(10) Thrush, gradual slow improvement, medical management


(11) Dehydration, continue to encourage food and by mouth fluids.  Stable


(12) Ulcerative colitis, resolved


(13) Hypertension


14. AIDs


Assessment and Plan








Pt will need to establish fu with HIV provider (Dr Ernandez) ASAP .  Patient is 

aware and ready for the plan.  Discussed in length with him per ID





Vital signs reviewed and monitored, within normal ranges for now





Oral candidiasis, almost resolved.  He'll have some mild erythema on the tongue 

and gum area Doing good oral care per patient.


Using Magic mouthwash when necessary





Patient continues to use oxygen , patient has O2 at home already.  We'll eval 

whether patient needs to have another walk test before going home


Heparin for DVT prophylaxis





GI symptoms of nausea vomiting diarrhea constipation have been monitored.  

Currently patient denies any diarrhea.  May need further GI workup in the 

future if diarrhea persists.








Patient has decided on POA, and or friends to assist him if he cannot speak for 

himself.  Supportive care to patient,  and friends.


Discussed discharge planning for 314 when IV antibiotics are complete.  At that 

point medications should be by mouth per ID.


Patient is planning and involved with his discharge.  We are still discussing 

Monday.  Patient seems to be ready for the transition this time


No other acute changes.  Encourage patient to stay active, but with frequent 

rest periods.  Continue good nutrition with ensure supplements.





D/W patient


D/W Dr. Stern, patient seen on her behalf


Discharge Planning


Home with home health.


O2 probable


Discussed With:  Nurse, Family (patient), Other (Dr. stern,  patient seen on 

her behalf)








Coral Li Mar 12, 2017 14:11

## 2017-03-13 VITALS
RESPIRATION RATE: 18 BRPM | DIASTOLIC BLOOD PRESSURE: 85 MMHG | HEART RATE: 70 BPM | OXYGEN SATURATION: 98 % | TEMPERATURE: 98.1 F | SYSTOLIC BLOOD PRESSURE: 134 MMHG

## 2017-03-13 VITALS
HEART RATE: 76 BPM | RESPIRATION RATE: 17 BRPM | TEMPERATURE: 98.1 F | DIASTOLIC BLOOD PRESSURE: 72 MMHG | OXYGEN SATURATION: 97 % | SYSTOLIC BLOOD PRESSURE: 119 MMHG

## 2017-03-13 VITALS — OXYGEN SATURATION: 99 %

## 2017-03-13 VITALS
DIASTOLIC BLOOD PRESSURE: 71 MMHG | TEMPERATURE: 97.8 F | RESPIRATION RATE: 22 BRPM | OXYGEN SATURATION: 96 % | HEART RATE: 72 BPM | SYSTOLIC BLOOD PRESSURE: 125 MMHG

## 2017-03-13 RX ADMIN — DILTIAZEM HYDROCHLORIDE SCH MG: 120 CAPSULE, EXTENDED RELEASE ORAL at 08:34

## 2017-03-13 RX ADMIN — MESALAMINE SCH MG: 800 TABLET, DELAYED RELEASE ORAL at 14:02

## 2017-03-13 RX ADMIN — SULFAMETHOXAZOLE AND TRIMETHOPRIM SCH MLS/HR: 80; 16 INJECTION, SOLUTION, CONCENTRATE INTRAVENOUS at 06:11

## 2017-03-13 RX ADMIN — HYDROMORPHONE HYDROCHLORIDE PRN MG: 1 INJECTION, SOLUTION INTRAMUSCULAR; INTRAVENOUS; SUBCUTANEOUS at 06:13

## 2017-03-13 RX ADMIN — OXYCODONE HYDROCHLORIDE AND ACETAMINOPHEN SCH MG: 500 TABLET ORAL at 08:34

## 2017-03-13 RX ADMIN — GABAPENTIN SCH MG: 100 CAPSULE ORAL at 08:34

## 2017-03-13 RX ADMIN — METOPROLOL TARTRATE SCH MG: 25 TABLET, FILM COATED ORAL at 08:34

## 2017-03-13 RX ADMIN — Medication SCH MG: at 14:02

## 2017-03-13 RX ADMIN — BUDESONIDE AND FORMOTEROL FUMARATE DIHYDRATE SCH PUFF: 160; 4.5 AEROSOL RESPIRATORY (INHALATION) at 08:35

## 2017-03-13 RX ADMIN — GABAPENTIN SCH MG: 100 CAPSULE ORAL at 16:43

## 2017-03-13 RX ADMIN — AZITHROMYCIN SCH MG: 600 TABLET, FILM COATED ORAL at 08:34

## 2017-03-13 RX ADMIN — HYDROMORPHONE HYDROCHLORIDE PRN MG: 1 INJECTION, SOLUTION INTRAMUSCULAR; INTRAVENOUS; SUBCUTANEOUS at 12:07

## 2017-03-13 RX ADMIN — SULFAMETHOXAZOLE AND TRIMETHOPRIM SCH MLS/HR: 80; 16 INJECTION, SOLUTION, CONCENTRATE INTRAVENOUS at 17:40

## 2017-03-13 RX ADMIN — Medication SCH TAB: at 08:34

## 2017-03-13 RX ADMIN — Medication SCH CAP: at 08:34

## 2017-03-13 RX ADMIN — HEPARIN SODIUM SCH UNITS: 10000 INJECTION, SOLUTION INTRAVENOUS; SUBCUTANEOUS at 08:39

## 2017-03-13 RX ADMIN — SULFAMETHOXAZOLE AND TRIMETHOPRIM SCH MLS/HR: 80; 16 INJECTION, SOLUTION, CONCENTRATE INTRAVENOUS at 12:11

## 2017-03-13 RX ADMIN — FAMOTIDINE SCH MG: 20 TABLET, FILM COATED ORAL at 08:34

## 2017-03-13 RX ADMIN — MESALAMINE SCH MG: 800 TABLET, DELAYED RELEASE ORAL at 06:06

## 2017-03-13 RX ADMIN — SODIUM CHLORIDE, PRESERVATIVE FREE SCH ML: 5 INJECTION INTRAVENOUS at 08:39

## 2017-03-13 RX ADMIN — SULFAMETHOXAZOLE AND TRIMETHOPRIM SCH MLS/HR: 80; 16 INJECTION, SOLUTION, CONCENTRATE INTRAVENOUS at 00:45

## 2017-03-13 RX ADMIN — Medication SCH MG: at 06:06

## 2017-03-13 RX ADMIN — GABAPENTIN SCH MG: 100 CAPSULE ORAL at 12:08

## 2017-03-13 RX ADMIN — IPRATROPIUM BROMIDE AND ALBUTEROL SULFATE PRN AMPULE: .5; 3 SOLUTION RESPIRATORY (INHALATION) at 14:09

## 2017-03-13 RX ADMIN — HYDROMORPHONE HYDROCHLORIDE PRN MG: 1 INJECTION, SOLUTION INTRAMUSCULAR; INTRAVENOUS; SUBCUTANEOUS at 16:40

## 2017-03-13 RX ADMIN — SODIUM CHLORIDE SCH MLS/HR: 900 INJECTION INTRAVENOUS at 16:43

## 2017-03-13 NOTE — HHI.FF
Face to Face Verification


Diagnosis:  


(1) Hypoxia


(2) Hypertension


(3) Pneumocystis carinii pneumonia


(4) Ulcerative colitis


(5) Atrial fibrillation


(6) SIRS (systemic inflammatory response syndrome)


(7) BPH (benign prostatic hyperplasia)


(8) AIDS


Physical Therapy


Order:  Evaluate and Treat, Improve ambulation





Home Health Nursing


Order:     Oxygen administration education


      Nursing assessment with vital signs








I have seen patient Zo Pickard on 3/13/17. My clinical findings 

support the need for the requested home health care services because:


Ltd mobility - disease progression


Deconditioned w/ increased weakness


Need for psychosocial assistance








I certify that my clinical findings support that this patient is homebound 

because:


Hx COPD- exertion dyspnea/weakness


Unsteady gait/balance








Coral Li Mar 13, 2017 13:45

## 2017-03-13 NOTE — HHI.PR
Subjective


Remarks


Better  today. On O2  2l.


Able to eat  more  and  Voice is  much improved.  No fever.





Objective





 Vital Signs








  Date Time  Temp Pulse Resp B/P Pulse Ox O2 Delivery O2 Flow Rate FiO2


 


3/13/17 14:12     99 Nasal Cannula 2.00 


 


3/13/17 12:00 98.1 76 17 119/72 97   


 


3/13/17 08:34      Nasal Cannula 2.00 


 


3/13/17 08:00 98.1 70 18 134/85 98   


 


3/13/17 00:00 97.8 72 22 125/71 96   


 


3/12/17 20:50      Nasal Cannula 2.00 


 


3/12/17 20:00 97.7 76 20 107/63 97   








 I/O








 3/12/17 3/12/17 3/12/17 3/13/17 3/13/17 3/13/17





 07:00 15:00 23:00 07:00 15:00 23:00


 


Intake Total 777 ml 720 ml 2178 ml 620 ml 960 ml 


 


Balance 777 ml 720 ml 2178 ml 620 ml 960 ml 


 


      


 


Intake Oral 240 ml 720 ml 480 ml 120 ml 960 ml 


 


IV Total 537 ml  1698 ml 500 ml  


 


# Voids 2 4 2 3 3 


 


# Bowel Movements 0 1 0 0 1 








Objective Remarks


GENERAL: This is a 61 year-old male,in no  distress .


HEENT:  Mucous membranes moist. Throat clear.


NECK: Supple.


CARDIOVASCULAR: Regular rate and rhythm.


RESPIRATORY: Few wheezes, and Distant breath sounds.


GASTROINTESTINAL: Bowel sounds are present.


ABDOMEN: Not distended.No mass.


MUSCULOSKELETAL: No edema. no deformity.


NEUROLOGIC: Alert, oriented. .Reflexes  1 +. No deficits.





Assessment and Plan


Assessment and Plan





IMPRESSION:


1. Sepsis on admission.


2. COPD exacerbation.


3. Hyponatremia.


4. Hospital acquired pneumonia.


5. History of A-fib, currently in sinus rhythm.


6. History of ulcerative colitis.


7. PCP pneumonia/Immunocompromised  state











Plan :





1. Cont Antibiotics as ordered  per ID.





2. Nebs tid , duoneb.





3. IS and acapella  at bedside  qid.





4. Ensure supplements bid.





5. PT evaluation. 





6. Home  soon.








ARIADNA Murry MD Mar 13, 2017 19:19

## 2017-03-13 NOTE — HHI.PR
Subjective


Remarks


Tongue pain/edema re solving  90%


No SOB at rest, has improved every day over the past week in his stamina, slow 

and gradual


Voice hoarse improving, clear speech


Resting in bed, O2 on


Working on a discharge plan Monday


afebrile (Coral Li)





Objective


Objective Results


-





 Vital Signs








  Date Time  Temp Pulse Resp B/P Pulse Ox O2 Delivery O2 Flow Rate FiO2


 


3/13/17 12:00 98.1 76 17 119/72 97   


 


3/13/17 08:34      Nasal Cannula 2.00 


 


3/13/17 08:00 98.1 70 18 134/85 98   


 


3/13/17 00:00 97.8 72 22 125/71 96   


 


3/12/17 20:50      Nasal Cannula 2.00 


 


3/12/17 20:00 97.7 76 20 107/63 97   


 


3/12/17 16:00 98.1 78 18 104/68 98   








 I/O








 3/12/17 3/12/17 3/12/17 3/13/17 3/13/17 3/13/17





 07:00 15:00 23:00 07:00 15:00 23:00


 


Intake Total 777 ml 720 ml 2178 ml 620 ml  


 


Balance 777 ml 720 ml 2178 ml 620 ml  


 


      


 


Intake Oral 240 ml 720 ml 480 ml 120 ml  


 


IV Total 537 ml  1698 ml 500 ml  


 


# Voids 2 4 2 3  


 


# Bowel Movements 0 1 0 0  





 (Coral iL)





ROS


General:  Fatigue, Weakness (improving), Other (10 point ROS done positives 

noted are fatigue and weakness which are improving, sore throat and sore tongue 

improving 90% back to baseline no diarrhea anxiety mild, other systems negative 

or unremarkable)


HEENT:  Sore Throat


Pulmonary:  SOB (exertional)


GI:  Diarrhea (none)


Neuro/MS:  Other (anxiety improved) (Coral Li)





Physical Exam


Physical Exam


PHYSICAL EXAMINATION


GENERAL:  This is a well-developed, slim   male 


who appears to be in no acute distress.  He  is alert and awake,   


HEAD:  Normocephalic without any lesion or mass noted.  Facial features


appear symmetric.


OROPHARYNGEAL:  Oropharynx with erythema or edema.  Resolving


NECK:  Supple.  No nuchal rigidity or lymphadenopathy.


Trachea midline without deviation.  


CARDIAC:  Regular rhythm, regular rate, S1 and S2 are heard.  Murmur none 


LUNGS:  Clear to auscultation bilaterally.  No wheeze,  rhonchi or no rale.  No


use of accessory muscles on inspiration or expiration at rest


ABDOMEN:  Soft, nontender, no organomegaly or masses.  Bowel sounds are


heard in all four quadrants.  No rebound.  No guarding.


EXTREMITIES:  No edema.  Pulses equal bilateral.  


NEUROLOGICAL:  Patient mood and affect appropriate.  Mild anxiety but improving 

when patient is feeling better


SKIN:Warm and moist


Objective Remarks


I'm feeling so much better and feeling stronger every day (Coral Li

)





A/P


Assessment and Plan


Diagnosis:  


(1) COPD (chronic obstructive pulmonary disease)


   Medical management stable


(2) BPH (benign prostatic hyperplasia)


   Medical management stable


(3) Atrial arrhythmia, resolved


   Continue Cardizem by mouth per cardiology and see when necessary


(4) Atrial fibrillation, stable





(5) Pneumonia, now on by mouth antibiotics


   IV antibiotics completed today and will transition to Bactrim DS 2 by mouth 

3 times a day for 21 days on discharge


   


(6) Atrial fibrillation, stable





(7) SIRS (systemic inflammatory response syndrome)


   Will follow up as outpatient with plan of care and treatment regimen given 

to him per Dr. Ray


(8) Hypoxia, no acute issues while on oxygen at rest


   Patient will use O2 at 2 L when necessary at home


(9) Pneumocystis carinii pneumonia


   Stable, resolved


(10) Thrush, gradual slow improvement, medical management


   Acyclovir 400 mg 3 times a day until tongue and oral thrush is completely 

resolved.  May use Magic mouthwash when necessary


(11) Dehydration, continue to encourage food and by mouth fluids.  Stable





(12) Ulcerative colitis, resolved


   Will follow up with GI if symptoms return of diarrhea


(13) Hypertension





14. AIDs


   Outpatient appointments and information/plan a care in place.  Dr. Ernandez








Discharge Planning


Home with home health.


O2 probable


Physical therapy


Discussed With:  Nurse, Family, Other (Dr. Gonzalez, seen on his behalf) (Coral Li)


Assessment and Plan


pt is seen & Examined


chart reviewed 


ID input noted


d/w PT 


d/w Coral 


medically stable for d/c 


d/c home today w C


see [facility]


see Orders


f/u pcp


f/u Dr Sheridan in 1 wks for HAART tx  (Anwer,Muhammed Coral Pink Mar 13, 2017 13:56


Claudy Gonzalez MD Mar 13, 2017 14:16

## 2017-03-19 NOTE — HHI.DS
Discharge Summary


Admission Date


Feb 11, 2017 at 19:02


Discharge Date:  Mar 13, 2017


Admitting Diagnosis


respiratory distress, hypoxia, pneumonia, sepsis





(1) COPD (chronic obstructive pulmonary disease)


(2) BPH (benign prostatic hyperplasia)


(3) Atrial arrhythmia


(4) Atrial fibrillation


(5) Pneumonia


(6) Atrial fibrillation


(7) SIRS (systemic inflammatory response syndrome)


(8) Hypoxia


(9) Pneumocystis carinii pneumonia


(10) Thrush


(11) Dehydration


(12) Ulcerative colitis


(13) Hypertension


(14) AIDS


Procedures


bronchoscopy on 2/15-


Imaging





Last Impressions








Chest X-Ray 3/11/17 0600 Signed





Impressions: 





 Service Date/Time:  Saturday, March 11, 2017 05:25 - CONCLUSION:  Patchy areas 





 of non-consolidative infiltrate both lower lungs, new.  Decreased infiltrates 

in 





 the upper lungs.     Newton Yanes MD 


 


CT Angiography 2/11/17 0000 Signed





Impressions: 





 Service Date/Time:  Saturday, February 11, 2017 18:29 - CONCLUSION:  1. 

Negative 





 for pulmonary embolus. 2. Interval development of bilateral lung disease on a 





 background of emphysema. Findings are not entirely typical of 

bronchopneumonia. 





 Differential diagnosis includes some form of smoking related disease with 





 respiratory bronchiolitis or possibly Langerhans' cell histiocytosis.     

Bravo Robison MD 








Hospital Course


This is a pleasant 61-year-old [place] male who has a history of COPD, and


recent diagnosis of COPD exacerbation with what appeared to be early pneumonia


for which he was hospitalized from January 30, until February 4, 2017.  He was


released from the hospital.  Over the past 24 hours he has developed a fever.


He became more short of breath today.  He went to see his primary care


physician, Dr. Farmer who recommended the patient come to the hospital.  The


patient stated at home he has a nebulizer and had been using it several


times a day.  He also has oxygen.  He has been using between 4 to 6 liters a


day.  He was taking all his medications as prescribed.


 


He could feel that he had a fever.  He had minimal voice this morning.  His


voice was improving a little bit.  The patient was tachycardiac.  He was not


having chest pain or pressure.  He was seen in the emergency room and he was


started on antibiotics, nebulizer, steroids, Tylenol was given for his fever,


and he was admitted for further care. 


 


His appetite was not very good because he had thrush and it hurts when he eats.


He had been using Nystatin and the thrush was improving.


Was evaluated in the ED and found with the following:


INVESTIGATIONS:


Sodium is 131, potassium 4.1, BUN 22, creatinine 1.08, Troponin-I is negative.


Albumin 2.5.  Blood gas was 0.9.


 


Urinalysis showed trace ketones.  Mucous few.  Cultures not indicated.


 


ABG showed a bicarb of 20, O2 sat 91, pH is 7.5, PCO2 26, PO2 of 79.  CBC was


normal.


 


EKG on admission showed sinus tachycardia.


 


CT angiography showed no PE, interval development of bilateral lung disease on


a background of emphysema, not entirely typical for bronchopneumonia.


Differential diagnosis includes some form of smoking related disease of


respiratory bronchiolitis or possible Langerhans cells, histiocytosis.


 


Was admitted with the following diagnoses:


1. Sepsis on admission.


2. COPD exacerbation.


3. Hyponatremia.


4. Hospital acquired pneumonia.


5. History of A-fib, currently in sinus rhythm.


6. History of ulcerative colitis.


 


Initially he was admitted, put on IVFs, antibiotics,duonebs and steroids. 


Pulmonary and infectious disease consultations were obtained. 


Home medications were resumed. Was put on appropriate GI and DVT prophylaxis. 





Pt. had a prolonged hospitalization as he continued to decline. Further work up 

was done per infectious disease and pulmonary to identify 


source of pulmonary infection. 





Underwent bronchoscopy on 2/15-cytology came back positive for Pneumocystis 

carinii


Further workup per infectious disease confirm HIV status.  Patient was 

diagnosed with advanced AIDS.  Undetectable CD4 counts less than 20.


HCV/HBV negative


Patient was managed with appropriate antibiotic and antifungal treatment per ID 

recommendations.


In addition, MAC prophylaxis was added Azithro 1200 q monday


Patient was counseled to establish himself with HIV provider as soon as 

possible within 1 week after discharge.  Patient made arrangements to follow-up 

with Dr. Ernandez.


Additionally, patient was put on tapering dose of steroids.


Infectious disease gave recommendations with and ate for antibiotic.


Patient's condition remain fragile, increased respiratory distress especially 

at night.  DuoNeb nebs were adjusted.


He remained extremely short of breath with minimal activity


He had fevers on and off throughout hospitalization, blood cultures were 

obtained and follow.


He developed severe oral candidiasis, tongue was extremely swollen and he had 

difficulty eating.  He had significant weight loss


Ulcerations on the tongue were confirmed to be herpetic.


Magic mouthwash in addition to micafungin  were ordered.  Symptoms eventually 

improved, patient was able to tolerate diet.  Cardiology also evaluated patient 

for history of A. fib.  Patient remained stable and in sinus rhythm.  He was 

continued on beta blockers and Cardizem.  No anticoagulation was needed


Heparin for DVT prophylaxis was used.


For history of ulcer colitis, he was continued on Asacol


He did develop diarrhea, was negative for C. difficile


End of life care, code status, adv. directives were discussed at length with 

patient.  He designated 2 friends to make decision on his behalf. Requesting 

that no information was to be provided to his sister.  If resp. distress, 

agreed to intubation for a short period.  


Patient's condition improved slowly, he completed antibiotics


Home health care, oxygen and physical therapy were arrange for discharge planner


Patient was clear for discharge by both pulmonology and infectious disease


Patient was discharged home in stable condition


Pt Condition on Discharge:  Stable


Discharge Disposition:  Disch w/ Home Health Serv


Discharge Instructions


DIET: Follow Instructions for:  As Tolerated, No Restrictions


Activities you can perform:  Regular-No Restrictions


Follow up Referrals:  


Infectious Disease - 1 Week


PCP Follow-up - 1 Week





New Medications:  


Sulfamethoxazole-Trimethoprim (Bactrim DS) 800-160 Mg Tab


1 TAB PO DAILY Infection #30 Ref 0 TAB


Acyclovir (Acyclovir) 200 Mg Cap


400 MG PO Q8HR herpesinfection #10 CAP


Azithromycin (Azithromycin) 600 Mg Tab


1200 MG PO Q7D MAC prophylaxis  #6 TAB


Clonazepam (Klonopin) 1 Mg Tab


1 MG PO HS PRN INSOMNIA #30 TAB


Diphenhydramine-Lidocaine-Mag-Alum-Simeth Liq (Magic Mouthwash Pediatric/Adult 

Liq) 60 Ml Susp


5 ML SWISH-SWAL ACHS oral pain Days 14 ML


Famotidine (Famotidine) 20 Mg Tab


20 MG PO BID GERD #30 TAB


Guaifenesin-Codeine Liq (Guaifenesin-Codeine Liq) 100-10 Mg/5 Ml Soln


10 ML PO Q4H PRN COUGH Days 10 ML


Metoprolol Tartrate (Metoprolol Tartrate) 25 Mg Tab


25 MG PO BID PRN RAPID HEART RATE Days 30 Ref 1 TAB


Oxycodone-Acetaminophen (Oxycodone-Acetaminophen) 5-325 mg Tab


1 TAB PO Q6H PRN pain 1-5 #30 TAB


Prednisone (Prednisone) 20 Mg Tab


20 MG PO DAILY pcp Days 1 TAB


 


Continued Medications:  


Ascorbic Acid (Vitamin C) 500 Mg Tab


500 MG PO DAILY Nutritional Supplement Ref 0 TAB


B-Complex Vitamins (Vitamin B Complex) 1 Tab


1 TAB PO DAILY


Budesonide-Formoterol Inh (Symbicort Inh) 160-4.5 Mcg/Act Aero


1 PUFF INH Q12HR #1 Ref 0 INHALER


Dicyclomine (Bentyl) 20 Mg Tab


20 MG PO TID PRN Bowel Management Ref 0 TAB


Diltiazem CD 24 HR (Cardizem CD 24 HR) 240 Mg Caper


240 MG PO DAILY tachycardia #30 Ref 3 CAP


Gabapentin (Neurontin) 100 Mg Cap


100 MG PO TID #90 Ref 0 CAP


Ipratropium-Albuterol Neb (Duoneb) 0.5-2.5 Mg/3 Ml Neb


1 VIAL NEB Q6HR PRN SHORTNESS OF BREATH #120 Ref 0 NEBULE


Mesalamine DR (Asacol HD) 800 Mg Tab


1600 MG PO Q8HR Swallow whole.  Take on an empty stomach. Ulcerative colitis 

Ref 0 TAB


Metoprolol Tartrate (Metoprolol Tartrate) 25 Mg Tab


12.5 MG PO DAILY PRN INCREASE IN BLOOD PRESSURE #60 Ref 0 TAB


Multiple Vitamins W/ Minerals (Centrum Silver Adult 50+) 1 Tab Tab


1 TAB PO DAILY


Pantoprazole (Protonix) 40 Mg Tab


40 MG PO DAILY PRN REFLUX #30 Ref 0 TAB


 


Discontinued Medications:  


Amoxicillin-Clavulanate (Augmentin) 500-125 mg Tab


Unknown Dose PO BID Infection Ref 0 TAB


Clonidine (Clonidine) 0.1 Mg Tab


0.1 MG PO BID PRN SYSTOLIC B/P > 170 #60 Ref 0 TAB











Wilma Harrison Mar 19, 2017 16:42

## 2017-04-07 ENCOUNTER — HOSPITAL ENCOUNTER (OUTPATIENT)
Dept: HOSPITAL 17 - CLAB | Age: 62
End: 2017-04-07
Attending: SPECIALIST
Payer: COMMERCIAL

## 2017-04-07 DIAGNOSIS — B20: Primary | ICD-10-CM

## 2017-04-07 LAB
ALP SERPL-CCNC: 82 U/L (ref 45–117)
ALT SERPL-CCNC: 20 U/L (ref 12–78)
ANION GAP SERPL CALC-SCNC: 9 MEQ/L (ref 5–15)
AST SERPL-CCNC: 14 U/L (ref 15–37)
BASOPHILS # BLD AUTO: 0.1 TH/MM3 (ref 0–0.2)
BASOPHILS NFR BLD AUTO: 1 % (ref 0–2)
BASOPHILS NFR BLD: 0.8 % (ref 0–2)
BILIRUB SERPL-MCNC: 0.4 MG/DL (ref 0.2–1)
BUN SERPL-MCNC: 12 MG/DL (ref 7–18)
CHLORIDE SERPL-SCNC: 106 MEQ/L (ref 98–107)
EOSINOPHIL # BLD: 0.5 TH/MM3 (ref 0–0.4)
EOSINOPHIL NFR BLD: 6.8 % (ref 0–4)
EOSINOPHIL NFR BLD: 8 % (ref 0–4)
ERYTHROCYTE [DISTWIDTH] IN BLOOD BY AUTOMATED COUNT: 19.2 % (ref 11.6–17.2)
GFR SERPLBLD BASED ON 1.73 SQ M-ARVRAT: 87 ML/MIN (ref 89–?)
HCO3 BLD-SCNC: 26 MEQ/L (ref 21–32)
HCT VFR BLD CALC: 39.8 % (ref 39–51)
HEMO FLAGS: (no result)
LYMPHOCYTES # BLD AUTO: 0.9 TH/MM3 (ref 1–4.8)
LYMPHOCYTES NFR BLD AUTO: 12.6 % (ref 9–44)
MCH RBC QN AUTO: 30.4 PG (ref 27–34)
MCHC RBC AUTO-ENTMCNC: 34.3 % (ref 32–36)
MCV RBC AUTO: 88.7 FL (ref 80–100)
METAMYELOCYTES NFR BLD: 1 % (ref 0–1)
MONOCYTES NFR BLD: 14 % (ref 0–8)
MYELOCYTES NFR BLD: 5 % (ref 0–0)
NEUTROPHILS # BLD AUTO: 4.5 TH/MM3 (ref 1.8–7.7)
NEUTROPHILS NFR BLD AUTO: 65.8 % (ref 16–70)
NEUTS BAND # BLD MANUAL: 4.5 TH/MM3 (ref 1.8–7.7)
NEUTS SEG NFR BLD MANUAL: 59 % (ref 16–70)
PLAT MORPH BLD: NORMAL
PLATELET # BLD: 374 TH/MM3 (ref 150–450)
PLATELET BLD QL SMEAR: NORMAL
POTASSIUM SERPL-SCNC: 3.8 MEQ/L (ref 3.5–5.1)
RBC # BLD AUTO: 4.48 MIL/MM3 (ref 4.5–5.9)
SCAN/DIFF: (no result)
SODIUM SERPL-SCNC: 141 MEQ/L (ref 136–145)
WBC # BLD AUTO: 6.9 TH/MM3 (ref 4–11)
WBC DIFF SAMPLE: 100

## 2017-04-07 PROCEDURE — 80053 COMPREHEN METABOLIC PANEL: CPT

## 2017-04-07 PROCEDURE — 80074 ACUTE HEPATITIS PANEL: CPT

## 2017-04-07 PROCEDURE — 85027 COMPLETE CBC AUTOMATED: CPT

## 2017-04-07 PROCEDURE — 85007 BL SMEAR W/DIFF WBC COUNT: CPT

## 2017-04-07 PROCEDURE — 36415 COLL VENOUS BLD VENIPUNCTURE: CPT

## 2017-04-07 PROCEDURE — 87536 HIV-1 QUANT&REVRSE TRNSCRPJ: CPT

## 2017-04-10 ENCOUNTER — HOSPITAL ENCOUNTER (OUTPATIENT)
Dept: HOSPITAL 17 - CLAB | Age: 62
End: 2017-04-10
Attending: SPECIALIST
Payer: COMMERCIAL

## 2017-04-10 DIAGNOSIS — B20: Primary | ICD-10-CM

## 2017-04-10 PROCEDURE — 86360 T CELL ABSOLUTE COUNT/RATIO: CPT

## 2017-04-10 PROCEDURE — 36415 COLL VENOUS BLD VENIPUNCTURE: CPT

## 2017-04-10 PROCEDURE — 86357 NK CELLS TOTAL COUNT: CPT

## 2017-04-10 PROCEDURE — 86355 B CELLS TOTAL COUNT: CPT

## 2017-04-10 PROCEDURE — 86359 T CELLS TOTAL COUNT: CPT

## 2017-04-11 LAB
CD 19 PERCENT: 6 % (ref 6–29)
CD3 CELLS # BLD: 704 /UL (ref 840–3060)
CD3 CELLS NFR BLD: 80 % (ref 57–85)
CD3+CD4+ CELLS NFR BLD: 7 % (ref 30–61)
CD3+CD4+ CELLS/CD3+CD8+ CLL BLD: 0.1 % (ref 0.86–5)
CD3+CD8+ CELLS # BLD: 635 /UL (ref 180–1170)
CD3+CD8+ CELLS NFR BLD: 70 % (ref 12–42)
LYMPHOCYTES # BLD AUTO: 879 10*3/UL (ref 850–3900)

## 2017-04-12 LAB
HIV RNA COPIES: 382
HIV RNA LOG COPIES: 2.58

## 2017-07-05 ENCOUNTER — HOSPITAL ENCOUNTER (OUTPATIENT)
Dept: HOSPITAL 17 - CLAB | Age: 62
End: 2017-07-05
Attending: SPECIALIST
Payer: COMMERCIAL

## 2017-07-05 DIAGNOSIS — B20: Primary | ICD-10-CM

## 2017-07-05 LAB
ALP SERPL-CCNC: 71 U/L (ref 45–117)
ALT SERPL-CCNC: 28 U/L (ref 12–78)
ANION GAP SERPL CALC-SCNC: 9 MEQ/L (ref 5–15)
AST SERPL-CCNC: 25 U/L (ref 15–37)
BILIRUB SERPL-MCNC: 0.3 MG/DL (ref 0.2–1)
BUN SERPL-MCNC: 20 MG/DL (ref 7–18)
CHLORIDE SERPL-SCNC: 107 MEQ/L (ref 98–107)
ERYTHROCYTE [DISTWIDTH] IN BLOOD BY AUTOMATED COUNT: 13.6 % (ref 11.6–17.2)
GFR SERPLBLD BASED ON 1.73 SQ M-ARVRAT: 45 ML/MIN (ref 89–?)
HCO3 BLD-SCNC: 25.3 MEQ/L (ref 21–32)
HCT VFR BLD CALC: 45.7 % (ref 39–51)
MCH RBC QN AUTO: 31.6 PG (ref 27–34)
MCHC RBC AUTO-ENTMCNC: 34.3 % (ref 32–36)
MCV RBC AUTO: 91.9 FL (ref 80–100)
PLATELET # BLD: 220 TH/MM3 (ref 150–450)
POTASSIUM SERPL-SCNC: 4 MEQ/L (ref 3.5–5.1)
RBC # BLD AUTO: 4.98 MIL/MM3 (ref 4.5–5.9)
REVIEW FLAG: (no result)
SODIUM SERPL-SCNC: 141 MEQ/L (ref 136–145)
WBC # BLD AUTO: 4.6 TH/MM3 (ref 4–11)

## 2017-07-05 PROCEDURE — 86355 B CELLS TOTAL COUNT: CPT

## 2017-07-05 PROCEDURE — 86359 T CELLS TOTAL COUNT: CPT

## 2017-07-05 PROCEDURE — 86360 T CELL ABSOLUTE COUNT/RATIO: CPT

## 2017-07-05 PROCEDURE — 85027 COMPLETE CBC AUTOMATED: CPT

## 2017-07-05 PROCEDURE — 80053 COMPREHEN METABOLIC PANEL: CPT

## 2017-07-05 PROCEDURE — 86357 NK CELLS TOTAL COUNT: CPT

## 2017-07-05 PROCEDURE — 36415 COLL VENOUS BLD VENIPUNCTURE: CPT

## 2017-07-05 PROCEDURE — 87536 HIV-1 QUANT&REVRSE TRNSCRPJ: CPT

## 2017-07-06 LAB
CD 19 PERCENT: 7 % (ref 6–29)
CD3 CELLS # BLD: 971 /UL (ref 840–3060)
CD3 CELLS NFR BLD: 78 % (ref 57–85)
CD3+CD4+ CELLS NFR BLD: 10 % (ref 30–61)
CD3+CD4+ CELLS/CD3+CD8+ CLL BLD: 0.1 % (ref 0.86–5)
CD3+CD8+ CELLS # BLD: 797 /UL (ref 180–1170)
CD3+CD8+ CELLS NFR BLD: 66 % (ref 12–42)
LYMPHOCYTES # BLD AUTO: 1249 10*3/UL (ref 850–3900)

## 2017-07-07 LAB
HIV RNA COPIES: 200
HIV RNA LOG COPIES: 2.3

## 2017-08-28 ENCOUNTER — HOSPITAL ENCOUNTER (OUTPATIENT)
Dept: HOSPITAL 17 - ESDC | Age: 62
Discharge: HOME | End: 2017-08-28
Attending: INTERNAL MEDICINE
Payer: COMMERCIAL

## 2017-08-28 DIAGNOSIS — Z86.010: ICD-10-CM

## 2017-08-28 DIAGNOSIS — K29.70: ICD-10-CM

## 2017-08-28 DIAGNOSIS — D12.4: ICD-10-CM

## 2017-08-28 DIAGNOSIS — K64.8: ICD-10-CM

## 2017-08-28 DIAGNOSIS — K57.90: ICD-10-CM

## 2017-08-28 DIAGNOSIS — K64.4: ICD-10-CM

## 2017-08-28 DIAGNOSIS — D12.3: ICD-10-CM

## 2017-08-28 DIAGNOSIS — K25.9: ICD-10-CM

## 2017-08-28 DIAGNOSIS — Z12.11: Primary | ICD-10-CM

## 2017-08-28 DIAGNOSIS — R49.0: ICD-10-CM

## 2017-08-28 DIAGNOSIS — K22.9: ICD-10-CM

## 2017-08-28 PROCEDURE — 45380 COLONOSCOPY AND BIOPSY: CPT

## 2017-08-28 PROCEDURE — 45385 COLONOSCOPY W/LESION REMOVAL: CPT

## 2017-08-28 PROCEDURE — 00810: CPT

## 2017-08-28 PROCEDURE — 88305 TISSUE EXAM BY PATHOLOGIST: CPT

## 2017-08-28 PROCEDURE — 43239 EGD BIOPSY SINGLE/MULTIPLE: CPT

## 2017-08-28 PROCEDURE — 00740: CPT

## 2017-08-28 PROCEDURE — 88312 SPECIAL STAINS GROUP 1: CPT

## 2017-08-28 NOTE — GIPROC
[facility]

[address], [address]

 

 

EGD PROCEDURE REPORT     EXAM DATE: 08/28/2017

 

PATIENT NAME:      Zo Pickard           MR #:      [id number]

YOB: 1955      VISIT #:     [id number]

ATTENDING:     Kei Oviedo MD     ORDER #:     [id number]

ASSISTANT:      Francis Piper and Benjamin Baptiste     STATUS:     outpatient

 

INDICATIONS:  The patient is a 61 yr old male here for an EGD due to hoarsness

 

PROCEDURE PERFORMED:     EGD w/ biopsy

MEDICATIONS:     None, Per Anesthesia, None, and Per Anesthesia.

TOPICAL ANESTHETIC:

 

CONSENT: The patient understands the risks and benefits of the procedure and

understands that these risks include, but are not limited to: sedation,

allergic reaction, infection, perforation and/or bleeding. Alternative means of

evaluation and treatment include, among others: physical exam, x-rays, and/or

surgical intervention. The patient elects to proceed with this endoscopic

procedure.

 



medical equipment was checked for proper function. Hand hygiene and appropriate

measures for infection prevention was taken. After the risks, benefits and

alternatives of the procedure were thoroughly explained, Informed consent was

verified, confirmed and timeout was successfully executed by the treatment

team. The patient was anesthetized with topical anesthesia and the EC-[id number]

(Pedi C) endoscope was introduced through the mouth and advanced to the second

portion of the duodenum.  Retroflexed views revealed no abnormalities  The

gastroscope was then slowly withdrawn and removed.

 

ESOPHAGUS: Irregular z line, this was biopsied.   STOMACH: A small non-bleeding

and round ulcer with a pigmented spot was found in the gastric body.  Biopsies

were taken at edge of the ulcer.   There was erythematous moderate and erosive

gastritis in the gastric antrum.  Multiple biopsies were performed.   The

endoscopy was otherwise normal.

 

 

 

ADVERSE EVENTS:     There were no complications.

IMPRESSIONS:     1.  Irregular z line, this was biopsied

2.  Small ulcer was found in the gastric body; biopsies were taken

3.  There was erythematous gastritis in the gastric antrum; multiple biopsies

were performed

4.  Normal endoscopy otherwise

5.  Retroflexed views revealed no abnormalities

 

RECOMMENDATIONS:     1.  Await biopsy results.  Biopsy results will not be ready

for 7-10 days.  If you don't hear from us in two weeks, call our office for

biopsy results.

2.  Protonix 40mg Q AM

3.  Avoid NSAIDS

4.  Follow-up: GI clinic 3 week(s)

PATIENT CONDITION:     stable

DISPOSITION:     Home

REPEAT EXAM:     Return 2 months EGD

 

 

___________________________________

Kei Oviedo MD

eSigned:  Kei Oviedo MD 08/28/2017 9:50 AM

 

 

cc: Hortencia Coulter

 

 

 

 

PATIENT NAME:  Zo Pickard

MR#: [id number]

## 2017-08-28 NOTE — GIPROC
[facility]

[address] [facility], [address]

 

 

COLONOSCOPY PROCEDURE REPORT     EXAM DATE: 08/28/2017

 

PATIENT NAME:      Zo Pickard           MR #:      [id number]

YOB: 1955      VISIT #:     [id number]

ENDOSCOPIST:     Kei Ovieod MD     ORDER #:     [id number]

ASSISTANT:      Francis Piper and Benjamin Baptiste     STATUS:     outpatient

 

INDICATIONS:  The patient is a 61 yr old male here for a colonoscopy due to high

risk patient with personal history of colonic polyps and high risk patient with

previously diagnosed UC

PROCEDURE PERFORMED:     Colonoscopy with biopsy

Colonoscopy with polypectomy

MEDICATIONS:     None and Per Anesthesia.

PREP QUALITY:     fair

ESTIMATED BLOOD LOSS:     None

 

CONSENT: The patient understands the risks and benefits of the procedure and

understands that these risks include, but are not limited to: sedation,

allergic reaction, infection, perforation and/or bleeding. Alternative means of

evaluation and treatment include, among others: physical exam, x-rays, and/or

surgical intervention. The patient elects to proceed with this endoscopic

procedure.

 



medical equipment was checked for proper function. Hand hygiene and appropriate

measures for infection prevention was taken. After the risks, benefits and

alternatives of the procedure were thoroughly explained, Informed consent was

verified, confirmed and timeout was successfully executed by the treatment

team. A digital exam revealed external hemorrhoids The Pentax EC-[id number]

endoscope was introduced through the anus and advanced to the cecum, which was

identified by both the appendix and ileocecal valve. The instrument was then

slowly withdrawn as the colon was fully examined.

 

 

COLON FINDINGS: Five small medium sized smooth sessile polyps were found in the

transverse colon and descending colon.  The DC polyps are probably

inflammatory.  A polypectomy was performed with a cold snare.  The resection

was complete and the polyp tissue was completely retrieved.   Mild

diverticulosis was noted throughout the entire examined colon.   The colon

mucosa was otherwise normal. Retroflexed views revealed internal hemorrhoids

and Retroflexed views revealed medium internal hemorrhoids The scope was then

completely withdrawn from the patient and the procedure terminated.

PROCEDURE WITHDRAWAL TIME:13.0minutes

 

 

 

ADVERSE EVENTS:      There were no complications.

IMPRESSIONS:     1.  Five small medium sized sessile polyps were found in the

transverse colon and descending colon; the DC polyps are probably inflammatory;

polypectomy was performed with a cold snare

2.  Mild diverticulosis was noted throughout the entire examined colon

3.  The colon mucosa was otherwise normal

4.  Retroflexed views revealed internal hemorrhoids

5.  Retroflexed views revealed medium internal hemorrhoids

6.  Revealed external hemorrhoids

 

RECOMMENDATIONS:     1.  Await biopsy results.  Biopsy results will not be ready

for 7-10 days.  If you don't hear from us in two weeks, call our office for

results.

2.  High fiber diet

3.  Yearly hemoccult

4.  Follow-up: GI Clinic 3 week(s)

RECALL:     Return 2 years Colonoscopy

 

_____________________________

Kei Oviedo MD

eSigned:  Kei Oviedo MD 08/28/2017 9:45 AM

 

 

cc:  Hortencia Coulter

 

 

 

 

PATIENT NAME:  Zo Pickard

MR#: [id number]

## 2017-11-15 ENCOUNTER — HOSPITAL ENCOUNTER (OUTPATIENT)
Dept: HOSPITAL 17 - CLAB | Age: 62
End: 2017-11-15
Attending: SPECIALIST
Payer: COMMERCIAL

## 2017-11-15 DIAGNOSIS — R53.83: ICD-10-CM

## 2017-11-15 DIAGNOSIS — Z12.5: ICD-10-CM

## 2017-11-15 DIAGNOSIS — D64.9: ICD-10-CM

## 2017-11-15 DIAGNOSIS — R94.5: Primary | ICD-10-CM

## 2017-11-15 DIAGNOSIS — E78.00: ICD-10-CM

## 2017-11-15 DIAGNOSIS — E78.1: ICD-10-CM

## 2017-11-15 DIAGNOSIS — B20: ICD-10-CM

## 2017-11-15 DIAGNOSIS — M25.50: ICD-10-CM

## 2017-11-15 DIAGNOSIS — E03.9: ICD-10-CM

## 2017-11-15 LAB
ALP SERPL-CCNC: 80 U/L (ref 45–117)
ALT SERPL-CCNC: 27 U/L (ref 12–78)
ANION GAP SERPL CALC-SCNC: 7 MEQ/L (ref 5–15)
AST SERPL-CCNC: 20 U/L (ref 15–37)
BASOPHILS # BLD AUTO: 0.1 TH/MM3 (ref 0–0.2)
BASOPHILS NFR BLD: 1 % (ref 0–2)
BILIRUB SERPL-MCNC: 0.3 MG/DL (ref 0.2–1)
BUN SERPL-MCNC: 18 MG/DL (ref 7–18)
CHLORIDE SERPL-SCNC: 102 MEQ/L (ref 98–107)
EOSINOPHIL # BLD: 0.6 TH/MM3 (ref 0–0.4)
EOSINOPHIL NFR BLD: 7.6 % (ref 0–4)
ERYTHROCYTE [DISTWIDTH] IN BLOOD BY AUTOMATED COUNT: 14.1 % (ref 11.6–17.2)
GFR SERPLBLD BASED ON 1.73 SQ M-ARVRAT: 67 ML/MIN (ref 89–?)
HCO3 BLD-SCNC: 26.4 MEQ/L (ref 21–32)
HCT VFR BLD CALC: 44.6 % (ref 39–51)
HDLC SERPL-MCNC: 41.5 MG/DL (ref 40–60)
HEMO FLAGS: (no result)
LYMPHOCYTES # BLD AUTO: 3.1 TH/MM3 (ref 1–4.8)
LYMPHOCYTES NFR BLD AUTO: 39.8 % (ref 9–44)
MCH RBC QN AUTO: 32.6 PG (ref 27–34)
MCHC RBC AUTO-ENTMCNC: 34.8 % (ref 32–36)
MCV RBC AUTO: 93.8 FL (ref 80–100)
MONOCYTES NFR BLD: 8.3 % (ref 0–8)
NEUTROPHILS # BLD AUTO: 3.4 TH/MM3 (ref 1.8–7.7)
NEUTROPHILS NFR BLD AUTO: 43.3 % (ref 16–70)
PLATELET # BLD: 205 TH/MM3 (ref 150–450)
POTASSIUM SERPL-SCNC: 3.7 MEQ/L (ref 3.5–5.1)
RBC # BLD AUTO: 4.75 MIL/MM3 (ref 4.5–5.9)
SODIUM SERPL-SCNC: 135 MEQ/L (ref 136–145)
WBC # BLD AUTO: 7.8 TH/MM3 (ref 4–11)

## 2017-11-15 PROCEDURE — 36415 COLL VENOUS BLD VENIPUNCTURE: CPT

## 2017-11-15 PROCEDURE — 86359 T CELLS TOTAL COUNT: CPT

## 2017-11-15 PROCEDURE — 86355 B CELLS TOTAL COUNT: CPT

## 2017-11-15 PROCEDURE — 87536 HIV-1 QUANT&REVRSE TRNSCRPJ: CPT

## 2017-11-15 PROCEDURE — 84153 ASSAY OF PSA TOTAL: CPT

## 2017-11-15 PROCEDURE — 80061 LIPID PANEL: CPT

## 2017-11-15 PROCEDURE — 86360 T CELL ABSOLUTE COUNT/RATIO: CPT

## 2017-11-15 PROCEDURE — 85025 COMPLETE CBC W/AUTO DIFF WBC: CPT

## 2017-11-15 PROCEDURE — 86357 NK CELLS TOTAL COUNT: CPT

## 2017-11-15 PROCEDURE — 80053 COMPREHEN METABOLIC PANEL: CPT

## 2017-11-15 PROCEDURE — 84443 ASSAY THYROID STIM HORMONE: CPT

## 2017-11-17 LAB
CD 19 PERCENT: 7 % (ref 6–29)
CD3 CELLS NFR BLD: 74 % (ref 57–85)
CD3+CD4+ CELLS NFR BLD: 9 % (ref 30–61)
CD3+CD4+ CELLS/CD3+CD8+ CLL BLD: 0.1 % (ref 0.86–5)
CD3+CD8+ CELLS # BLD: 1800 /UL (ref 180–1170)
CD3+CD8+ CELLS NFR BLD: 64 % (ref 12–42)
HIV RNA COPIES: 263 (ref ?–20)
HIV RNA LOG COPIES: 2.42 (ref ?–1.3)

## 2017-11-21 NOTE — RSPPFT
DATE OF PROCEDURE:    2/1/17



COMMENTS:   



Spirometry is within normal limits. Post-bronchodilator study demonstrated improvements in 
the FVC and FEF 25-75. Lung volumes were not completed. Flow volume loops appear 
unremarkable.  



IMPRESSION:    

   

1.    Essentially normal pulmonary function study.

2.    Minimal response to use of bronchodilator indicating reactive airways. Evaluation by medical service required

## 2018-03-16 ENCOUNTER — HOSPITAL ENCOUNTER (OUTPATIENT)
Dept: HOSPITAL 17 - CLAB | Age: 63
End: 2018-03-16
Attending: SPECIALIST
Payer: COMMERCIAL

## 2018-03-16 DIAGNOSIS — B20: Primary | ICD-10-CM

## 2018-03-16 LAB
ALBUMIN SERPL-MCNC: 3.8 GM/DL (ref 3.4–5)
ALP SERPL-CCNC: 74 U/L (ref 45–117)
ALT SERPL-CCNC: 25 U/L (ref 12–78)
AST SERPL-CCNC: 19 U/L (ref 15–37)
BILIRUB SERPL-MCNC: 0.2 MG/DL (ref 0.2–1)
BUN SERPL-MCNC: 19 MG/DL (ref 7–18)
CALCIUM SERPL-MCNC: 9.4 MG/DL (ref 8.5–10.1)
CHLORIDE SERPL-SCNC: 106 MEQ/L (ref 98–107)
CREAT SERPL-MCNC: 1.12 MG/DL (ref 0.6–1.3)
ERYTHROCYTE [DISTWIDTH] IN BLOOD BY AUTOMATED COUNT: 14.2 % (ref 11.6–17.2)
GFR SERPLBLD BASED ON 1.73 SQ M-ARVRAT: 67 ML/MIN (ref 89–?)
HCO3 BLD-SCNC: 27.8 MEQ/L (ref 21–32)
HCT VFR BLD CALC: 43.7 % (ref 39–51)
HGB BLD-MCNC: 14.9 GM/DL (ref 13–17)
MCH RBC QN AUTO: 31.7 PG (ref 27–34)
MCHC RBC AUTO-ENTMCNC: 34.2 % (ref 32–36)
MCV RBC AUTO: 92.8 FL (ref 80–100)
PLATELET # BLD: 189 TH/MM3 (ref 150–450)
PMV BLD AUTO: 8 FL (ref 7–11)
PROT SERPL-MCNC: 8.3 GM/DL (ref 6.4–8.2)
RBC # BLD AUTO: 4.71 MIL/MM3 (ref 4.5–5.9)
SODIUM SERPL-SCNC: 141 MEQ/L (ref 136–145)
WBC # BLD AUTO: 6.2 TH/MM3 (ref 4–11)

## 2018-03-16 PROCEDURE — 80053 COMPREHEN METABOLIC PANEL: CPT

## 2018-03-16 PROCEDURE — 86359 T CELLS TOTAL COUNT: CPT

## 2018-03-16 PROCEDURE — 86355 B CELLS TOTAL COUNT: CPT

## 2018-03-16 PROCEDURE — 36415 COLL VENOUS BLD VENIPUNCTURE: CPT

## 2018-03-16 PROCEDURE — 85027 COMPLETE CBC AUTOMATED: CPT

## 2018-03-16 PROCEDURE — 86360 T CELL ABSOLUTE COUNT/RATIO: CPT

## 2018-03-16 PROCEDURE — 87901 NFCT AGT GNTYP ALYS HIV1 REV: CPT

## 2018-03-16 PROCEDURE — 87536 HIV-1 QUANT&REVRSE TRNSCRPJ: CPT

## 2018-03-16 PROCEDURE — 86357 NK CELLS TOTAL COUNT: CPT

## 2018-03-18 LAB
CD3-/CD16+CD56+ PERCENT: 18 % (ref 4–25)
CD3-CD16+CD56+ (ABSOLUTE): 412 (ref 70–760)
LYMPHOCYTES # BLD AUTO: 2298 10*3/UL (ref 850–3900)

## 2018-03-21 LAB — HIV RT GENE MUT DET ISLT: (no result)

## 2018-08-28 ENCOUNTER — APPOINTMENT (RX ONLY)
Dept: URBAN - METROPOLITAN AREA CLINIC 54 | Facility: CLINIC | Age: 63
Setting detail: DERMATOLOGY
End: 2018-08-28

## 2018-08-28 DIAGNOSIS — I87.2 VENOUS INSUFFICIENCY (CHRONIC) (PERIPHERAL): ICD-10-CM

## 2018-08-28 PROBLEM — D89.9 DISORDER INVOLVING THE IMMUNE MECHANISM, UNSPECIFIED: Status: ACTIVE | Noted: 2018-08-28

## 2018-08-28 PROBLEM — L20.84 INTRINSIC (ALLERGIC) ECZEMA: Status: ACTIVE | Noted: 2018-08-28

## 2018-08-28 PROCEDURE — ? COUNSELING

## 2018-08-28 PROCEDURE — 99213 OFFICE O/P EST LOW 20 MIN: CPT

## 2018-08-28 PROCEDURE — ? RECOMMENDATIONS

## 2018-08-28 PROCEDURE — ? PRESCRIPTION

## 2018-08-28 RX ORDER — CLOBETASOL PROPIONATE 0.5 MG/G
CREAM TOPICAL BID
Qty: 1 | Refills: 3 | Status: ERX | COMMUNITY
Start: 2018-08-28

## 2018-08-28 RX ORDER — HYDROQUINONE 4 %
CREAM (GRAM) TOPICAL
Qty: 1 | Refills: 3 | Status: ERX | COMMUNITY
Start: 2018-08-28

## 2018-08-28 RX ADMIN — Medication: at 17:13

## 2018-08-28 RX ADMIN — CLOBETASOL PROPIONATE: 0.5 CREAM TOPICAL at 17:14

## 2018-08-28 ASSESSMENT — LOCATION DETAILED DESCRIPTION DERM
LOCATION DETAILED: RIGHT PROXIMAL PRETIBIAL REGION
LOCATION DETAILED: LEFT PROXIMAL PRETIBIAL REGION

## 2018-08-28 ASSESSMENT — LOCATION ZONE DERM: LOCATION ZONE: LEG

## 2018-08-28 ASSESSMENT — LOCATION SIMPLE DESCRIPTION DERM
LOCATION SIMPLE: LEFT PRETIBIAL REGION
LOCATION SIMPLE: RIGHT PRETIBIAL REGION

## 2018-08-28 NOTE — PROCEDURE: RECOMMENDATIONS
Recommendation Preamble: The following recommendations were made during the visit: ensure socks are not too tight as to leave impressions on the skin. During moments of rest elevate the legs with a pillow to allow the backward flow of blood.
Detail Level: Zone

## 2019-06-07 NOTE — EKG
Date Performed: 02/11/2017       Time Performed: 17:04:19

 

PTAGE:      61 years

 

EKG:      SINUS TACHYCARDIA Compared to previous tracing, sinus rate has increased ABNORMAL RHYTHM EC
G INTERPRETATION BASED ON A DEFAULT AGE OF 40 YEARS 

 

NO PREVIOUS TRACING            

 

DOCTOR:   Brayan Gibbs  Interpretating Date/Time  02/12/2017 12:53:00 89yo with hx afib on coumadin, CHF, HTN, PPM presents with lower quadrant abdominal pain.  Yesterday he developed alternating RLQ and LLQ pain.  He said at times the right side would hurt, other times the other side would.  He did not have assoc n/v/d.  He does not have dysuria or back pain.  He recently finished a course of antibiotics for a positive urinalysis.  The finding was incidental for a screen for urine uric acid.  He also recently completed a course of prednisone for a bony process in his foot (family does not think it was for gout).  No fevers or chills.

## 2025-02-16 NOTE — HHI.IDPN
Subjective


Subjective


Remarks


Delayed entry- pt was seen today around  1930 today


Pt is worse today


back on O2, ambulates around hallway with O2


co productive cough with green sputum


+ fever, chills


T 99.8


Antibiotics


Trim-sulfa


fluc


Past Medical History


COPD


tobaccoisn


Allergies:  


Coded Allergies:  


     No Known Allergies (Verified , 1/30/17)





Objective


.





 Vital Signs








  Date Time  Temp Pulse Resp B/P Pulse Ox O2 Delivery O2 Flow Rate FiO2


 


2/24/17 20:00 99.8 97 22 131/70 95   


 


2/24/17 17:29 97.4 85 20 113/70 93   


 


2/24/17 15:53     94 Nasal Cannula 2.00 


 


2/24/17 14:30     94 Nasal Cannula 2.00 


 


2/24/17 12:50 97.5 92 18 125/83 97   


 


2/24/17 10:00     92 Nasal Cannula 2.00 21





      Humidified  


 


2/24/17 09:17 96.6 96 20 110/61 92   


 


2/24/17 04:59     92   21


 


2/24/17 00:00 98.0 80 20 120/66 98   














 2/23/17 2/23/17 2/24/17





 15:00 23:00 07:00


 


Intake Total 720 ml 1220 ml 240 ml


 


Balance 720 ml 1220 ml 240 ml


 


   


 


Intake Oral 720 ml 720 ml 240 ml


 


IV Total  500 ml 


 


# Voids 2 2 2


 


# Bowel Movements 1 0 1








Imaging





Last Impressions








Chest X-Ray 2/20/17 0600 Signed





Impressions: 





 Service Date/Time:  Monday, February 20, 2017 05:30 - CONCLUSION:  Diffuse 





 increase interstitial markings related to diffuse processes such as edema or 





 diffuse infection.     Nahun Azul MD 


 


CT Angiography 2/11/17 0000 Signed





Impressions: 





 Service Date/Time:  Saturday, February 11, 2017 18:29 - CONCLUSION:  1. 

Negative 





 for pulmonary embolus. 2. Interval development of bilateral lung disease on a 





 background of emphysema. Findings are not entirely typical of 

bronchopneumonia. 





 Differential diagnosis includes some form of smoking related disease with 





 respiratory bronchiolitis or possibly Langerhans' cell histiocytosis.     

Bravo Robison MD 








Physical Exam


CONSTITUTIONAL/GENERAL: This is thin patient, in no apparent distress.


TUBES/LINES/DRAINS:


SKIN: No jaundice, rashes, or lesions. Ecchymoses on upper extremities. No 

wounds seen anteriorly. Skin temperature appropriate. Not diaphoretic. 


HEAD: Atraumatic. Normocephalic.


EYES: Pupils equal and round and reactive. Extraocular motions intact. No 

scleral icterus. No injection or drainage. Fundi not examined.


ENT: Hearing grossly normal. Nose without bleeding or purulent drainage. 

Extensive oral thrush


NECK: Trachea midline. Supple, nontender. No palpable thyroid enlargement or 

nodularity. 


CARDIOVASCULAR: Regular rate and rhythm without murmurs, gallops, or rubs. No 

JVD. Peripheral pulses symmetric.


RESPIRATORY/CHEST: Symmetric, unlabored respirations. + diffuse    rhonchi.   

and scattered wheezing 


GASTROINTESTINAL: Abdomen soft, non-tender, nondistended. No hepato-splenomegaly

, or palpable masses. No guarding. Bowel sounds present.


GENITOURINARY: Without palpable bladder distension.


MUSCULOSKELETAL: Extremities without clubbing, cyanosis, or edema. No joint 

tenderness or effusion noted. No calf tenderness. No mottling or clubbing.


LYMPHATICS: No palpable cervical or supraclavicular adenopathy.


NEUROLOGICAL: Awake and alert. Motor and sensory grossly within normal limits. 

Follows commands. Cognitively sharp. Moves all extremities.


PSYCHIATRIC: No obvious anxiety/depression. no apparent hallucinations or other 

psychotic thought process.





Assessment & Plan


Remarks


COPD


PCP PNA,  new diagnosis


Oral candidiasis in the settings of another OI


Confirmed HIV/AIDS, CD4 < 20P


HCV/HBV negative


Worsening hypoxia and new low grade fever toda ? new PNA





repeat CXR


sputum clx


blood clx with fever


add cefepime for HCAP





   - cont  IV BActrim with  sterroids


      Prednisone 40 mg twice daily for 5 days, followed by


      Prednisone 40 mg daily for 5 days, followed by


      Prednisone 20 mg daily for 11 days


   -  start azithro  1200 q Mon for MAC profilaxis


   - cont fluconazole


   - anticipate transition to po bactrim  and dc soon if cont to improve








Pt is willing to establish HIV care immediately upon d/c. He was counseled. He 

understands the importance of prompt start of HIV tx and compliance with it


He would like to have Dr SWETHA Ernandez as his HIV provider








Deborah Ray MD Feb 24, 2017 23:04
Subjective


Subjective


Remarks


Delayed entry- pt was seen today around 1730


Pt markedly improved


Tolerates ambulation with RA


cont to have some cough


afebrile 


tolerates IV Bactrim OK


DONELL HIV +


Antibiotics


Trim-sulfa


fluc


Past Medical History


COPD


tobaccoisn


Allergies:  


Coded Allergies:  


     No Known Allergies (Verified , 1/30/17)





Objective


.





 Vital Signs








  Date Time  Temp Pulse Resp B/P Pulse Ox O2 Delivery O2 Flow Rate FiO2


 


2/23/17 21:41     92 Nasal Cannula 2.00 


 


2/23/17 20:00 98.2 89 22 131/76 93   


 


2/23/17 16:35     96 Nasal Cannula 2.00 


 


2/23/17 16:00 98.3 76 18 123/76 96   


 


2/23/17 12:00 99.4 83 19 132/75 96   


 


2/23/17 09:15     96 Nasal Cannula 2.00 





      Humidified  


 


2/23/17 08:00 98.0 91 19 130/70 96   


 


2/23/17 06:19 97.6 90 20 127/72 95   


 


2/23/17 05:33     93 Nasal Cannula 2.00 


 


2/23/17 03:08     90 Nasal Cannula 2.00 


 


2/23/17 00:00 97.8 86 16 138/82 93   














 2/22/17 2/22/17 2/23/17





 14:59 22:59 06:59


 


Intake Total 640 ml 860 ml 360 ml


 


Output Total  420 ml 


 


Balance 640 ml 440 ml 360 ml


 


   


 


Intake Oral 640 ml 360 ml 360 ml


 


IV Total  500 ml 


 


Output Urine Total  420 ml 


 


# Voids 2  2


 


# Bowel Movements 1 0 1








Imaging





Last Impressions








Chest X-Ray 2/20/17 0600 Signed





Impressions: 





 Service Date/Time:  Monday, February 20, 2017 05:30 - CONCLUSION:  Diffuse 





 increase interstitial markings related to diffuse processes such as edema or 





 diffuse infection.     Nahun Azul MD 


 


CT Angiography 2/11/17 0000 Signed





Impressions: 





 Service Date/Time:  Saturday, February 11, 2017 18:29 - CONCLUSION:  1. 

Negative 





 for pulmonary embolus. 2. Interval development of bilateral lung disease on a 





 background of emphysema. Findings are not entirely typical of 

bronchopneumonia. 





 Differential diagnosis includes some form of smoking related disease with 





 respiratory bronchiolitis or possibly Langerhans' cell histiocytosis.     

Bravo Robison MD 








Physical Exam


CONSTITUTIONAL/GENERAL: This is thin patient, in no apparent distress.


TUBES/LINES/DRAINS:


SKIN: No jaundice, rashes, or lesions. Ecchymoses on upper extremities. No 

wounds seen anteriorly. Skin temperature appropriate. Not diaphoretic. 


HEAD: Atraumatic. Normocephalic.


EYES: Pupils equal and round and reactive. Extraocular motions intact. No 

scleral icterus. No injection or drainage. Fundi not examined.


ENT: Hearing grossly normal. Nose without bleeding or purulent drainage. 

Extensive oral thrush


NECK: Trachea midline. Supple, nontender. No palpable thyroid enlargement or 

nodularity. 


CARDIOVASCULAR: Regular rate and rhythm without murmurs, gallops, or rubs. No 

JVD. Peripheral pulses symmetric.


RESPIRATORY/CHEST: Symmetric, unlabored respirations. SOme   rhonchi.   and 

wheez  more prominent on the R


GASTROINTESTINAL: Abdomen soft, non-tender, nondistended. No hepato-splenomegaly

, or palpable masses. No guarding. Bowel sounds present.


GENITOURINARY: Without palpable bladder distension.


MUSCULOSKELETAL: Extremities without clubbing, cyanosis, or edema. No joint 

tenderness or effusion noted. No calf tenderness. No mottling or clubbing.


LYMPHATICS: No palpable cervical or supraclavicular adenopathy.


NEUROLOGICAL: Awake and alert. Motor and sensory grossly within normal limits. 

Follows commands. Cognitively sharp. Moves all extremities.


PSYCHIATRIC: No obvious anxiety/depression. no apparent hallucinations or other 

psychotic thought process.





Assessment & Plan


Remarks


COPD


PCP PNA,  new diagnosis


Oral candidiasis in the settings of another OI


Probable HIV/AIDS, confirmatory tests P


HCV/HBV negative








   - cont  IV BActrim with  sterroids


      Prednisone 40 mg twice daily for 5 days, followed by


      Prednisone 40 mg daily for 5 days, followed by


      Prednisone 20 mg daily for 11 days


   -  fu HIV testing confirmatory results


   - fu CD4 count


   - cont fluconazole


   - anticipate transition to po bactrim  and dc soon if cont to improve





Pt is willing to establish HIV care immediately demetrio d/c. He was counseled. He 

understands the importance of prompt start of HIV tx and compliance with it


He would likel to have Dr SWETHA Ernandez as his HIV provider


He was asking questions about his HIV prognosis with therapy, and I advised him 

that his prognosis with adequate HIV Rx will dramatically improve


All questions were answered








Deborah Ray MD Feb 23, 2017 22:29
Subjective


Subjective


Remarks


Doing better!


Less SOB


Oral pain somewhat improved


HSV clx + from oral lesions


afebrile


remains on 2 L NC O2


Antibiotics


Trim-sulfa


fluc


cefepime


mmicafungin


acyclovit


Past Medical History


COPD


tobaccoisn


Allergies:  


Coded Allergies:  


     No Known Allergies (Verified , 1/30/17)





Objective


.





 Vital Signs








  Date Time  Temp Pulse Resp B/P Pulse Ox O2 Delivery O2 Flow Rate FiO2


 


3/3/17 12:31   18     


 


3/3/17 12:00 96.8 90 20 138/68 94   


 


3/3/17 11:56     96 Nasal Cannula 2.00 


 


3/3/17 08:20      Nasal Cannula 2.00 96


 


3/3/17 08:00 98.2 96 20 133/69 95   


 


3/3/17 00:00 100.6 84 20 118/65 95   


 


3/2/17 20:33     95 Nasal Cannula 2.00 


 


3/2/17 20:00 97.0 88 18 117/72 95   


 


3/2/17 18:00 97.9 80 20 104/64 95   














 3/2/17 3/2/17 3/3/17





 15:00 23:00 07:00


 


Intake Total 1080 ml 886 ml 1180 ml


 


Output Total 600 ml  600 ml


 


Balance 480 ml 886 ml 580 ml


 


   


 


Intake Oral 1080 ml 560 ml 480 ml


 


IV Total  326 ml 700 ml


 


Output Urine Total 600 ml  600 ml


 


# Voids  2 1


 


# Bowel Movements 1 1 0








.





Laboratory Tests








Test 3/2/17





 04:40


 


White Blood Count 7.8 TH/MM3


 


Red Blood Count 4.84 MIL/MM3


 


Hemoglobin 14.3 GM/DL


 


Hematocrit 41.2 %


 


Mean Corpuscular Volume 85.2 FL


 


Mean Corpuscular Hemoglobin 29.6 PG


 


Mean Corpuscular Hemoglobin 34.8 %





Concent 


 


Red Cell Distribution Width 15.3 %


 


Platelet Count 150 TH/MM3


 


Mean Platelet Volume 8.3 FL


 


Neutrophils (%) (Auto) 94.9 %


 


Lymphocytes (%) (Auto) 1.7 %


 


Monocytes (%) (Auto) 2.1 %


 


Eosinophils (%) (Auto) 1.1 %


 


Basophils (%) (Auto) 0.2 %


 


Neutrophils # (Auto) 7.4 TH/MM3


 


Lymphocytes # (Auto) 0.1 TH/MM3


 


Monocytes # (Auto) 0.2 TH/MM3


 


Eosinophils # (Auto) 0.1 TH/MM3


 


Basophils # (Auto) 0.0 TH/MM3


 


CBC Comment AUTO DIFF 


 


Differential Total Cells 100 





Counted 


 


Neutrophils % (Manual) 74 %


 


Band Neutrophils % 14 %


 


Lymphocytes % 4 %


 


Eosinophils % 2 %


 


Neutrophils # (Manual) 7.3 TH/MM3


 


Metamyelocytes 5 %


 


Myelocytes 1 %


 


Differential Comment FINAL DIFF





 MANUAL


 


Platelet Estimate NORMAL 


 


Platelet Morphology Comment NORMAL 


 


Red Cell Morphology Comment NORMAL 








Laboratory Tests








Test 3/1/17 3/2/17





 18:40 04:40


 


Serum Osmolality 281 MOSM/KG 


 


Sodium Level  130 MEQ/L


 


Potassium Level  4.4 MEQ/L


 


Chloride Level  98 MEQ/L


 


Carbon Dioxide Level  20.5 MEQ/L


 


Anion Gap  12 MEQ/L


 


Blood Urea Nitrogen  19 MG/DL


 


Creatinine  0.89 MG/DL


 


Estimat Glomerular Filtration  87 ML/MIN





Rate  


 


Random Glucose  95 MG/DL


 


Calcium Level  8.7 MG/DL








Microbiology








 Date/Time Procedure Status





Source Growth 


 


 3/2/17 11:09 Aerobic Blood Culture - Preliminary Resulted





Blood Peripheral NO GROWTH IN 1 DAY 





 3/2/17 11:09 Anaerobic Blood Culture - Preliminary Resulted





Blood Peripheral NO GROWTH IN 1 DAY 


 


 3/2/17 11:21 Aerobic Blood Culture - Preliminary Resulted





Blood Peripheral NO GROWTH IN 1 DAY 





 3/2/17 11:21 Anaerobic Blood Culture - Preliminary Resulted





Blood Peripheral NO GROWTH IN 1 DAY 


 


 3/2/17 13:30 Gram Stain - Final Resulted





Sputum Expectorated Sputum  





 3/2/17 13:30 Sputum Culture - Preliminary Resulted





Sputum Expectorated Sputum HEAVY GROWTH NORMAL RESPIRATORY JACKELYN... 








Imaging





Last Impressions








Chest X-Ray 3/2/17 0600 Signed





Impressions: 





 Service Date/Time:  Thursday, March 2, 2017 06:01 - CONCLUSION:  1. Stable 





 ill-defined airspace disease in the lungs, left greater than right compared 

with 





 February 25. No new infiltrate or effusion.     Bravo Robison MD 


 


CT Angiography 2/11/17 0000 Signed





Impressions: 





 Service Date/Time:  Saturday, February 11, 2017 18:29 - CONCLUSION:  1. 

Negative 





 for pulmonary embolus. 2. Interval development of bilateral lung disease on a 





 background of emphysema. Findings are not entirely typical of 

bronchopneumonia. 





 Differential diagnosis includes some form of smoking related disease with 





 respiratory bronchiolitis or possibly Langerhans' cell histiocytosis.     

Bravo Robison MD 








Physical Exam


CONSTITUTIONAL/GENERAL: This is thin patient, in no apparent distress.


TUBES/LINES/DRAINS:


SKIN: No jaundice, rashes, or lesions. Ecchymoses on upper extremities. No 

wounds seen anteriorly. Skin temperature appropriate. Not diaphoretic. 


HEAD: Atraumatic. Normocephalic.


EYES: Pupils equal and round and reactive. Extraocular motions intact. No 

scleral icterus. No injection or drainage. Fundi not examined.


ENT: Hearing grossly normal. Nose without bleeding or purulent drainage. thrush 

is clearing . Ulcerations on the lateral tongue, very painful


Voice is very quiet


Severe areas of denuded mucosae on lateral aspects of the tounge, tongue less 

swollen


NECK: Trachea midline. Supple, nontender. No palpable thyroid enlargement or 

nodularity. 


No stridor


CARDIOVASCULAR: Regular rate and rhythm without murmurs, gallops, or rubs. No 

JVD. Peripheral pulses symmetric.


RESPIRATORY/CHEST: Symmetric, unlabored respirations. few scattered    rhonchi.

   and scattered wheezing 


GASTROINTESTINAL: Abdomen soft, non-tender, nondistended. No hepato-splenomegaly

, or palpable masses. No guarding. Bowel sounds present.


GENITOURINARY: Without palpable bladder distension.


MUSCULOSKELETAL: Extremities without clubbing, cyanosis, or edema. No joint 

tenderness or effusion noted. No calf tenderness. No mottling or clubbing.


LYMPHATICS: No palpable cervical or supraclavicular adenopathy.


NEUROLOGICAL: Awake and alert. Motor and sensory grossly within normal limits. 

Follows commands. Normal speech. Moves all extremities.


PSYCHIATRIC: No obvious anxiety/depression. no apparent hallucinations or other 

psychotic thought process.





Assessment & Plan


Remarks


COPD


PCP PNA, imnproving on IV Bactrim


Oral candidiasis, severe , improving on micafungin


Ulcerations of the tongue - confirmed  herpetic


Confirmed HIV/AIDS, CD4 < 20P


HCV/HBV negative


Persistent hypoxia





   - cont  IV BActrim with  sterroids


      Prednisone 40 mg twice daily for 5 days, followed by


      Prednisone 40 mg daily for 5 days, followed by


      Prednisone 20 mg daily for 11 days


   - bactrim can be changed to oral 2 DS TID to complete total of 21 days when 

pt is ready for dc


   - cont azithro  1200 q Mon for MAC profilaxis


   - dc fluconazole


   - cont micafungin for severe resistant oral thrush


   -cont  acyclovir, can be switched to po 400 tid  untill lesions completely 

healed


   - chk CXR


   - change cefepime to CFTX


   - fu genotype in anticipation of HAART 


   - if remains OK thru the we will dc on Monday





Pt is willing to establish HIV care immediately upon d/c. He was counseled. He 

understands the importance of prompt start of HIV tx and compliance with it


He would like to have Dr SWETHA Ernandez as his HIV provider





dw pt








Deborah Ray MD Mar 3, 2017 14:40
Subjective


Subjective


Remarks


Pt co severe pain of his tongue and mild swelling of his tongue


No difficulty swallowing, no drooling


No fever


SOb unchanged


Problems started around 10 pm last night 


remians on O2 NC


Antibiotics


Trim-sulfa


fluc


cefepime


Past Medical History


COPD


tobaccoisn


Allergies:  


Coded Allergies:  


     No Known Allergies (Verified , 1/30/17)





Objective


.





 Vital Signs








  Date Time  Temp Pulse Resp B/P Pulse Ox O2 Delivery O2 Flow Rate FiO2


 


2/25/17 12:00 97.6 91 17 133/69 95   


 


2/25/17 08:00 98.6 92 20 125/74 95   


 


2/25/17 07:55     89 Nasal Cannula 2.00 


 


2/25/17 03:41     96 Nasal Cannula 2.00 


 


2/25/17 00:00 98.8 89 20 114/69 96   


 


2/24/17 21:30     95 Nasal Cannula 2.00 21





      Humidified  


 


2/24/17 20:00 99.8 97 22 131/70 95   


 


2/24/17 17:29 97.4 85 20 113/70 93   














 2/24/17 2/24/17 2/25/17





 15:00 23:00 07:00


 


Intake Total 480 ml 240 ml 906 ml


 


Balance 480 ml 240 ml 906 ml


 


   


 


Intake Oral 480 ml 240 ml 240 ml


 


IV Total   666 ml


 


# Voids 2 2 2


 


# Bowel Movements  0 0








.





Microbiology








 Date/Time Procedure Status





Source Growth 


 


 2/25/17 01:45 Gram Stain Received





Sputum Expectorated Sputum Pending 





 2/25/17 01:45 Sputum Culture Received





Sputum Expectorated Sputum Pending 








Imaging





Last Impressions








Chest X-Ray 2/25/17 0600 Signed





Impressions: 





 Service Date/Time:  Saturday, February 25, 2017 06:09 - CONCLUSION:  No 





 significant change has occurred.       Orestes Apodaca MD 


 


CT Angiography 2/11/17 0000 Signed





Impressions: 





 Service Date/Time:  Saturday, February 11, 2017 18:29 - CONCLUSION:  1. 

Negative 





 for pulmonary embolus. 2. Interval development of bilateral lung disease on a 





 background of emphysema. Findings are not entirely typical of 

bronchopneumonia. 





 Differential diagnosis includes some form of smoking related disease with 





 respiratory bronchiolitis or possibly Langerhans' cell histiocytosis.     

Bravo Robison MD 








Physical Exam


CONSTITUTIONAL/GENERAL: This is thin patient, in no apparent distress.


TUBES/LINES/DRAINS:


SKIN: No jaundice, rashes, or lesions. Ecchymoses on upper extremities. No 

wounds seen anteriorly. Skin temperature appropriate. Not diaphoretic. 


HEAD: Atraumatic. Normocephalic.


EYES: Pupils equal and round and reactive. Extraocular motions intact. No 

scleral icterus. No injection or drainage. Fundi not examined.


ENT: Hearing grossly normal. Nose without bleeding or purulent drainage. Severe

  oral thrush. Ulcerations on the lateral tongue, very painful


I dont see much difference in the tongue size when compared to yday


NECK: Trachea midline. Supple, nontender. No palpable thyroid enlargement or 

nodularity. 


No stridor


CARDIOVASCULAR: Regular rate and rhythm without murmurs, gallops, or rubs. No 

JVD. Peripheral pulses symmetric.


RESPIRATORY/CHEST: Symmetric, unlabored respirations. few scattered    rhonchi.

   and scattered wheezing 


GASTROINTESTINAL: Abdomen soft, non-tender, nondistended. No hepato-splenomegaly

, or palpable masses. No guarding. Bowel sounds present.


GENITOURINARY: Without palpable bladder distension.


MUSCULOSKELETAL: Extremities without clubbing, cyanosis, or edema. No joint 

tenderness or effusion noted. No calf tenderness. No mottling or clubbing.


LYMPHATICS: No palpable cervical or supraclavicular adenopathy.


NEUROLOGICAL: Awake and alert. Motor and sensory grossly within normal limits. 

Follows commands. Normal speech. Moves all extremities.


PSYCHIATRIC: No obvious anxiety/depression. no apparent hallucinations or other 

psychotic thought process.





Assessment & Plan


Remarks


COPD


PCP PNA, 


Oral candidiasis, severe , worse


Ulcerations of the tongue ? aphtous vs herpetic


Confirmed HIV/AIDS, CD4 < 20P


HCV/HBV negative


Worsening hypoxia and new low grade fever toda ? new PNA





repeat CXR


sputum clx


blood clx with fever


cont cefepime for HCAP





   - cont  IV BActrim with  sterroids


      Prednisone 40 mg twice daily for 5 days, followed by


      Prednisone 40 mg daily for 5 days, followed by


      Prednisone 20 mg daily for 11 days


   -  start azithro  1200 q Mon for MAC profilaxis


   - cont fluconazole


   - add micafungin


   - add acyclovir


   - HSV PCR


   - anticipate transition to po bactrim  and dc soon if cont to improve








Pt is willing to establish HIV care immediately upon d/c. He was counseled. He 

understands the importance of prompt start of HIV tx and compliance with it


He would like to have Dr SWETHA Ernandez as his HIV provider





dw Deborah Reyes pt, MD Feb 25, 2017 16:27
Subjective


Subjective


Remarks


clearly improving


oral pain and swelling improving


Breathing better


afebrile


Antibiotics


Trim-sulfa


mmicafungin


acyclovir


azithro


Past Medical History


COPD


tobaccoisn


Allergies:  


Coded Allergies:  


     No Known Allergies (Verified , 1/30/17)





Objective


.





 Vital Signs








  Date Time  Temp Pulse Resp B/P Pulse Ox O2 Delivery O2 Flow Rate FiO2


 


3/8/17 20:32     98 Nasal Cannula 2.00 


 


3/8/17 16:00 96.2 83 18 104/65 98   


 


3/8/17 12:00 96.3 82 17 97/56 97   


 


3/8/17 10:17   16     


 


3/8/17 09:04     97 Nasal Cannula 2.00 


 


3/8/17 08:30      Nasal Cannula 2.00 


 


3/8/17 08:00 98.6 85 20 127/70 97   


 


3/8/17 00:00 97.4 79 18 122/69 96   


 


3/7/17 23:51     97 Nasal Cannula 2.00 














 3/7/17 3/7/17 3/8/17





 15:00 23:00 07:00


 


Intake Total 240 ml 1200 ml 1740 ml


 


Balance 240 ml 1200 ml 1740 ml


 


   


 


Intake Oral 240 ml 1200 ml 240 ml


 


IV Total  0 ml 1500 ml


 


# Voids 3 3 1


 


# Bowel Movements 0  








Imaging





Last Impressions








Chest X-Ray 3/6/17 0000 Signed





Impressions: 





 Service Date/Time:  Monday, March 6, 2017 05:50 - CONCLUSION:  1. Stable 

patchy 





 airspace disease laterally in the midlung fields. 2. Possible small associated 





 effusions. 3. No significant change from prior.     Richard Barnard MD 


 


CT Angiography 2/11/17 0000 Signed





Impressions: 





 Service Date/Time:  Saturday, February 11, 2017 18:29 - CONCLUSION:  1. 

Negative 





 for pulmonary embolus. 2. Interval development of bilateral lung disease on a 





 background of emphysema. Findings are not entirely typical of 

bronchopneumonia. 





 Differential diagnosis includes some form of smoking related disease with 





 respiratory bronchiolitis or possibly Langerhans' cell histiocytosis.     

Bravo Robison MD 








Physical Exam


CONSTITUTIONAL/GENERAL: This is thin patient, in no apparent distress.


TUBES/LINES/DRAINS:


SKIN: No jaundice, rashes, or lesions. Ecchymoses on upper extremities. No 

wounds seen anteriorly. Skin temperature appropriate. Not diaphoretic. 


HEAD: Atraumatic. Normocephalic.


EYES: Pupils equal and round and reactive. Extraocular motions intact. No 

scleral icterus. No injection or drainage. Fundi not examined.


ENT: Hearing grossly normal. Nose without bleeding or purulent drainage. thrush 

resolved . Ulcerations nearly resolve


Voice is very quiet


Improved  areas of denuded mucosae on lateral aspects of the tounge, tongue 

less swollen


NECK: Trachea midline. Supple, nontender. No palpable thyroid enlargement or 

nodularity. 


No stridor


CARDIOVASCULAR: Regular rate and rhythm without murmurs, gallops, or rubs. No 

JVD. Peripheral pulses symmetric.


RESPIRATORY/CHEST: Symmetric, unlabored respirations. few scattered    rhonchi.

   and scattered wheezing 


GASTROINTESTINAL: Abdomen soft, non-tender, nondistended. No hepato-splenomegaly

, or palpable masses. No guarding. Bowel sounds present.


MUSCULOSKELETAL: Extremities without clubbing, cyanosis, or edema. No joint 

tenderness or effusion noted. No calf tenderness. No mottling or clubbing.


NEUROLOGICAL: Awake and alert. Motor and sensory grossly within normal limits. 

Follows commands. Normal speech. Moves all extremities.


PSYCHIATRIC: No obvious anxiety/depression. no apparent hallucinations or other 

psychotic thought process.





Assessment & Plan


Remarks


COPD


PCP PNA, imnproving on IV Bactrim


Oral candidiasis, severe , improving on micafungin


Ulcerations of the tongue - confirmed  herpetic


Confirmed HIV/AIDS, CD4 < 20P


HCV/HBV negative


Persistent hypoxia - improved 





   - cont  IV BActrim with  sterroids


      Prednisone 40 mg twice daily for 5 days, followed by


      Prednisone 40 mg daily for 5 days, followed by


      Prednisone 20 mg daily for 11 days


   - bactrim can be changed to oral 2 DS TID to complete total of 21 days when 

pt is ready for dc


   - Stop Bactrim on 3/14: at home pt can take Bactrim DS 2pills  TID PO to 

complete the coure


      after the course completed he needs to take  Bactrim DS 1 pill daily 


   - cont azithro  1200 q Mon for MAC profilaxis


   - cont micafungin for severe resistant oral thrush; will need at least  full 

2 weeks (thru 3/10)


   -cont  acyclovir, can be switched to po 400 tid  untill lesions completely 

healed


   -  genotype noted in anticipation of HAART : forward to Dr Ernandez; if  for 

some reason pt say in the hospital p  Friday will start HAART nxt week


   pt can be dc' d on Fri after micafungin dose





Pt is willing to establish HIV care immediately upon d/c. He was counseled. He 

understands the importance of prompt start of HIV tx and compliance with it


He would like to have Dr SWETHA Ernandez as his HIV provider





dw pt








Deborah Ray MD Mar 8, 2017 21:31
Subjective


Subjective


Remarks


improving slowly


over w/e intermittent low grade fevers


Less SOB


Oral pain still persist


HSV clx + from oral lesions


remains on 2 L NC O2


Antibiotics


Trim-sulfa


fluc


cefepime


mmicafungin


acyclovit


Past Medical History


COPD


tobaccoisn


Allergies:  


Coded Allergies:  


     No Known Allergies (Verified , 1/30/17)





Objective


.





 Vital Signs








  Date Time  Temp Pulse Resp B/P Pulse Ox O2 Delivery O2 Flow Rate FiO2


 


3/6/17 12:00 98.4 88 17 112/71 91   


 


3/6/17 09:26      Nasal Cannula 2.00 


 


3/6/17 08:00 98.6 90 18 124/82 97   


 


3/6/17 07:54     94 Nasal Cannula 2.00 


 


3/6/17 06:01   20     


 


3/6/17 04:00 98.8 97 18 99/59 96   


 


3/6/17 03:16     94 Nasal Cannula 2.00 


 


3/6/17 02:00 99.2 97 22 123/67 94   


 


3/5/17 20:25      Nasal Cannula 2.00 96


 


3/5/17 20:00 97.8 94 18 117/72 96   


 


3/5/17 19:54     94 Nasal Cannula 2.00 


 


3/5/17 16:00 96.2 88 20 108/63 95   














 3/5/17 3/5/17 3/6/17





 15:00 23:00 07:00


 


Intake Total 1720 ml 2385 ml 1231 ml


 


Output Total 600 ml  600 ml


 


Balance 1120 ml 2385 ml 631 ml


 


   


 


Intake Oral 800 ml 480 ml 240 ml


 


IV Total 920 ml 1905 ml 991 ml


 


Output Urine Total 600 ml  600 ml


 


# Voids  2 


 


# Bowel Movements 1  








.





Laboratory Tests








Test 3/5/17





 06:15


 


White Blood Count 6.7 TH/MM3


 


Red Blood Count 4.39 MIL/MM3


 


Hemoglobin 13.0 GM/DL


 


Hematocrit 37.9 %


 


Mean Corpuscular Volume 86.5 FL


 


Mean Corpuscular Hemoglobin 29.6 PG


 


Mean Corpuscular Hemoglobin 34.2 %





Concent 


 


Red Cell Distribution Width 15.4 %


 


Platelet Count 157 TH/MM3


 


Mean Platelet Volume 8.0 FL


 


Neutrophils (%) (Auto) 90.2 %


 


Lymphocytes (%) (Auto) 2.8 %


 


Monocytes (%) (Auto) 2.9 %


 


Eosinophils (%) (Auto) 3.7 %


 


Basophils (%) (Auto) 0.4 %


 


Neutrophils # (Auto) 6.1 TH/MM3


 


Lymphocytes # (Auto) 0.2 TH/MM3


 


Monocytes # (Auto) 0.2 TH/MM3


 


Eosinophils # (Auto) 0.2 TH/MM3


 


Basophils # (Auto) 0.0 TH/MM3


 


CBC Comment AUTO DIFF 


 


Differential Total Cells 100 





Counted 


 


Neutrophils % (Manual) 83 %


 


Band Neutrophils % 7 %


 


Lymphocytes % 1 %


 


Eosinophils % 2 %


 


Basophils % 1 %


 


Neutrophils # (Manual) 6.4 TH/MM3


 


Metamyelocytes 3 %


 


Myelocytes 1 %


 


Promyelocytes 2 %


 


Differential Comment FINAL DIFF





 MANUAL


 


Platelet Estimate NORMAL 


 


Platelet Morphology Comment NORMAL 


 


Red Cell Morphology Comment NORMAL 








Laboratory Tests








Test 3/5/17





 06:15


 


Sodium Level 132 MEQ/L


 


Potassium Level 4.2 MEQ/L


 


Chloride Level 100 MEQ/L


 


Carbon Dioxide Level 22.0 MEQ/L


 


Anion Gap 10 MEQ/L


 


Blood Urea Nitrogen 13 MG/DL


 


Creatinine 0.94 MG/DL


 


Estimat Glomerular Filtration 82 ML/MIN





Rate 


 


Random Glucose 82 MG/DL


 


Calcium Level 8.6 MG/DL








Imaging





Last Impressions








Chest X-Ray 3/6/17 0000 Signed





Impressions: 





 Service Date/Time:  Monday, March 6, 2017 05:50 - CONCLUSION:  1. Stable 

patchy 





 airspace disease laterally in the midlung fields. 2. Possible small associated 





 effusions. 3. No significant change from prior.     Richard Barnard MD 


 


CT Angiography 2/11/17 0000 Signed





Impressions: 





 Service Date/Time:  Saturday, February 11, 2017 18:29 - CONCLUSION:  1. 

Negative 





 for pulmonary embolus. 2. Interval development of bilateral lung disease on a 





 background of emphysema. Findings are not entirely typical of 

bronchopneumonia. 





 Differential diagnosis includes some form of smoking related disease with 





 respiratory bronchiolitis or possibly Langerhans' cell histiocytosis.     

Bravo Robison MD 








Physical Exam


CONSTITUTIONAL/GENERAL: This is thin patient, in no apparent distress.


TUBES/LINES/DRAINS:


SKIN: No jaundice, rashes, or lesions. Ecchymoses on upper extremities. No 

wounds seen anteriorly. Skin temperature appropriate. Not diaphoretic. 


HEAD: Atraumatic. Normocephalic.


EYES: Pupils equal and round and reactive. Extraocular motions intact. No 

scleral icterus. No injection or drainage. Fundi not examined.


ENT: Hearing grossly normal. Nose without bleeding or purulent drainage. thrush 

resolved . Ulcerations on the lateral tongue, very painful, but seems improving 


Voice is very quiet


Improved  areas of denuded mucosae on lateral aspects of the tounge, tongue 

less swollen


NECK: Trachea midline. Supple, nontender. No palpable thyroid enlargement or 

nodularity. 


No stridor


CARDIOVASCULAR: Regular rate and rhythm without murmurs, gallops, or rubs. No 

JVD. Peripheral pulses symmetric.


RESPIRATORY/CHEST: Symmetric, unlabored respirations. few scattered    rhonchi.

   and scattered wheezing 


GASTROINTESTINAL: Abdomen soft, non-tender, nondistended. No hepato-splenomegaly

, or palpable masses. No guarding. Bowel sounds present.


MUSCULOSKELETAL: Extremities without clubbing, cyanosis, or edema. No joint 

tenderness or effusion noted. No calf tenderness. No mottling or clubbing.


LYMPHATICS: No palpable cervical or supraclavicular adenopathy.


NEUROLOGICAL: Awake and alert. Motor and sensory grossly within normal limits. 

Follows commands. Normal speech. Moves all extremities.


PSYCHIATRIC: No obvious anxiety/depression. no apparent hallucinations or other 

psychotic thought process.





Assessment & Plan


Remarks


COPD


PCP PNA, imnproving on IV Bactrim


Oral candidiasis, severe , improving on micafungin


Ulcerations of the tongue - confirmed  herpetic


Confirmed HIV/AIDS, CD4 < 20P


HCV/HBV negative


Persistent hypoxia





   - cont  IV BActrim with  sterroids


      Prednisone 40 mg twice daily for 5 days, followed by


      Prednisone 40 mg daily for 5 days, followed by


      Prednisone 20 mg daily for 11 days


   - bactrim can be changed to oral 2 DS TID to complete total of 21 days when 

pt is ready for dc


   - Stop Bactrim on 3/14


   - cont azithro  1200 q Mon for MAC profilaxis


   - dc fluconazole


   - cont micafungin for severe resistant oral thrush; will need at least  full 

2 weeks (thru 3/10)


   -cont  acyclovir, can be switched to po 400 tid  untill lesions completely 

healed


   - chk CXR


   - dc CFTX


   - fu genotype in anticipation of HAART 


   


Pt is willing to establish HIV care immediately upon d/c. He was counseled. He 

understands the importance of prompt start of HIV tx and compliance with it


He would like to have Dr SWETHA Ernandez as his HIV provider





dw pt








Deborah Ray MD Mar 6, 2017 14:59
Subjective


Subjective


Remarks


pt is co not feeling well


co severe oral pain


co SOB


afebrile


Antibiotics


Trim-sulfa


fluc


cefepime


mmicafungin


acyclovit


Past Medical History


COPD


tobaccoisn


Allergies:  


Coded Allergies:  


     No Known Allergies (Verified , 1/30/17)





Objective


.





 Vital Signs








  Date Time  Temp Pulse Resp B/P Pulse Ox O2 Delivery O2 Flow Rate FiO2


 


3/1/17 12:00 98.4 88 20 134/84 95   


 


3/1/17 09:21     95 Nasal Cannula 2.00 


 


3/1/17 08:00 96.5 95 24 137/91 94   


 


3/1/17 00:07   18     


 


3/1/17 00:00 97.8 84 20 128/84 94   


 


2/28/17 20:00     94 Room Air  


 


2/28/17 20:00 95.5 95 20 127/86 98   


 


2/28/17 17:17 97.7 84 16 136/81 95   














 2/28/17 2/28/17 3/1/17





 15:00 23:00 07:00


 


Intake Total 715 ml 1200 ml 240 ml


 


Balance 715 ml 1200 ml 240 ml


 


   


 


Intake Oral 715 ml 1200 ml 240 ml


 


# Voids 3 2 2


 


# Bowel Movements 0  








.





Laboratory Tests








Test 3/1/17





 05:02


 


White Blood Count 9.0 TH/MM3


 


Red Blood Count 4.85 MIL/MM3


 


Hemoglobin 14.2 GM/DL


 


Hematocrit 41.9 %


 


Mean Corpuscular Volume 86.3 FL


 


Mean Corpuscular Hemoglobin 29.4 PG


 


Mean Corpuscular Hemoglobin 34.0 %





Concent 


 


Red Cell Distribution Width 15.5 %


 


Platelet Count 158 TH/MM3


 


Mean Platelet Volume 7.7 FL


 


Neutrophils (%) (Auto) 94.3 %


 


Lymphocytes (%) (Auto) 2.3 %


 


Monocytes (%) (Auto) 2.5 %


 


Eosinophils (%) (Auto) 0.8 %


 


Basophils (%) (Auto) 0.1 %


 


Neutrophils # (Auto) 8.5 TH/MM3


 


Lymphocytes # (Auto) 0.2 TH/MM3


 


Monocytes # (Auto) 0.2 TH/MM3


 


Eosinophils # (Auto) 0.1 TH/MM3


 


Basophils # (Auto) 0.0 TH/MM3


 


CBC Comment AUTO DIFF 


 


Differential Total Cells 100 





Counted 


 


Neutrophils % (Manual) 70 %


 


Band Neutrophils % 19 %


 


Lymphocytes % 1 %


 


Monocytes % 4 %


 


Neutrophils # (Manual) 8.6 TH/MM3


 


Metamyelocytes 3 %


 


Myelocytes 3 %


 


Differential Comment FINAL DIFF





 MANUAL


 


Platelet Estimate NORMAL 


 


Platelet Morphology Comment NORMAL 


 


Red Cell Morphology Comment NORMAL 








Laboratory Tests








Test 3/1/17





 05:02


 


Sodium Level 128 MEQ/L


 


Potassium Level 4.9 MEQ/L


 


Chloride Level 96 MEQ/L


 


Carbon Dioxide Level 22.2 MEQ/L


 


Anion Gap 10 MEQ/L


 


Blood Urea Nitrogen 17 MG/DL


 


Creatinine 0.85 MG/DL


 


Estimat Glomerular Filtration 92 ML/MIN





Rate 


 


Random Glucose 78 MG/DL


 


Calcium Level 8.5 MG/DL








Imaging





Last Impressions








Chest X-Ray 2/25/17 0600 Signed





Impressions: 





 Service Date/Time:  Saturday, February 25, 2017 06:09 - CONCLUSION:  No 





 significant change has occurred.       Orestes Apodaca MD 


 


CT Angiography 2/11/17 0000 Signed





Impressions: 





 Service Date/Time:  Saturday, February 11, 2017 18:29 - CONCLUSION:  1. 

Negative 





 for pulmonary embolus. 2. Interval development of bilateral lung disease on a 





 background of emphysema. Findings are not entirely typical of 

bronchopneumonia. 





 Differential diagnosis includes some form of smoking related disease with 





 respiratory bronchiolitis or possibly Langerhans' cell histiocytosis.     

Bravo Robison MD 








Physical Exam


CONSTITUTIONAL/GENERAL: This is thin patient, in no apparent distress.


TUBES/LINES/DRAINS:


SKIN: No jaundice, rashes, or lesions. Ecchymoses on upper extremities. No 

wounds seen anteriorly. Skin temperature appropriate. Not diaphoretic. 


HEAD: Atraumatic. Normocephalic.


EYES: Pupils equal and round and reactive. Extraocular motions intact. No 

scleral icterus. No injection or drainage. Fundi not examined.


ENT: Hearing grossly normal. Nose without bleeding or purulent drainage. Severe

  oral thrush. Ulcerations on the lateral tongue, very painful


Voice is very quiet


Severe areas of denuded mucosae on lateral aspects of the tounge


NECK: Trachea midline. Supple, nontender. No palpable thyroid enlargement or 

nodularity. 


No stridor


CARDIOVASCULAR: Regular rate and rhythm without murmurs, gallops, or rubs. No 

JVD. Peripheral pulses symmetric.


RESPIRATORY/CHEST: Symmetric, unlabored respirations. few scattered    rhonchi.

   and scattered wheezing 


GASTROINTESTINAL: Abdomen soft, non-tender, nondistended. No hepato-splenomegaly

, or palpable masses. No guarding. Bowel sounds present.


GENITOURINARY: Without palpable bladder distension.


MUSCULOSKELETAL: Extremities without clubbing, cyanosis, or edema. No joint 

tenderness or effusion noted. No calf tenderness. No mottling or clubbing.


LYMPHATICS: No palpable cervical or supraclavicular adenopathy.


NEUROLOGICAL: Awake and alert. Motor and sensory grossly within normal limits. 

Follows commands. Normal speech. Moves all extremities.


PSYCHIATRIC: No obvious anxiety/depression. no apparent hallucinations or other 

psychotic thought process.





Assessment & Plan


Remarks


COPD


PCP PNA, 


Oral candidiasis, severe , not improving 


Ulcerations of the tongue ? aphtous vs herpetic


Confirmed HIV/AIDS, CD4 < 20P


HCV/HBV negative


Persistent hypoxia


sputum negative 








   - cont  IV BActrim with  sterroids


      Prednisone 40 mg twice daily for 5 days, followed by


      Prednisone 40 mg daily for 5 days, followed by


      Prednisone 20 mg daily for 11 days


   -  start azithro  1200 q Mon for MAC profilaxis


   - cont fluconazole


   - cont micafungin


   -cont  acyclovir


   - chk HSV PCR


   - anticipate transition to po bactrim  and dc soon if cont to improve


   - chk CXR


   - change cefepime to CFTX


   - chk genotype in anticipation of HAART 


   - will hold dc since he is somewhat worse today








Pt is willing to establish HIV care immediately upon d/c. He was counseled. He 

understands the importance of prompt start of HIV tx and compliance with it


He would like to have Dr SWETHA Ernandez as his HIV provider





dw Dr Frank tracey pt








Deborah Ray MD Mar 1, 2017 15:38
Subjective


Subjective


Remarks


still hypoxic 


requires currently 4 L of O2


no expectoration


no fever


thrush improved


co liquid diarrhea


Antibiotics


azithro


CTX


fluc


Past Medical History


COPD


tobaccoisn


Allergies:  


Coded Allergies:  


     No Known Allergies (Verified , 1/30/17)





Objective


.





 Vital Signs








  Date Time  Temp Pulse Resp B/P Pulse Ox O2 Delivery O2 Flow Rate FiO2


 


2/20/17 12:21    146/93    


 


2/20/17 12:00 98.0 79 19 169/99 95   


 


2/20/17 08:00 97.5 79 18 140/80 96   


 


2/20/17 07:29     95 Nasal Cannula 3.00 


 


2/20/17 00:00 96.9 82 20 159/97 95   


 


2/19/17 22:28      Nasal Cannula 2.00 


 


2/19/17 21:17     94 Nasal Cannula 3.00 


 


2/19/17 20:00 96.5 82 20 123/90 94   


 


2/19/17 16:00 98.1 78 21 135/87 90   


 


2/19/17 15:11     92 Nasal Cannula 3.00 














 2/19/17 2/19/17 2/20/17





 15:00 23:00 07:00


 


Intake Total 1200 ml 338 ml 320 ml


 


Balance 1200 ml 338 ml 320 ml


 


   


 


Intake Oral 1200 ml 240 ml 320 ml


 


IV Total  98 ml 


 


# Voids 3 2 2


 


# Bowel Movements 2 0 0








.





Laboratory Tests








Test 2/20/17





 05:28


 


White Blood Count 9.1 TH/MM3


 


Red Blood Count 4.99 MIL/MM3


 


Hemoglobin 14.8 GM/DL


 


Hematocrit 43.2 %


 


Mean Corpuscular Volume 86.6 FL


 


Mean Corpuscular Hemoglobin 29.6 PG


 


Mean Corpuscular Hemoglobin 34.2 %





Concent 


 


Red Cell Distribution Width 14.5 %


 


Platelet Count 225 TH/MM3


 


Mean Platelet Volume 7.8 FL








Laboratory Tests








Test 2/19/17 2/20/17





 05:26 05:28


 


Creatinine 0.80 MG/DL 0.80 MG/DL


 


Estimat Glomerular Filtration 99 ML/MIN 99 ML/MIN





Rate  


 


Sodium Level  138 MEQ/L


 


Potassium Level  4.1 MEQ/L


 


Chloride Level  102 MEQ/L


 


Carbon Dioxide Level  25.6 MEQ/L


 


Anion Gap  10 MEQ/L


 


Blood Urea Nitrogen  21 MG/DL


 


Random Glucose  145 MG/DL


 


Calcium Level  8.6 MG/DL








Imaging





Last Impressions








Chest X-Ray 2/20/17 0600 Signed





Impressions: 





 Service Date/Time:  Monday, February 20, 2017 05:30 - CONCLUSION:  Diffuse 





 increase interstitial markings related to diffuse processes such as edema or 





 diffuse infection.     Nahun Azul MD 


 


CT Angiography 2/11/17 0000 Signed





Impressions: 





 Service Date/Time:  Saturday, February 11, 2017 18:29 - CONCLUSION:  1. 

Negative 





 for pulmonary embolus. 2. Interval development of bilateral lung disease on a 





 background of emphysema. Findings are not entirely typical of 

bronchopneumonia. 





 Differential diagnosis includes some form of smoking related disease with 





 respiratory bronchiolitis or possibly Langerhans' cell histiocytosis.     

Bravo Robison MD 








Physical Exam


CONSTITUTIONAL/GENERAL: This is thin patient, in no apparent distress.


TUBES/LINES/DRAINS:


SKIN: No jaundice, rashes, or lesions. Ecchymoses on upper extremities. No 

wounds seen anteriorly. Skin temperature appropriate. Not diaphoretic. 


HEAD: Atraumatic. Normocephalic.


EYES: Pupils equal and round and reactive. Extraocular motions intact. No 

scleral icterus. No injection or drainage. Fundi not examined.


ENT: Hearing grossly normal. Nose without bleeding or purulent drainage. 

Extensive oral thrush


NECK: Trachea midline. Supple, nontender. No palpable thyroid enlargement or 

nodularity. 


CARDIOVASCULAR: Regular rate and rhythm without murmurs, gallops, or rubs. No 

JVD. Peripheral pulses symmetric.


RESPIRATORY/CHEST: Symmetric, unlabored respirations. Clear to auscultation. 

Breath sounds equal bilaterally. No wheezes, rales, or rhonchi.  


GASTROINTESTINAL: Abdomen soft, non-tender, nondistended. No hepato-splenomegaly

, or palpable masses. No guarding. Bowel sounds present.


GENITOURINARY: Without palpable bladder distension.


MUSCULOSKELETAL: Extremities without clubbing, cyanosis, or edema. No joint 

tenderness or effusion noted. No calf tenderness. No mottling or clubbing.


LYMPHATICS: No palpable cervical or supraclavicular adenopathy.


NEUROLOGICAL: Awake and alert. Motor and sensory grossly within normal limits. 

Follows commands. Cognitively sharp. Moves all extremities.


PSYCHIATRIC: No obvious anxiety/depression. no apparent hallucinations or other 

psychotic thought process.





Assessment & Plan


Remarks


COPD


PNA vs non infectious pneumonitis with hypoxia with unestblished underlying 

cause 


   - sputum and BAL clx are negative


    - CXR from today looks worse


   - still on high level of NC O2


   - completely negative w/u as of today


Oral candidiasis in the settings of sterriods use - umproving on fluc





   - cont azithro


   - dc  CFTX


   - fu P cytology and leg


   -cont  fluconazole for oral thrush


   - consider lung bx if P cytology unrevealinmg


   - ro C.diff


   - consider HIV testing (pt would like it to be done as o/p) - pt has 

occupational risks for HCV/HIV


      - last time tested negative for HIV about 4 yrs ago and HCV negative 

about 15 yrs ago








Deborah Ray MD Feb 20, 2017 13:13
Subjective


Subjective


Remarks


still hypoxic 


requires currently 4 L of O2


no expectoration


no fever


thrush worse


no diarrhea


BAL positive for PCP


Antibiotics


azithro


CTX


fluc


Past Medical History


COPD


tobaccoisn


Allergies:  


Coded Allergies:  


     No Known Allergies (Verified , 1/30/17)





Objective


.





 Vital Signs








  Date Time  Temp Pulse Resp B/P Pulse Ox O2 Delivery O2 Flow Rate FiO2


 


2/21/17 12:00 97.8 78 18 157/82 93   


 


2/21/17 08:38      Nasal Cannula 4.00 


 


2/21/17 08:00 97.0 81 18 152/93 94   


 


2/21/17 00:00 97.2 9 20 150/78 97   


 


2/20/17 20:19     93 Nasal Cannula 2.00 


 


2/20/17 20:00 96.9 90 20 164/80 97   


 


2/20/17 19:30     93 Nasal Cannula 2.00 


 


2/20/17 16:00 97.8 84 19 146/61 96   














 2/20/17 2/20/17 2/21/17





 15:00 23:00 07:00


 


Intake Total 1080 ml 480 ml 240 ml


 


Output Total 600 ml  


 


Balance 480 ml 480 ml 240 ml


 


   


 


Intake Oral 1080 ml 480 ml 240 ml


 


IV Total 0 ml  


 


Output Urine Total 400 ml  


 


Stool Total 200 ml  


 


# Voids  2 2


 


# Bowel Movements  1 0








.





Laboratory Tests








Test 2/20/17





 05:28


 


White Blood Count 9.1 TH/MM3


 


Red Blood Count 4.99 MIL/MM3


 


Hemoglobin 14.8 GM/DL


 


Hematocrit 43.2 %


 


Mean Corpuscular Volume 86.6 FL


 


Mean Corpuscular Hemoglobin 29.6 PG


 


Mean Corpuscular Hemoglobin 34.2 %





Concent 


 


Red Cell Distribution Width 14.5 %


 


Platelet Count 225 TH/MM3


 


Mean Platelet Volume 7.8 FL








Laboratory Tests








Test 2/20/17





 05:28


 


Sodium Level 138 MEQ/L


 


Potassium Level 4.1 MEQ/L


 


Chloride Level 102 MEQ/L


 


Carbon Dioxide Level 25.6 MEQ/L


 


Anion Gap 10 MEQ/L


 


Blood Urea Nitrogen 21 MG/DL


 


Creatinine 0.80 MG/DL


 


Estimat Glomerular Filtration 99 ML/MIN





Rate 


 


Random Glucose 145 MG/DL


 


Calcium Level 8.6 MG/DL








Imaging





Last Impressions








Chest X-Ray 2/20/17 0600 Signed





Impressions: 





 Service Date/Time:  Monday, February 20, 2017 05:30 - CONCLUSION:  Diffuse 





 increase interstitial markings related to diffuse processes such as edema or 





 diffuse infection.     Nahun Azul MD 


 


CT Angiography 2/11/17 0000 Signed





Impressions: 





 Service Date/Time:  Saturday, February 11, 2017 18:29 - CONCLUSION:  1. 

Negative 





 for pulmonary embolus. 2. Interval development of bilateral lung disease on a 





 background of emphysema. Findings are not entirely typical of 

bronchopneumonia. 





 Differential diagnosis includes some form of smoking related disease with 





 respiratory bronchiolitis or possibly Langerhans' cell histiocytosis.     

Bravo Robison MD 








Physical Exam


CONSTITUTIONAL/GENERAL: This is thin patient, in no apparent distress.


TUBES/LINES/DRAINS:


SKIN: No jaundice, rashes, or lesions. Ecchymoses on upper extremities. No 

wounds seen anteriorly. Skin temperature appropriate. Not diaphoretic. 


HEAD: Atraumatic. Normocephalic.


EYES: Pupils equal and round and reactive. Extraocular motions intact. No 

scleral icterus. No injection or drainage. Fundi not examined.


ENT: Hearing grossly normal. Nose without bleeding or purulent drainage. 

Extensive oral thrush


NECK: Trachea midline. Supple, nontender. No palpable thyroid enlargement or 

nodularity. 


CARDIOVASCULAR: Regular rate and rhythm without murmurs, gallops, or rubs. No 

JVD. Peripheral pulses symmetric.


RESPIRATORY/CHEST: Symmetric, unlabored respirations. Scattered  rhonchi.  


GASTROINTESTINAL: Abdomen soft, non-tender, nondistended. No hepato-splenomegaly

, or palpable masses. No guarding. Bowel sounds present.


GENITOURINARY: Without palpable bladder distension.


MUSCULOSKELETAL: Extremities without clubbing, cyanosis, or edema. No joint 

tenderness or effusion noted. No calf tenderness. No mottling or clubbing.


LYMPHATICS: No palpable cervical or supraclavicular adenopathy.


NEUROLOGICAL: Awake and alert. Motor and sensory grossly within normal limits. 

Follows commands. Cognitively sharp. Moves all extremities.


PSYCHIATRIC: No obvious anxiety/depression. no apparent hallucinations or other 

psychotic thought process.





Assessment & Plan


Remarks


COPD


PCP PNA,  new diagnosis


Oral candidiasis in the settings of another OI


suspected HIV/AIDS





   - dc azithro


   - dc  CFTX


   - start IV BActrim with  sterroids


      Prednisone 40 mg twice daily for 5 days, followed by


      Prednisone 40 mg daily for 5 days, followed by


      Prednisone 20 mg daily for 11 days


   -  HIV testing in -house: pt with 2 AIDS defining conditions; explained to pt

, he is agreeable


   chk CD4 count


   - cont fluconazole








Deborah Ray MD Feb 21, 2017 13:19
4 = No assist / stand by assistance